# Patient Record
Sex: MALE | Race: WHITE | NOT HISPANIC OR LATINO | URBAN - METROPOLITAN AREA
[De-identification: names, ages, dates, MRNs, and addresses within clinical notes are randomized per-mention and may not be internally consistent; named-entity substitution may affect disease eponyms.]

---

## 2017-12-04 ENCOUNTER — OUTPATIENT (OUTPATIENT)
Dept: OUTPATIENT SERVICES | Facility: HOSPITAL | Age: 67
LOS: 1 days | Discharge: HOME | End: 2017-12-04

## 2017-12-06 ENCOUNTER — OUTPATIENT (OUTPATIENT)
Dept: OUTPATIENT SERVICES | Facility: HOSPITAL | Age: 67
LOS: 1 days | Discharge: HOME | End: 2017-12-06

## 2017-12-08 ENCOUNTER — OUTPATIENT (OUTPATIENT)
Dept: OUTPATIENT SERVICES | Facility: HOSPITAL | Age: 67
LOS: 1 days | Discharge: HOME | End: 2017-12-08

## 2017-12-11 ENCOUNTER — OUTPATIENT (OUTPATIENT)
Dept: OUTPATIENT SERVICES | Facility: HOSPITAL | Age: 67
LOS: 1 days | Discharge: HOME | End: 2017-12-11

## 2017-12-15 ENCOUNTER — OUTPATIENT (OUTPATIENT)
Dept: OUTPATIENT SERVICES | Facility: HOSPITAL | Age: 67
LOS: 1 days | Discharge: HOME | End: 2017-12-15

## 2017-12-18 ENCOUNTER — OUTPATIENT (OUTPATIENT)
Dept: OUTPATIENT SERVICES | Facility: HOSPITAL | Age: 67
LOS: 1 days | Discharge: HOME | End: 2017-12-18

## 2017-12-22 ENCOUNTER — OUTPATIENT (OUTPATIENT)
Dept: OUTPATIENT SERVICES | Facility: HOSPITAL | Age: 67
LOS: 1 days | Discharge: HOME | End: 2017-12-22

## 2022-09-14 ENCOUNTER — OFFICE VISIT (OUTPATIENT)
Dept: FAMILY MEDICINE CLINIC | Facility: CLINIC | Age: 72
End: 2022-09-14
Payer: COMMERCIAL

## 2022-09-14 ENCOUNTER — APPOINTMENT (OUTPATIENT)
Dept: LAB | Facility: CLINIC | Age: 72
End: 2022-09-14
Payer: COMMERCIAL

## 2022-09-14 VITALS
HEART RATE: 92 BPM | BODY MASS INDEX: 35.46 KG/M2 | HEIGHT: 68 IN | SYSTOLIC BLOOD PRESSURE: 110 MMHG | DIASTOLIC BLOOD PRESSURE: 72 MMHG | WEIGHT: 234 LBS | OXYGEN SATURATION: 99 %

## 2022-09-14 DIAGNOSIS — Z76.89 ENCOUNTER TO ESTABLISH CARE: Primary | ICD-10-CM

## 2022-09-14 DIAGNOSIS — Z11.59 NEED FOR HEPATITIS C SCREENING TEST: ICD-10-CM

## 2022-09-14 DIAGNOSIS — G89.29 CHRONIC LEFT SHOULDER PAIN: ICD-10-CM

## 2022-09-14 DIAGNOSIS — I10 ESSENTIAL HYPERTENSION: ICD-10-CM

## 2022-09-14 DIAGNOSIS — G47.33 OSA (OBSTRUCTIVE SLEEP APNEA): ICD-10-CM

## 2022-09-14 DIAGNOSIS — J45.40 MODERATE PERSISTENT ASTHMA, UNSPECIFIED WHETHER COMPLICATED: ICD-10-CM

## 2022-09-14 DIAGNOSIS — E78.2 MIXED HYPERLIPIDEMIA: ICD-10-CM

## 2022-09-14 DIAGNOSIS — M25.50 POLYARTHRALGIA: ICD-10-CM

## 2022-09-14 DIAGNOSIS — Z12.5 PROSTATE CANCER SCREENING: ICD-10-CM

## 2022-09-14 DIAGNOSIS — M25.512 CHRONIC LEFT SHOULDER PAIN: ICD-10-CM

## 2022-09-14 DIAGNOSIS — E03.9 HYPOTHYROIDISM, UNSPECIFIED TYPE: ICD-10-CM

## 2022-09-14 DIAGNOSIS — M19.90 ARTHRITIS: ICD-10-CM

## 2022-09-14 DIAGNOSIS — Z23 NEED FOR PNEUMOCOCCAL VACCINATION: ICD-10-CM

## 2022-09-14 DIAGNOSIS — Z12.11 SCREENING FOR COLON CANCER: ICD-10-CM

## 2022-09-14 LAB
ALBUMIN SERPL BCP-MCNC: 4 G/DL (ref 3.5–5)
ALP SERPL-CCNC: 91 U/L (ref 46–116)
ALT SERPL W P-5'-P-CCNC: 62 U/L (ref 12–78)
ANION GAP SERPL CALCULATED.3IONS-SCNC: 4 MMOL/L (ref 4–13)
AST SERPL W P-5'-P-CCNC: 35 U/L (ref 5–45)
BASOPHILS # BLD AUTO: 0.08 THOUSANDS/ΜL (ref 0–0.1)
BASOPHILS NFR BLD AUTO: 1 % (ref 0–1)
BILIRUB SERPL-MCNC: 0.33 MG/DL (ref 0.2–1)
BUN SERPL-MCNC: 26 MG/DL (ref 5–25)
CALCIUM SERPL-MCNC: 9.8 MG/DL (ref 8.3–10.1)
CHLORIDE SERPL-SCNC: 105 MMOL/L (ref 96–108)
CHOLEST SERPL-MCNC: 171 MG/DL
CO2 SERPL-SCNC: 28 MMOL/L (ref 21–32)
CREAT SERPL-MCNC: 1.36 MG/DL (ref 0.6–1.3)
CREAT UR-MCNC: 296 MG/DL
CRP SERPL QL: 3.3 MG/L
EOSINOPHIL # BLD AUTO: 0.15 THOUSAND/ΜL (ref 0–0.61)
EOSINOPHIL NFR BLD AUTO: 2 % (ref 0–6)
ERYTHROCYTE [DISTWIDTH] IN BLOOD BY AUTOMATED COUNT: 13.8 % (ref 11.6–15.1)
GFR SERPL CREATININE-BSD FRML MDRD: 52 ML/MIN/1.73SQ M
GLUCOSE P FAST SERPL-MCNC: 115 MG/DL (ref 65–99)
HCT VFR BLD AUTO: 46.5 % (ref 36.5–49.3)
HDLC SERPL-MCNC: 31 MG/DL
HGB BLD-MCNC: 14.8 G/DL (ref 12–17)
IMM GRANULOCYTES # BLD AUTO: 0.06 THOUSAND/UL (ref 0–0.2)
IMM GRANULOCYTES NFR BLD AUTO: 1 % (ref 0–2)
LDLC SERPL CALC-MCNC: 89 MG/DL (ref 0–100)
LYMPHOCYTES # BLD AUTO: 1.55 THOUSANDS/ΜL (ref 0.6–4.47)
LYMPHOCYTES NFR BLD AUTO: 19 % (ref 14–44)
MCH RBC QN AUTO: 30 PG (ref 26.8–34.3)
MCHC RBC AUTO-ENTMCNC: 31.8 G/DL (ref 31.4–37.4)
MCV RBC AUTO: 94 FL (ref 82–98)
MICROALBUMIN UR-MCNC: 22.9 MG/L (ref 0–20)
MICROALBUMIN/CREAT 24H UR: 8 MG/G CREATININE (ref 0–30)
MONOCYTES # BLD AUTO: 0.65 THOUSAND/ΜL (ref 0.17–1.22)
MONOCYTES NFR BLD AUTO: 8 % (ref 4–12)
NEUTROPHILS # BLD AUTO: 5.72 THOUSANDS/ΜL (ref 1.85–7.62)
NEUTS SEG NFR BLD AUTO: 69 % (ref 43–75)
NONHDLC SERPL-MCNC: 140 MG/DL
NRBC BLD AUTO-RTO: 0 /100 WBCS
PLATELET # BLD AUTO: 274 THOUSANDS/UL (ref 149–390)
PMV BLD AUTO: 9.6 FL (ref 8.9–12.7)
POTASSIUM SERPL-SCNC: 4.7 MMOL/L (ref 3.5–5.3)
PROT SERPL-MCNC: 7.5 G/DL (ref 6.4–8.4)
PSA SERPL-MCNC: 1.6 NG/ML (ref 0–4)
RBC # BLD AUTO: 4.93 MILLION/UL (ref 3.88–5.62)
SODIUM SERPL-SCNC: 137 MMOL/L (ref 135–147)
TRIGL SERPL-MCNC: 255 MG/DL
TSH SERPL DL<=0.05 MIU/L-ACNC: 0.92 UIU/ML (ref 0.45–4.5)
WBC # BLD AUTO: 8.21 THOUSAND/UL (ref 4.31–10.16)

## 2022-09-14 PROCEDURE — 90677 PCV20 VACCINE IM: CPT | Performed by: FAMILY MEDICINE

## 2022-09-14 PROCEDURE — 36415 COLL VENOUS BLD VENIPUNCTURE: CPT

## 2022-09-14 PROCEDURE — 86430 RHEUMATOID FACTOR TEST QUAL: CPT

## 2022-09-14 PROCEDURE — 86803 HEPATITIS C AB TEST: CPT

## 2022-09-14 PROCEDURE — 85025 COMPLETE CBC W/AUTO DIFF WBC: CPT

## 2022-09-14 PROCEDURE — 3288F FALL RISK ASSESSMENT DOCD: CPT | Performed by: FAMILY MEDICINE

## 2022-09-14 PROCEDURE — 82570 ASSAY OF URINE CREATININE: CPT | Performed by: FAMILY MEDICINE

## 2022-09-14 PROCEDURE — 3725F SCREEN DEPRESSION PERFORMED: CPT | Performed by: FAMILY MEDICINE

## 2022-09-14 PROCEDURE — 86140 C-REACTIVE PROTEIN: CPT

## 2022-09-14 PROCEDURE — 84443 ASSAY THYROID STIM HORMONE: CPT | Performed by: FAMILY MEDICINE

## 2022-09-14 PROCEDURE — 80053 COMPREHEN METABOLIC PANEL: CPT

## 2022-09-14 PROCEDURE — 86038 ANTINUCLEAR ANTIBODIES: CPT

## 2022-09-14 PROCEDURE — 80061 LIPID PANEL: CPT

## 2022-09-14 PROCEDURE — 90471 IMMUNIZATION ADMIN: CPT | Performed by: FAMILY MEDICINE

## 2022-09-14 PROCEDURE — 1101F PT FALLS ASSESS-DOCD LE1/YR: CPT | Performed by: FAMILY MEDICINE

## 2022-09-14 PROCEDURE — 82043 UR ALBUMIN QUANTITATIVE: CPT | Performed by: FAMILY MEDICINE

## 2022-09-14 PROCEDURE — 99204 OFFICE O/P NEW MOD 45 MIN: CPT | Performed by: FAMILY MEDICINE

## 2022-09-14 PROCEDURE — 86200 CCP ANTIBODY: CPT

## 2022-09-14 PROCEDURE — G0103 PSA SCREENING: HCPCS

## 2022-09-14 RX ORDER — TRAZODONE HYDROCHLORIDE 50 MG/1
TABLET ORAL
COMMUNITY
Start: 2022-08-25 | End: 2022-10-16

## 2022-09-14 RX ORDER — MODAFINIL 200 MG/1
TABLET ORAL
COMMUNITY
Start: 2022-06-10

## 2022-09-14 RX ORDER — TAMSULOSIN HYDROCHLORIDE 0.4 MG/1
CAPSULE ORAL
COMMUNITY
Start: 2022-08-11

## 2022-09-14 RX ORDER — ENALAPRIL MALEATE 10 MG/1
10 TABLET ORAL DAILY
COMMUNITY

## 2022-09-14 RX ORDER — ALBUTEROL SULFATE 90 UG/1
2 AEROSOL, METERED RESPIRATORY (INHALATION) EVERY 4 HOURS PRN
COMMUNITY

## 2022-09-14 RX ORDER — LEVOTHYROXINE SODIUM 0.1 MG/1
TABLET ORAL
COMMUNITY
Start: 2022-08-11

## 2022-09-14 RX ORDER — SIMVASTATIN 10 MG
10 TABLET ORAL
Status: ON HOLD | COMMUNITY
Start: 2022-08-12 | End: 2022-10-16 | Stop reason: SDUPTHER

## 2022-09-14 RX ORDER — PRAMIPEXOLE DIHYDROCHLORIDE 1 MG/1
2 TABLET ORAL
COMMUNITY
Start: 2022-08-15

## 2022-09-14 RX ORDER — MONTELUKAST SODIUM 10 MG/1
TABLET ORAL
COMMUNITY
Start: 2022-08-11

## 2022-09-14 RX ORDER — LAMOTRIGINE 200 MG/1
TABLET ORAL
COMMUNITY
Start: 2022-08-15

## 2022-09-14 NOTE — PROGRESS NOTES
Assessment/Plan:   Diagnoses and all orders for this visit:    Encounter to establish care  - reviewed PMHx, PSHx, meds, allergies, FHx, Soc Hx  - due for screening labs - script given   - UTD with COVID IMMs and Booster x1   - UTD with Tdap (within the past 10yrs)   - due for PCV20 and will get in the office today  - RTO in 10/2022 for annual Flu vaccine - pt aware and agreeable  - last Colon Ca screening was >10yrs ago - referred to GI for screening Cscope  - discussed prostate ca screening - pt interested - PSA ordered    BMI 35 0-35 9,adult  -     Ambulatory Referral to Nutrition Services; Future  -     Ambulatory Referral to Weight Management; Future  - BMI 35 6  - has been steadily gaining weight and interested in weight loss options  - discussed diet and encouraged exercise  - referral given to Nutritionist and to Weight Management     Chronic left shoulder pain  -     Ambulatory Referral to Orthopedic Surgery; Future  Arthritis  -     Ambulatory Referral to Orthopedic Surgery; Future  Polyarthralgia  -     DENIS Screen w/ Reflex to Titer/Pattern; Future  -     RF Screen w/ Reflex to Titer; Future  -     Ambulatory Referral to Rheumatology; Future  -     C-reactive protein; Future  -     Cyclic citrul peptide antibody, IgG; Future    Moderate persistent asthma, unspecified whether complicated  -     Ambulatory Referral to Pulmonology; Future  DONALD (obstructive sleep apnea)  -     Ambulatory Referral to Sleep Medicine; Future  -     CBC and differential; Future  Need for pneumococcal vaccination  -     Pneumococcal Conjugate Vaccine 20-valent (Pcv20)    Screening for colon cancer  -     Ambulatory Referral to Gastroenterology; Future    Prostate cancer screening  -     PSA, Total Screen; Future    Need for hepatitis C screening test  -     Hepatitis C antibody;  Future    Hypothyroidism, unspecified type  -     TSH, 3rd generation with Free T4 reflex    Essential hypertension  -     Comprehensive metabolic panel; Future  -     Microalbumin / creatinine urine ratio  -     Ambulatory Referral to Ophthalmology; Future  - BP stable in the office today   - cont ACEI 10mg QD  - due for screening labs - script given   - referred to Optho for annual hypertensive eye exam - advised to schedule     Mixed hyperlipidemia  -     Lipid panel; Future  - cont statin     Other orders  -     Trelegy Ellipta 200-62 5-25 MCG/INH AEPB inhaler; Inhale 1 puff daily  -     lamoTRIgine (LaMICtal) 200 MG tablet  -     levothyroxine 100 mcg tablet  -     modafinil (PROVIGIL) 200 MG tablet  -     montelukast (SINGULAIR) 10 mg tablet  -     tamsulosin (FLOMAX) 0 4 mg  -     pramipexole (MIRAPEX) 1 mg tablet; Take 2 mg by mouth daily at bedtime  -     simvastatin (ZOCOR) 10 mg tablet; 10 mg  -     traZODone (DESYREL) 50 mg tablet  -     enalapril (VASOTEC) 10 mg tablet; Take 10 mg by mouth daily  -     albuterol (PROVENTIL HFA,VENTOLIN HFA) 90 mcg/act inhaler; Inhale 2 puffs every 4 (four) hours as needed for wheezing          Subjective:    Patient ID: Zoila Mosqueda is a 81 y o  male    Zoila Mosqueda is a 81 y o  male who presents to the office with his wife to establish care   - prior PCP: [facility] - per pt, his last annual exam was within the past year   - Specialists: Ortho, Pulm, Psych   - PMHx: Depression, BPH, HL, HTN, asthma, DONALD on CPAP (does not recall last sleep study), seasonal allergies, hypothyroidism, BMI 35  - allergies: Spiriva - rash  - Meds: see med rec   - PSHx: umbilical hernia repair, b/l cataract surgery   - FHx: M (HTN, Depression), F (Arthritis, Depression), PU (MI at 29yo), denies FHx of colon/prostate ca   - Immunizations: UTD with COVID IMMs and Booster x1, Tdap within the past 10yrs, interested in PCV20 and will get in the office today   - Hm: last Cscope was 15yrs, interested in prostate cancer screening   - diet/exercise: not exercising, diet: unbalanced diet - drinks on ave 10cans of diet coke - has been gaining weight  - social: denies tob/illicits/EtOH  - last vision: last Optho was >1yr ago   - last dental: goes Q6months   - ROS: today in the office pt denies F/C/N/V/HA/visual changes/CP/palpitations/SOB/wheezing/abd pain/D/LE edema       The following portions of the patient's history were reviewed and updated as appropriate: allergies, current medications, past family history, past medical history, past social history, past surgical history and problem list     Review of Systems  as per HPI    Objective:  /72   Pulse 92   Ht 5' 8" (1 727 m)   Wt 106 kg (234 lb)   SpO2 99%   BMI 35 58 kg/m²    Physical Exam  Vitals reviewed  Constitutional:       General: He is not in acute distress  Appearance: Normal appearance  He is obese  He is not ill-appearing, toxic-appearing or diaphoretic  HENT:      Head: Normocephalic and atraumatic  Right Ear: External ear normal       Left Ear: External ear normal       Nose: Nose normal    Eyes:      General: No scleral icterus  Right eye: No discharge  Left eye: No discharge  Extraocular Movements: Extraocular movements intact  Conjunctiva/sclera: Conjunctivae normal    Cardiovascular:      Rate and Rhythm: Normal rate and regular rhythm  Pulmonary:      Effort: Pulmonary effort is normal  No respiratory distress  Breath sounds: No stridor  Examination of the right-lower field reveals decreased breath sounds  Decreased breath sounds present  No wheezing, rhonchi or rales  Abdominal:      Palpations: Abdomen is soft  Musculoskeletal:      Cervical back: Normal range of motion  Right lower leg: No edema  Left lower leg: No edema  Comments: B/l UE ROM limited 2/2 pain    Skin:     General: Skin is warm  Neurological:      General: No focal deficit present  Mental Status: He is alert and oriented to person, place, and time     Psychiatric:         Mood and Affect: Mood normal          Behavior: Behavior normal  BMI Counseling: Body mass index is 35 58 kg/m²  The BMI is above normal  Nutrition recommendations include 3-5 servings of fruits/vegetables daily  Exercise recommendations include exercising 3-5 times per week  Patient referred to nutritionist due to patient being obese  Patient referred to weight management due to patient being obese  Depression Screening Follow-up Plan: Patient's depression screening was positive with a PHQ-2 score of 0  Their PHQ-9 score was   Continue regular follow-up with their psychologist/therapist/psychiatrist who is managing their mental health condition(s)

## 2022-09-15 ENCOUNTER — TELEPHONE (OUTPATIENT)
Dept: FAMILY MEDICINE CLINIC | Facility: CLINIC | Age: 72
End: 2022-09-15

## 2022-09-15 LAB
HCV AB SER QL: NORMAL
RHEUMATOID FACT SER QL LA: NEGATIVE

## 2022-09-15 NOTE — TELEPHONE ENCOUNTER
Jade Raymond is calling for Estrella Bees   Dr Mardelle Nageotte referred Estrella Bees to pulmonology  Pulmonology is requesting a chest x-ray be done before he is seen    Asking if Dr Mardelle Nageotte will order one for Estrella Bees

## 2022-09-16 ENCOUNTER — TELEPHONE (OUTPATIENT)
Dept: FAMILY MEDICINE CLINIC | Facility: CLINIC | Age: 72
End: 2022-09-16

## 2022-09-16 DIAGNOSIS — R79.89 ELEVATED SERUM CREATININE: ICD-10-CM

## 2022-09-16 DIAGNOSIS — R80.9 URINE TEST POSITIVE FOR MICROALBUMINURIA: ICD-10-CM

## 2022-09-16 DIAGNOSIS — I10 ESSENTIAL HYPERTENSION: Primary | ICD-10-CM

## 2022-09-16 DIAGNOSIS — J45.40 MODERATE PERSISTENT ASTHMA, UNSPECIFIED WHETHER COMPLICATED: Primary | ICD-10-CM

## 2022-09-16 DIAGNOSIS — E78.1 HYPERTRIGLYCERIDEMIA: ICD-10-CM

## 2022-09-16 DIAGNOSIS — R73.01 ELEVATED FASTING BLOOD SUGAR: ICD-10-CM

## 2022-09-16 LAB
CCP AB SER IA-ACNC: 0.5
RYE IGE QN: NEGATIVE

## 2022-09-16 NOTE — TELEPHONE ENCOUNTER
Pt called regarding the labs he just got a few days ago  Pt would like to know was there anything in the labs that would be affiliated with rheumatoid arthritis  Please advise pt

## 2022-09-20 ENCOUNTER — TELEPHONE (OUTPATIENT)
Dept: FAMILY MEDICINE CLINIC | Facility: CLINIC | Age: 72
End: 2022-09-20

## 2022-09-20 NOTE — TELEPHONE ENCOUNTER
Pt called regarding his rheumatoid arthritis lab that he got done on 9/14/22  He said his joint pain is getting worse  Please advise pt when labs have been reviewed

## 2022-09-21 NOTE — TELEPHONE ENCOUNTER
Spoke with pt and advised OTC Tylenol for now for pain control (limit to total of 3g/day)   Caution with NSAIDs as noted to have elevated kidney function tests on CMP - no other labs with which to compare  Advised to schedule with Rheum (can be out of network if can get earlier appt) - pt aware and agreeable

## 2022-09-26 ENCOUNTER — APPOINTMENT (OUTPATIENT)
Dept: RADIOLOGY | Facility: CLINIC | Age: 72
End: 2022-09-26
Payer: COMMERCIAL

## 2022-09-26 VITALS — WEIGHT: 231 LBS | BODY MASS INDEX: 35.01 KG/M2 | HEIGHT: 68 IN

## 2022-09-26 DIAGNOSIS — G89.29 CHRONIC RIGHT SHOULDER PAIN: Primary | ICD-10-CM

## 2022-09-26 DIAGNOSIS — M25.511 CHRONIC RIGHT SHOULDER PAIN: Primary | ICD-10-CM

## 2022-09-26 DIAGNOSIS — M25.512 CHRONIC LEFT SHOULDER PAIN: ICD-10-CM

## 2022-09-26 DIAGNOSIS — M19.90 ARTHRITIS: ICD-10-CM

## 2022-09-26 DIAGNOSIS — M19.011 PRIMARY OSTEOARTHRITIS OF RIGHT SHOULDER: ICD-10-CM

## 2022-09-26 DIAGNOSIS — G89.29 CHRONIC LEFT SHOULDER PAIN: ICD-10-CM

## 2022-09-26 DIAGNOSIS — J45.40 MODERATE PERSISTENT ASTHMA, UNSPECIFIED WHETHER COMPLICATED: ICD-10-CM

## 2022-09-26 PROCEDURE — 1160F RVW MEDS BY RX/DR IN RCRD: CPT | Performed by: ORTHOPAEDIC SURGERY

## 2022-09-26 PROCEDURE — 71046 X-RAY EXAM CHEST 2 VIEWS: CPT

## 2022-09-26 PROCEDURE — 73030 X-RAY EXAM OF SHOULDER: CPT

## 2022-09-26 PROCEDURE — 20610 DRAIN/INJ JOINT/BURSA W/O US: CPT | Performed by: ORTHOPAEDIC SURGERY

## 2022-09-26 PROCEDURE — 99203 OFFICE O/P NEW LOW 30 MIN: CPT | Performed by: ORTHOPAEDIC SURGERY

## 2022-09-26 RX ORDER — TRIAMCINOLONE ACETONIDE 40 MG/ML
80 INJECTION, SUSPENSION INTRA-ARTICULAR; INTRAMUSCULAR
Status: COMPLETED | OUTPATIENT
Start: 2022-09-26 | End: 2022-09-26

## 2022-09-26 RX ORDER — BUPIVACAINE HYDROCHLORIDE 2.5 MG/ML
4 INJECTION, SOLUTION INFILTRATION; PERINEURAL
Status: COMPLETED | OUTPATIENT
Start: 2022-09-26 | End: 2022-09-26

## 2022-09-26 RX ADMIN — TRIAMCINOLONE ACETONIDE 80 MG: 40 INJECTION, SUSPENSION INTRA-ARTICULAR; INTRAMUSCULAR at 09:23

## 2022-09-26 RX ADMIN — BUPIVACAINE HYDROCHLORIDE 4 ML: 2.5 INJECTION, SOLUTION INFILTRATION; PERINEURAL at 09:23

## 2022-09-26 NOTE — PROGRESS NOTES
Assessment:  1  Chronic right shoulder pain  XR shoulder 2+ vw right    Ambulatory referral to Orthopedic Surgery   2  Chronic left shoulder pain  Ambulatory Referral to Orthopedic Surgery    XR shoulder 2+ vw left   3  Arthritis  Ambulatory Referral to Orthopedic Surgery   4  Primary osteoarthritis of right shoulder  Ambulatory referral to Orthopedic Surgery       Plan:  Right shoulder osteoarthritis  The patient has on-going right shoulder pain and stiffness  He displays limited ROM with crepitus on exam   Radiograph displays severe arthritic changes  He is provided with right glenohumeral steroid injection  He tolerated the procedure well  He understands this injection would require a 3 month wait until surgery can be considered  He is referred to [facility] for total shoulder arthroplasty consideration  To do next visit:  Return if symptoms worsen or fail to improve  The above stated was discussed in layman's terms and the patient expressed understanding  All questions were answered to the patient's satisfaction  The patient has advanced arthritis of his right shoulder  Ultimately, at some point, will need surgical intervention including shoulder replacement surgery  The patient requested an injection  This occurred via an intra-articular approach with Kenbautista and Luz Elena  The patient understands that he will not be able to have any shoulder surgery for 3 months due to the injection  If his condition changes, he will not hesitate to let us know  Physical exam shows diffuse tenderness throughout the shoulder with glenohumeral crepitation and limited range of motion  X-rays do show advanced arthritic changes      Scribe Attestation    I,:  Dara Casey am acting as a scribe while in the presence of the attending physician :       I,:  Valerie Mills, DO personally performed the services described in this documentation    as scribed in my presence :             Subjective:    Henry Omer Ralf Troy is a 81 y o  male who presents for initial evaluation of right shoulder  He has had symptoms for several years with out injury  Today he complains of severe right shoulder generalized pain  Most activity aggravates while rest alleviates  He has had several injections in past in [place]  Review of systems negative unless otherwise specified in HPI    History reviewed  No pertinent past medical history  History reviewed  No pertinent surgical history      Family History   Problem Relation Age of Onset    Hypertension Mother     Depression Mother     Depression Father     Arthritis Father     Heart attack Paternal Uncle 32    Colon cancer Neg Hx     Prostate cancer Neg Hx        Social History     Occupational History    Not on file   Tobacco Use    Smoking status: Never Smoker    Smokeless tobacco: Never Used   Vaping Use    Vaping Use: Never used   Substance and Sexual Activity    Alcohol use: Never     Comment: socially    Drug use: Never    Sexual activity: Not on file         Current Outpatient Medications:     albuterol (PROVENTIL HFA,VENTOLIN HFA) 90 mcg/act inhaler, Inhale 2 puffs every 4 (four) hours as needed for wheezing, Disp: , Rfl:     enalapril (VASOTEC) 10 mg tablet, Take 10 mg by mouth daily, Disp: , Rfl:     lamoTRIgine (LaMICtal) 200 MG tablet, , Disp: , Rfl:     levothyroxine 100 mcg tablet, , Disp: , Rfl:     modafinil (PROVIGIL) 200 MG tablet, , Disp: , Rfl:     montelukast (SINGULAIR) 10 mg tablet, , Disp: , Rfl:     pramipexole (MIRAPEX) 1 mg tablet, Take 2 mg by mouth daily at bedtime, Disp: , Rfl:     simvastatin (ZOCOR) 10 mg tablet, 10 mg, Disp: , Rfl:     tamsulosin (FLOMAX) 0 4 mg, , Disp: , Rfl:     traZODone (DESYREL) 50 mg tablet, , Disp: , Rfl:     Trelegy Ellipta 200-62 5-25 MCG/INH AEPB inhaler, Inhale 1 puff daily, Disp: , Rfl:     Allergies   Allergen Reactions    Spiriva Respimat [Tiotropium Bromide Monohydrate] Rash Vitals:       Objective:  Physical exam  · General: Awake, Alert, Oriented  · Eyes: Pupils equal, round and reactive to light  · Heart: regular rate and rhythm  · Lungs: No audible wheezing  · Abdomen: soft                    Ortho Exam  Right shoulder:  Limited ROM with pain  Crepitus with ROM of shoulder and elbow  Sensation intact       Diagnostics, reviewed and taken today if performed as documented: The attending physician has personally reviewed the pertinent films in PACS and interpretation is as follows:  Right shoulder x-ray:  Severe arthritic changes  Procedures, if performed today:    Large joint arthrocentesis: R glenohumeral  [place] Protocol:  Consent: Verbal consent obtained  Risks and benefits: risks, benefits and alternatives were discussed  Consent given by: patient  Time out: Immediately prior to procedure a "time out" was called to verify the correct patient, procedure, equipment, support staff and site/side marked as required  Timeout called at: 9/26/2022 9:20 AM   Patient understanding: patient states understanding of the procedure being performed  Site marked: the operative site was marked  Patient identity confirmed: verbally with patient    Supporting Documentation  Indications: pain   Procedure Details  Location: shoulder - R glenohumeral  Preparation: Patient was prepped and draped in the usual sterile fashion  Needle size: 22 G  Ultrasound guidance: no  Approach: anterolateral  Medications administered: 4 mL bupivacaine 0 25 %; 80 mg triamcinolone acetonide 40 mg/mL    Patient tolerance: patient tolerated the procedure well with no immediate complications  Dressing:  Sterile dressing applied             Portions of the record may have been created with voice recognition software  Occasional wrong word or "sound a like" substitutions may have occurred due to the inherent limitations of voice recognition software    Read the chart carefully and recognize, using context, where substitutions have occurred

## 2022-09-27 DIAGNOSIS — J45.20 MILD INTERMITTENT ASTHMA, UNSPECIFIED WHETHER COMPLICATED: Primary | ICD-10-CM

## 2022-10-04 ENCOUNTER — APPOINTMENT (OUTPATIENT)
Dept: RADIOLOGY | Facility: AMBULARY SURGERY CENTER | Age: 72
End: 2022-10-04
Attending: STUDENT IN AN ORGANIZED HEALTH CARE EDUCATION/TRAINING PROGRAM
Payer: COMMERCIAL

## 2022-10-04 ENCOUNTER — APPOINTMENT (OUTPATIENT)
Dept: LAB | Facility: AMBULARY SURGERY CENTER | Age: 72
End: 2022-10-04
Payer: COMMERCIAL

## 2022-10-04 VITALS
BODY MASS INDEX: 35.46 KG/M2 | WEIGHT: 234 LBS | SYSTOLIC BLOOD PRESSURE: 141 MMHG | DIASTOLIC BLOOD PRESSURE: 78 MMHG | HEART RATE: 71 BPM | HEIGHT: 68 IN

## 2022-10-04 DIAGNOSIS — M25.551 ACUTE HIP PAIN, BILATERAL: ICD-10-CM

## 2022-10-04 DIAGNOSIS — M25.551 PAIN IN RIGHT HIP: ICD-10-CM

## 2022-10-04 DIAGNOSIS — R79.89 ELEVATED SERUM CREATININE: ICD-10-CM

## 2022-10-04 DIAGNOSIS — R73.01 ELEVATED FASTING BLOOD SUGAR: ICD-10-CM

## 2022-10-04 DIAGNOSIS — M70.61 GREATER TROCHANTERIC BURSITIS OF BOTH HIPS: Primary | ICD-10-CM

## 2022-10-04 DIAGNOSIS — M25.552 ACUTE HIP PAIN, BILATERAL: ICD-10-CM

## 2022-10-04 DIAGNOSIS — M70.62 GREATER TROCHANTERIC BURSITIS OF BOTH HIPS: Primary | ICD-10-CM

## 2022-10-04 DIAGNOSIS — R80.9 URINE TEST POSITIVE FOR MICROALBUMINURIA: ICD-10-CM

## 2022-10-04 DIAGNOSIS — M25.552 PAIN IN LEFT HIP: ICD-10-CM

## 2022-10-04 LAB
ANION GAP SERPL CALCULATED.3IONS-SCNC: 6 MMOL/L (ref 4–13)
BUN SERPL-MCNC: 23 MG/DL (ref 5–25)
CALCIUM SERPL-MCNC: 9 MG/DL (ref 8.3–10.1)
CHLORIDE SERPL-SCNC: 101 MMOL/L (ref 96–108)
CO2 SERPL-SCNC: 27 MMOL/L (ref 21–32)
CREAT SERPL-MCNC: 1.18 MG/DL (ref 0.6–1.3)
CREAT UR-MCNC: 147 MG/DL
EST. AVERAGE GLUCOSE BLD GHB EST-MCNC: 137 MG/DL
GFR SERPL CREATININE-BSD FRML MDRD: 62 ML/MIN/1.73SQ M
GLUCOSE P FAST SERPL-MCNC: 114 MG/DL (ref 65–99)
HBA1C MFR BLD: 6.4 %
MICROALBUMIN UR-MCNC: 8.2 MG/L (ref 0–20)
MICROALBUMIN/CREAT 24H UR: 6 MG/G CREATININE (ref 0–30)
POTASSIUM SERPL-SCNC: 4.3 MMOL/L (ref 3.5–5.3)
SODIUM SERPL-SCNC: 134 MMOL/L (ref 135–147)

## 2022-10-04 PROCEDURE — 80048 BASIC METABOLIC PNL TOTAL CA: CPT

## 2022-10-04 PROCEDURE — 82570 ASSAY OF URINE CREATININE: CPT

## 2022-10-04 PROCEDURE — 99214 OFFICE O/P EST MOD 30 MIN: CPT | Performed by: STUDENT IN AN ORGANIZED HEALTH CARE EDUCATION/TRAINING PROGRAM

## 2022-10-04 PROCEDURE — 36415 COLL VENOUS BLD VENIPUNCTURE: CPT

## 2022-10-04 PROCEDURE — 20610 DRAIN/INJ JOINT/BURSA W/O US: CPT | Performed by: STUDENT IN AN ORGANIZED HEALTH CARE EDUCATION/TRAINING PROGRAM

## 2022-10-04 PROCEDURE — 82043 UR ALBUMIN QUANTITATIVE: CPT

## 2022-10-04 PROCEDURE — 83036 HEMOGLOBIN GLYCOSYLATED A1C: CPT

## 2022-10-04 PROCEDURE — 73522 X-RAY EXAM HIPS BI 3-4 VIEWS: CPT

## 2022-10-04 RX ORDER — BUPIVACAINE HYDROCHLORIDE 2.5 MG/ML
1 INJECTION, SOLUTION INFILTRATION; PERINEURAL
Status: COMPLETED | OUTPATIENT
Start: 2022-10-04 | End: 2022-10-04

## 2022-10-04 RX ORDER — METHYLPREDNISOLONE ACETATE 40 MG/ML
1 INJECTION, SUSPENSION INTRA-ARTICULAR; INTRALESIONAL; INTRAMUSCULAR; SOFT TISSUE
Status: COMPLETED | OUTPATIENT
Start: 2022-10-04 | End: 2022-10-04

## 2022-10-04 RX ORDER — MELOXICAM 15 MG/1
15 TABLET ORAL DAILY
Qty: 30 TABLET | Refills: 1 | Status: SHIPPED | OUTPATIENT
Start: 2022-10-04

## 2022-10-04 RX ADMIN — METHYLPREDNISOLONE ACETATE 1 ML: 40 INJECTION, SUSPENSION INTRA-ARTICULAR; INTRALESIONAL; INTRAMUSCULAR; SOFT TISSUE at 10:19

## 2022-10-04 RX ADMIN — BUPIVACAINE HYDROCHLORIDE 1 ML: 2.5 INJECTION, SOLUTION INFILTRATION; PERINEURAL at 10:19

## 2022-10-04 NOTE — PROGRESS NOTES
Orthopaedics Office Visit - New Patient Visit    ASSESSMENT/PLAN:    Assessment:   Bilateral greater trochanteric bursitis     Plan:   1  Patient to be weight-bearing as tolerated, activity as tolerated to bilateral lower extremities  2  Conservative management of trochanteric bursitis was discussed with the patient at length  Patient expressed understanding  Patient would like to proceed with corticosteroid injections in the bilateral trochanteric bursae:  3  Bilateral corticosteroid injection's give, procedure tolerated well  Post injection protocol advised  4  Patient is seeing   [facility] for bilateral shoulder arthritis  5  Meloxicam 15 mg once daily was prescribed to the patient  Risk of medications discussed  6  Patient can follow-up on an as-needed basis for trochanteric bursitis   To Do Next Visit:  None    _____________________________________________________  CHIEF COMPLAINT:  Chief Complaint   Patient presents with    Left Hip - Pain    Right Hip - Pain    Left Shoulder - Pain         SUBJECTIVE:  Elle Chavez is a 81 y o  male who presents today for initial evaluation of bilateral hip pain  Patient states his pain started about 3 months ago  He denies any mechanism of injury or trauma  He indicates the greater trochanter as the location of his pain bilaterally  He denies any groin pain  He describes his pain as a dull and constant ache  Prolonged weight-bearing aggravates while rest alleviates  He also notes 'unsteadiness' when he is standing  He has not tried any previous treatments  PAST MEDICAL HISTORY:  History reviewed  No pertinent past medical history  PAST SURGICAL HISTORY:  History reviewed  No pertinent surgical history      FAMILY HISTORY:  Family History   Problem Relation Age of Onset    Hypertension Mother     Depression Mother     Depression Father     Arthritis Father     Heart attack Paternal Uncle 32    Colon cancer Neg Hx     Prostate cancer Neg Hx SOCIAL HISTORY:  Social History     Tobacco Use    Smoking status: Never Smoker    Smokeless tobacco: Never Used   Vaping Use    Vaping Use: Never used   Substance Use Topics    Alcohol use: Never     Comment: socially    Drug use: Never       MEDICATIONS:    Current Outpatient Medications:     albuterol (PROVENTIL HFA,VENTOLIN HFA) 90 mcg/act inhaler, Inhale 2 puffs every 4 (four) hours as needed for wheezing, Disp: , Rfl:     enalapril (VASOTEC) 10 mg tablet, Take 10 mg by mouth daily, Disp: , Rfl:     lamoTRIgine (LaMICtal) 200 MG tablet, , Disp: , Rfl:     levothyroxine 100 mcg tablet, , Disp: , Rfl:     modafinil (PROVIGIL) 200 MG tablet, , Disp: , Rfl:     montelukast (SINGULAIR) 10 mg tablet, , Disp: , Rfl:     pramipexole (MIRAPEX) 1 mg tablet, Take 2 mg by mouth daily at bedtime, Disp: , Rfl:     simvastatin (ZOCOR) 10 mg tablet, 10 mg, Disp: , Rfl:     tamsulosin (FLOMAX) 0 4 mg, , Disp: , Rfl:     traZODone (DESYREL) 50 mg tablet, , Disp: , Rfl:     Trelegy Ellipta 200-62 5-25 MCG/INH AEPB inhaler, Inhale 1 puff daily, Disp: 60 blister, Rfl: 2    ALLERGIES:  Allergies   Allergen Reactions    Spiriva Respimat [Tiotropium Bromide Monohydrate] Rash       REVIEW OF SYSTEMS:  MSK: bilateral hip  Neuro: no paraesthesias, numbness or tingling  Pertinent items are otherwise noted in HPI  A comprehensive review of systems was otherwise negative      LABS:  HgA1c: No results found for: HGBA1C  BMP:   Lab Results   Component Value Date    CALCIUM 9 8 09/14/2022    K 4 7 09/14/2022    CO2 28 09/14/2022     09/14/2022    BUN 26 (H) 09/14/2022    CREATININE 1 36 (H) 09/14/2022     CBC: No components found for: CBC    _____________________________________________________  PHYSICAL EXAMINATION:  Vital signs: /78   Pulse 71   Ht 5' 8" (1 727 m)   Wt 106 kg (234 lb)   BMI 35 58 kg/m²   General: No acute distress, awake and alert  Psychiatric: Mood and affect appear appropriate  HEENT: Trachea Midline, No torticollis, no apparent facial trauma  Cardiovascular: No audible murmurs; Extremities appear perfused  Pulmonary: No audible wheezing or stridor  Skin: No open lesions; see further details (if any) below    MUSCULOSKELETAL EXAMINATION:  Extremities: The right hip was exposed inspected  No overlying skin lesions or ecchymosis  Pain with resisted hip abduction No masses  Patient's hip range of motion flexion extension was full  Patient has limited internal rotation with some pain with internal rotation to approximately 15-20 degrees  Patient neurovascularly intact distally  The left hip was exposed inspected  No overlying skin lesions or ecchymosis  Pain with resisted hip abduction No masses  Patient's hip range of motion flexion extension was full  Patient has limited internal rotation with some pain with internal rotation to approximately 20 degrees  Patient neurovascularly intact distally  _____________________________________________________  STUDIES REVIEWED:  I personally reviewed the images and interpretation is as follows:  X-rays of the pelvis and bilateral hips demonstrated mild-to-moderate osteoarthritic changes in the hip joints with mild joint space narrowing  No acute fractures dislocations  No osseous lesions  PROCEDURES PERFORMED:  Large joint arthrocentesis: bilateral greater trochanteric bursa  Universal Protocol:  Consent: Verbal consent obtained    Risks and benefits: risks, benefits and alternatives were discussed  Consent given by: patient  Timeout called at: 10/4/2022 10:18 AM   Patient understanding: patient states understanding of the procedure being performed  Patient identity confirmed: verbally with patient    Supporting Documentation  Indications: pain and diagnostic evaluation   Procedure Details  Location: hip - bilateral greater trochanteric bursa  Ultrasound guidance: no    Medications (Right): 1 mL bupivacaine 0 25 %; 1 mL methylPREDNISolone acetate 40 mg/mLMedications (Left): 1 mL bupivacaine 0 25 %; 1 mL methylPREDNISolone acetate 40 mg/mL   Patient tolerance: patient tolerated the procedure well with no immediate complications  Dressing:  Sterile dressing applied          Scribe Attestation    I,:  Shannan Huerta am acting as a scribe while in the presence of the attending physician :       I,:  Lydia Doan DO personally performed the services described in this documentation    as scribed in my presence :

## 2022-10-05 DIAGNOSIS — R73.03 PREDIABETES: Primary | ICD-10-CM

## 2022-10-06 ENCOUNTER — OFFICE VISIT (OUTPATIENT)
Dept: GASTROENTEROLOGY | Facility: CLINIC | Age: 72
End: 2022-10-06
Payer: COMMERCIAL

## 2022-10-06 ENCOUNTER — TELEPHONE (OUTPATIENT)
Dept: GASTROENTEROLOGY | Facility: CLINIC | Age: 72
End: 2022-10-06

## 2022-10-06 VITALS
BODY MASS INDEX: 35.46 KG/M2 | HEART RATE: 75 BPM | HEIGHT: 68 IN | WEIGHT: 234 LBS | DIASTOLIC BLOOD PRESSURE: 66 MMHG | SYSTOLIC BLOOD PRESSURE: 117 MMHG

## 2022-10-06 DIAGNOSIS — E66.9 OBESITY (BMI 35.0-39.9 WITHOUT COMORBIDITY): ICD-10-CM

## 2022-10-06 DIAGNOSIS — R13.19 OTHER DYSPHAGIA: Primary | ICD-10-CM

## 2022-10-06 DIAGNOSIS — Z12.11 SCREENING FOR COLON CANCER: ICD-10-CM

## 2022-10-06 DIAGNOSIS — Z86.010 HISTORY OF COLON POLYPS: ICD-10-CM

## 2022-10-06 DIAGNOSIS — J45.909 MODERATE ASTHMA WITHOUT COMPLICATION, UNSPECIFIED WHETHER PERSISTENT: ICD-10-CM

## 2022-10-06 PROCEDURE — 99244 OFF/OP CNSLTJ NEW/EST MOD 40: CPT | Performed by: INTERNAL MEDICINE

## 2022-10-06 NOTE — TELEPHONE ENCOUNTER
Pt seen in office today by Dr Jolie Shelton whom informed that pt needs Pulmonary clearance is needed prior to pt having colon/egd scheduled  He would like pt to be scheduled for December if pt is cleared  Need to send clearance to Dr Ezekiel Kuhn, Pulmonologist   Pt has an appt with him on 10/12/22    Will fax clearance request

## 2022-10-06 NOTE — PROGRESS NOTES
[facility] - Outpatient Consultation  Natanael Garrido 81 y o  male MRN: [id number]  Encounter: [id number]          ASSESSMENT AND PLAN:      1  Screening for colon cancer  Patient has presented for colonoscopy  Patient has history of polyp  His last colonoscopy was 15 years ago  He denies any recent change in bowel habits or abdominal pain  He denies any rectal bleed per rectum  His diet is fairly good with fiber intake  He denies any prior episode of colitis  A colonoscopy will be done for screening purposes with a history of colon polyp  This will be scheduled in about six weeks since patient has significant comorbid conditions with pulmonary disorder and is getting consultation done in a few weeks  - Ambulatory Referral to Gastroenterology  - Colonoscopy; Future    2  Other dysphagia  Patient complains of dysphagia mostly of pills and solids  He points towards his middle of the chest where the pills which are large once get stuck  He does not have any dysphagia in the throat area  He does not have any significant amount of solid food dysphagia  He denies any heartburn indigestion  There is no nausea or vomiting  He denies any bloating or gassiness  For further evaluation of dysphagia upper endoscopy will be scheduled  This will be done after he gets clearance from Pulmonary Service  - EGD; Future    3  BMI 35 0-35 9,adult      4  History of colon polyps  Patient has history of colon polyp  This was about 20 years ago  His last colonoscopy was 15 years ago  - Colonoscopy; Future    5  Moderate asthma without complication, unspecified whether persistent  Patient has asthma and currently has shortness of breath on exertion  He is on multiple medication for asthma  He has pulmonary consult pending  Clearance for EGD colonoscopy will be sought at that time      6  Obesity (BMI 35 0-39 9 without comorbidity)      ______________________________________________________________________    HPI:  Difficulty eating and swallowing  History of asthma  See above discussion  History of colon polyp  Denies any chest pain and cough  Has shortness of breath on exertion  Denies any dysuria hematuria  There is no history of stroke, CVA or seizures  He does not have any history of cardiac illness  There is no palpitation or orthopnea  REVIEW OF SYSTEMS:    CONSTITUTIONAL: Denies any fever, chills, rigors, and weight loss  HEENT: No earache or tinnitus  Denies hearing loss or visual disturbances  CARDIOVASCULAR: No chest pain or palpitations  RESPIRATORY: Denies any cough, hemoptysis, shortness of breath or dyspnea on exertion  GASTROINTESTINAL: As noted in the History of Present Illness  GENITOURINARY: No problems with urination  Denies any hematuria or dysuria  NEUROLOGIC: No dizziness or vertigo, denies headaches  MUSCULOSKELETAL: Denies any muscle or joint pain  SKIN: Denies skin rashes or itching  ENDOCRINE: Denies excessive thirst  Denies intolerance to heat or cold  PSYCHOSOCIAL: Denies depression or anxiety  Denies any recent memory loss  Historical Information   History reviewed  No pertinent past medical history    Past Surgical History:   Procedure Laterality Date    UMBILICAL HERNIA REPAIR       Social History   Social History     Substance and Sexual Activity   Alcohol Use Never    Comment: socially     Social History     Substance and Sexual Activity   Drug Use Never     Social History     Tobacco Use   Smoking Status Never Smoker   Smokeless Tobacco Never Used     Family History   Problem Relation Age of Onset    Hypertension Mother     Depression Mother     Depression Father     Arthritis Father     Heart attack Paternal Uncle 32    Colon cancer Neg Hx     Prostate cancer Neg Hx        Meds/Allergies       Current Outpatient Medications:     albuterol (PROVENTIL HFA,VENTOLIN HFA) 90 mcg/act inhaler    enalapril (VASOTEC) 10 mg tablet    lamoTRIgine (LaMICtal) 200 MG tablet    levothyroxine 100 mcg tablet    meloxicam (Mobic) 15 mg tablet    modafinil (PROVIGIL) 200 MG tablet    montelukast (SINGULAIR) 10 mg tablet    pramipexole (MIRAPEX) 1 mg tablet    simvastatin (ZOCOR) 10 mg tablet    tamsulosin (FLOMAX) 0 4 mg    traZODone (DESYREL) 50 mg tablet    Trelegy Ellipta 200-62 5-25 MCG/INH AEPB inhaler    Allergies   Allergen Reactions    Spiriva Respimat [Tiotropium Bromide Monohydrate] Rash           Objective     Blood pressure 117/66, pulse 75, height 5' 8" (1 727 m), weight 106 kg (234 lb)  Body mass index is 35 58 kg/m²  PHYSICAL EXAM:      General Appearance:   Alert, cooperative, no distress   HEENT:   Normocephalic, atraumatic, anicteric      Neck:  Supple, symmetrical, trachea midline   Lungs:   Clear to auscultation bilaterally; no rales, rhonchi or wheezing; respirations unlabored    Heart[de-identified]   Regular rate and rhythm; no murmur, rub, or gallop  Abdomen:   Soft, non-tender, non-distended; normal bowel sounds; no masses, no organomegaly    Genitalia:   Deferred    Rectal:   Deferred    Extremities:  No cyanosis, clubbing or edema    Pulses:  2+ and symmetric    Skin:  No jaundice, rashes, or lesions    Lymph nodes:  No palpable cervical lymphadenopathy        Lab Results:   No visits with results within 1 Day(s) from this visit  Latest known visit with results is:   Appointment on 10/04/2022   Component Date Value    Sodium 10/04/2022 134 (A)    Potassium 10/04/2022 4 3     Chloride 10/04/2022 101     CO2 10/04/2022 27     ANION GAP 10/04/2022 6     BUN 10/04/2022 23     Creatinine 10/04/2022 1 18     Glucose, Fasting 10/04/2022 114 (A)    Calcium 10/04/2022 9 0     eGFR 10/04/2022 62     Hemoglobin A1C 10/04/2022 6 4 (A)    EAG 10/04/2022 137     Creatinine, Ur 10/04/2022 147 0     Microalbum  ,U,Random 10/04/2022 8 2     Microalb Creat Ratio 10/04/2022 6          Radiology Results:   XR chest pa & lateral    Result Date: 9/30/2022  Narrative: CHEST INDICATION:   J45 40: Moderate persistent asthma, uncomplicated  COMPARISON:  None EXAM PERFORMED/VIEWS:  XR CHEST PA & LATERAL FINDINGS: Cardiomediastinal silhouette appears unremarkable  Irregular parenchymal markings in the right lung base region could be secondary to scarring/fibrosis  Cannot exclude superimposed airspace disease  Otherwise no lobar airspace consolidation noted  No pneumothorax or pleural effusion  Mild S-shaped focal lumbar scoliosis with multilevel degenerative changes noted  No acute osseous fracture or subluxation  No free air in the upper abdomen noted        Impression: Irregular parenchymal markings in the right lung base region could be secondary to scarring/fibrosis  Cannot exclude superimposed airspace disease  Otherwise no lobar airspace consolidation noted  Workstation performed: [id number]     XR shoulder 2+ vw left    Result Date: 9/30/2022  Narrative: LEFT SHOULDER INDICATION:   M25 512: Pain in left shoulder G89 29: Other chronic pain  COMPARISON:  None VIEWS:  XR SHOULDER 2+ VW LEFT Images: 3 FINDINGS: Moderate glenohumeral joint osteoarthrosis  No acute displaced fracture or dislocation seen  No radiographic evidence for calcific tendinosis  Impression: No acute osseous abnormality  Moderate glenohumeral joint osteoarthrosis  Workstation performed: [id number]     XR shoulder 2+ vw right    Result Date: 9/30/2022  Narrative: RIGHT SHOULDER INDICATION:   M25 511: Pain in right shoulder G89 29: Other chronic pain  COMPARISON:  None VIEWS:  XR SHOULDER 2+ VW RIGHT FINDINGS: There is no acute fracture or dislocation  Severe osteoarthrosis of the glenohumeral joint with bone-on-bone apposition and subchondral sclerosis  No lytic or blastic osseous lesion   Please refer to concurrent chest radiograph for discussion of the chest      Impression: Severe osteoarthrosis of the right glenohumeral joint  No acute osseous abnormality  Workstation performed: [id number]   Answers for HPI/ROS submitted by the patient on 9/29/2022  Chronicity: chronic  Onset: more than 1 year ago  Onset quality: gradual  Frequency: intermittently  Episode duration: 0 months  Progression since onset: gradually worsening  Pain location: epigastric region  Pain - numeric: 3/10  Pain quality: aching  Radiates to: does not radiate  anorexia: No  arthralgias: Yes  belching: No  constipation: No  diarrhea: No  dysuria: No  fever: No  flatus: No  frequency: No  headaches: No  hematochezia: No  hematuria: No  melena: No  myalgias: Yes  nausea:  No  weight loss: No  vomiting: No  Aggravated by: coughing, eating  Relieved by: certain positions

## 2022-10-10 NOTE — PROGRESS NOTES
Pulmonary Consultation   Juan Jose Vivar 81 y o  male MRN: [id number]  10/10/2022      Reason for Consultation:  Worsening shortness of breath    Requested by: Saray Cantu DO    Assessment:  1  Shortness of breath  · Patient with worsening SOB, mostly on exertion but occasionally at rest, for the past 3 months  · He states it does not feel like asthma and it does not improve with Albuterol  · He wakes up multiple times in the middle of the night with significant shortness of breath  · CXR unremarkable  · Etiology based on history and exam with significant concern for neuromuscular disorder leading to shortness of breath  · Plan:  · Patient will be [facility] to [facility] to expedite neurology evaluation due to possibly rapidly progressing neuromuscular disorder  · PFTs and TTE once discharged from hospital    · Call the office to schedule an appointment after hospital discharge  · Rest of plan as below    2  Moderate Asthma  · Controlled  · Continue Trelegy Ellipta    3  History of Eosinophilic pneumonia  · Diagnosed 12 years ago, treated with prednisone  · No recurrence for the past 5 years per patient  4  DONALD  · Cannot tolerate CPAP  · Has appointment with sleep medicine already scheduled  5  Tremor  · Evidence of resting tremor on physical exam which significantly intensifies with any intentional movement, especially during neuro exam  No history or evidence of seizure-like activity  · SOB started 3 months ago and now accompanied by tremor, dizziness and unsteady gait  Concern for rapidly progressive neuromuscular disorder  · Patient will be admitted at [facility]  Please, place neurology consult  · ADT21 placed  · Inpatient Neurology AP informed  · Further workup per neurology  6  Preoperative Clearance  · GI requesting pre-op clearance prior to routine colonoscopy  · Will await pending further neurologic workup        Plan:    Diagnoses and all orders for this visit:    Dyspnea on exertion  -     Cancel: POCT 6 minute walk  -     Echo complete w/ contrast if indicated; Future  -     Cancel: Complete PFT with post bronchodilator; Future  -     Complete PFT with post bronchodilator; Future  -     Transfer to other facility    Moderate persistent asthma, unspecified whether complicated  -     Ambulatory Referral to Pulmonology    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[facility] [address] )    Polyarthralgia  -     Sedimentation rate, automated; Future    Tremor  -     Ambulatory Referral to Neurology; Future  -     Transfer to other facility    DONALD (obstructive sleep apnea)        History of Present Illness   HPI:  Boom Echevarria is a 81 y o  male with a PMHx Moderate persistent asthma, eosinophilic pneumonia, DONALD, HTN, Hypothyroidism, HLD, who comes to the office after being referred by PCP for shortness of breath  Patient states his shortness of breath has been progressively worsening for the past 3 months, to the point where he is scared  He is on Trelegy Ellipta for asthma and albuterol as needed  He reports feeling no improvement with the albuterol inhaler lately  He also states "this does not feel like asthma and it does not feel like prior episodes of eosinophilic pneumonia"  SOB is mostly on exertion however he also feels it while resting in bed  He has been waking up in the middle of the night gasping for air  Nothing improves his symptoms  He has not found any trigger so far  Symptoms have been worsening rapidly especially over the past few days when he also started to develop a resting tremor, dizziness and feeling unsteady while ambulating  Patient also reports significant arthritis of multiple joints  Patient is a lifelong nonsmoker  Denies vaping  He was diagnosed with asthma since early childhood  Diagnosed with eosinophilic pneumonia 12 years ago, treated with steroids  Denies any recurrence for the past 5 years  He worked at a desk job at [employer]  Denies any occupational exposures  No pets  Denies exposure to wild animals  Additionally, GI is requesting pulmonary clearance prior to routine colonoscopy  Review of Systems   Constitutional: Positive for activity change and fatigue  Negative for chills and fever  Respiratory: Positive for shortness of breath  Negative for wheezing  Cardiovascular: Positive for leg swelling  Negative for chest pain and palpitations  Gastrointestinal: Negative for abdominal pain  Musculoskeletal: Positive for arthralgias and gait problem  Neurological: Positive for dizziness, tremors and weakness  Negative for seizures and headaches  Hematological: Negative  Psychiatric/Behavioral: Negative  Historical Information   No past medical history on file    Past Surgical History:   Procedure Laterality Date   • UMBILICAL HERNIA REPAIR       Family History   Problem Relation Age of Onset   • Hypertension Mother    • Depression Mother    • Depression Father    • Arthritis Father    • Heart attack Paternal Uncle 32   • Colon cancer Neg Hx    • Prostate cancer Neg Hx          Meds/Allergies     Current Outpatient Medications:   •  albuterol (PROVENTIL HFA,VENTOLIN HFA) 90 mcg/act inhaler, Inhale 2 puffs every 4 (four) hours as needed for wheezing, Disp: , Rfl:   •  enalapril (VASOTEC) 10 mg tablet, Take 10 mg by mouth daily, Disp: , Rfl:   •  lamoTRIgine (LaMICtal) 200 MG tablet, , Disp: , Rfl:   •  levothyroxine 100 mcg tablet, , Disp: , Rfl:   •  meloxicam (Mobic) 15 mg tablet, Take 1 tablet (15 mg total) by mouth daily, Disp: 30 tablet, Rfl: 1  •  modafinil (PROVIGIL) 200 MG tablet, , Disp: , Rfl:   •  montelukast (SINGULAIR) 10 mg tablet, , Disp: , Rfl:   •  pramipexole (MIRAPEX) 1 mg tablet, Take 2 mg by mouth daily at bedtime, Disp: , Rfl:   •  simvastatin (ZOCOR) 10 mg tablet, 10 mg, Disp: , Rfl:   •  tamsulosin (FLOMAX) 0 4 mg, , Disp: , Rfl:   •  traZODone (DESYREL) 50 mg tablet, , Disp: , Rfl:   •  Trelegy Ellipta 200-62 5-25 MCG/INH AEPB inhaler, Inhale 1 puff daily, Disp: 60 blister, Rfl: 2  Allergies   Allergen Reactions   • Spiriva Respimat [Tiotropium Bromide Monohydrate] Rash       Vitals: There were no vitals taken for this visit  , There is no height or weight on file to calculate BMI  Physical Exam  Physical Exam  Vitals reviewed  Constitutional:       General: He is not in acute distress  Appearance: He is not toxic-appearing  HENT:      Head: Normocephalic  Eyes:      Pupils: Pupils are equal, round, and reactive to light  Cardiovascular:      Rate and Rhythm: Normal rate and regular rhythm  Heart sounds: No murmur heard  Pulmonary:      Effort: Pulmonary effort is normal  No respiratory distress  Breath sounds: Normal breath sounds  No wheezing, rhonchi or rales  Abdominal:      General: Bowel sounds are normal  There is no distension  Palpations: Abdomen is soft  Tenderness: There is no abdominal tenderness  There is no guarding  Musculoskeletal:      Right lower leg: Edema present  Left lower leg: Edema present  Comments: Trace bilateral lower extremity edema   Skin:     General: Skin is warm  Capillary Refill: Capillary refill takes less than 2 seconds  Neurological:      Mental Status: He is alert and oriented to person, place, and time  Comments: Patient is awake, alert and oriented x3  Speech is normal  No evidence of aphasia or dysarthria  CN intact  Patient does exhibit a resting tremor which significantly intensifies with any intentional movement to involve most of his body  No evidence of seizure-like activity  Decrease strength in bilateral lower extremities  Unsteady, broad based gait  Psychiatric:         Mood and Affect: Mood normal              Labs: I have personally reviewed pertinent lab results    Lab Results   Component Value Date    WBC 8 21 09/14/2022    HGB 14 8 09/14/2022    HCT 46 5 09/14/2022    MCV 94 09/14/2022     09/14/2022     Lab Results Component Value Date    CALCIUM 9 0 10/04/2022    K 4 3 10/04/2022    CO2 27 10/04/2022     10/04/2022    BUN 23 10/04/2022    CREATININE 1 18 10/04/2022     No results found for: IGE  Lab Results   Component Value Date    ALT 62 09/14/2022    AST 35 09/14/2022    ALKPHOS 91 09/14/2022       Imaging and other studies: I have personally reviewed pertinent reports  · CXR 09/26/2022: Irregular parenchymal markings in RLL  · CT Chest 06/03/2022: Outside facility  Imaging not available  Per report, evidence of new mild patchy GGO at the LLL concerning for infection/inflammation  Right hemidiaphragm elevation with RLL subsegmental atelectasis  Pulmonary function testing: None available    EKG, Pathology, and Other Studies: I have personally reviewed pertinent reports  TTE  10/14/2020: LVEF 65%, G1DD  Trace TR  No evidence of pulmonary hypertension       Suraj Harrell MD  Pulmonary and Critical Care Fellowship - PGY4  [facility]

## 2022-10-11 ENCOUNTER — OFFICE VISIT (OUTPATIENT)
Dept: OBGYN CLINIC | Facility: OTHER | Age: 72
End: 2022-10-11
Payer: COMMERCIAL

## 2022-10-11 VITALS
HEIGHT: 68 IN | HEART RATE: 96 BPM | SYSTOLIC BLOOD PRESSURE: 123 MMHG | DIASTOLIC BLOOD PRESSURE: 73 MMHG | WEIGHT: 229 LBS | BODY MASS INDEX: 34.71 KG/M2

## 2022-10-11 DIAGNOSIS — M19.012 PRIMARY OSTEOARTHRITIS, LEFT SHOULDER: ICD-10-CM

## 2022-10-11 DIAGNOSIS — M19.011 PRIMARY OSTEOARTHRITIS OF RIGHT SHOULDER: Primary | ICD-10-CM

## 2022-10-11 PROCEDURE — 20610 DRAIN/INJ JOINT/BURSA W/O US: CPT | Performed by: ORTHOPAEDIC SURGERY

## 2022-10-11 PROCEDURE — 99214 OFFICE O/P EST MOD 30 MIN: CPT | Performed by: ORTHOPAEDIC SURGERY

## 2022-10-11 RX ORDER — BUPIVACAINE HYDROCHLORIDE 2.5 MG/ML
2 INJECTION, SOLUTION INFILTRATION; PERINEURAL
Status: COMPLETED | OUTPATIENT
Start: 2022-10-11 | End: 2022-10-11

## 2022-10-11 RX ORDER — BETAMETHASONE SODIUM PHOSPHATE AND BETAMETHASONE ACETATE 3; 3 MG/ML; MG/ML
6 INJECTION, SUSPENSION INTRA-ARTICULAR; INTRALESIONAL; INTRAMUSCULAR; SOFT TISSUE
Status: COMPLETED | OUTPATIENT
Start: 2022-10-11 | End: 2022-10-11

## 2022-10-11 RX ADMIN — BUPIVACAINE HYDROCHLORIDE 2 ML: 2.5 INJECTION, SOLUTION INFILTRATION; PERINEURAL at 13:44

## 2022-10-11 RX ADMIN — BETAMETHASONE SODIUM PHOSPHATE AND BETAMETHASONE ACETATE 6 MG: 3; 3 INJECTION, SUSPENSION INTRA-ARTICULAR; INTRALESIONAL; INTRAMUSCULAR; SOFT TISSUE at 13:44

## 2022-10-11 NOTE — PROGRESS NOTES
Jasen Sorenson MD personally examined the patient and reviewed the history provided  I agree with the note and the assessment and plan by Caro Posey PA-C  Assessment:    Bilateral glenohumeral osteoarthritis    Plan:    As detailed in the plan below the patient would benefit from a right likely reverse total shoulder arthroplasty given the severe glenoid deformity we will obtain the CT blueprint as a preoperative plan and review that with the patient  His surgery should be delayed for at least 3 months secondary to the recent glenohumeral corticosteroid injection this will give him time to medically optimize his current medical situation and allow us time to evaluate the blueprint determine whether not a patient matched implant is required to reconstruct  He was provided with a left glenohumeral injection as detailed for symptomatic relief and we can always discuss all arthroplasty options for that in the future  I did see the patient in coordination with Ms Saeid Magaña and did develop and implement the medical decision making and plan of care                Assessment  Diagnoses and all orders for this visit:    Primary osteoarthritis of right shoulder  -     Ambulatory referral to Orthopedic Surgery  -     CT shoulder right blue print; Future    Primary osteoarthritis, left shoulder  -     Ambulatory referral to Orthopedic Surgery        Discussion and Plan:    Librado Garcia has severe osteoarthritis of the right shoulder  Since he just had an injection, we cannot perform surgery for 3 months  We will obtain a CT Blue Print to plan his surgery  We will see him back in a month to review those results with him  At this visit, we will consider scheduling surgery  He will need optimized prior to surgery given his new developments  For the left shoulder, a steroid injection was provided for pain relief  He will follow up as needed for the left shoulder    This injection can be repeated every 4-6 months as long as he gets a good result  Of concern, Laurann Cabot has hypersensitivity of the shoulder  A slight touch produces severe pain  This is atypical of shoulder pathology  Consideration for pain management may need to be discussed in future  Tylenol and Ice PRN pain  Avoid activities that cause pain  Subjective:   Patient ID: Dominic Gonzalez is a 81 y o  male      Laurann Cabot presents to the office in consultation of his right shoulder  He is referred by Dr Selena Skinner for consideration of a right total shoulder arthroplasty  Right shoulder pain is constant and made worse with any movement  Pain interferes with ADLs and activities of enjoyment  Pain interferes with sleep  Admits to crepitation with motion  He has had steroid injections in the past without relief  Last injection was just 2 weeks ago  He uses Meloxicam without relief  He took Aleve without relief  He has not had any surgery on the right shoulder  Admits to pain that radiates from top of arm down to his hand  This started about 2 weeks ago  He notes progression of a tremor x 2 weeks as well  Over the last day or so, he has increased difficulty with ambulation  He has not told his PCP about this development or seen a neurologist for these complaints  Also complaining of left shoulder pain  Pain has been worsening over time  Admits to crepitation of the shoulder with motion  Pain is worse with motion  Pain improves with rest   Denies any prior treatment with PT or injections  The following portions of the patient's history were reviewed and updated as appropriate: allergies, current medications, past family history, past medical history, past social history, past surgical history and problem list     Review of Systems   Constitutional: Negative for chills and fever  HENT: Negative for hearing loss  Eyes: Negative for visual disturbance  Respiratory: Negative for shortness of breath  Cardiovascular: Negative for chest pain  Gastrointestinal: Negative for abdominal pain  Musculoskeletal: Positive for gait problem  As reviewed in the HPI   Skin: Negative for rash  Neurological: Positive for tremors  As reviewed in the HPI   Psychiatric/Behavioral: Negative for agitation  Objective:  /73 (BP Location: Left arm, Patient Position: Sitting, Cuff Size: Adult)   Pulse 96   Ht 5' 8" (1 727 m)   Wt 104 kg (229 lb)   BMI 34 82 kg/m²       Right Shoulder Exam     Range of Motion   External rotation: 30   Forward flexion: 130     Muscle Strength   External rotation: 4/5   Supraspinatus: 4/5     Other   Erythema: absent  Sensation: normal  Pulse: present      Left Shoulder Exam     Range of Motion   External rotation: 60   Forward flexion: 160     Other   Scars: absent  Left shoulder sensation: hypersensitive  Pulse: present             Physical Exam  Constitutional:       Appearance: He is well-developed  HENT:      Head: Normocephalic  Pulmonary:      Effort: No respiratory distress  Breath sounds: No wheezing  Musculoskeletal:      Cervical back: Normal range of motion  Skin:     General: Skin is warm and dry  Neurological:      Mental Status: He is alert and oriented to person, place, and time  Psychiatric:         Behavior: Behavior normal          Thought Content: Thought content normal          Judgment: Judgment normal            I have personally reviewed pertinent films in PACS and my interpretation is as follows  Xray right shoulder - severe glenohumeral OA with medial wear of glenoid    Xray left shoulder - moderate glenohumeral OA with inferior humeral head osteophyte    Large joint arthrocentesis: L glenohumeral  Universal Protocol:  Consent: Verbal consent obtained    Consent given by: patient    Supporting Documentation  Indications: pain   Procedure Details  Location: shoulder - L glenohumeral  Needle size: 22 G  Ultrasound guidance: no  Approach: posterior  Medications administered: 6 mg betamethasone acetate-betamethasone sodium phosphate 6 (3-3) mg/mL; 2 mL bupivacaine 0 25 %    Patient tolerance: patient tolerated the procedure well with no immediate complications  Dressing:  Sterile dressing applied

## 2022-10-11 NOTE — TELEPHONE ENCOUNTER
Faxed clearance request to Dr Jasen Dobbs [phone]  Will call their office in one week to obtain status of clearance if do not receive back from them as pt is scheduled tomorrow

## 2022-10-11 NOTE — PATIENT INSTRUCTIONS

## 2022-10-12 ENCOUNTER — CONSULT (OUTPATIENT)
Dept: PULMONOLOGY | Facility: CLINIC | Age: 72
End: 2022-10-12
Payer: COMMERCIAL

## 2022-10-12 VITALS
DIASTOLIC BLOOD PRESSURE: 72 MMHG | HEART RATE: 113 BPM | OXYGEN SATURATION: 96 % | TEMPERATURE: 97.9 F | SYSTOLIC BLOOD PRESSURE: 134 MMHG | BODY MASS INDEX: 36.22 KG/M2 | RESPIRATION RATE: 16 BRPM | WEIGHT: 239 LBS | HEIGHT: 68 IN

## 2022-10-12 DIAGNOSIS — J45.40 MODERATE PERSISTENT ASTHMA, UNSPECIFIED WHETHER COMPLICATED: ICD-10-CM

## 2022-10-12 DIAGNOSIS — M25.50 POLYARTHRALGIA: ICD-10-CM

## 2022-10-12 DIAGNOSIS — J82.81 EOSINOPHILIC PNEUMONIA (HCC): ICD-10-CM

## 2022-10-12 DIAGNOSIS — R25.1 TREMOR: ICD-10-CM

## 2022-10-12 DIAGNOSIS — R06.09 DYSPNEA ON EXERTION: Primary | ICD-10-CM

## 2022-10-12 DIAGNOSIS — G47.33 OSA (OBSTRUCTIVE SLEEP APNEA): ICD-10-CM

## 2022-10-12 PROBLEM — R06.02 SHORTNESS OF BREATH: Status: ACTIVE | Noted: 2022-10-12

## 2022-10-12 PROCEDURE — 99244 OFF/OP CNSLTJ NEW/EST MOD 40: CPT | Performed by: INTERNAL MEDICINE

## 2022-10-13 ENCOUNTER — HOSPITAL ENCOUNTER (INPATIENT)
Facility: HOSPITAL | Age: 72
LOS: 3 days | Discharge: HOME | DRG: 073 | End: 2022-10-16
Attending: INTERNAL MEDICINE | Admitting: INTERNAL MEDICINE
Payer: COMMERCIAL

## 2022-10-13 ENCOUNTER — APPOINTMENT (OUTPATIENT)
Dept: RADIOLOGY | Facility: HOSPITAL | Age: 72
DRG: 073 | End: 2022-10-13
Payer: COMMERCIAL

## 2022-10-13 DIAGNOSIS — G25.3 MYOCLONUS: ICD-10-CM

## 2022-10-13 DIAGNOSIS — R29.898 HIP WEAKNESS: ICD-10-CM

## 2022-10-13 DIAGNOSIS — E78.2 MIXED HYPERLIPIDEMIA: ICD-10-CM

## 2022-10-13 DIAGNOSIS — M25.50 POLYARTHRALGIA: ICD-10-CM

## 2022-10-13 DIAGNOSIS — R25.1 TREMOR: ICD-10-CM

## 2022-10-13 DIAGNOSIS — R25.1 TREMOR OF RIGHT HAND: ICD-10-CM

## 2022-10-13 DIAGNOSIS — R06.02 SHORTNESS OF BREATH: Primary | ICD-10-CM

## 2022-10-13 PROBLEM — R73.03 PRE-DIABETES: Status: ACTIVE | Noted: 2022-10-13

## 2022-10-13 PROBLEM — F31.9 BIPOLAR DISORDER (HCC): Status: ACTIVE | Noted: 2022-10-13

## 2022-10-13 PROBLEM — R05.8 RESPIRATORY TRACT CONGESTION WITH COUGH: Status: ACTIVE | Noted: 2022-10-13

## 2022-10-13 PROBLEM — R10.31 RLQ ABDOMINAL PAIN: Status: ACTIVE | Noted: 2022-10-13

## 2022-10-13 PROBLEM — G47.00 INSOMNIA: Status: ACTIVE | Noted: 2022-10-13

## 2022-10-13 PROBLEM — J45.909 ASTHMA: Status: ACTIVE | Noted: 2022-10-13

## 2022-10-13 PROBLEM — R13.10 DYSPHAGIA: Status: ACTIVE | Noted: 2022-10-13

## 2022-10-13 PROBLEM — R42 DIZZINESS: Status: ACTIVE | Noted: 2022-10-13

## 2022-10-13 PROBLEM — J96.01 ACUTE RESPIRATORY FAILURE WITH HYPOXIA (HCC): Status: ACTIVE | Noted: 2022-10-13

## 2022-10-13 PROBLEM — N40.0 BPH (BENIGN PROSTATIC HYPERPLASIA): Status: ACTIVE | Noted: 2022-10-13

## 2022-10-13 PROBLEM — H53.8 BLURRY VISION, BILATERAL: Status: ACTIVE | Noted: 2022-10-13

## 2022-10-13 LAB
APTT PPP: 28 SECONDS (ref 23–37)
CARDIAC TROPONIN I PNL SERPL HS: 3 NG/L
CK SERPL-CCNC: 188 U/L (ref 39–308)
CRP SERPL QL: 1.8 MG/L
D DIMER PPP FEU-MCNC: 0.44 UG/ML FEU
ERYTHROCYTE [SEDIMENTATION RATE] IN BLOOD: 15 MM/HOUR (ref 0–19)
FLUAV RNA RESP QL NAA+PROBE: NEGATIVE
FLUBV RNA RESP QL NAA+PROBE: NEGATIVE
INR PPP: 0.94 (ref 0.84–1.19)
PROCALCITONIN SERPL-MCNC: 0.07 NG/ML
PROTHROMBIN TIME: 12.8 SECONDS (ref 11.6–14.5)
RSV RNA RESP QL NAA+PROBE: NEGATIVE
SARS-COV-2 RNA RESP QL NAA+PROBE: NEGATIVE
TSH SERPL DL<=0.05 MIU/L-ACNC: 2.74 UIU/ML (ref 0.45–4.5)

## 2022-10-13 PROCEDURE — 90662 IIV NO PRSV INCREASED AG IM: CPT | Performed by: INTERNAL MEDICINE

## 2022-10-13 PROCEDURE — 99223 1ST HOSP IP/OBS HIGH 75: CPT | Performed by: INTERNAL MEDICINE

## 2022-10-13 PROCEDURE — 86140 C-REACTIVE PROTEIN: CPT

## 2022-10-13 PROCEDURE — 85379 FIBRIN DEGRADATION QUANT: CPT

## 2022-10-13 PROCEDURE — 82550 ASSAY OF CK (CPK): CPT

## 2022-10-13 PROCEDURE — 90471 IMMUNIZATION ADMIN: CPT | Performed by: INTERNAL MEDICINE

## 2022-10-13 PROCEDURE — 0241U HB NFCT DS VIR RESP RNA 4 TRGT: CPT

## 2022-10-13 PROCEDURE — 82553 CREATINE MB FRACTION: CPT

## 2022-10-13 PROCEDURE — 86038 ANTINUCLEAR ANTIBODIES: CPT

## 2022-10-13 PROCEDURE — 84443 ASSAY THYROID STIM HORMONE: CPT

## 2022-10-13 PROCEDURE — 82607 VITAMIN B-12: CPT

## 2022-10-13 PROCEDURE — 84145 PROCALCITONIN (PCT): CPT

## 2022-10-13 PROCEDURE — 84484 ASSAY OF TROPONIN QUANT: CPT

## 2022-10-13 PROCEDURE — 85730 THROMBOPLASTIN TIME PARTIAL: CPT

## 2022-10-13 PROCEDURE — 82746 ASSAY OF FOLIC ACID SERUM: CPT

## 2022-10-13 PROCEDURE — 71045 X-RAY EXAM CHEST 1 VIEW: CPT

## 2022-10-13 PROCEDURE — 85652 RBC SED RATE AUTOMATED: CPT

## 2022-10-13 PROCEDURE — 83880 ASSAY OF NATRIURETIC PEPTIDE: CPT

## 2022-10-13 PROCEDURE — 85610 PROTHROMBIN TIME: CPT

## 2022-10-13 RX ORDER — ACETAMINOPHEN 325 MG/1
650 TABLET ORAL EVERY 6 HOURS PRN
Status: DISCONTINUED | OUTPATIENT
Start: 2022-10-13 | End: 2022-10-16 | Stop reason: HOSPADM

## 2022-10-13 RX ORDER — MODAFINIL 100 MG/1
200 TABLET ORAL DAILY
Status: DISCONTINUED | OUTPATIENT
Start: 2022-10-14 | End: 2022-10-16 | Stop reason: HOSPADM

## 2022-10-13 RX ORDER — ALBUTEROL SULFATE 90 UG/1
2 AEROSOL, METERED RESPIRATORY (INHALATION) EVERY 4 HOURS PRN
Status: DISCONTINUED | OUTPATIENT
Start: 2022-10-13 | End: 2022-10-16 | Stop reason: HOSPADM

## 2022-10-13 RX ORDER — LANOLIN ALCOHOL/MO/W.PET/CERES
6 CREAM (GRAM) TOPICAL
Status: DISCONTINUED | OUTPATIENT
Start: 2022-10-13 | End: 2022-10-16 | Stop reason: HOSPADM

## 2022-10-13 RX ORDER — LAMOTRIGINE 100 MG/1
200 TABLET ORAL DAILY
Status: DISCONTINUED | OUTPATIENT
Start: 2022-10-14 | End: 2022-10-16 | Stop reason: HOSPADM

## 2022-10-13 RX ORDER — TRAZODONE HYDROCHLORIDE 100 MG/1
100 TABLET ORAL
Status: DISCONTINUED | OUTPATIENT
Start: 2022-10-13 | End: 2022-10-14

## 2022-10-13 RX ORDER — PRAVASTATIN SODIUM 20 MG
20 TABLET ORAL
Status: DISCONTINUED | OUTPATIENT
Start: 2022-10-14 | End: 2022-10-14

## 2022-10-13 RX ORDER — MELOXICAM 7.5 MG/1
15 TABLET ORAL DAILY
Status: DISCONTINUED | OUTPATIENT
Start: 2022-10-14 | End: 2022-10-16 | Stop reason: HOSPADM

## 2022-10-13 RX ORDER — LEVOTHYROXINE SODIUM 0.1 MG/1
100 TABLET ORAL
Status: DISCONTINUED | OUTPATIENT
Start: 2022-10-14 | End: 2022-10-16 | Stop reason: HOSPADM

## 2022-10-13 RX ORDER — ENOXAPARIN SODIUM 100 MG/ML
40 INJECTION SUBCUTANEOUS DAILY
Status: DISCONTINUED | OUTPATIENT
Start: 2022-10-14 | End: 2022-10-16 | Stop reason: HOSPADM

## 2022-10-13 RX ORDER — TAMSULOSIN HYDROCHLORIDE 0.4 MG/1
0.4 CAPSULE ORAL
Status: DISCONTINUED | OUTPATIENT
Start: 2022-10-14 | End: 2022-10-16 | Stop reason: HOSPADM

## 2022-10-13 RX ORDER — PRAMIPEXOLE DIHYDROCHLORIDE 0.5 MG/1
2 TABLET ORAL
Status: DISCONTINUED | OUTPATIENT
Start: 2022-10-13 | End: 2022-10-16 | Stop reason: HOSPADM

## 2022-10-13 RX ORDER — LISINOPRIL 10 MG/1
10 TABLET ORAL DAILY
Status: DISCONTINUED | OUTPATIENT
Start: 2022-10-14 | End: 2022-10-16 | Stop reason: HOSPADM

## 2022-10-13 RX ORDER — MONTELUKAST SODIUM 10 MG/1
10 TABLET ORAL DAILY
Status: DISCONTINUED | OUTPATIENT
Start: 2022-10-14 | End: 2022-10-16 | Stop reason: HOSPADM

## 2022-10-13 RX ADMIN — INFLUENZA A VIRUS A/VICTORIA/2570/2019 IVR-215 (H1N1) ANTIGEN (FORMALDEHYDE INACTIVATED), INFLUENZA A VIRUS A/DARWIN/9/2021 SAN-010 (H3N2) ANTIGEN (FORMALDEHYDE INACTIVATED), INFLUENZA B VIRUS B/PHUKET/3073/2013 ANTIGEN (FORMALDEHYDE INACTIVATED), AND INFLUENZA B VIRUS B/MICHIGAN/01/2021 ANTIGEN (FORMALDEHYDE INACTIVATED) 0.7 ML: 60; 60; 60; 60 INJECTION, SUSPENSION INTRAMUSCULAR at 19:21

## 2022-10-13 RX ADMIN — TRAZODONE HYDROCHLORIDE 100 MG: 100 TABLET ORAL at 21:24

## 2022-10-13 RX ADMIN — Medication 6 MG: at 21:46

## 2022-10-13 RX ADMIN — PRAMIPEXOLE DIHYDROCHLORIDE 2 MG: 0.5 TABLET ORAL at 21:24

## 2022-10-14 ENCOUNTER — APPOINTMENT (OUTPATIENT)
Dept: RADIOLOGY | Facility: HOSPITAL | Age: 72
DRG: 073 | End: 2022-10-14
Payer: COMMERCIAL

## 2022-10-14 ENCOUNTER — APPOINTMENT (OUTPATIENT)
Dept: MRI IMAGING | Facility: HOSPITAL | Age: 72
DRG: 073 | End: 2022-10-14
Payer: COMMERCIAL

## 2022-10-14 ENCOUNTER — APPOINTMENT (INPATIENT)
Dept: NON INVASIVE DIAGNOSTICS | Facility: HOSPITAL | Age: 72
DRG: 073 | End: 2022-10-14
Payer: COMMERCIAL

## 2022-10-14 LAB
2HR DELTA HS TROPONIN: 1 NG/L
4HR DELTA HS TROPONIN: 2 NG/L
ALBUMIN SERPL BCP-MCNC: 3.8 G/DL (ref 3.5–5)
ALP SERPL-CCNC: 62 U/L (ref 34–104)
ALT SERPL W P-5'-P-CCNC: 38 U/L (ref 7–52)
ANION GAP SERPL CALCULATED.3IONS-SCNC: 7 MMOL/L (ref 4–13)
AORTIC ROOT: 3.5 CM
APICAL FOUR CHAMBER EJECTION FRACTION: 62 %
ASCENDING AORTA: 3.3 CM
AST SERPL W P-5'-P-CCNC: 27 U/L (ref 13–39)
AV LVOT PEAK GRADIENT: 4 MMHG
AV PEAK GRADIENT: 9 MMHG
BASOPHILS # BLD AUTO: 0.04 THOUSANDS/ΜL (ref 0–0.1)
BASOPHILS NFR BLD AUTO: 0 % (ref 0–1)
BILIRUB SERPL-MCNC: 0.36 MG/DL (ref 0.2–1)
BUN SERPL-MCNC: 22 MG/DL (ref 5–25)
CALCIUM SERPL-MCNC: 8.6 MG/DL (ref 8.4–10.2)
CARDIAC TROPONIN I PNL SERPL HS: 4 NG/L
CARDIAC TROPONIN I PNL SERPL HS: 5 NG/L
CHLORIDE SERPL-SCNC: 102 MMOL/L (ref 96–108)
CK MB SERPL-MCNC: 2.7 % (ref 0–2.5)
CK MB SERPL-MCNC: 5 NG/ML (ref 0.6–6.3)
CO2 SERPL-SCNC: 25 MMOL/L (ref 21–32)
CORTIS AM PEAK SERPL-MCNC: 0.6 UG/DL (ref 4.2–22.4)
CREAT SERPL-MCNC: 1.15 MG/DL (ref 0.6–1.3)
E WAVE DECELERATION TIME: 222 MS
EOSINOPHIL # BLD AUTO: 0.07 THOUSAND/ΜL (ref 0–0.61)
EOSINOPHIL NFR BLD AUTO: 1 % (ref 0–6)
ERYTHROCYTE [DISTWIDTH] IN BLOOD BY AUTOMATED COUNT: 14.2 % (ref 11.6–15.1)
FOLATE SERPL-MCNC: >20 NG/ML (ref 3.1–17.5)
FRACTIONAL SHORTENING: 30 (ref 28–44)
GFR SERPL CREATININE-BSD FRML MDRD: 64 ML/MIN/1.73SQ M
GLUCOSE SERPL-MCNC: 102 MG/DL (ref 65–140)
GLUCOSE SERPL-MCNC: 119 MG/DL (ref 65–140)
GLUCOSE SERPL-MCNC: 165 MG/DL (ref 65–140)
GLUCOSE SERPL-MCNC: 99 MG/DL (ref 65–140)
HCT VFR BLD AUTO: 41.5 % (ref 36.5–49.3)
HGB BLD-MCNC: 13.2 G/DL (ref 12–17)
IMM GRANULOCYTES # BLD AUTO: 0.1 THOUSAND/UL (ref 0–0.2)
IMM GRANULOCYTES NFR BLD AUTO: 1 % (ref 0–2)
INTERVENTRICULAR SEPTUM IN DIASTOLE (PARASTERNAL SHORT AXIS VIEW): 1.1 CM
INTERVENTRICULAR SEPTUM: 1.1 CM (ref 0.6–1.1)
L PNEUMO1 AG UR QL IA.RAPID: NEGATIVE
LAAS-AP2: 19.6 CM2
LAAS-AP4: 14.1 CM2
LEFT ATRIUM SIZE: 3.1 CM
LEFT INTERNAL DIMENSION IN SYSTOLE: 3.1 CM (ref 2.1–4)
LEFT VENTRICULAR INTERNAL DIMENSION IN DIASTOLE: 4.4 CM (ref 3.5–6)
LEFT VENTRICULAR POSTERIOR WALL IN END DIASTOLE: 1.1 CM
LEFT VENTRICULAR STROKE VOLUME: 53 ML
LVSV (TEICH): 53 ML
LYMPHOCYTES # BLD AUTO: 1.98 THOUSANDS/ΜL (ref 0.6–4.47)
LYMPHOCYTES NFR BLD AUTO: 19 % (ref 14–44)
MCH RBC QN AUTO: 29.7 PG (ref 26.8–34.3)
MCHC RBC AUTO-ENTMCNC: 31.8 G/DL (ref 31.4–37.4)
MCV RBC AUTO: 93 FL (ref 82–98)
MITRAL REGURGITATION PEAK VELOCITY: 5.37 M/S
MITRAL VALVE MEAN INFLOW VELOCITY: 4.49 M/S
MITRAL VALVE REGURGITANT PEAK GRADIENT: 115 MMHG
MONOCYTES # BLD AUTO: 0.85 THOUSAND/ΜL (ref 0.17–1.22)
MONOCYTES NFR BLD AUTO: 8 % (ref 4–12)
MV E'TISSUE VEL-SEP: 7 CM/S
MV PEAK A VEL: 0.93 M/S
MV PEAK E VEL: 88 CM/S
MV STENOSIS PRESSURE HALF TIME: 64 MS
MV VALVE AREA P 1/2 METHOD: 3.44
NEUTROPHILS # BLD AUTO: 7.24 THOUSANDS/ΜL (ref 1.85–7.62)
NEUTS SEG NFR BLD AUTO: 71 % (ref 43–75)
NRBC BLD AUTO-RTO: 0 /100 WBCS
NT-PROBNP SERPL-MCNC: 42 PG/ML
PLATELET # BLD AUTO: 237 THOUSANDS/UL (ref 149–390)
PMV BLD AUTO: 9.9 FL (ref 8.9–12.7)
POTASSIUM SERPL-SCNC: 3.9 MMOL/L (ref 3.5–5.3)
PROT SERPL-MCNC: 6.2 G/DL (ref 6.4–8.4)
RA PRESSURE ESTIMATED: 3 MMHG
RBC # BLD AUTO: 4.45 MILLION/UL (ref 3.88–5.62)
RIGHT ATRIUM AREA SYSTOLE A4C: 12.5 CM2
RIGHT VENTRICLE ID DIMENSION: 3.8 CM
RV PSP: 25 MMHG
RYE IGE QN: NEGATIVE
S PNEUM AG UR QL: NEGATIVE
SL CV DOP CALC MV VTI RETROGRADE: 191.5 CM
SL CV LEFT ATRIUM LENGTH A2C: 5.5 CM
SL CV LV EF: 65
SL CV MV MEAN GRADIENT RETROGRADE: 87 MMHG
SL CV PED ECHO LEFT VENTRICLE DIASTOLIC VOLUME (MOD BIPLANE) 2D: 90 ML
SL CV PED ECHO LEFT VENTRICLE SYSTOLIC VOLUME (MOD BIPLANE) 2D: 37 ML
SODIUM SERPL-SCNC: 134 MMOL/L (ref 135–147)
TR MAX PG: 22 MMHG
TR PEAK VELOCITY: 2.3 M/S
TRICUSPID VALVE PEAK REGURGITATION VELOCITY: 2.34 M/S
VIT B12 SERPL-MCNC: 1006 PG/ML (ref 100–900)
WBC # BLD AUTO: 10.28 THOUSAND/UL (ref 4.31–10.16)

## 2022-10-14 PROCEDURE — 85025 COMPLETE CBC W/AUTO DIFF WBC: CPT | Performed by: INTERNAL MEDICINE

## 2022-10-14 PROCEDURE — 93306 TTE W/DOPPLER COMPLETE: CPT | Performed by: INTERNAL MEDICINE

## 2022-10-14 PROCEDURE — 80053 COMPREHEN METABOLIC PANEL: CPT | Performed by: INTERNAL MEDICINE

## 2022-10-14 PROCEDURE — 86235 NUCLEAR ANTIGEN ANTIBODY: CPT | Performed by: PSYCHIATRY & NEUROLOGY

## 2022-10-14 PROCEDURE — G1004 CDSM NDSC: HCPCS

## 2022-10-14 PROCEDURE — 97166 OT EVAL MOD COMPLEX 45 MIN: CPT

## 2022-10-14 PROCEDURE — 82948 REAGENT STRIP/BLOOD GLUCOSE: CPT

## 2022-10-14 PROCEDURE — 86255 FLUORESCENT ANTIBODY SCREEN: CPT | Performed by: PSYCHIATRY & NEUROLOGY

## 2022-10-14 PROCEDURE — 82533 TOTAL CORTISOL: CPT | Performed by: INTERNAL MEDICINE

## 2022-10-14 PROCEDURE — 74230 X-RAY XM SWLNG FUNCJ C+: CPT

## 2022-10-14 PROCEDURE — A9585 GADOBUTROL INJECTION: HCPCS | Performed by: PSYCHIATRY & NEUROLOGY

## 2022-10-14 PROCEDURE — 83519 RIA NONANTIBODY: CPT | Performed by: PSYCHIATRY & NEUROLOGY

## 2022-10-14 PROCEDURE — 99232 SBSQ HOSP IP/OBS MODERATE 35: CPT | Performed by: INTERNAL MEDICINE

## 2022-10-14 PROCEDURE — 87449 NOS EACH ORGANISM AG IA: CPT

## 2022-10-14 PROCEDURE — 92611 MOTION FLUOROSCOPY/SWALLOW: CPT

## 2022-10-14 PROCEDURE — 99255 IP/OBS CONSLTJ NEW/EST HI 80: CPT | Performed by: INTERNAL MEDICINE

## 2022-10-14 PROCEDURE — 93306 TTE W/DOPPLER COMPLETE: CPT

## 2022-10-14 PROCEDURE — 84484 ASSAY OF TROPONIN QUANT: CPT

## 2022-10-14 PROCEDURE — 99254 IP/OBS CNSLTJ NEW/EST MOD 60: CPT | Performed by: PSYCHIATRY & NEUROLOGY

## 2022-10-14 PROCEDURE — 72156 MRI NECK SPINE W/O & W/DYE: CPT

## 2022-10-14 RX ORDER — CLONAZEPAM 0.5 MG/1
0.5 TABLET ORAL
Status: DISCONTINUED | OUTPATIENT
Start: 2022-10-14 | End: 2022-10-16 | Stop reason: HOSPADM

## 2022-10-14 RX ORDER — OXYCODONE HYDROCHLORIDE 5 MG/1
2.5 TABLET ORAL EVERY 6 HOURS PRN
Status: DISCONTINUED | OUTPATIENT
Start: 2022-10-14 | End: 2022-10-16 | Stop reason: HOSPADM

## 2022-10-14 RX ADMIN — CLONAZEPAM 0.5 MG: 0.5 TABLET ORAL at 21:32

## 2022-10-14 RX ADMIN — LISINOPRIL 10 MG: 10 TABLET ORAL at 09:06

## 2022-10-14 RX ADMIN — Medication 6 MG: at 21:32

## 2022-10-14 RX ADMIN — FLUTICASONE FUROATE, UMECLIDINIUM BROMIDE AND VILANTEROL TRIFENATATE 1 PUFF: 200; 62.5; 25 POWDER RESPIRATORY (INHALATION) at 09:08

## 2022-10-14 RX ADMIN — LEVOTHYROXINE SODIUM 100 MCG: 100 TABLET ORAL at 05:07

## 2022-10-14 RX ADMIN — MELOXICAM 15 MG: 7.5 TABLET ORAL at 09:06

## 2022-10-14 RX ADMIN — GADOBUTROL 10 ML: 604.72 INJECTION INTRAVENOUS at 21:02

## 2022-10-14 RX ADMIN — MODAFINIL 200 MG: 100 TABLET ORAL at 09:12

## 2022-10-14 RX ADMIN — TAMSULOSIN HYDROCHLORIDE 0.4 MG: 0.4 CAPSULE ORAL at 17:17

## 2022-10-14 RX ADMIN — PRAMIPEXOLE DIHYDROCHLORIDE 2 MG: 0.5 TABLET ORAL at 21:32

## 2022-10-14 RX ADMIN — DICLOFENAC SODIUM TOPICAL GEL, 1%, 2 G: 10 GEL TOPICAL at 21:34

## 2022-10-14 RX ADMIN — MONTELUKAST 10 MG: 10 TABLET, FILM COATED ORAL at 09:06

## 2022-10-14 RX ADMIN — LAMOTRIGINE 200 MG: 100 TABLET ORAL at 09:12

## 2022-10-14 NOTE — ASSESSMENT & PLAN NOTE
Venice Redd is a 81 y o  male w/ PMH DONALD, recurrent eosinophilic PNA, asthma, HLD, bipolar disorder x 40 years who p/w multiple worsening symptoms over the past several weeks to months concerning for an evolving process  Patient most prominently exhibits SOB/MONTAÑO which has been worsening from his baseline  Though history of eosinophilic PNA in the past he feels this is categorically different in presentation  Also has diffuse arthralgias that started in the right shoulder and progressed to left should and b/l hips w/o inflammatory markers indicative of rheumatologic disease or elevated CK indicative of inflammatory myositis     • Workup reviewed:   o Labs:  o Imaging:  • VC/NIF:  o 10/14/22: 1 3/-54    Plan:  • MRI CS

## 2022-10-14 NOTE — ASSESSMENT & PLAN NOTE
Patient has significant history of asthma since childhood requiring prednisone for several years during childhood    Controlled now    Plan:  Trelegy  Albuterol  Montelukast  Respiratory protocol

## 2022-10-14 NOTE — ASSESSMENT & PLAN NOTE
Possible prediabetes  Most recent hemoglobin A1c 6 5%      Plan:  Carbohydrate diet  Check blood sugar in the a m  10/14

## 2022-10-14 NOTE — ASSESSMENT & PLAN NOTE
Patient reporting history of hypothyroidism    TSH within normal limits    Plan:  Levothyroxine 100 mcg, home medication

## 2022-10-14 NOTE — ASSESSMENT & PLAN NOTE
Patient presenting with shortness of breath with hypoxia, SpO2 90% on room air    Direct admission from pulmonology visit, concern for acute neuromuscular disorder    Plan:  Pulmonology consult  Neurology consult  Respiratory protocol  Vital capacity measurement  Flu vaccine  TTE  Ambulatory pulse ox  Troponins  D-dimer, reflex to CTA chest with bilateral lower extremity Dopplers if D-dimer is elevated  ProBNP  TSH  A m  CMP, CBC, PT/INR, PTT  Lovenox 40 mg daily for VTE prophylaxis

## 2022-10-14 NOTE — ASSESSMENT & PLAN NOTE
Patient complaining of aches chronic history of significant insomnia, getting only about 3 hours of sleep each night  Less than 1 hour sleep over the last 3 nights      Plan:  Trazodone 100 mg, home medication  Melatonin 6 mg

## 2022-10-14 NOTE — CONSULTS
NEUROLOGY RESIDENCY CONSULT NOTE     Name: Chuck Hayward   Age & Sex: 81 y o  male   MRN: [id number]  Unit/Bed#: S -01   Encounter: [id number]  Length of Stay: 1    ASSESSMENT & PLAN     *Undifferentiated systemic disorder w/ SOB/MONTAÑO, polyarthralgias, tremor and myoclonus  Assessment & Plan  Chuck Hayward is a 81 y o  male w/ PMH DONALD, recurrent eosinophilic PNA, dysphagia w/ known esophageal narrowing, asthma, HLD, bipolar disorder x 40 years who p/w multiple worsening symptoms over the past several weeks to months concerning for an as-yet undetermined evolving process  Patient most prominently exhibits SOB/MONTAÑO which has been worsening from his baseline  Though history of eosinophilic PNA in the past he feels this is categorically different in presentation  Also has diffuse arthralgias that started in the right shoulder and progressed to left should and b/l hips w/o inflammatory markers indicative of rheumatologic disease or elevated CK indicative of inflammatory myositis  Does have b/l ptosis, prominent med-high amplitude predominantly postural and kinetic tremor, stimulus-induced myoclonus, atypical rest movements that are not choreiform per se but could be akin to extrapyramidal sx seen in long term neuroleptic use  Unclear unifying diagnosis but could be neuromuscular junction, cervical spine or autoimmune/rheumatologic  less likely myositis given aforementioned workup  Could be multifactorial and compounded by sleep disruption, DONALD, and pain     • Workup reviewed:   o Labs thus far unrevealing: , B12 1006, folate > 20, TSH 2 7, COVID/FLU/RSV neg, DENIS neg, CRP 1 8, ESR 15, DD  o Imaging:   - Barium swallow pending read  - XR hips wnl  - XR shoulder L moderate glenohumeral joint osteoarthrosis, R severe osteoarthrosis glenohumeral joint  - CXR wnl  • VC/NIF:  o 10/14/22: 1 3/-54    Plan:  • MRI CS w/wo  • Myasthenia labs: AChR binding, blocking, modulating, MuSk, + striated muscle  • Complete rheumatologic workup: sjogren's pending  • PT/OT/ST  • F/u barium swallow  • Continue monitoring VC/NIF  • Pulmonology consulted, appreciate recs  • Will complete a full review of medications to see if any psychotropics could be contributory  • Given that trazodone not currently helping his sleep, d/c trazodone and trial clonazepam 0 5 mg qhs for sleep and tremor/myoclonus  • If no improvement can consider empiric trial of mestinon but would obtain at least the above imaging first    Recommendations for outpatient neurological follow up have yet to be determined  Pending for d/c: workup, clinical improvement    SUBJECTIVE     Reason for Consult / Principal Problem: SOB, weakness, dysphagia, tremor  Hx and PE limited by: N/A    HPI: French Fontanez is a 81 y o  male w/ PMH DONALD, recurrent eosinophilic PNA, dysphagia w/ known esophageal narrowing, asthma, HLD, bipolar disorder x 40 years who p/w multiple worsening symptoms over the past several weeks to months concerning for an as-yet undetermined evolving process  Patient most prominently exhibits SOB/MONTAÑO which has been worsening from his baseline  Though he does have a history of recurrent eosinophilic PNA, he feels this is categorically different in presentation at this time  Also has diffuse arthralgias that started in the right shoulder and progressed to left should and b/l hips w/o inflammatory markers indicative of rheumatologic disease or elevated CK indicative of inflammatory myositis  Complains of LLE/left foot numbness and tingling but today this sx much less prominent  Does have b/l ptosis, worsened with activity and exhibits a prominent med-high amplitude predominantly postural and kinetic tremor  Has fairly severe stimulus-induced myoclonus as well as atypical rest movements that are not choreiform per se but could be akin to extrapyramidal sx seen in long term neuroleptic use   Unclear unifying diagnosis but could be neuromuscular junction, cervical spine or autoimmune/rheumatologic  less likely myositis given aforementioned workup  Could be multifactorial and compounded by sleep disruption, DONALD, and pain  Otherwise patient oriented and "happy to be here" in the sense of appreciating coordinated workup at this time  Inpatient consult to Neurology  Consult performed by: Alexandro Khalil MD  Consult ordered by: Josseline Velasquez MD          Historical Information   Past Medical History:   Diagnosis Date   • Arthritis    • Asthma    • 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[facility] )    • 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[facility] )    • [facility] depression ([facility] )    • Sleep apnea      Past Surgical History:   Procedure Laterality Date   • UMBILICAL HERNIA REPAIR       Social History   Social History     Substance and Sexual Activity   Alcohol Use Never    Comment: socially     Social History     Substance and Sexual Activity   Drug Use Never     E-Cigarette/Vaping   • E-Cigarette Use Never User      E-Cigarette/Vaping Substances   • Nicotine No    • THC No    • CBD No    • Flavoring No    • Other No    • Unknown No      Social History     Tobacco Use   Smoking Status Never Smoker   Smokeless Tobacco Never Used     Family History:   Family History   Problem Relation Age of Onset   • Hypertension Mother    • Depression Mother    • Depression Father    • Arthritis Father    • Heart attack Paternal Uncle 32   • Colon cancer Neg Hx    • Prostate cancer Neg Hx      Meds/Allergies   all current active meds have been reviewed and PTA meds:   Prior to Admission Medications   Prescriptions Last Dose Informant Patient Reported? Taking?    Trelegy Ellipta 200-62 5-25 MCG/INH AEPB inhaler 10/13/2022 at Unknown time Self No Yes   Sig: Inhale 1 puff daily   albuterol (PROVENTIL HFA,VENTOLIN HFA) 90 mcg/act inhaler Past Week at Unknown time Self Yes Yes   Sig: Inhale 2 puffs every 4 (four) hours as needed for wheezing   enalapril (VASOTEC) 10 mg tablet 10/13/2022 at Unknown time Self Yes Yes   Sig: Take 10 mg by mouth daily lamoTRIgine (LaMICtal) 200 MG tablet 10/13/2022 at Unknown time Self Yes Yes   levothyroxine 100 mcg tablet 10/13/2022 at Unknown time Self Yes Yes   meloxicam (Mobic) 15 mg tablet 10/13/2022 at Unknown time Self No Yes   Sig: Take 1 tablet (15 mg total) by mouth daily   modafinil (PROVIGIL) 200 MG tablet 10/13/2022 at Unknown time Self Yes Yes   montelukast (SINGULAIR) 10 mg tablet 10/13/2022 at Unknown time Self Yes Yes   pramipexole (MIRAPEX) 1 mg tablet 10/13/2022 at Unknown time Self Yes Yes   Sig: Take 2 mg by mouth daily at bedtime   simvastatin (ZOCOR) 10 mg tablet 10/13/2022 at Unknown time Self Yes Yes   Sig: 10 mg   tamsulosin (FLOMAX) 0 4 mg 10/13/2022 at Unknown time Self Yes Yes   traZODone (DESYREL) 50 mg tablet 10/12/2022 at Unknown time Self Yes Yes      Facility-Administered Medications: None     Allergies   Allergen Reactions   • Spiriva Respimat [Tiotropium Bromide Monohydrate] Rash       Previous medical records under review    Review of Systems 13 point ROS negative unless otherwise specified    OBJECTIVE     Patient ID: Juanita Marrero is a 81 y o  male  Vitals:   Vitals:    10/14/22 1250 10/14/22 1255 10/14/22 1258 10/14/22 1516   BP: 119/65 126/69 114/67 116/63   BP Location: Left arm Left arm Left arm Left arm   Pulse:    72   Resp:    18   Temp:    97 9 °F (36 6 °C)   TempSrc:    Oral   SpO2:       Weight:       Height:          Body mass index is 36 34 kg/m²  No intake or output data in the 24 hours ending 10/14/22 1553    Temperature: Temp (24hrs), Av °F (36 7 °C), Min:97 6 °F (36 4 °C), Max:98 5 °F (36 9 °C)   Temperature: 97 9 °F (36 6 °C)    Invasive Devices: Invasive Devices  Report    Peripheral Intravenous Line  Duration           Peripheral IV 10/13/22 Distal;Right;Upper;Ventral (anterior) Arm <1 day              Physical Exam Vitals reviewed  Constitutional:    Not in acute distress  Normal appearance  Not ill-appearing, toxic-appearing or diaphoretic   Elevated BMI  HENT:   Normocephalic and atraumatic  External ear normal b/l  Nose normal  No congestion or rhinorrhea  Mucous membranes are moist   Oropharynx is clear  No oropharyngeal exudate or posterior oropharyngeal erythema  Eyes:    No scleral icterus  No discharge b/l  Conjunctivae normal    Pulmonary:  Pulmonary effort is normal  No respiratory distress  GI: abdomen not distended  Musculoskeletal: no gross deformities  No muscle wasting but severely sensitive to certain maneuvers w/ pain-limitations in exam  Skin:   Skin is not pale  Psychiatric:       Mood normal  Affect congruent    Neurologic Exam:   Mental Status   Alert and oriented to person, place, and time  Attention is normal  Speech is fluent w/o dysarthria     Cranial Nerves   CN II Visual fields full to confrontation  CN III, IV, VI PERRL, EOMI, no CN III palsy, no CN VI palsy  no nystagmus, b/l ptosis exacerbated by upward eye movements  CN V   Facial sensation symmetric  CN VII  Facial expression full, symmetric  CN VIII Hearing roughly symmetric  CN IX, X Palate symmetric  CN XI trapezius strength symmetric  CN XII no dysarthria, symmetric lingual protrusion  Motor Exam   Muscle bulk and tone grossly normal; Pronator drift absent b/l  Strength intact and symmetric BUE/BLE but severe pain-limited exam BUE    Sensory Exam   Light touch and Temperature intact and symmetric BUE/BLE (subtle decrease LLE)    Gait, Coordination, and Reflexes   FTN normal b/l; severe RUE >>> LUE postural > kinetic med-high amplitude, low-med frequency tremor; severe stimulus-induced myoclonic jerking of all limbs  Able to be suppressed when patient encouraged to relax  Reflexes: Brachioradialis: 2+ b/l    Biceps: 2+ b/l    Patellar: 1+ b/l     Plantar response downgoing b/l  Gait: casual gait with average stance, slightly shortened stride length, arm swing normal but some clear gait hesitancy and instability    LABORATORY DATA     Labs:  I have personally reviewed pertinent reports  Results from last 7 days   Lab Units 10/14/22  0226   WBC Thousand/uL 10 28*   HEMOGLOBIN g/dL 13 2   HEMATOCRIT % 41 5   PLATELETS Thousands/uL 237   NEUTROS PCT % 71   MONOS PCT % 8      Results from last 7 days   Lab Units 10/14/22  0226   POTASSIUM mmol/L 3 9   CHLORIDE mmol/L 102   CO2 mmol/L 25   BUN mg/dL 22   CREATININE mg/dL 1 15   CALCIUM mg/dL 8 6   ALK PHOS U/L 62   ALT U/L 38   AST U/L 27              Results from last 7 days   Lab Units 10/13/22  2152   INR  0 94   PTT seconds 28     Lab Results   Component Value Date    HGBA1C 6 4 (H) 10/04/2022    [address] 89 09/14/2022               IMAGING & DIAGNOSTIC TESTING     Radiology Results: I have personally reviewed pertinent reports  and I have personally reviewed pertinent films in PACS  FL barium swallow video w speech   Final Result by ANABEL DARLING (10/14 1148)      XR chest portable   Final Result by Dariela Amador MD (10/14 1021)      No acute cardiopulmonary disease  Workstation performed: [id number]         MRI inpatient order    (Results Pending)       Other Diagnostic Testing: I have personally reviewed pertinent reports        ACTIVE MEDICATIONS     Current Facility-Administered Medications   Medication Dose Route Frequency   • acetaminophen (TYLENOL) tablet 650 mg  650 mg Oral Q6H PRN   • albuterol (PROVENTIL HFA,VENTOLIN HFA) inhaler 2 puff  2 puff Inhalation Q4H PRN   • clonazePAM (KlonoPIN) tablet 0 5 mg  0 5 mg Oral HS   • enoxaparin (LOVENOX) subcutaneous injection 40 mg  40 mg Subcutaneous Daily   • fluticasone-umeclidinium-vilanterol (TRELEGY) 200-62 5-25 MCG/INH inhaler 1 puff  1 puff Inhalation Daily   • lamoTRIgine (LaMICtal) tablet 200 mg  200 mg Oral Daily   • levothyroxine tablet 100 mcg  100 mcg Oral Early Morning   • lisinopril (ZESTRIL) tablet 10 mg  10 mg Oral Daily   • melatonin tablet 6 mg  6 mg Oral HS   • meloxicam (MOBIC) tablet 15 mg  15 mg Oral Daily   • modafinil (PROVIGIL) tablet 200 mg  200 mg Oral Daily   • montelukast (SINGULAIR) tablet 10 mg  10 mg Oral Daily   • pramipexole (MIRAPEX) tablet 2 mg  2 mg Oral HS   • tamsulosin (FLOMAX) capsule 0 4 mg  0 4 mg Oral Daily With Dinner     CODE STATUS & ADVANCED DIRECTIVES     Code Status: Level 1 - Full Code  Advance Directive and Living Will:      Power of :    POLST:      VTE Pharmacologic Prophylaxis: per primary team  VTE Mechanical Prophylaxis: sequential compression device    ==  Star Drake MD  Resident, Neurology, [address] [place] [address]

## 2022-10-14 NOTE — OCCUPATIONAL THERAPY NOTE
Occupational Therapy Evaluation      Chuck Hayward    10/14/2022    Patient Active Problem List   Diagnosis    BMI 35 0-35 9,adult    Primary osteoarthritis of right shoulder    Arthritis    Moderate persistent asthma    Hypothyroidism    DONALD (obstructive sleep apnea)    Essential hypertension    Mixed hyperlipidemia    Polyarthralgia    Primary osteoarthritis, left shoulder    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HCC)    Tremor    Shortness of breath    Acute respiratory failure with hypoxia (HCC)    Hip weakness    Blurry vision, bilateral    Insomnia    Dysphagia    Bipolar disorder (HCC)    Dizziness    Respiratory tract congestion with cough    RLQ abdominal pain    Asthma    BPH (benign prostatic hyperplasia)    Pre-diabetes    Myoclonic jerking    Tremor of right hand       Past Medical History:   Diagnosis Date    Arthritis     Asthma     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[address] )     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[address] )     Manic depression ([address] )     Sleep apnea        Past Surgical History:   Procedure Laterality Date    UMBILICAL HERNIA REPAIR          10/14/22 0754   OT Last Visit   OT Visit Date 10/14/22   Note Type   Note type Evaluation   Additional Comments Prefers to go by "Bill"   Pain Assessment   Pain Assessment Tool 0-10   Pain Score 8  (with activity)   Pain Location/Orientation Orientation: Bilateral;Location: Shoulder   Pain Onset/Description Onset: Ongoing; Descriptor: Aching   Effect of Pain on Daily Activities limits activity tolerance and comfort   Patient's Stated Pain Goal No pain   Hospital Pain Intervention(s) Repositioned; Ambulation/increased activity; Emotional support   Restrictions/Precautions   Weight Bearing Precautions Per Order No   Other Precautions Pain; Fall Risk   Home Living   Type of Home Other (Comment)  (Condo)   Home Layout One level;Performs ADLs on one level; Able to live on main level with bedroom/bathroom; Elevator  (10th floor)   Bathroom Shower/Tub Walk-in shower   Bathroom Toilet Standard   Bathroom Equipment Shower chair;Grab bars in [facility] [address]   (no AD)   Additional Comments Family lives across the reis of [address] [facility] ([facility])   Prior Function   Level of [place] Independent with ADLs; Independent with functional mobility; Independent with IADLS   Lives With Spouse  (works full time at [employer])   [address] with driving; Independent with medication management; Independent with meal prep   Falls in the last 6 months 1 to 4  (2 falls per pt in shower/bathroom)   Vocational Retired  (in May)   Comments Pt reports (I) with ADLs/IADLs PTA, no AD for mobility  Had been limited by pain for past few weeks, unable to sleep more than 3 hours and increased difficulty with ADL completion however has been managing  Pt lives alone during week as wife stays at other house close to her work but is home during weekend   Lifestyle   Autonomy Pt (I) with ADLs/IADLs PTA living in a 10th floor condo with elevator access   Reciprocal Relationships Supportive spouse and family who lives [facility] in [address] [place] [address] to Others Retired Manager of client relations   General   Family/Caregiver Present No   Additional General Comments Patient reporting new onset jerking/flexion of his neck and left upper extremity muscles involuntarily  None observed during functional activity however observed when resting  Subjective   Subjective "I just have too many things going wrong at once"   ADL   Eating Assistance 6  Modified independent   Eating Deficit Setup; Beverage management   Grooming Assistance 6  Modified Independent   LB Dressing Assistance 6  Modified independent   LB Dressing Deficit Setup;Don/doff R sock; Don/doff L sock; Increased time to complete  (Sitting EOB)   Toileting Assistance  6  Modified independent   Toileting Deficit Setup; Clothing management up;Clothing management down  (urinate in stance in bathroom)   Bed Mobility Supine to Sit 6  Modified independent   Additional items HOB elevated   Sit to Supine Unable to assess   Additional Comments Pt received sitting cross legged in bed  Pt OOB to recliner at end of session   Transfers   Sit to Stand 5  Supervision   Additional items Assist x 1; Increased time required   Stand to Sit 5  Supervision   Additional items Assist x 1; Increased time required   Functional Mobility   Functional Mobility 5  Supervision   Additional Comments Short household distance to bathroom and back with no device with (S)  No LOB, denies SOB, denies lightheadedness   Additional items   (no AD)   Balance   Static Sitting Good   Dynamic Sitting Fair +   Static Standing Fair +   Dynamic Standing Fair   Activity Tolerance   Activity Tolerance Patient limited by pain   Medical Staff Made Aware Spoke to PT RJ   Nurse Made Aware yes, RN Sharon Vega ok to see pt   RUE Assessment   RUE Assessment WFL   LUE Assessment   LUE Assessment WFL   Hand Function   Gross Motor Coordination Functional   Fine Motor Coordination Functional  (tremor noted R hand)   Sensation   Light Touch No apparent deficits   Additional Comments Lack of sensation in L foot   Vision-Basic Assessment   Current Vision Does not wear glasses   Cognition   Overall Cognitive Status [facility]   Arousal/Participation Alert; Cooperative   Attention Within functional limits   Orientation Level Oriented X4   Memory Decreased recall of precautions   Following Commands Follows multistep commands with increased time or repetition   Comments Pt pleasant and agreeable to OT session  Frustrated with pain and lack of progress with ability to sleep  Good insight into deficits  Assessment   Limitation Decreased high-level ADLs   Prognosis Good   Assessment Pt is a 81 y o  male seen for OT evaluation s/p admit to [address] [place] [address] on 10/13/2022 w/Acute respiratory failure with hypoxia ([facility] [address] )  Orders received for OT evaluation and treatment   Pt identified during session via name and wristband  Comorbidities affecting patient at time of evaluation include: myoclonic jerking, Bipolar disorder, dysphagia, hypertension, hypothyroid and hyperlipidemia  Personal factors affecting patient at time of evaluation include: limited caregiver support and difficulty performing IADLs  PTA, patient was independent with functional mobility without assistive device, independent with ADLS, independent with IADLS and living with spouse in a 10th floor condo with 0 steps to enter  The evaluation identifies the following performance deficits: impaired balance, decreased IADLS, pain and decreased activity tolerance, that result in activity limitations (as is outlined above in flowsheet)  Based on the OT evaluation outcomes, functional performance deficits, and assessment findings, pt has been identified as moderate complexity, because the patient may present with comorbidities that affect occupational performance and required minimal or moderate modification of tasks or assistance with the consideration of several treatment options  The patient's raw score on the AM-PAC Daily Activity inpatient short form is 24, standardized score is 57 54, greater than 39 4  From an OT standpoint, patients at this level may benefit from d/c to No rehabilitation needs  At time of evaluation, pt demonstrated (I) with ADL's and functional mobility w/ no device  Pt with no concerns for safe DC home and reporting feeling at/close to functional baseline aside from increased pain  Pt with no acute OT needs at this time and will be DC from inpatient OT caseload  Please reconsult if functional status changes     Goals   Patient Goals to have less pain   Recommendation   OT Discharge Recommendation No rehabilitation needs   Additional Comments  Pt denied need for DME   AM-[facility] Daily Activity Inpatient   Lower Body Dressing 4   Bathing 4   Toileting 4   Upper Body Dressing 4   Grooming 4   Eating 4   Daily Activity Raw Score 24   Daily Activity Standardized Score (Calc for Raw Score >=11) 57 54   AM-PAC Applied Cognition Inpatient   Following a Speech/Presentation 4   Understanding Ordinary Conversation 4   Taking Medications 4   Remembering Where Things Are Placed or Put Away 4   Remembering List of 4-5 Errands 4   Taking Care of Complicated Tasks 4   Applied Cognition Raw Score 24   Applied Cognition Standardized Score 62 21     At time of evaluation, pt demonstrated (I) with ADL's and functional mobility w/ no device  Pt with no concerns for safe DC home and reporting feeling at/close to functional baseline aside from increased pain  Pt with no acute OT needs at this time and will be DC from inpatient OT caseload  Please reconsult if functional status changes      Bozena Kramer

## 2022-10-14 NOTE — ASSESSMENT & PLAN NOTE
Patient reports history of bipolar disorder with manic episodes that last from several days to up to a month      Plan:  Lamictal 200 mg, home medication

## 2022-10-14 NOTE — CONSULTS
Consultation - Pulmonary Medicine   Natanael Garrido 81 y o  male MRN: [id number]  Unit/Bed#: S -01 Encounter: [id number]      Assessment/Plan:  1  Shortness of breath  - progressively worsening in the past 3 months  - SpO2 at rest is 95% on room air and drops to 85% with ambulation  No home O2 use at baseline   - associated with neuro muscular symptoms in the past 3 months including dose fascia and muscle weakness  - not improving with use of albuterol inhaler   - CXR in the past showed atelectasis in bibasilar areas but recent CXR with this admission showed no acute cardiopulmonary disease  2  DONALD  - patient is intolerant of the CPAP and did not follow-up for a dental Peace evaluation with the dentist   - patient admits to being sleep-deprived due to the severe DONALD    3  Moderate persistent asthma  -  Spirometry 10/26/2021:-FEV1/FVC noted to be 80% predicted  FEV1 noted to be 1 99 L, FVC noted to be 2 49 L  No obstructive ventilatory defect noted  - patient uses albuterol inhaler p r n  and Trelegy 1 puff daily    4  History of Eosinophilic pneumonia  - had treatment for eosinophilic pneumonia times 12 years ago and was treated with steroids x3 months with complete resolution  5  Dysphagia  - patient has had dysphagia for about 5 years and is now associated with muscular weakness when taking deep breaths as well as jerking movements associated with neck pain  Plan:  Continue to monitor SpO2 while on room air  ABG in the morning  Continue Trelegy 1 puff daily  Albuterol inhaler q 4 H p r n  Barium swallow with video and speech pending  Neuro consult  Will consider  NIF  Will consider SNIFF Test  Echo pending    Plan discussed with [facility]  History of Present Illness   Physician Requesting Consult: Echo Díaz MD  Reason for Consult / Principal Problem:  Worsening shortness of Breath    Hx and PE limited by: N/A  Chief Complaint:  Worsening shortness of Breath  HPI: Natanael Garrido is a 81 y o   male with PMH of childhood moderate persistent asthma, DONALD intolerant of CPaP, Eosinophilic PNA x 12 yrs ago, HTN, HLD, dysphagia x 5 years,  Hypothyroidism, obesity, history of polyps bursitis, OA, bipolar disorder s/p ECT who presented to [facility] with complaints of worsening SOB x 3 mths that did not improve with albuterol inhaler  He admits to nocturnal dyspnea waking up several times a night  Patient admits that his SpO2 at home is around 95% at rest and will drop to 85% with ambulation  He was previously able to walk several blocks without dyspnea however now can barely walk to the kitchen and back in the last 3 months  He has associated minimal nasal congestion cough and minimal production of phlegm  In the last 3 months he states that he has left foot numbness and unsteadiness when he walks  He attributes his muscle jerking to poor sleep due to severe DONALD  Family history is significant for mother with severe scoliosis impacting left lung capacity, mother, father and sister with depression but no other neurological conditions  From pulmonary standpoint, patient follows up with [facility] for the SOB established care 2 days ago, but was seeing pulmonologist Dr Sebas Mcgee in [place] [facility] for 2 years for asthma  Patient has no smoking hx  Denies vaping  Patient is currently maintained on Trelegy and Proventil 2 puffs q 4h p r n  patient is allergic to Spiriva and gets a rash  Patient was on Asmanex and Breo prior to current regimen that started on 5/12/22  Spirometry 10/26/2021:-FEV1/FVC noted to be 80% predicted  FEV1 noted to be 1 99 L, FVC noted to be 2 49 L  No obstructive ventilatory defect noted  No occupational exposures as patient wors at a desk for [employer]  No pets  Allergic to dogs  No hx of  GERD, seasonal allergies, or postnasal drip  Patient denies any recent acute exposures to dust, mold, asbestos, or silica      Review of Systems   Constitutional: Positive for activity change (Due to dyspnea on exertion) and fatigue  HENT: Positive for congestion and trouble swallowing  Eyes: Negative for discharge  Respiratory: Positive for choking (With dysphagia) and shortness of breath  Cardiovascular: Negative for chest pain, palpitations and leg swelling  Gastrointestinal: Negative for abdominal distention, constipation, diarrhea, nausea and vomiting  Endocrine: Negative for cold intolerance  Genitourinary: Negative for difficulty urinating  Musculoskeletal: Positive for arthralgias, gait problem and neck pain  Skin: Negative for wound  Allergic/Immunologic: Negative for environmental allergies  Neurological: Positive for dizziness (Resolved) and numbness (left foot)  Negative for headaches  Psychiatric/Behavioral: Positive for sleep disturbance (Patient sleeps less than 2 hours per night)  Negative for agitation, behavioral problems, confusion, decreased concentration, self-injury and suicidal ideas  History of bipolar disorder and suicidal ideations  None currently  Historical Information   Past Medical History:   Diagnosis Date   • Arthritis    • Asthma    • 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[facility] [address] )    • 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    • Manic depression ([facility] )    • Sleep apnea      Past Surgical History:   Procedure Laterality Date   • UMBILICAL HERNIA REPAIR       Social History   Social History     Substance and Sexual Activity   Alcohol Use Never    Comment: socially     Social History     Substance and Sexual Activity   Drug Use Never     Social History     Tobacco Use   Smoking Status Never Smoker   Smokeless Tobacco Never Used     E-Cigarette/Vaping   • E-Cigarette Use Never User      E-Cigarette/Vaping Substances   • Nicotine No    • THC No    • CBD No    • Flavoring No    • Other No    • Unknown No      Occupational History:  Desk job at [employer]      Family History:   Family History   Problem Relation Age of Onset   • Hypertension Mother    • Depression Mother    • Depression Father    • Arthritis Father    • Heart attack Paternal Uncle 32   • Colon cancer Neg Hx    • Prostate cancer Neg Hx        Meds/Allergies   all current active meds have been reviewed, pertinent pulmonary meds have been reviewed, current meds:   Current Facility-Administered Medications   Medication Dose Route Frequency   • acetaminophen (TYLENOL) tablet 650 mg  650 mg Oral Q6H PRN   • albuterol (PROVENTIL HFA,VENTOLIN HFA) inhaler 2 puff  2 puff Inhalation Q4H PRN   • clonazePAM (KlonoPIN) tablet 0 5 mg  0 5 mg Oral HS   • enoxaparin (LOVENOX) subcutaneous injection 40 mg  40 mg Subcutaneous Daily   • fluticasone-umeclidinium-vilanterol (TRELEGY) 200-62 5-25 MCG/INH inhaler 1 puff  1 puff Inhalation Daily   • lamoTRIgine (LaMICtal) tablet 200 mg  200 mg Oral Daily   • levothyroxine tablet 100 mcg  100 mcg Oral Early Morning   • lisinopril (ZESTRIL) tablet 10 mg  10 mg Oral Daily   • melatonin tablet 6 mg  6 mg Oral HS   • meloxicam (MOBIC) tablet 15 mg  15 mg Oral Daily   • modafinil (PROVIGIL) tablet 200 mg  200 mg Oral Daily   • montelukast (SINGULAIR) tablet 10 mg  10 mg Oral Daily   • pramipexole (MIRAPEX) tablet 2 mg  2 mg Oral HS   • tamsulosin (FLOMAX) capsule 0 4 mg  0 4 mg Oral Daily With Dinner    and PTA meds:   Prior to Admission Medications   Prescriptions Last Dose Informant Patient Reported? Taking?    Trelegy Ellipta 200-62 5-25 MCG/INH AEPB inhaler 10/13/2022 at Unknown time Self No Yes   Sig: Inhale 1 puff daily   albuterol (PROVENTIL HFA,VENTOLIN HFA) 90 mcg/act inhaler Past Week at Unknown time Self Yes Yes   Sig: Inhale 2 puffs every 4 (four) hours as needed for wheezing   enalapril (VASOTEC) 10 mg tablet 10/13/2022 at Unknown time Self Yes Yes   Sig: Take 10 mg by mouth daily   lamoTRIgine (LaMICtal) 200 MG tablet 10/13/2022 at Unknown time Self Yes Yes   levothyroxine 100 mcg tablet 10/13/2022 at Unknown time Self Yes Yes   meloxicam (Mobic) 15 mg tablet 10/13/2022 at Unknown time Self No Yes   Sig: Take 1 tablet (15 mg total) by mouth daily   modafinil (PROVIGIL) 200 MG tablet 10/13/2022 at Unknown time Self Yes Yes   montelukast (SINGULAIR) 10 mg tablet 10/13/2022 at Unknown time Self Yes Yes   pramipexole (MIRAPEX) 1 mg tablet 10/13/2022 at Unknown time Self Yes Yes   Sig: Take 2 mg by mouth daily at bedtime   simvastatin (ZOCOR) 10 mg tablet 10/13/2022 at Unknown time Self Yes Yes   Sig: 10 mg   tamsulosin (FLOMAX) 0 4 mg 10/13/2022 at Unknown time Self Yes Yes   traZODone (DESYREL) 50 mg tablet 10/12/2022 at Unknown time Self Yes Yes      Facility-Administered Medications: None       Allergies   Allergen Reactions   • Spiriva Respimat [Tiotropium Bromide Monohydrate] Rash       Objective   Vitals: Blood pressure 118/70, pulse 64, temperature 97 6 °F (36 4 °C), temperature source Oral, resp  rate 18, SpO2 90 %  ,There is no height or weight on file to calculate BMI  No intake or output data in the 24 hours ending 10/14/22 1021  Invasive Devices  Report    Peripheral Intravenous Line  Duration           Peripheral IV 10/13/22 Distal;Right;Upper;Ventral (anterior) Arm <1 day                Physical Exam  Vitals and nursing note reviewed  Constitutional:       General: He is not in acute distress  Appearance: Normal appearance  He is not ill-appearing, toxic-appearing or diaphoretic  HENT:      Head: Normocephalic and atraumatic  Nose: Nose normal       Mouth/Throat:      Mouth: Mucous membranes are moist       Pharynx: No oropharyngeal exudate or posterior oropharyngeal erythema  Eyes:      General: No scleral icterus  Right eye: No discharge  Left eye: No discharge  Pupils: Pupils are equal, round, and reactive to light  Neck:      Vascular: No carotid bruit  Cardiovascular:      Rate and Rhythm: Normal rate and regular rhythm  Pulses: Normal pulses  Heart sounds: Normal heart sounds  No murmur heard  No friction rub   No gallop  Pulmonary:      Effort: Pulmonary effort is normal  No respiratory distress  Breath sounds: Normal breath sounds  No stridor  No wheezing, rhonchi or rales  Abdominal:      General: Bowel sounds are normal       Palpations: Abdomen is soft  Tenderness: There is no abdominal tenderness  There is no right CVA tenderness, left CVA tenderness, guarding or rebound  Musculoskeletal:         General: Deformity (Mild thoracic scoliosis) present  No swelling or tenderness  Cervical back: Normal range of motion and neck supple  No rigidity  No muscular tenderness  Right lower leg: No edema  Left lower leg: No edema  Lymphadenopathy:      Cervical: No cervical adenopathy  Skin:     General: Skin is warm and dry  Capillary Refill: Capillary refill takes less than 2 seconds  Coloration: Skin is not jaundiced  Findings: No lesion or rash  Neurological:      General: No focal deficit present  Mental Status: He is alert and oriented to person, place, and time  Mental status is at baseline  Motor: No weakness  Psychiatric:         Mood and Affect: Mood normal          Behavior: Behavior normal          Thought Content:  Thought content normal          Judgment: Judgment normal          Lab Results:   CBC:   Lab Results   Component Value Date    WBC 10 28 (H) 10/14/2022    HGB 13 2 10/14/2022    HCT 41 5 10/14/2022    MCV 93 10/14/2022     10/14/2022    MCH 29 7 10/14/2022    MCHC 31 8 10/14/2022    RDW 14 2 10/14/2022    MPV 9 9 10/14/2022    NRBC 0 10/14/2022   , CMP:   Lab Results   Component Value Date    SODIUM 134 (L) 10/14/2022    K 3 9 10/14/2022     10/14/2022    CO2 25 10/14/2022    BUN 22 10/14/2022    CREATININE 1 15 10/14/2022    CALCIUM 8 6 10/14/2022    AST 27 10/14/2022    ALT 38 10/14/2022    ALKPHOS 62 10/14/2022    EGFR 64 10/14/2022   , PT/INR:   Lab Results   Component Value Date    INR 0 94 10/13/2022        Recent Labs 10/13/22  2152   CRP 1 8   DDIMER 0 44     Lab Results   Component Value Date    QERVDZKQ71 1,006 (H) 10/13/2022      Lab Results   Component Value Date    NTBNP 42 10/13/2022      Recent Labs     10/14/22  0508   LEGIONELLAUR Negative   STPU Negative       Imaging Studies: I have personally reviewed pertinent reports  XR chest portable Result Date: 10/14/2022 Impression: No acute cardiopulmonary disease  XR chest portable Result Date: 10/14/2022 Impression:         EKG, Pathology, and Other Studies: I have personally reviewed pertinent reports  Pulmonary Results (PFTs, PSG): I have personally reviewed pertinent reports  Spirometry 10/26/2021:-FEV1/FVC noted to be 80% predicted  FEV1 noted to be 1 99 L, FVC noted to be 2 49 L  No obstructive ventilatory defect noted  VTE Prophylaxis: Enoxaparin (Lovenox)    Code Status: Level 1 - Full Code    None    Portions of the record may have been created with voice recognition software  Occasional wrong word or "sound a like" substitutions may have occurred due to the inherent limitations of voice recognition software  Read the chart carefully and recognize, using context, where substitutions have occurred

## 2022-10-14 NOTE — ASSESSMENT & PLAN NOTE
Possible prediabetes  Most recent hemoglobin A1c 6 5%  A m   Blood sugars within normal limits    Plan:  Carbohydrate diet

## 2022-10-14 NOTE — ASSESSMENT & PLAN NOTE
Patient complaining of aches chronic history of significant insomnia, getting only about 3 hours of sleep each night  Less than 1 hour sleep over the last 3 nights      Plan:  Discontinue trazodone  Trial clonazepam 0 5 mg at night  Melatonin 6 mg

## 2022-10-14 NOTE — ASSESSMENT & PLAN NOTE
Patient reporting 1 day history of nasal congestion and cough with productive phlegm      Plan:  COVID/RSV/flu  Chest x-ray  Procalcitonin  Urine Legionella  Urine strep pneumonia  Sputum culture

## 2022-10-14 NOTE — ASSESSMENT & PLAN NOTE
Patient complaining of ongoing right lower quadrant abdominal pain  Attributes this to a pulled muscle  Nontender to palpation on abdominal exam    Plan:  Tylenol p r n

## 2022-10-14 NOTE — ASSESSMENT & PLAN NOTE
Patient reporting a 5 year history of dysphagia, getting worse over the last 1 year  Dysphagia with pills and solid food      Plan:  Bedside speech pathology evaluation

## 2022-10-14 NOTE — ASSESSMENT & PLAN NOTE
· Patient presenting with shortness of breath with hypoxia, SpO2 90% on room air  Direct admission from pulmonology visit, concern for acute neuromuscular disorder  · D-dimer, proBNP, TSH, troponins, CMP, CBC, PT/INR, PTT all within normal limits  · X-ray of the hips within normal limits  · X-ray of the shoulder left-sided, moderate glenohumeral joint osteoarthritis, right-sided severe osteoarthritis glenohumeral joint  · Abnormal vital capacity  · Echo demonstrating EF of 65%, aortic sclerosis, mild mitral regurgitation, trace tricuspid regurgitation    Plan:  Pulmonology consult, considering NIF, considering SNIFF test  ABG in the morning  Trelegy 1 puff daily  Albuterol inhaler q 4h as needed  Neurology consult  Respiratory protocol  Vital capacity measurement  Continue to monitor SpO2 while on room air  Lovenox 40 mg daily for VTE prophylaxis  MRI cervical spine with and without contrast  Myasthenia gravis labs, follow-up with results  Sjogren's rheumatological workup, follow-up with results  Continue to monitor VC/NIF  Complete a full review of medications to see of any psychotropics could be contributory  Discontinue trazodone, trial clonazepam am 0 5 mg q h s   For sleep/tremor/myoclonus  If no improvement consider empiric trial of Mestinon

## 2022-10-14 NOTE — PLAN OF CARE
Problem: MOBILITY - ADULT  Goal: Maintain or return to baseline ADL function  Description: INTERVENTIONS:  -  Assess patient's ability to carry out ADLs; assess patient's baseline for ADL function and identify physical deficits which impact ability to perform ADLs (bathing, care of mouth/teeth, toileting, grooming, dressing, etc )  - Assess/evaluate cause of self-care deficits   - Assess range of motion  - Assess patient's mobility; develop plan if impaired  - Assess patient's need for assistive devices and provide as appropriate  - Encourage maximum independence but intervene and supervise when necessary  - Involve family in performance of ADLs  - Assess for home care needs following discharge   - Consider OT consult to assist with ADL evaluation and planning for discharge  - Provide patient education as appropriate  Outcome: Progressing  Goal: Maintains/Returns to pre admission functional level  Description: INTERVENTIONS:  - Perform BMAT or MOVE assessment daily    - Set and communicate daily mobility goal to care team and patient/family/caregiver     - Collaborate with rehabilitation services on mobility goals if consulted  - Perform Range of Motion   - Stand patient 3 times a day  - Ambulate patient 3 times a day  - Out of bed to chair 3 times a day   - Out of bed for toileting  - Record patient progress and toleration of activity level   Outcome: Progressing

## 2022-10-14 NOTE — H&P
[facility]  H&P- [facility] 1950, 81 y o  male MRN: [id number]  Unit/Bed#: S -01 Encounter: [id number]  Primary Care Provider: Mukul Linn DO   Date and time admitted to hospital: 10/13/2022  4:23 PM    * Acute respiratory failure with hypoxia [facility])  Assessment & Plan  Patient presenting with shortness of breath with hypoxia, SpO2 90% on room air  Direct admission from pulmonology visit, concern for acute neuromuscular disorder    Plan:  Pulmonology consult  Neurology consult  Respiratory protocol  Vital capacity measurement  Flu vaccine  TTE  Ambulatory pulse ox  Troponins  D-dimer, reflex to CTA chest with bilateral lower extremity Dopplers if D-dimer is elevated  ProBNP  TSH  A m  CMP, CBC, PT/INR, PTT  Lovenox 40 mg daily for VTE prophylaxis    Myoclonic jerking  Assessment & Plan  Patient reporting new onset jerking/flexion of his neck and left upper extremity muscles involuntarily  Disrupting his sleep and ability to complete his daily activities  Clary's? Plan:  Neurology consult  Continue to monitor    Respiratory tract congestion with cough  Assessment & Plan  Patient reporting 1 day history of nasal congestion and cough with productive phlegm  Plan:  COVID/RSV/flu  Chest x-ray  Procalcitonin  Urine Legionella  Urine strep pneumonia  Sputum culture    Dizziness  Assessment & Plan  Patient reporting recent history of dizziness and unsteadiness on feet  Reports 2 falls in the last 10 days  Reports complete numbness of left foot  Plan:  Neurology consult, appreciate recommendations  Orthostatics  B12  TSH  Folate it is  PT/OT evaluation    Dysphagia  Assessment & Plan  Patient reporting a 5 year history of dysphagia, getting worse over the last 1 year  Dysphagia with pills and solid food      Plan:  Bedside speech pathology evaluation    Hip weakness  Assessment & Plan  Patient complaining of inability to bring knees to chest   Hip flexor weakness on exam   Proximal muscle weakness  Polymyalgia rheumatica versus polymyositis    Plan:  CK  ESR   CRP  DENIS  A m  Cortisol  PT/OT evaluation    Polyarthralgia  Assessment & Plan  Patient complaining of chronic right shoulder pain, additionally 10 day history of bilateral shoulder pain, bilateral hip pain, left foot ankle pain    Plan:  CK  ESR   CRP  DENIS  Meloxicam, home medication    Pre-diabetes  Assessment & Plan  Possible prediabetes  Most recent hemoglobin A1c 6 5%  Plan:  Carbohydrate diet  Check blood sugar in the a m  10/14    Asthma  Assessment & Plan  Patient has significant history of asthma since childhood requiring prednisone for several years during childhood  Controlled now    Plan:  Trelegy  Albuterol  Montelukast  Respiratory protocol    Insomnia  Assessment & Plan  Patient complaining of aches chronic history of significant insomnia, getting only about 3 hours of sleep each night  Less than 1 hour sleep over the last 3 nights  Plan:  Trazodone 100 mg, home medication  Melatonin 6 mg     Blurry vision, bilateral  Assessment & Plan  Two day history of bilateral blurry vision  Patient reports previous 20/20 vision    Plan:  Continue to monitor with neurological exam    BPH (benign prostatic hyperplasia)  Assessment & Plan  History of BPH    Plan:  Tamsulosin 0 4 mg, home medication    RLQ abdominal pain  Assessment & Plan  Patient complaining of ongoing right lower quadrant abdominal pain  Attributes this to a pulled muscle  Nontender to palpation on abdominal exam    Plan:  Tylenol p r n  Bipolar disorder [facility])  Assessment & Plan  Patient reports history of bipolar disorder with manic episodes that last from several days to up to a month      Plan:  Lamictal 200 mg, home medication    Mixed hyperlipidemia  Assessment & Plan  History of hyperlipidemia    Plan:  Pravastatin 20 mg, alternative formulary for home medication    Essential hypertension  Assessment & Plan  History of hypertension  Blood pressure on admission 131/76    Plan:  Lisinopril 10 mg, alternate formulary of home medication    DONALD (obstructive sleep apnea)  Assessment & Plan  History of obstructive sleep apnea  Noncompliant with CPAP due to sleep disturbances    Plan:  Modafinil 200 mg    Hypothyroidism  Assessment & Plan  Patient reporting history of hypothyroidism  Plan:  Levothyroxine 100 mcg, home medication  TSH    VTE Pharmacologic Prophylaxis: VTE Score: 3 Moderate Risk (Score 3-4) - Pharmacological DVT Prophylaxis Ordered: enoxaparin (Lovenox)  Code Status: Level 1 - Full Code   Discussion with family: I did not attempt update family  Anticipated Length of Stay: Patient will be admitted on an inpatient basis with an anticipated length of stay of greater than 2 midnights secondary to Rule out neuromuscular weakness  Chief Complaint:  Shortness of breath    History of Present Illness:  Natanael Garrido is a 81 y o  male with a PMH of asthma, obesity, history of polyps, history of greater trochanter bursitis, osteoarthritis, obstructive sleep apnea, hypothyroidism, hypertension, hyperlipidemia, depression, BPH, bipolar disorder, dysphagia with solid foods who presents with 3 month history of worsening shortness of breath  Patient reports that he has had 3 months of worsening shortness of breath, monitors his own pulse ox levels, at rest O2 levels are 95% when walking O2 levels dropped down to 85%  Patient reports he feels exhausted and can even climb any stairs at his house  Reports that he has a 1 day history of congestion, cough, and productive phlegm  Patient also reports a severe history of asthma since his childhood requiring prednisone for several years  He stopped having significant respiratory problems at the age of 23, and has only had occasional breathing issues over    The last decade    Patient reports a history of manic depression bipolar disorder with manic episodes lasting from several days to months at a time  No depressive medications helped him and he has required electroshock therapy several times to help him with his depression  Patient also complains of chronic osteoarthritis in the right shoulder for 7 years duration, he sees Orthopedic surgery and is planning on getting his right shoulder replaced  Over the last 3 days his arthritis has gotten significantly worse where he has significant pain in bilateral shoulders, bilateral hips, thumbs, and the left foot  This pain is significantly disrupting his sleep  Patient reports a 30 year history of poor sleep getting only about 3 hours of sleep each night  Patient reports that his left foot is completely numb  Patient also reports that he is unsteady on his feet and has fallen twice over the last 10 days without head trauma  Patient reporting that his head and shoulder have been involuntary jerking and if this is getting worse over the last several days  Patient reports a chronic history of poor sleep, most recently poor sleep from significant jerking of his left arm and neck  Patient reports weakness in his bilateral hips, cannot raise his legs to put on his shoes cannot raise his knees to his chest   Patient reports tremors in his right arm for the last 2 days  Patient also reports severe itching in the 2nd digit of the left toe which required significant anti-itch topical medication, now resolved  Patient also complaining of several days of dry mouth  Patient reports that he has significantly thin skin on his arms and he bruises very easily and gets cuts easily  He reports that of use Band-Aids the Band-Aids rip off his skin  He denies any blisters  Patient reports bilateral blurry vision for the last 2 days, previously he reports that he had 20/20 vision  Patient reports that his systolic blood pressures have significantly varied over the last week ranging from 90 up to 170      Patient reports a 5 year history of dysphagia which has gotten progressively worse over the last 1 year  He reports that pills and food gets stuck at the bottom of his throat and it takes some time for him to fully swallow pills and food  Patient denies fever, chills, nausea, vomiting, constipation, diarrhea, dysuria, lower extremity swelling  Patient reports last bowel movement was at 2:00 p m  Today 10/13 with nonbloody and formed  Patient reports he is happy to be here and hopes we can help workup his recent deterioration  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 [employer]  Review of Systems:  Review of Systems   Constitutional: Positive for fatigue  Negative for chills and fever  HENT: Positive for congestion and trouble swallowing  Negative for ear pain and sore throat  Eyes: Positive for visual disturbance  Negative for pain  Respiratory: Positive for cough and shortness of breath  Negative for wheezing  Cardiovascular: Negative for chest pain, palpitations and leg swelling  Gastrointestinal: Positive for abdominal pain  Negative for blood in stool, constipation, diarrhea, nausea and vomiting  Genitourinary: Negative for dysuria and hematuria  Musculoskeletal: Positive for arthralgias and gait problem  Negative for back pain  Skin: Negative for color change and rash  Neurological: Positive for dizziness and tremors  Negative for seizures and syncope  Hematological: Bruises/bleeds easily  Psychiatric/Behavioral: Positive for decreased concentration and sleep disturbance  All other systems reviewed and are negative        Past Medical and Surgical History:   Past Medical History:   Diagnosis Date   • Arthritis    • Asthma    • 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 Authorizing Provider   albuterol (PROVENTIL HFA,VENTOLIN HFA) 90 mcg/act inhaler Inhale 2 puffs every 4 (four) hours as needed for wheezing   Yes Historical Provider, MD   enalapril (VASOTEC) 10 mg tablet Take 10 mg by mouth daily   Yes Historical Provider, MD   lamoTRIgine (LaMICtal) 200 MG tablet  8/15/22  Yes Historical Provider, MD   levothyroxine 100 mcg tablet  8/11/22  Yes Historical Provider, MD   meloxicam (Mobic) 15 mg tablet Take 1 tablet (15 mg total) by mouth daily 10/4/22  Yes Jose Yang DO   modafinil (PROVIGIL) 200 MG tablet  6/10/22  Yes Historical Provider, MD   montelukast (SINGULAIR) 10 mg tablet  8/11/22  Yes Historical Provider, MD   pramipexole (MIRAPEX) 1 mg tablet Take 2 mg by mouth daily at bedtime 8/15/22  Yes Historical Provider, MD   simvastatin (ZOCOR) 10 mg tablet 10 mg 8/12/22  Yes Historical Provider, MD   tamsulosin (FLOMAX) 0 4 mg  8/11/22  Yes Historical Provider, MD   traZODone (DESYREL) 50 mg tablet  8/25/22  Yes Historical Provider, MD Chris Gagnon Tovar 200-62 5-25 MCG/INH AEPB inhaler Inhale 1 puff daily 9/28/22  Yes Magdaline Holiday, DO     I have reviewed home medications with patient personally  Allergies:    Allergies   Allergen Reactions   • Spiriva Respimat [Tiotropium Bromide Monohydrate] Rash       Social History:  Marital Status: /Civil Union   Occupation:  Retired  Patient Pre-hospital Living Situation: Home  Patient Pre-hospital Level of Mobility: walks  Patient Pre-hospital Diet Restrictions:  None  Substance Use History:   Social History     Substance and Sexual Activity   Alcohol Use Never    Comment: socially     Social History     Tobacco Use   Smoking Status Never Smoker   Smokeless Tobacco Never Used     Social History     Substance and Sexual Activity   Drug Use Never       Family History:  Family History   Problem Relation Age of Onset   • Hypertension Mother    • Depression Mother    • Depression Father    • Arthritis Father    • Heart attack Paternal Uncle 32   • Colon cancer Neg Hx    • Prostate cancer Neg Hx        Physical Exam:     Vitals:   Blood Pressure: 131/76 (10/13/22 1642)  Pulse: 66 (10/13/22 1642)  Temperature: 98 5 °F (36 9 °C) (10/13/22 1642)  Temp Source: Oral (10/13/22 1642)  Respirations: 18 (10/13/22 1642)  SpO2: 90 % (10/13/22 1642)    Physical Exam  Vitals and nursing note reviewed  Constitutional:       Appearance: He is well-developed  HENT:      Head: Normocephalic and atraumatic  Eyes:      Conjunctiva/sclera: Conjunctivae normal       Pupils: Pupils are equal, round, and reactive to light  Cardiovascular:      Rate and Rhythm: Normal rate and regular rhythm  Heart sounds: No murmur heard  Pulmonary:      Effort: Pulmonary effort is normal  No respiratory distress  Breath sounds: Normal breath sounds  No wheezing  Comments: Lung sounds reduced on the right side compared to the left  Abdominal:      General: Bowel sounds are normal  There is distension  Palpations: Abdomen is soft  Tenderness: There is no abdominal tenderness  Musculoskeletal:      Cervical back: Neck supple  Right lower leg: No edema  Left lower leg: No edema  Skin:     General: Skin is warm and dry  Neurological:      Mental Status: He is alert  Sensory: Sensory deficit (Numbness of the left) present  Motor: Weakness (Weakness of the bilateral hip flexors) present  Additional Data:     Lab Results:                            Imaging: No pertinent imaging reviewed  XR chest portable    (Results Pending)       EKG and Other Studies Reviewed on Admission:   · EKG: No EKG obtained  ** Please Note: This note has been constructed using a voice recognition system   **

## 2022-10-14 NOTE — ASSESSMENT & PLAN NOTE
Patient complaining of inability to bring knees to chest   Hip flexor weakness on exam   Proximal muscle weakness  Polymyalgia rheumatica versus polymyositis  CK, ESR, CRP within normal limits  A m  Cortisol significantly low 10/14 of 0 6, however this lab was drawn at approximately 2:30 a m  In the morning will repeat this lab at the correct time of 9:00 a m  Plan:  DENIS, follow-up with results  A m   Cortisol follow-up with results  PT/OT evaluation

## 2022-10-14 NOTE — ASSESSMENT & PLAN NOTE
Patient reporting new onset jerking/flexion of his neck and left upper extremity muscles involuntarily  Disrupting his sleep and ability to complete his daily activities  Ware's?     Plan:  Neurology consult  Continue to monitor

## 2022-10-14 NOTE — PROGRESS NOTES
[facility]  Progress Note [facility] 1950, 81 y o  male MRN: [id number]  Unit/Bed#: S -01 Encounter: [id number]  Primary Care Provider: Severiano Needy, DO   Date and time admitted to hospital: 10/13/2022  4:23 PM    * Acute respiratory failure with hypoxia ([address] )  Assessment & Plan  · Patient presenting with shortness of breath with hypoxia, SpO2 90% on room air  Direct admission from pulmonology visit, concern for acute neuromuscular disorder  · D-dimer, proBNP, TSH, troponins, CMP, CBC, PT/INR, PTT all within normal limits  · X-ray of the hips within normal limits  · X-ray of the shoulder left-sided, moderate glenohumeral joint osteoarthritis, right-sided severe osteoarthritis glenohumeral joint  · Abnormal vital capacity  · Echo demonstrating EF of 65%, aortic sclerosis, mild mitral regurgitation, trace tricuspid regurgitation    Plan:  Pulmonology consult, considering NIF, considering SNIFF test  ABG in the morning  Trelegy 1 puff daily  Albuterol inhaler q 4h as needed  Neurology consult  Respiratory protocol  Vital capacity measurement  Continue to monitor SpO2 while on room air  Lovenox 40 mg daily for VTE prophylaxis  MRI cervical spine with and without contrast  Myasthenia gravis labs, follow-up with results  Sjogren's rheumatological workup, follow-up with results  Continue to monitor VC/NIF  Complete a full review of medications to see of any psychotropics could be contributory  Discontinue trazodone, trial clonazepam am 0 5 mg q h s  For sleep/tremor/myoclonus  If no improvement consider empiric trial of Mestinon    Myoclonus  Assessment & Plan  Patient reporting new onset jerking/flexion of his neck and left upper extremity muscles involuntarily  Disrupting his sleep and ability to complete his daily activities       Plan:  Neurology consult  Continue to monitor  See plan for acute respiratory failure with hypoxia    Respiratory tract congestion with cough  Assessment & Plan  Patient reporting 1 day history of nasal congestion and cough with productive phlegm  COVID/RSV/flu, pro calc, urine Legionella, urine strep pneumonia all within normal limits  Chest x-ray demonstrating no acute cardiopulmonary disease    Plan:  Sputum culture    Dizziness  Assessment & Plan  Patient reporting recent history of dizziness and unsteadiness on feet  Reports 2 falls in the last 10 days  Reports complete numbness of left foot  B12, TSH, folate all within normal limits  Orthostatics negative    Plan:  Neurology consult, appreciate recommendations  PT/OT evaluation    Dysphagia  Assessment & Plan  Patient reporting a 5 year history of dysphagia, getting worse over the last 1 year  Dysphagia with pills and solid food  Plan:  Bedside speech pathology evaluation  Barium swallow study with video    Hip weakness  Assessment & Plan  Patient complaining of inability to bring knees to chest   Hip flexor weakness on exam   Proximal muscle weakness  Polymyalgia rheumatica versus polymyositis  CK, ESR, CRP within normal limits  A m  Cortisol significantly low 10/14 of 0 6, however this lab was drawn at approximately 2:30 a m  In the morning will repeat this lab at the correct time of 9:00 a m  Plan:  DENIS, follow-up with results  A m  Cortisol follow-up with results  PT/OT evaluation    Polyarthralgia  Assessment & Plan  Patient complaining of chronic right shoulder pain, additionally 10 day history of bilateral shoulder pain, bilateral hip pain, left foot ankle pain  CK, ESR, CRP all within normal limits    Plan:  DENIS, follow-up with results  Meloxicam, home medication    Pre-diabetes  Assessment & Plan  Possible prediabetes  Most recent hemoglobin A1c 6 5%  A m  Blood sugars within normal limits    Plan:  Carbohydrate diet    Asthma  Assessment & Plan  Patient has significant history of asthma since childhood requiring prednisone for several years during childhood    Controlled now    Plan:  Trelegy  Albuterol  Montelukast  Respiratory protocol    Insomnia  Assessment & Plan  Patient complaining of aches chronic history of significant insomnia, getting only about 3 hours of sleep each night  Less than 1 hour sleep over the last 3 nights  Plan:  Discontinue trazodone  Trial clonazepam 0 5 mg at night  Melatonin 6 mg     Blurry vision, bilateral  Assessment & Plan  Two day history of bilateral blurry vision  Patient reports previous 20/20 vision    Plan:  Continue to monitor with neurological exam    Tremor of right hand  Assessment & Plan  Patient reporting new onset tremors of the right hand    Plan:  Neurology consult  Continue to monitor    BPH (benign prostatic hyperplasia)  Assessment & Plan  History of BPH    Plan:  Tamsulosin 0 4 mg, home medication    RLQ abdominal pain  Assessment & Plan  Patient complaining of ongoing right lower quadrant abdominal pain  Attributes this to a pulled muscle  Nontender to palpation on abdominal exam    Plan:  Tylenol p r n  Bipolar disorder [facility])  Assessment & Plan  Patient reports history of bipolar disorder with manic episodes that last from several days to up to a month  Plan:  Lamictal 200 mg, home medication    Mixed hyperlipidemia  Assessment & Plan  History of hyperlipidemia    Plan:  Pravastatin 20 mg, alternative formulary for home medication    Essential hypertension  Assessment & Plan  History of hypertension  Blood pressure on admission 131/76    Plan:  Lisinopril 10 mg, alternate formulary of home medication    DONALD (obstructive sleep apnea)  Assessment & Plan  History of obstructive sleep apnea  Noncompliant with CPAP due to sleep disturbances    Plan:  Modafinil 200 mg    Hypothyroidism  Assessment & Plan  Patient reporting history of hypothyroidism    TSH within normal limits    Plan:  Levothyroxine 100 mcg, home medication        VTE Pharmacologic Prophylaxis: VTE Score: 3 Moderate Risk (Score 3-4) - Pharmacological DVT Prophylaxis Ordered: enoxaparin (Lovenox)  Patient Centered Rounds: I performed bedside rounds with nursing staff today  Discussions with Specialists or Other Care Team Provider:  Neurology, pulmonology, attending physician, senior resident    Education and Discussions with Family / Patient: I do not update family  Current Length of Stay: 1 day(s)  Current Patient Status: Inpatient   Discharge Plan: TBD    Code Status: Level 1 - Full Code    Subjective: Mariusz White his 11year-old male with a past medical history asthma, bursitis of the greater trochanter, osteoarthritis, obstructive sleep apnea, obesity, history of polyps, hypothyroidism, hypertension, hyperlipidemia, depression, BPH, bipolar disorder, dysphagia who was a direct admission from pulmonology department for concern of a rapidly progressive neuromuscular disorder, with shortness of breath progressively worsening over the last 3 months  No overnight events  This morning patient reports that his shortness of breath is worse than yesterday in the days prior  He reports having extensive pain in his bilateral hips and is feeling discouraged by his progressive symptoms  He reports that IV in his right arm is painful when he bends his arm  He is hoping he can shower and walk as much as possible today  Patient reports also getting no sleep last night  Patient denies nausea, vomiting, chest pain, abdominal pain, dysuria, constipation, diarrhea  Objective:     Vitals:   Temp (24hrs), Av 8 °F (36 6 °C), Min:97 6 °F (36 4 °C), Max:97 9 °F (36 6 °C)    Temp:  [97 6 °F (36 4 °C)-97 9 °F (36 6 °C)] 97 9 °F (36 6 °C)  HR:  [64-72] 72  Resp:  [18] 18  BP: (114-126)/(63-70) 116/63  Body mass index is 36 34 kg/m²  Input and Output Summary (last 24 hours):   No intake or output data in the 24 hours ending 10/14/22 6395    Physical Exam:   Physical Exam  Vitals and nursing note reviewed     Constitutional:       Appearance: He is well-developed  HENT:      Head: Normocephalic and atraumatic  Eyes:      Conjunctiva/sclera: Conjunctivae normal    Cardiovascular:      Rate and Rhythm: Normal rate and regular rhythm  Heart sounds: No murmur heard  Pulmonary:      Effort: Pulmonary effort is normal  No respiratory distress  Breath sounds: Normal breath sounds  Abdominal:      General: Bowel sounds are normal  There is distension  Palpations: Abdomen is soft  Tenderness: There is no abdominal tenderness  Musculoskeletal:      Cervical back: Neck supple  Right lower leg: No edema  Left lower leg: No edema  Skin:     General: Skin is warm and dry  Neurological:      Mental Status: He is alert and oriented to person, place, and time  Sensory: Sensory deficit (Numbness and tingling of the left foot) present  Motor: Weakness (Weakness in the bilateral hip flexors, knee extension, knee flexion, upper extremity flexion and extension) present        Comments: Involuntary jerks/chorea of the neck/abdomen/left upper extremity          Additional Data:     Labs:  Results from last 7 days   Lab Units 10/14/22  0226   WBC Thousand/uL 10 28*   HEMOGLOBIN g/dL 13 2   HEMATOCRIT % 41 5   PLATELETS Thousands/uL 237   NEUTROS PCT % 71   LYMPHS PCT % 19   MONOS PCT % 8   EOS PCT % 1     Results from last 7 days   Lab Units 10/14/22  0226   SODIUM mmol/L 134*   POTASSIUM mmol/L 3 9   CHLORIDE mmol/L 102   CO2 mmol/L 25   BUN mg/dL 22   CREATININE mg/dL 1 15   ANION GAP mmol/L 7   CALCIUM mg/dL 8 6   ALBUMIN g/dL 3 8   TOTAL BILIRUBIN mg/dL 0 36   ALK PHOS U/L 62   ALT U/L 38   AST U/L 27   GLUCOSE RANDOM mg/dL 119     Results from last 7 days   Lab Units 10/13/22  2152   INR  0 94     Results from last 7 days   Lab Units 10/14/22  1610 10/14/22  0753   POC GLUCOSE mg/dl 165* 99         Results from last 7 days   Lab Units 10/13/22  2152   PROCALCITONIN ng/ml 0 07       Lines/Drains:  Invasive Devices  Report Peripheral Intravenous Line  Duration           Peripheral IV 10/13/22 Distal;Right;Upper;Ventral (anterior) Arm <1 day                      Imaging: Reviewed radiology reports from this admission including: chest xray    Recent Cultures (last 7 days):   Results from last 7 days   Lab Units 10/14/22  0508   LEGIONELLA URINARY ANTIGEN  Negative       Last 24 Hours Medication List:   Current Facility-Administered Medications   Medication Dose Route Frequency Provider Last Rate   • acetaminophen  650 mg Oral Q6H PRN Harvey Ellis MD     • albuterol  2 puff Inhalation Q4H PRN Harvey Ellis MD     • clonazePAM  0 5 mg Oral HS Rylan Casper MD     • Diclofenac Sodium  2 g Topical 4x Daily Harvey Ellis MD     • enoxaparin  40 mg Subcutaneous Daily Harvey Ellis MD     • fluticasone-umeclidinium-vilanterol  1 puff Inhalation Daily Harvey Ellis MD     • lamoTRIgine  200 mg Oral Daily Harvey Ellis MD     • levothyroxine  100 mcg Oral Early Morning Harvey Ellis MD     • lisinopril  10 mg Oral Daily Harvey Ellis MD     • melatonin  6 mg Oral HS Harvey Ellis MD     • meloxicam  15 mg Oral Daily Harvey Ellis MD     • modafinil  200 mg Oral Daily Harvey Ellis MD     • montelukast  10 mg Oral Daily Harvey Ellis MD     • oxyCODONE  2 5 mg Oral Q6H PRN Harvey Ellis MD     • pramipexole  2 mg Oral HS Harvey Ellis MD     • tamsulosin  0 4 mg Oral Daily With Skye Harris MD          Today, Patient Was Seen By: Fang Bahena    **Please Note: This note may have been constructed using a voice recognition system  **

## 2022-10-14 NOTE — ASSESSMENT & PLAN NOTE
Two day history of bilateral blurry vision    Patient reports previous 20/20 vision    Plan:  Continue to monitor with neurological exam

## 2022-10-14 NOTE — UTILIZATION REVIEW
Initial Clinical Review    Admission: Date/Time/Statement:   Admission Orders (From admission, onward)     Ordered        10/13/22 1836  Inpatient Admission  Once                      Orders Placed This Encounter   Procedures   • Inpatient Admission     Standing Status:   Standing     Number of Occurrences:   1     Order Specific Question:   Level of Care     Answer:   Med Surg [16]     Order Specific Question:   Estimated length of stay     Answer:   More than 2 Midnights     Order Specific Question:   Certification     Answer:   I certify that inpatient services are medically necessary for this patient for a duration of greater than two midnights  See H&P and MD Progress Notes for additional information about the patient's course of treatment  ED Arrival Information     Patient not seen in ED                     No chief complaint on file  Initial Presentation: 81 y o  male with a PMH of asthma, obesity, history of polyps, history of greater trochanter bursitis, osteoarthritis, obstructive sleep apnea, hypothyroidism, hypertension, hyperlipidemia, depression, BPH, bipolar disorder, dysphagia with solid foods who presents with 3 month history of worsening shortness of breath  Patient reports that he has had 3 months of worsening shortness of breath, monitors his own pulse ox levels, at rest O2 levels are 95% when walking O2 levels dropped down to 85%  Patient reports he feels exhausted and can even climb any stairs at his house  Reports that he has a 1 day history of congestion, cough, and productive phlegm  Plan: Inpatient admission for evaluation and treatment of acute resp failure with hypoxia, myoclonic jerking: Pulmonology and Neurology consults, TTE, ambulatory pulse ox, sputum culture, urine strep pneumonia and Legionella, orthostatic vitals , PT/OT       Date: 10/14   Day 2:     Pulmonology consult: continue to monitor SpO2, ABG in the am, continue Trglegy and albuterol, barium swallow pending, Neurology consult, Echo pending  Internal medicine: Patient symptoms appear to involve multiple systems  Neuro input appreciated  Agree with MRI of the cervical spine, rheumatological workup including SSA SSB for Sjogren's  ESR and CRP unremarkable  Follow-up on acetyl choline receptor antibody and musk antibody to rule out myasthenia  Patient had diminished vital capacity but normal NIF  Speech consulted for video barium swallow study  PT OT evaluation will be obtained    Neurology consult: MRI c spine, myasthenia labs, PT/OT/ST, follow up barium swallow, discontinue trazadone and trial clonazepam      ED Triage Vitals   Temperature Pulse Respirations Blood Pressure SpO2   10/13/22 1642 10/13/22 1642 10/13/22 1642 10/13/22 1642 10/13/22 1642   98 5 °F (36 9 °C) 66 18 131/76 90 %      Temp Source Heart Rate Source Patient Position - Orthostatic VS BP Location FiO2 (%)   10/13/22 1642 -- 10/13/22 1642 10/13/22 1642 --   Oral  Sitting Left arm       Pain Score       10/13/22 1650       6          Wt Readings from Last 1 Encounters:   10/14/22 108 kg (239 lb)     Additional Vital Signs:     Date/Time Temp Pulse Resp BP MAP (mmHg) SpO2 O2 Device Patient Position - Orthostatic VS   10/14/22 1258 -- -- -- 114/67 -- -- -- Standing for 3 minutes - Orthostatic VS   10/14/22 1255 -- -- -- 126/69 -- -- -- Standing - Orthostatic VS   10/14/22 1250 -- -- -- 119/65 -- -- -- Sitting - Orthostatic VS   10/14/22 1245 -- -- -- 124/65 -- -- -- Lying - Orthostatic VS   10/14/22 0905 -- 64 -- 118/70 -- -- -- --   10/14/22 0751 97 6 °F (36 4 °C) 64 18 118/70 92 -- -- Sitting     Pertinent Labs/Diagnostic Test Results:   FL barium swallow video w speech   Final Result by SYSTEMGENERATED, DOCUMENTATION (10/14 1148)      XR chest portable   Final Result by Nitish Rinaldi MD (10/14 1021)      No acute cardiopulmonary disease                    Workstation performed: [id number]         MRI inpatient order    (Results Pending) 10/14 Echo: Interpretation Summary       •  Left Ventricle: Left ventricular cavity size is normal  Wall thickness is normal  The left ventricular ejection fraction is 65%  Systolic function is normal  Wall motion is normal  Diastolic function is normal for age  •  Right Ventricle: Right ventricular cavity size is normal  Systolic function is normal   •  Aortic Valve: There is aortic sclerosis  •  Mitral Valve: There is mild regurgitation  •  Tricuspid Valve: There is trace regurgitation  The estimated right ventricular systolic pressure is 91 33 mmHg      Results from last 7 days   Lab Units 10/13/22  2149   SARS-COV-2  Negative     Results from last 7 days   Lab Units 10/14/22  0226   WBC Thousand/uL 10 28*   HEMOGLOBIN g/dL 13 2   HEMATOCRIT % 41 5   PLATELETS Thousands/uL 237   NEUTROS ABS Thousands/µL 7 24         Results from last 7 days   Lab Units 10/14/22  0226   SODIUM mmol/L 134*   POTASSIUM mmol/L 3 9   CHLORIDE mmol/L 102   CO2 mmol/L 25   ANION GAP mmol/L 7   BUN mg/dL 22   CREATININE mg/dL 1 15   EGFR ml/min/1 73sq m 64   CALCIUM mg/dL 8 6     Results from last 7 days   Lab Units 10/14/22  0226   AST U/L 27   ALT U/L 38   ALK PHOS U/L 62   TOTAL PROTEIN g/dL 6 2*   ALBUMIN g/dL 3 8   TOTAL BILIRUBIN mg/dL 0 36     Results from last 7 days   Lab Units 10/14/22  0753   POC GLUCOSE mg/dl 99     Results from last 7 days   Lab Units 10/14/22  0226   GLUCOSE RANDOM mg/dL 119           Results from last 7 days   Lab Units 10/13/22  2152   CK TOTAL U/L 188   CK MB INDEX % 2 7*   CK MB ng/mL 5 0     Results from last 7 days   Lab Units 10/14/22  0226 10/14/22  0033 10/13/22  2152   HS TNI 0HR ng/L  --   --  3   HS TNI 2HR ng/L  --  4  --    HSTNI D2 ng/L  --  1  --    HS TNI 4HR ng/L 5  --   --    HSTNI D4 ng/L 2  --   --      Results from last 7 days   Lab Units 10/13/22  2152   D-DIMER QUANTITATIVE ug/ml FEU 0 44     Results from last 7 days   Lab Units 10/13/22  2152   PROTIME seconds 12 8   INR 0 94   PTT seconds 28     Results from last 7 days   Lab Units 10/13/22  2152   TSH 3RD GENERATON uIU/mL 2 743     Results from last 7 days   Lab Units 10/13/22  2152   PROCALCITONIN ng/ml 0 07                 Results from last 7 days   Lab Units 10/13/22  2152   NT-PRO BNP pg/mL 42                 Results from last 7 days   Lab Units 10/13/22  2152   CRP mg/L 1 8   SED RATE mm/hour 15                 Results from last 7 days   Lab Units 10/14/22  0508 10/13/22  2149   STREP PNEUMONIAE ANTIGEN, URINE  Negative  --    LEGIONELLA URINARY ANTIGEN  Negative  --    INFLUENZA A PCR   --  Negative   INFLUENZA B PCR   --  Negative   RSV PCR   --  Negative         ED Treatment:   Medication Administration - No Administrations Displayed (No Start Event Found)     None        Past Medical History:   Diagnosis Date   • Arthritis    • Asthma    • 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HCC)    • 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HCC)    • Manic depression ([facility] [address] )    • Sleep apnea      Present on Admission:  • Polyarthralgia  • Essential hypertension  • DONALD (obstructive sleep apnea)  • Hypothyroidism  • Mixed hyperlipidemia      Admitting Diagnosis: Dyspnea on exertion [R06 09]  Age/Sex: 81 y o  male  Admission Orders:  Scheduled Medications:  clonazePAM, 0 5 mg, Oral, HS  enoxaparin, 40 mg, Subcutaneous, Daily  fluticasone-umeclidinium-vilanterol, 1 puff, Inhalation, Daily  lamoTRIgine, 200 mg, Oral, Daily  levothyroxine, 100 mcg, Oral, Early Morning  lisinopril, 10 mg, Oral, Daily  melatonin, 6 mg, Oral, HS  meloxicam, 15 mg, Oral, Daily  modafinil, 200 mg, Oral, Daily  montelukast, 10 mg, Oral, Daily  pramipexole, 2 mg, Oral, HS  tamsulosin, 0 4 mg, Oral, Daily With Dinner      Continuous IV Infusions:     PRN Meds:  acetaminophen, 650 mg, Oral, Q6H PRN  albuterol, 2 puff, Inhalation, Q4H PRN        IP CONSULT TO NEUROLOGY  IP CONSULT TO PULMONOLOGY    Network Utilization Review Department  ATTENTION: Please call with any questions or concerns to [phone] and carefully listen to the prompts so that you are directed to the right person  All voicemails are confidential   Candia [employer]ens all requests for admission clinical reviews, approved or denied determinations and any other requests to dedicated fax number below belonging to the campus where the patient is receiving treatment   List of dedicated fax numbers for the Facilities:  [address] DENIALS (Administrative/Medical Necessity) [phone]   [address] (Maternity/NICU/Pediatrics) [phone]   1 [facility] [phone]   [place] [address] [phone]   [address] [place] [address] Rosalba Wise [facility] 28 [phone]   [facility] Aliya [place] [facility] 134 815 [facility] [phone]

## 2022-10-14 NOTE — ASSESSMENT & PLAN NOTE
Patient reporting a 5 year history of dysphagia, getting worse over the last 1 year  Dysphagia with pills and solid food      Plan:  Bedside speech pathology evaluation  Barium swallow study with video

## 2022-10-14 NOTE — CASE MANAGEMENT
Case Management Assessment & Discharge Planning Note    Patient name Eri Reddy  Location S /S -70 MRN [id number]  : 1950 Date 10/14/2022       Current Admission Date: 10/13/2022  Current Admission Diagnosis:Acute respiratory failure with hypoxia [facility])   Patient Active Problem List    Diagnosis Date Noted   • Acute respiratory failure with hypoxia ([facility] [address] ) 10/13/2022   • Hip weakness 10/13/2022   • Blurry vision, bilateral 10/13/2022   • Insomnia 10/13/2022   • Dysphagia 10/13/2022   • Bipolar disorder ([address] ) 10/13/2022   • Dizziness 10/13/2022   • Respiratory tract congestion with cough 10/13/2022   • RLQ abdominal pain 10/13/2022   • Asthma 10/13/2022   • BPH (benign prostatic hyperplasia) 10/13/2022   • Pre-diabetes 10/13/2022   • Myoclonic jerking 10/13/2022   • Tremor of right hand    • 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[facility] [address] )    • Tremor 10/12/2022   • Shortness of breath 10/12/2022   • Primary osteoarthritis, left shoulder 10/11/2022   • BMI 35 0-35 9,adult 2022   • Primary osteoarthritis of right shoulder 2022   • Arthritis 2022   • Moderate persistent asthma 2022   • Hypothyroidism 2022   • DONALD (obstructive sleep apnea) 2022   • Essential hypertension 2022   • Mixed hyperlipidemia 2022   • Polyarthralgia 2022      LOS (days): 1  Geometric Mean LOS (GMLOS) (days): 2 80  Days to GMLOS:2     OBJECTIVE:    Risk of Unplanned Readmission Score: 5 13         Current admission status: Inpatient       Preferred Pharmacy:   [facility] [address] [place] [id number]  Phone: [phone] Fax: [phone]    [facility] ([facility] [address] [place] [address], [address] [place] [address]  [address]  Phone: [phone] Fax: [phone] [facility] [id number]  [place] [address] [address]  Phone: [phone] Fax: [phone]    Primary Care Provider: Ivana Castano DO    Primary Insurance: BLUE CROSS  Secondary Insurance:     ASSESSMENT:  Octavia Ambrosio Proxies    There are no active Health Care Proxies on file  Patient Information  Admitted from[de-identified] Home  Mental Status: Alert  During Assessment patient was accompanied by: Not accompanied during assessment  Assessment information provided by[de-identified] Patient  Primary Caregiver: Self  Support Systems: Spouse/significant other, Family members  [place] of Residence: [address] [place] [address] do you live in?: [facility] entry access options   Select all that apply : Stairs  Number of steps to enter home : 3  Type of Current Residence: 2 story home  Upon entering residence, is there a bedroom on the main floor (no further steps)?: No  A bedroom is located on the following floor levels of residence (select all that apply):: 2nd Floor  Upon entering residence, is there a bathroom on the main floor (no further steps)?: No  Indicate which floors of current residence have a bathroom (select all the apply):: 2nd Floor  Number of steps to 2nd floor from main floor:  (15 steps)  In the last 12 months, was there a time when you were not able to pay the mortgage or rent on time?: No  In the last 12 months, how many places have you lived?: 1  In the last 12 months, was there a time when you did not have a steady place to sleep or slept in a shelter (including now)?: No  Homeless/housing insecurity resource given?: N/A  Living Arrangements: Lives w/ Spouse/significant other  Is patient a ?: No    Activities of Daily Living Prior to Admission  Functional Status: Independent  Completes ADLs independently?: Yes  Ambulates independently?: Yes  Does patient use assisted devices?: No  Does patient currently own DME?: No  Does patient have a history of Outpatient Therapy (PT/OT)?: Yes  Does patient have a history of HHC?: No  Does patient currently have [address]?: No    Patient Information Continued  Does patient have prescription coverage?: Yes  Within the past 12 months, you worried that your food would run out before you got the money to buy more : Never true  Within the past 12 months, the food you bought just didn't last and you didn't have money to get more : Never true  Food insecurity resource given?: N/A  Does patient receive dialysis treatments?: No  Does patient have a history of substance abuse?: No  Does patient have a history of Mental Health Diagnosis?: No    Means of Transportation  Means of Transport to Appts[de-identified] Other (Comment) (Has license, gave his car to his grandson and takes Radha Monae to appointments)  In the past 12 months, has lack of transportation kept you from medical appointments or from getting medications?: No  In the past 12 months, has lack of transportation kept you from meetings, work, or from getting things needed for daily living?: No  Was application for public transport provided?: N/A    DISCHARGE DETAILS:    Contacts  Patient Contacts: Patient  Contact Method:  In Person  Reason/Outcome: Continuity of Care, Discharge Planning    Other Referral/Resources/Interventions Provided:  Interventions: None Indicated    Treatment Team Recommendation: Home  Discharge Destination Plan[de-identified] Home     Additional Comments: Patient confirms he lives in [place], but his wife also has a house in [place] as she now works with [employer], so he is staying with her after his hospital stay

## 2022-10-14 NOTE — ASSESSMENT & PLAN NOTE
Patient reporting new onset tremors of the right hand    Plan:  Neurology consult  Continue to monitor

## 2022-10-14 NOTE — ASSESSMENT & PLAN NOTE
Patient reporting new onset jerking/flexion of his neck and left upper extremity muscles involuntarily  Disrupting his sleep and ability to complete his daily activities       Plan:  Neurology consult  Continue to monitor  See plan for acute respiratory failure with hypoxia

## 2022-10-14 NOTE — ASSESSMENT & PLAN NOTE
Patient reporting recent history of dizziness and unsteadiness on feet  Reports 2 falls in the last 10 days  Reports complete numbness of left foot      Plan:  Neurology consult, appreciate recommendations  Orthostatics  B12  TSH  Folate it is  PT/OT evaluation

## 2022-10-14 NOTE — ASSESSMENT & PLAN NOTE
Patient reporting 1 day history of nasal congestion and cough with productive phlegm    COVID/RSV/flu, pro calc, urine Legionella, urine strep pneumonia all within normal limits  Chest x-ray demonstrating no acute cardiopulmonary disease    Plan:  Sputum culture

## 2022-10-14 NOTE — RESPIRATORY THERAPY NOTE
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[address] )    • Manic depression ([facility] )    • Sleep apnea      Social History     Socioeconomic History   • Marital status: /Civil Union     Spouse name: Not on file   • Number of children: Not on file   • Years of education: Not on file   • Highest education level: Not on file   Occupational History   • Not on file   Tobacco Use   • Smoking status: Never Smoker   • Smokeless tobacco: Never Used   Vaping Use   • Vaping Use: Never used   Substance and Sexual Activity   • Alcohol use: Never     Comment: socially   • Drug use: Never   • Sexual activity: Not on file   Other Topics Concern   • Not on file   Social History Narrative   • Not on file     Social Determinants of Health     Financial Resource Strain: Not on file   Food Insecurity: Not on file   Transportation Needs: Not on file   Physical Activity: Not on file   Stress: Not on file   Social Connections: Not on file   Intimate Partner Violence: Not on file   Housing Stability: Not on file       Subjective         Objective    Physical Exam:        Vitals:  Blood pressure 131/76, pulse 66, temperature 98 5 °F (36 9 °C), temperature source Oral, resp  rate 18, SpO2 90 %  Imaging and other studies: I have personally reviewed pertinent reports              Plan    Respiratory Plan: (P) Discontinue Protocol, Moderate/Severe Distress pathway        Resp Comments: (P) takes MDI daily, ALB prn but states it doesnt do much for him has tried cpap at home but doesnt wear it at home nor doesn't want to try ours

## 2022-10-14 NOTE — SPEECH THERAPY NOTE
Speech Pathology Videofluoroscopic Swallow Study (VFSS/VBSS/MBSS)      Patient Name: Cady Gurrola    RKHBE'P Date: 10/14/2022     Problem List  Principal Problem:    Acute respiratory failure with hypoxia ([facility] [address] )  Active Problems:    Hypothyroidism    DONALD (obstructive sleep apnea)    Essential hypertension    Mixed hyperlipidemia    Polyarthralgia    Hip weakness    Blurry vision, bilateral    Insomnia    Dysphagia    Bipolar disorder (HCC)    Dizziness    Respiratory tract congestion with cough    RLQ abdominal pain    Asthma    BPH (benign prostatic hyperplasia)    Pre-diabetes    Myoclonic jerking    Tremor of right hand      Past Medical History  Past Medical History:   Diagnosis Date   • Arthritis    • Asthma    • 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[facility] [address] )    • 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[address] )    • Manic depression ([facility] [address] )    • Sleep apnea        Past Surgical History  Past Surgical History:   Procedure Laterality Date   • UMBILICAL HERNIA REPAIR           General Information; Cady Gurrola is a 81 y o  male with a PMH of asthma, obesity, history of polyps, history of greater trochanter bursitis, osteoarthritis, obstructive sleep apnea, hypothyroidism, hypertension, hyperlipidemia, depression, BPH, bipolar disorder, dysphagia with solid foods who presents with 3 month history of worsening shortness of breath  Patient reports that he has had 3 months of worsening shortness of breath, monitors his own pulse ox levels, at rest O2 levels are 95% when walking O2 levels dropped down to 85%  Patient reports he feels exhausted and can even climb any stairs at his house  Reports that he has a 1 day history of congestion, cough, and productive phlegm  Patient also reports a severe history of asthma since his childhood requiring prednisone for several years  He stopped having significant respiratory problems at the age of 23, and has only had occasional breathing issues over the last decade    Patient reports a history of manic depression bipolar disorder with manic episodes lasting from several days to months at a time  No depressive medications helped him and he has required electroshock therapy several times to help him with his depression  Patient also reports a 5 year history of dysphagia which has gotten progressively worse over the last 1 year  He reports that pills and food gets stuck at the bottom of his throat and it takes some time for him to fully swallow pills and food  1* DX: Acute respiratory failure with hypoxia  Betsey Santoyo SLP Swallowing Evaluation ordered in am followed by order for VFSS  "Lit Owens" was viewed in lateral position and assessed with thin liquid (tsp, individual and successive cup sips)  nectar thick liquid (individual and successive cup sips), puree, soft moist food (fruit/fig bar), solid food (Nelly Doone cookie) and a13mm pill with thin liquid  Oral stage:  No significant oral stage dysphagia  Mastication was timely and effective with materials administered today  Bolus formation and transfer were functional   Oral control was good with cup sips (mild premature spillage over the base of tongue with tsp of thin liquid only)    Pharyngeal stage:  No s/s pharyngeal dysphagia  Velar elevation noted  Swallowing initiation was prompt  Laryngeal rise and anterior hyoid excursion appeared adequate  AIrway entrance closure appeared adequate  Tongue base retraction appeared to be of adequate strength  PES opening was adequate  Arthritic changes in lower cervical spine suspected  Management of food/liquid/barium tablet follows: All food, liquid and the barium tablet passed through the pharynx safely and efficiently (ie no laryngeal penetration, aspiration or significant pharyngeal residue noted today)  Esophageal stage:  Brief view of esophagus was completed after administration of the barium tablet (no stasis in upper  mid esophagus)      Assessment Summary:    Pt presents with no significant s/s oropharyngeal dysphagia today  He admits to episodes when "I eat too fast" and "when I used to take a handful of medications at one time)  We spoke of importance of regular bite size, thorough chewing and avoiding taking too many pills at one time  Recommendations:   Recommended Diet:  regular diet and thin liquids   Recommended Form of Medications: as desired and tolerated   SLP Dysphagia therapy recommended: None at this time    Results Reviewed with: patient and RN     Thank you for this referral   Please do not hesitate to contact me with any questions or concerns      Maribel Jaime [facility]   Speech Pathologist  [place] License [id number]  [place] License [id number]

## 2022-10-14 NOTE — ASSESSMENT & PLAN NOTE
Patient reporting recent history of dizziness and unsteadiness on feet  Reports 2 falls in the last 10 days  Reports complete numbness of left foot    B12, TSH, folate all within normal limits  Orthostatics negative    Plan:  Neurology consult, appreciate recommendations  PT/OT evaluation

## 2022-10-14 NOTE — ASSESSMENT & PLAN NOTE
History of obstructive sleep apnea    Noncompliant with CPAP due to sleep disturbances    Plan:  Modafinil 200 mg

## 2022-10-14 NOTE — ASSESSMENT & PLAN NOTE
Patient complaining of chronic right shoulder pain, additionally 10 day history of bilateral shoulder pain, bilateral hip pain, left foot ankle pain    Plan:  CK  ESR   CRP  DENIS  Meloxicam, home medication

## 2022-10-14 NOTE — ASSESSMENT & PLAN NOTE
Patient complaining of inability to bring knees to chest   Hip flexor weakness on exam   Proximal muscle weakness  Polymyalgia rheumatica versus polymyositis    Plan:  CK  ESR   CRP  DENIS  A m   Cortisol  PT/OT evaluation

## 2022-10-14 NOTE — ASSESSMENT & PLAN NOTE
History of hypertension    Blood pressure on admission 131/76    Plan:  Lisinopril 10 mg, alternate formulary of home medication

## 2022-10-14 NOTE — ASSESSMENT & PLAN NOTE
Patient complaining of chronic right shoulder pain, additionally 10 day history of bilateral shoulder pain, bilateral hip pain, left foot ankle pain  CK, ESR, CRP all within normal limits    Plan:  DENIS, follow-up with results  Meloxicam, home medication

## 2022-10-15 LAB
ANION GAP SERPL CALCULATED.3IONS-SCNC: 7 MMOL/L (ref 4–13)
BASOPHILS # BLD AUTO: 0.05 THOUSANDS/ΜL (ref 0–0.1)
BASOPHILS NFR BLD AUTO: 1 % (ref 0–1)
BUN SERPL-MCNC: 19 MG/DL (ref 5–25)
CALCIUM SERPL-MCNC: 9.1 MG/DL (ref 8.4–10.2)
CHLORIDE SERPL-SCNC: 102 MMOL/L (ref 96–108)
CO2 SERPL-SCNC: 27 MMOL/L (ref 21–32)
CORTIS AM PEAK SERPL-MCNC: 7.5 UG/DL (ref 4.2–22.4)
CREAT SERPL-MCNC: 1.14 MG/DL (ref 0.6–1.3)
ENA SS-A AB SER-ACNC: <0.2 AI (ref 0–0.9)
ENA SS-B AB SER-ACNC: <0.2 AI (ref 0–0.9)
EOSINOPHIL # BLD AUTO: 0.17 THOUSAND/ΜL (ref 0–0.61)
EOSINOPHIL NFR BLD AUTO: 3 % (ref 0–6)
ERYTHROCYTE [DISTWIDTH] IN BLOOD BY AUTOMATED COUNT: 14 % (ref 11.6–15.1)
GFR SERPL CREATININE-BSD FRML MDRD: 64 ML/MIN/1.73SQ M
GLUCOSE SERPL-MCNC: 107 MG/DL (ref 65–140)
HCT VFR BLD AUTO: 41.3 % (ref 36.5–49.3)
HGB BLD-MCNC: 13.9 G/DL (ref 12–17)
IMM GRANULOCYTES # BLD AUTO: 0.06 THOUSAND/UL (ref 0–0.2)
IMM GRANULOCYTES NFR BLD AUTO: 1 % (ref 0–2)
LYMPHOCYTES # BLD AUTO: 1.4 THOUSANDS/ΜL (ref 0.6–4.47)
LYMPHOCYTES NFR BLD AUTO: 21 % (ref 14–44)
MCH RBC QN AUTO: 30.7 PG (ref 26.8–34.3)
MCHC RBC AUTO-ENTMCNC: 33.7 G/DL (ref 31.4–37.4)
MCV RBC AUTO: 91 FL (ref 82–98)
MONOCYTES # BLD AUTO: 0.6 THOUSAND/ΜL (ref 0.17–1.22)
MONOCYTES NFR BLD AUTO: 9 % (ref 4–12)
NEUTROPHILS # BLD AUTO: 4.38 THOUSANDS/ΜL (ref 1.85–7.62)
NEUTS SEG NFR BLD AUTO: 65 % (ref 43–75)
NRBC BLD AUTO-RTO: 0 /100 WBCS
PLATELET # BLD AUTO: 207 THOUSANDS/UL (ref 149–390)
PMV BLD AUTO: 9.3 FL (ref 8.9–12.7)
POTASSIUM SERPL-SCNC: 4.2 MMOL/L (ref 3.5–5.3)
RBC # BLD AUTO: 4.53 MILLION/UL (ref 3.88–5.62)
SODIUM SERPL-SCNC: 136 MMOL/L (ref 135–147)
WBC # BLD AUTO: 6.66 THOUSAND/UL (ref 4.31–10.16)

## 2022-10-15 PROCEDURE — 93005 ELECTROCARDIOGRAM TRACING: CPT

## 2022-10-15 PROCEDURE — 85025 COMPLETE CBC W/AUTO DIFF WBC: CPT

## 2022-10-15 PROCEDURE — 82533 TOTAL CORTISOL: CPT

## 2022-10-15 PROCEDURE — 80048 BASIC METABOLIC PNL TOTAL CA: CPT

## 2022-10-15 PROCEDURE — 94150 VITAL CAPACITY TEST: CPT

## 2022-10-15 PROCEDURE — 99232 SBSQ HOSP IP/OBS MODERATE 35: CPT | Performed by: INTERNAL MEDICINE

## 2022-10-15 RX ADMIN — MELOXICAM 15 MG: 7.5 TABLET ORAL at 08:50

## 2022-10-15 RX ADMIN — LEVOTHYROXINE SODIUM 100 MCG: 100 TABLET ORAL at 06:00

## 2022-10-15 RX ADMIN — CLONAZEPAM 0.5 MG: 0.5 TABLET ORAL at 21:46

## 2022-10-15 RX ADMIN — Medication 6 MG: at 21:46

## 2022-10-15 RX ADMIN — DICLOFENAC SODIUM TOPICAL GEL, 1%, 2 G: 10 GEL TOPICAL at 08:50

## 2022-10-15 RX ADMIN — MODAFINIL 200 MG: 100 TABLET ORAL at 08:50

## 2022-10-15 RX ADMIN — TAMSULOSIN HYDROCHLORIDE 0.4 MG: 0.4 CAPSULE ORAL at 17:37

## 2022-10-15 RX ADMIN — DICLOFENAC SODIUM TOPICAL GEL, 1%, 2 G: 10 GEL TOPICAL at 17:37

## 2022-10-15 RX ADMIN — FLUTICASONE FUROATE, UMECLIDINIUM BROMIDE AND VILANTEROL TRIFENATATE 1 PUFF: 200; 62.5; 25 POWDER RESPIRATORY (INHALATION) at 08:50

## 2022-10-15 RX ADMIN — LISINOPRIL 10 MG: 10 TABLET ORAL at 08:50

## 2022-10-15 RX ADMIN — DICLOFENAC SODIUM TOPICAL GEL, 1%, 2 G: 10 GEL TOPICAL at 21:45

## 2022-10-15 RX ADMIN — LAMOTRIGINE 200 MG: 100 TABLET ORAL at 08:50

## 2022-10-15 RX ADMIN — PRAMIPEXOLE DIHYDROCHLORIDE 2 MG: 0.5 TABLET ORAL at 21:46

## 2022-10-15 RX ADMIN — DICLOFENAC SODIUM TOPICAL GEL, 1%, 2 G: 10 GEL TOPICAL at 11:14

## 2022-10-15 RX ADMIN — MONTELUKAST 10 MG: 10 TABLET, FILM COATED ORAL at 08:50

## 2022-10-15 NOTE — RESPIRATORY THERAPY NOTE
Pt ordered STAT ABG  Pt found to be in no distress and on room air  Pt reports no change is respiratory or mental status  RN unaware of reason for order   Message sent to MD

## 2022-10-15 NOTE — PROGRESS NOTES
[facility]  Progress Note Steven Sears 1950, 81 y o  male MRN: [id number]  Unit/Bed#: S -01 Encounter: [id number]  Primary Care Provider: Natan Arteaga DO   Date and time admitted to hospital: 10/13/2022  4:23 PM    * Acute respiratory failure with hypoxia ([address] )  Assessment & Plan  · Patient presenting with shortness of breath with hypoxia, SpO2 90% on room air  Direct admission from pulmonology visit, concern for acute neuromuscular disorder  · D-dimer, proBNP, TSH, troponins, CMP, CBC, PT/INR, PTT all within normal limits  · Chest x-ray demonstrating no acute cardiopulmonary disease  · X-ray of the hips within normal limits  · X-ray of the shoulder left-sided, moderate glenohumeral joint osteoarthritis, right-sided severe osteoarthritis glenohumeral joint  · Abnormal vital capacity  · Echo demonstrating EF of 65%, aortic sclerosis, mild mitral regurgitation, trace tricuspid regurgitation  · Repeat NIF/VC today demonstrating NIF greater than -60, VC 4 0 5 L, great effort  · MRI of the cervical spine with contrast demonstrating:  Reversal of mid to lower cervical lordosis centered at the fused C5-C6 level, grade 1 degenerative anterolisthesis at C3-4 and C4-5 level, multilevel degenerative disc disease with superimposed discs disc protrusion at the C2-C3 through the C4-C5 levels, the cervical cord appears normal in caliber and signal with no evidence of focal impingement  · Continuous O2 pulse monitoring demonstrating saturations between 90 and 92% on room air    Plan:  Pulmonology consult, considering SNIFF test  ABG in the morning, follow-up with results  A m   Cortisol, follow-up with results  Trelegy 1 puff daily  Albuterol inhaler q 4h as needed  Neurology consult  Respiratory protocol  Continue to monitor SpO2 while on room air  Lovenox 40 mg daily for VTE prophylaxis  Myasthenia gravis labs, follow-up with results  Complete ice pack study to see if ice improves ptosis of patient's eyelids  Sjogren's rheumatological workup, follow-up with results  Continue to monitor VC/NIF  Continuous pulse oxygen monitoring  Complete a full review of medications to see of any psychotropics could be contributory  If no improvement consider empiric trial of Mestinon/pyridostigmine  Consider high-resolution CT scan for workup of interstitial lung disease  Consider consultation to rheumatology following workup  Consider consultation to GI, was previously scheduled to get a colonoscopy/EGD and was sent to pulmonology for clearance which subsequently developed into inpatient hospital admission      Myoclonus  Assessment & Plan  Patient reporting new onset jerking/flexion of his neck and left upper extremity muscles involuntarily  Disrupting his sleep and ability to complete his daily activities  Possibly extrapyramidal symptoms    Plan:  Neurology consult  Continue to monitor  See plan for acute respiratory failure with hypoxia    Respiratory tract congestion with cough  Assessment & Plan  Patient reporting 1 day history of nasal congestion and cough with productive phlegm  COVID/RSV/flu, pro calc, urine Legionella, urine strep pneumonia all within normal limits  Chest x-ray demonstrating no acute cardiopulmonary disease    Plan:  Sputum culture    Dizziness  Assessment & Plan  Patient reporting recent history of dizziness and unsteadiness on feet  Reports 2 falls in the last 10 days  Reports complete numbness of left foot  B12, TSH, folate all within normal limits  Orthostatics negative    Plan:  Neurology consult, appreciate recommendations  PT/OT evaluation  Continuous pulse oxygen monitoring    Dysphagia  Assessment & Plan  Patient reporting a 5 year history of dysphagia, getting worse over the last 1 year  Dysphagia with pills and solid food      Plan:  Bedside speech pathology evaluation  Barium swallow study with video, demonstrating no significant oropharyngeal dysphagia    Hip weakness  Assessment & Plan  Patient complaining of inability to bring knees to chest   Hip flexor weakness on exam   Proximal muscle weakness  Polymyalgia rheumatica versus polymyositis  CK, ESR, CRP within normal limits  A m  Cortisol significantly low 10/14 of 0 6, however this lab was drawn at approximately 2:30 a m  In the morning will repeat this lab at the correct time of 9:00 a m  Plan:  DENIS, follow-up with results  A m  Cortisol follow-up with results  PT/OT evaluation    Polyarthralgia  Assessment & Plan  Patient complaining of chronic right shoulder pain, additionally 10 day history of bilateral shoulder pain, bilateral hip pain, left foot ankle pain  CK, ESR, CRP all within normal limits    Plan:  DENIS, follow-up with results  Meloxicam, home medication  Voltaren gel p r n  For joint pain  Consider consultation to rheumatology    Pre-diabetes  Assessment & Plan  Possible prediabetes  Most recent hemoglobin A1c 6 5%  A m  Blood sugars within normal limits    Plan:  Carbohydrate diet    Asthma  Assessment & Plan  Patient has significant history of asthma since childhood requiring prednisone for several years during childhood  Controlled now    Plan:  Trelegy  Albuterol  Montelukast  Respiratory protocol    Insomnia  Assessment & Plan  Patient complaining of aches chronic history of significant insomnia, getting only about 3 hours of sleep each night  Less than 1 hour sleep over the last 3 nights  Plan:  Discontinue trazodone, trial clonazepam am 0 5 mg q h s  For sleep/tremor/myoclonus, greatly improved sleep  Melatonin 6 mg     Blurry vision, bilateral  Assessment & Plan  Two day history of bilateral blurry vision    Patient reports previous 20/20 vision  Patient reporting drooping eyelids this morning 10/15, left greater than right    Plan:  Continue to monitor with neurological exam, neuro exams q 4  Complete ice test to see if ice application to eyelids improves ptosis    Tremor of right hand  Assessment & Plan  Patient reporting new onset tremors of the right hand  Possibly extrapyramidal symptoms    Plan:  Neurology consult  Continue to monitor    BPH (benign prostatic hyperplasia)  Assessment & Plan  History of BPH    Plan:  Tamsulosin 0 4 mg, home medication    RLQ abdominal pain  Assessment & Plan  Patient complaining of ongoing right lower quadrant abdominal pain  Attributes this to a pulled muscle  Nontender to palpation on abdominal exam    Plan:  Tylenol p r n  Bipolar disorder [facility])  Assessment & Plan  Patient reports history of bipolar disorder with manic episodes that last from several days to up to a month  Plan:  Lamictal 200 mg, home medication    Mixed hyperlipidemia  Assessment & Plan  History of hyperlipidemia    Plan:  Pravastatin 20 mg, alternative formulary for home medication    Essential hypertension  Assessment & Plan  History of hypertension  Blood pressure on admission 131/76    Plan:  Lisinopril 10 mg, alternate formulary of home medication    DONALD (obstructive sleep apnea)  Assessment & Plan  History of obstructive sleep apnea  Noncompliant with CPAP due to sleep disturbances    Plan:  Modafinil 200 mg    Hypothyroidism  Assessment & Plan  Patient reporting history of hypothyroidism  TSH within normal limits    Plan:  Levothyroxine 100 mcg, home medication        VTE Pharmacologic Prophylaxis: VTE Score: 3 Moderate Risk (Score 3-4) - Pharmacological DVT Prophylaxis Ordered: enoxaparin (Lovenox)  Patient Centered Rounds: I performed bedside rounds with nursing staff today  Discussions with Specialists or Other Care Team Provider:  Neurology, pulmonology, attending physician    Education and Discussions with Family / Patient: I did not update family today  Current Length of Stay: 2 day(s)  Current Patient Status: Inpatient   Discharge Plan: TBD    Code Status: Level 1 - Full Code    Subjective:    Jeffery Span his 11year-old male with a past medical history asthma, bursitis of the greater trochanter, osteoarthritis, obstructive sleep apnea, obesity, history of polyps, hypothyroidism, hypertension, hyperlipidemia, depression, BPH, bipolar disorder, dysphagia who was a direct admission from pulmonology department for concern of a rapidly progressive neuromuscular disorder, with shortness of breath progressively worsening over the last 3 months  No overnight events  This morning patient reports that he feels like his uncontrollable movements/jerking have gotten worse over the last 30-60 minutes, without any stimulus  He also reports he feels like a shaking and more unsteady the knee has been  Patient reports that he slept well last night any got 6 hours slight hand that was pre should have of the switched to clonazepam   This morning patient reports that his perception of his shortness of breath is worsened and he feels like he is not getting enough air  Patient reports that the Voltaren gel has really helped with his joint pain, however he still is having pretty excruciating bilateral hip pain  Regarding patient's dizziness, patient reports feeling dizzy lightheaded at rest without movement  Patient reports that his eyelids seem to be more droopy this morning left greater than right  Patient reports ongoing mild abdominal pain in the right lower quadrant, attributes this to and muscle pull  Patient denies fever, chills, chest pain, nausea, vomiting, diarrhea, constipation, dysuria  Objective:     Vitals:   Temp (24hrs), Av 9 °F (36 6 °C), Min:97 9 °F (36 6 °C), Max:97 9 °F (36 6 °C)    Temp:  [97 9 °F (36 6 °C)] 97 9 °F (36 6 °C)  HR:  [60-74] 60  Resp:  [16-18] 18  BP: (114-126)/(63-69) 125/64  SpO2:  [92 %-93 %] 92 %  Body mass index is 36 34 kg/m²  Input and Output Summary (last 24 hours):      Intake/Output Summary (Last 24 hours) at 10/15/2022 1229  Last data filed at 10/15/2022 0900  Gross per 24 hour   Intake 180 ml   Output --   Net 180 ml       Physical Exam:   Physical Exam  Vitals and nursing note reviewed  Constitutional:       Appearance: He is well-developed  HENT:      Head: Normocephalic and atraumatic  Eyes:      Conjunctiva/sclera: Conjunctivae normal    Cardiovascular:      Rate and Rhythm: Normal rate and regular rhythm  Heart sounds: No murmur heard  Pulmonary:      Effort: Pulmonary effort is normal  No respiratory distress  Breath sounds: Normal breath sounds  Comments: Reduced breath sounds  Abdominal:      General: Bowel sounds are normal       Palpations: Abdomen is soft  Tenderness: There is no abdominal tenderness  Musculoskeletal:      Cervical back: Neck supple  Right lower leg: Edema present  Left lower leg: Edema present  Skin:     General: Skin is warm and dry  Neurological:      Mental Status: He is alert and oriented to person, place, and time  Motor: Weakness (Bilateral weakness of the hip flexors, knee extension, knee flexion, elbow extension, and elbow flexion) present            Additional Data:     Labs:  Results from last 7 days   Lab Units 10/15/22  0523   WBC Thousand/uL 6 66   HEMOGLOBIN g/dL 13 9   HEMATOCRIT % 41 3   PLATELETS Thousands/uL 207   NEUTROS PCT % 65   LYMPHS PCT % 21   MONOS PCT % 9   EOS PCT % 3     Results from last 7 days   Lab Units 10/15/22  0523 10/14/22  0226   SODIUM mmol/L 136 134*   POTASSIUM mmol/L 4 2 3 9   CHLORIDE mmol/L 102 102   CO2 mmol/L 27 25   BUN mg/dL 19 22   CREATININE mg/dL 1 14 1 15   ANION GAP mmol/L 7 7   CALCIUM mg/dL 9 1 8 6   ALBUMIN g/dL  --  3 8   TOTAL BILIRUBIN mg/dL  --  0 36   ALK PHOS U/L  --  62   ALT U/L  --  38   AST U/L  --  27   GLUCOSE RANDOM mg/dL 107 119     Results from last 7 days   Lab Units 10/13/22  2152   INR  0 94     Results from last 7 days   Lab Units 10/14/22  2056 10/14/22  1610 10/14/22  0753   POC GLUCOSE mg/dl 102 165* 99         Results from last 7 days   Lab Units 10/13/22  2152 PROCALCITONIN ng/ml 0 07       Lines/Drains:  Invasive Devices  Report    Peripheral Intravenous Line  Duration           Peripheral IV 10/13/22 Distal;Right;Upper;Ventral (anterior) Arm 1 day                      Imaging: Reviewed radiology reports from this admission including: MRI spine    Recent Cultures (last 7 days):   Results from last 7 days   Lab Units 10/14/22  0508   LEGIONELLA URINARY ANTIGEN  Negative       Last 24 Hours Medication List:   Current Facility-Administered Medications   Medication Dose Route Frequency Provider Last Rate   • acetaminophen  650 mg Oral Q6H PRN Nica Bell MD     • albuterol  2 puff Inhalation Q4H PRN Nica Bell MD     • clonazePAM  0 5 mg Oral HS Jadon Barakat MD     • Diclofenac Sodium  2 g Topical 4x Daily Nica Bell MD     • enoxaparin  40 mg Subcutaneous Daily Nica Bell MD     • fluticasone-umeclidinium-vilanterol  1 puff Inhalation Daily Nica Bell MD     • lamoTRIgine  200 mg Oral Daily Nica Bell MD     • levothyroxine  100 mcg Oral Early Morning Nica Bell MD     • lisinopril  10 mg Oral Daily Nica Bell MD     • melatonin  6 mg Oral HS Nica Bell MD     • meloxicam  15 mg Oral Daily Nica Bell MD     • modafinil  200 mg Oral Daily Nica Bell MD     • montelukast  10 mg Oral Daily Nica Bell MD     • oxyCODONE  2 5 mg Oral Q6H PRN Nica Bell MD     • pramipexole  2 mg Oral HS Nica Bell MD     • tamsulosin  0 4 mg Oral Daily With Claribel Alves MD          Today, Patient Was Seen By: Razia Bahena    **Please Note: This note may have been constructed using a voice recognition system  **

## 2022-10-15 NOTE — ASSESSMENT & PLAN NOTE
Patient reporting new onset jerking/flexion of his neck and left upper extremity muscles involuntarily  Disrupting his sleep and ability to complete his daily activities     Possibly extrapyramidal symptoms    Plan:  Neurology consult  Continue to monitor  See plan for acute respiratory failure with hypoxia

## 2022-10-15 NOTE — ASSESSMENT & PLAN NOTE
Patient complaining of aches chronic history of significant insomnia, getting only about 3 hours of sleep each night  Less than 1 hour sleep over the last 3 nights  Plan:  Discontinue trazodone, trial clonazepam am 0 5 mg q h s   For sleep/tremor/myoclonus, greatly improved sleep  Melatonin 6 mg

## 2022-10-15 NOTE — ASSESSMENT & PLAN NOTE
Patient reporting new onset tremors of the right hand  Possibly extrapyramidal symptoms    Plan:  Neurology consult  Continue to monitor

## 2022-10-15 NOTE — ASSESSMENT & PLAN NOTE
Two day history of bilateral blurry vision    Patient reports previous 20/20 vision  Patient reporting drooping eyelids this morning 10/15, left greater than right    Plan:  Continue to monitor with neurological exam, neuro exams q 4  Complete ice test to see if ice application to eyelids improves ptosis

## 2022-10-15 NOTE — PROGRESS NOTES
Pt is now refusing continuous pulse ox  Educated the pt on the importance of having it on, but pt still refused  Notified SLIM  Will continue to monitor

## 2022-10-15 NOTE — ASSESSMENT & PLAN NOTE
Patient reporting recent history of dizziness and unsteadiness on feet  Reports 2 falls in the last 10 days  Reports complete numbness of left foot    B12, TSH, folate all within normal limits  Orthostatics negative    Plan:  Neurology consult, appreciate recommendations  PT/OT evaluation  Continuous pulse oxygen monitoring

## 2022-10-15 NOTE — ASSESSMENT & PLAN NOTE
· Patient presenting with shortness of breath with hypoxia, SpO2 90% on room air  Direct admission from pulmonology visit, concern for acute neuromuscular disorder  · D-dimer, proBNP, TSH, troponins, CMP, CBC, PT/INR, PTT all within normal limits  · Chest x-ray demonstrating no acute cardiopulmonary disease  · X-ray of the hips within normal limits  · X-ray of the shoulder left-sided, moderate glenohumeral joint osteoarthritis, right-sided severe osteoarthritis glenohumeral joint  · Abnormal vital capacity  · Echo demonstrating EF of 65%, aortic sclerosis, mild mitral regurgitation, trace tricuspid regurgitation  · Repeat NIF/VC today demonstrating NIF greater than -60, VC 4 0 5 L, great effort  · MRI of the cervical spine with contrast demonstrating:  Reversal of mid to lower cervical lordosis centered at the fused C5-C6 level, grade 1 degenerative anterolisthesis at C3-4 and C4-5 level, multilevel degenerative disc disease with superimposed discs disc protrusion at the C2-C3 through the C4-C5 levels, the cervical cord appears normal in caliber and signal with no evidence of focal impingement  · Continuous O2 pulse monitoring demonstrating saturations between 90 and 92% on room air    Plan:  Pulmonology consult, considering SNIFF test  ABG in the morning, follow-up with results  A m   Cortisol, follow-up with results  Trelegy 1 puff daily  Albuterol inhaler q 4h as needed  Neurology consult  Respiratory protocol  Continue to monitor SpO2 while on room air  Lovenox 40 mg daily for VTE prophylaxis  Myasthenia gravis labs, follow-up with results  Complete ice pack study to see if ice improves ptosis of patient's eyelids  Sjogren's rheumatological workup, follow-up with results  Continue to monitor VC/NIF  Continuous pulse oxygen monitoring  Complete a full review of medications to see of any psychotropics could be contributory  If no improvement consider empiric trial of Mestinon/pyridostigmine  Consider high-resolution CT scan for workup of interstitial lung disease  Consider consultation to rheumatology following workup  Consider consultation to GI, was previously scheduled to get a colonoscopy/EGD and was sent to pulmonology for clearance which subsequently developed into inpatient hospital admission

## 2022-10-15 NOTE — QUICK NOTE
Jose Ford is a 81 y o  male with DONALD, recurrent eosinophilic PNA, dysphagia w/ known esophageal narrowing, HLD, asthma, bipolar disorder who presented with multiple symptoms that have been worsening over the past several weeks to months including SOB/MONTAÑO (worse than baseline) and diffuse arthralgias  On exam patient noted to have bilateral ptosis, prominent medium-high amplitude predominantly postural and kinetic tremor, stimulus induced myoclonus, and atypical rest movements    - MRI C-spine completed and unremarkable for cord enhancement/ focal impingement  - Sjogren's antibody negative  - RT assessment 10/15/2022: VC 4 5, NIF -60  - Lab work pending:  Acetylcholine binding, blocking, and modulating receptors, musk antibody    Today patient reported feeling unsteady on his feet  He also continues to experience left eye drooping  Patient states he feels better with the medication last night (clonazepam)  Patient admits to noticing the jerking movements this morning, however movements were not noted on exam   Discussed MRI C-spine results with patient  Patient to follow-up with outpatient neuromuscular attending or advanced practitioner in 4-6 weeks  No further outpatient imaging/testing needed prior to follow-up

## 2022-10-15 NOTE — ASSESSMENT & PLAN NOTE
Patient reporting a 5 year history of dysphagia, getting worse over the last 1 year  Dysphagia with pills and solid food      Plan:  Bedside speech pathology evaluation  Barium swallow study with video, demonstrating no significant oropharyngeal dysphagia

## 2022-10-15 NOTE — ASSESSMENT & PLAN NOTE
Patient complaining of chronic right shoulder pain, additionally 10 day history of bilateral shoulder pain, bilateral hip pain, left foot ankle pain  CK, ESR, CRP all within normal limits    Plan:  DENIS, follow-up with results  Meloxicam, home medication  Voltaren gel p r n   For joint pain  Consider consultation to rheumatology

## 2022-10-16 LAB
ANION GAP SERPL CALCULATED.3IONS-SCNC: 7 MMOL/L (ref 4–13)
ARTERIAL PATENCY WRIST A: YES
BASE EXCESS BLDA CALC-SCNC: 1.2 MMOL/L
BASOPHILS # BLD AUTO: 0.05 THOUSANDS/ΜL (ref 0–0.1)
BASOPHILS NFR BLD AUTO: 1 % (ref 0–1)
BUN SERPL-MCNC: 23 MG/DL (ref 5–25)
CALCIUM SERPL-MCNC: 8.7 MG/DL (ref 8.4–10.2)
CHLORIDE SERPL-SCNC: 100 MMOL/L (ref 96–108)
CO2 SERPL-SCNC: 29 MMOL/L (ref 21–32)
CREAT SERPL-MCNC: 1.28 MG/DL (ref 0.6–1.3)
EOSINOPHIL # BLD AUTO: 0.22 THOUSAND/ΜL (ref 0–0.61)
EOSINOPHIL NFR BLD AUTO: 3 % (ref 0–6)
ERYTHROCYTE [DISTWIDTH] IN BLOOD BY AUTOMATED COUNT: 14 % (ref 11.6–15.1)
GFR SERPL CREATININE-BSD FRML MDRD: 56 ML/MIN/1.73SQ M
GLUCOSE SERPL-MCNC: 102 MG/DL (ref 65–140)
HCO3 BLDA-SCNC: 27.1 MMOL/L (ref 22–28)
HCT VFR BLD AUTO: 41.5 % (ref 36.5–49.3)
HGB BLD-MCNC: 13.6 G/DL (ref 12–17)
IMM GRANULOCYTES # BLD AUTO: 0.04 THOUSAND/UL (ref 0–0.2)
IMM GRANULOCYTES NFR BLD AUTO: 1 % (ref 0–2)
LYMPHOCYTES # BLD AUTO: 1.49 THOUSANDS/ΜL (ref 0.6–4.47)
LYMPHOCYTES NFR BLD AUTO: 22 % (ref 14–44)
MCH RBC QN AUTO: 30.3 PG (ref 26.8–34.3)
MCHC RBC AUTO-ENTMCNC: 32.8 G/DL (ref 31.4–37.4)
MCV RBC AUTO: 92 FL (ref 82–98)
MONOCYTES # BLD AUTO: 0.54 THOUSAND/ΜL (ref 0.17–1.22)
MONOCYTES NFR BLD AUTO: 8 % (ref 4–12)
NASAL CANNULA: 3
NEUTROPHILS # BLD AUTO: 4.39 THOUSANDS/ΜL (ref 1.85–7.62)
NEUTS SEG NFR BLD AUTO: 65 % (ref 43–75)
NRBC BLD AUTO-RTO: 0 /100 WBCS
O2 CT BLDA-SCNC: 19 ML/DL (ref 16–23)
OXYHGB MFR BLDA: 96.9 % (ref 94–97)
PCO2 BLDA: 48.2 MM HG (ref 36–44)
PH BLDA: 7.37 [PH] (ref 7.35–7.45)
PLATELET # BLD AUTO: 222 THOUSANDS/UL (ref 149–390)
PMV BLD AUTO: 9.6 FL (ref 8.9–12.7)
PO2 BLDA: 126.7 MM HG (ref 75–129)
POTASSIUM SERPL-SCNC: 4.3 MMOL/L (ref 3.5–5.3)
RBC # BLD AUTO: 4.49 MILLION/UL (ref 3.88–5.62)
SODIUM SERPL-SCNC: 136 MMOL/L (ref 135–147)
SPECIMEN SOURCE: ABNORMAL
WBC # BLD AUTO: 6.73 THOUSAND/UL (ref 4.31–10.16)

## 2022-10-16 PROCEDURE — 85025 COMPLETE CBC W/AUTO DIFF WBC: CPT

## 2022-10-16 PROCEDURE — 82805 BLOOD GASES W/O2 SATURATION: CPT | Performed by: PHYSICIAN ASSISTANT

## 2022-10-16 PROCEDURE — 99239 HOSP IP/OBS DSCHRG MGMT >30: CPT | Performed by: INTERNAL MEDICINE

## 2022-10-16 PROCEDURE — 80048 BASIC METABOLIC PNL TOTAL CA: CPT

## 2022-10-16 RX ORDER — SIMVASTATIN 10 MG
10 TABLET ORAL
Qty: 30 TABLET | Refills: 0 | Status: SHIPPED | OUTPATIENT
Start: 2022-10-23 | End: 2022-10-28

## 2022-10-16 RX ORDER — CLONAZEPAM 0.5 MG/1
0.5 TABLET ORAL
Qty: 30 TABLET | Refills: 0 | Status: SHIPPED | OUTPATIENT
Start: 2022-10-16 | End: 2022-10-26

## 2022-10-16 RX ADMIN — MONTELUKAST 10 MG: 10 TABLET, FILM COATED ORAL at 08:12

## 2022-10-16 RX ADMIN — OXYCODONE HYDROCHLORIDE 2.5 MG: 5 TABLET ORAL at 00:12

## 2022-10-16 RX ADMIN — FLUTICASONE FUROATE, UMECLIDINIUM BROMIDE AND VILANTEROL TRIFENATATE 1 PUFF: 200; 62.5; 25 POWDER RESPIRATORY (INHALATION) at 08:13

## 2022-10-16 RX ADMIN — DICLOFENAC SODIUM TOPICAL GEL, 1%, 2 G: 10 GEL TOPICAL at 11:10

## 2022-10-16 RX ADMIN — LISINOPRIL 10 MG: 10 TABLET ORAL at 08:12

## 2022-10-16 RX ADMIN — MODAFINIL 200 MG: 100 TABLET ORAL at 08:12

## 2022-10-16 RX ADMIN — MELOXICAM 15 MG: 7.5 TABLET ORAL at 08:12

## 2022-10-16 RX ADMIN — LAMOTRIGINE 200 MG: 100 TABLET ORAL at 08:12

## 2022-10-16 RX ADMIN — DICLOFENAC SODIUM TOPICAL GEL, 1%, 2 G: 10 GEL TOPICAL at 08:12

## 2022-10-16 RX ADMIN — LEVOTHYROXINE SODIUM 100 MCG: 100 TABLET ORAL at 05:17

## 2022-10-16 NOTE — ASSESSMENT & PLAN NOTE
Patient reporting 1 day history of nasal congestion and cough with productive phlegm    COVID/RSV/flu, pro calc, urine Legionella, urine strep pneumonia all within normal limits  Chest x-ray demonstrating no acute cardiopulmonary disease

## 2022-10-16 NOTE — ASSESSMENT & PLAN NOTE
Patient reporting new onset tremors of the right hand  Possibly extrapyramidal symptoms    Plan:  Neurology follow-up outpatient  For now continue dopaminergic agents

## 2022-10-16 NOTE — ASSESSMENT & PLAN NOTE
Patient reporting new onset jerking/flexion of his neck and left upper extremity muscles involuntarily  Disrupting his sleep and ability to complete his daily activities     Possibly extrapyramidal symptoms    Plan:  Neurology follow-up outpatient

## 2022-10-16 NOTE — ASSESSMENT & PLAN NOTE
· Patient presenting with shortness of breath with hypoxia, SpO2 90% on room air    Direct admission from pulmonology visit, concern for acute neuromuscular disorder  · Imaging and lab testing only significant for known osteoarthritis  · Abnormal vital capacity  · Echo demonstrating EF of 65%, aortic sclerosis, mild mitral regurgitation, trace tricuspid regurgitation  · Repeat NIF/VC demonstrating NIF greater than -60, VC 4 0 5 L, great effort  · MRI of the cervical spine with contrast demonstrating:  Multilevel degenerative disc disease, no impingement of the cervical cord     Plan:  Pulmonology follow-up outpatient  Continue home medications (Trelegy, albuterol inhaler)  Outpatient Rheumatology  Will let patient know once myasthenia labs return

## 2022-10-16 NOTE — PLAN OF CARE
Problem: MOBILITY - ADULT  Goal: Maintain or return to baseline ADL function  Description: INTERVENTIONS:  -  Assess patient's ability to carry out ADLs; assess patient's baseline for ADL function and identify physical deficits which impact ability to perform ADLs (bathing, care of mouth/teeth, toileting, grooming, dressing, etc )  - Assess/evaluate cause of self-care deficits   - Assess range of motion  - Assess patient's mobility; develop plan if impaired  - Assess patient's need for assistive devices and provide as appropriate  - Encourage maximum independence but intervene and supervise when necessary  - Involve family in performance of ADLs  - Assess for home care needs following discharge   - Consider OT consult to assist with ADL evaluation and planning for discharge  - Provide patient education as appropriate  10/16/2022 1258 by Madhu Le RN  Outcome: Adequate for Discharge  10/16/2022 1258 by Madhu Le RN  Outcome: Adequate for Discharge  10/16/2022 0945 by Madhu Le RN  Outcome: Progressing  Goal: Maintains/Returns to pre admission functional level  Description: INTERVENTIONS:  - Perform BMAT or MOVE assessment daily    - Set and communicate daily mobility goal to care team and patient/family/caregiver  - Collaborate with rehabilitation services on mobility goals if consulted  - Perform Range of Motion 3 times a day  - Reposition patient every 2 hours    - Dangle patient 3 times a day  - Stand patient 3 times a day  - Ambulate patient 3 times a day  - Out of bed to chair 3 times a day   - Out of bed for meals 3 times a day  - Out of bed for toileting  - Record patient progress and toleration of activity level   10/16/2022 1258 by Madhu Le RN  Outcome: Adequate for Discharge  10/16/2022 1258 by Madhu Le RN  Outcome: Adequate for Discharge  10/16/2022 0945 by Madhu Le RN  Outcome: Progressing     Problem: MOBILITY - ADULT  Goal: Maintains/Returns to pre admission functional level  Description: INTERVENTIONS:  - Perform BMAT or MOVE assessment daily    - Set and communicate daily mobility goal to care team and patient/family/caregiver  - Collaborate with rehabilitation services on mobility goals if consulted  - Perform Range of Motion 3 times a day  - Reposition patient every 2 hours    - Dangle patient 3 times a day  - Stand patient 3 times a day  - Ambulate patient 3 times a day  - Out of bed to chair 3 times a day   - Out of bed for meals 3 times a day  - Out of bed for toileting  - Record patient progress and toleration of activity level   10/16/2022 1258 by Maci Estrella, RN  Outcome: Adequate for Discharge  10/16/2022 1258 by Maci Estrella, RN  Outcome: Adequate for Discharge  10/16/2022 0945 by Maci Estrella, RN  Outcome: Progressing

## 2022-10-16 NOTE — ASSESSMENT & PLAN NOTE
History of obstructive sleep apnea    Noncompliant with CPAP due to sleep disturbances    Plan:  Modafinil 200 mg  Recommend sleep medicine follow-up

## 2022-10-16 NOTE — ASSESSMENT & PLAN NOTE
Patient complaining of chronic right shoulder pain, additionally 10 day history of bilateral shoulder pain, bilateral hip pain, left foot ankle pain  CK, ESR, CRP all within normal limits    Plan:  Meloxicam, home medication  Voltaren gel p r n   For joint pain  Outpatient rheumatology

## 2022-10-16 NOTE — DISCHARGE SUMMARY
[facility]  Discharge- [facility] 1950, 81 y o  male MRN: [id number]  Unit/Bed#: S -01 Encounter: [id number]  Primary Care Provider: Mukul Linn DO   Date and time admitted to hospital: 10/13/2022  4:23 PM    * Acute respiratory failure with hypoxia [facility])  Assessment & Plan  · Patient presenting with shortness of breath with hypoxia, SpO2 90% on room air  Direct admission from pulmonology visit, concern for acute neuromuscular disorder  · Imaging and lab testing only significant for known osteoarthritis  · Abnormal vital capacity  · Echo demonstrating EF of 65%, aortic sclerosis, mild mitral regurgitation, trace tricuspid regurgitation  · Repeat NIF/VC demonstrating NIF greater than -60, VC 4 0 5 L, great effort  · MRI of the cervical spine with contrast demonstrating:  Multilevel degenerative disc disease, no impingement of the cervical cord     Plan:  Pulmonology follow-up outpatient  Continue home medications (Trelegy, albuterol inhaler)  Outpatient Rheumatology  Will let patient know once myasthenia labs return      Tremor of right hand  Assessment & Plan  Patient reporting new onset tremors of the right hand  Possibly extrapyramidal symptoms    Plan:  Neurology follow-up outpatient  For now continue dopaminergic agents    Myoclonus  Assessment & Plan  Patient reporting new onset jerking/flexion of his neck and left upper extremity muscles involuntarily  Disrupting his sleep and ability to complete his daily activities  Possibly extrapyramidal symptoms    Plan:  Neurology follow-up outpatient    Pre-diabetes  Assessment & Plan  prediabetes  Most recent hemoglobin A1c 6 5%      Plan:  Carbohydrate diet  Follow-up with PCP    BPH (benign prostatic hyperplasia)  Assessment & Plan  History of BPH    Plan:  Tamsulosin 0 4 mg, home medication    Asthma  Assessment & Plan  Patient has significant history of asthma since childhood requiring prednisone for several years during childhood  Controlled now    Plan:  Trelegy  Albuterol  Montelukast    RLQ abdominal pain  Assessment & Plan  Patient complaining of ongoing right lower quadrant abdominal pain  Attributes this to a pulled muscle  Nontender to palpation on abdominal exam    Plan:  Tylenol p r n  Respiratory tract congestion with cough  Assessment & Plan  Patient reporting 1 day history of nasal congestion and cough with productive phlegm  COVID/RSV/flu, pro calc, urine Legionella, urine strep pneumonia all within normal limits  Chest x-ray demonstrating no acute cardiopulmonary disease    Dizziness  Assessment & Plan  Patient reporting recent history of dizziness and unsteadiness on feet  Reports 2 falls in the last 10 days  Reports complete numbness of left foot  B12, TSH, folate all within normal limits  Orthostatics negative  Visualized ambulating to nurse's station without assistance    Plan:  Neurology f/u outpatient  PT/OT outpatient    Bipolar disorder [facility])  Assessment & Plan  Patient reports history of bipolar disorder with manic episodes that last from several days to up to a month  Plan:  Lamictal 200 mg, home medication    Dysphagia  Assessment & Plan  Patient reporting a 5 year history of dysphagia, getting worse over the last 1 year  Dysphagia with pills and solid food  Plan:  Bedside speech pathology evaluation  Barium swallow study with video, demonstrating no significant oropharyngeal dysphagia    Insomnia  Assessment & Plan  Patient complaining of aches chronic history of significant insomnia, getting only about 3 hours of sleep each night  Less than 1 hour sleep over the last 3 nights  Plan:  Discontinue trazodone, continue clonazepam am 0 5 mg q h s  (started here) For sleep/tremor/myoclonus, greatly improved sleep    Blurry vision, bilateral  Assessment & Plan  Two day history of bilateral blurry vision    Patient reports previous 20/20 vision  Patient reporting drooping eyelids this morning 10/15, left greater than right    Plan:  Neuro and rheum follow up   Pending myasthenia labs    Hip weakness  Assessment & Plan  Patient complaining of inability to bring knees to chest   Hip flexor weakness on exam   Proximal muscle weakness  AM cortisol not drawn at correct time x2  Statin on hold     Plan:  Hold statin until next Sunday, if no change in weakness or pain can resume  Instructed to let PCP know if pains return  PT/OT outpatient referral    Polyarthralgia  Assessment & Plan  Patient complaining of chronic right shoulder pain, additionally 10 day history of bilateral shoulder pain, bilateral hip pain, left foot ankle pain  CK, ESR, CRP all within normal limits    Plan:  Meloxicam, home medication  Voltaren gel p r n  For joint pain  Outpatient rheumatology    Mixed hyperlipidemia  Assessment & Plan  History of hyperlipidemia    Plan:  Hold statin until next week, if no improvement in joint/muscle aches can resume  Instructed to notify PCP if pain returns or worsens on resumption    Essential hypertension  Assessment & Plan  History of hypertension  Blood pressure on admission 131/76    Plan:  Continue home enalapril    DONALD (obstructive sleep apnea)  Assessment & Plan  History of obstructive sleep apnea  Noncompliant with CPAP due to sleep disturbances    Plan:  Modafinil 200 mg  Recommend sleep medicine follow-up    Hypothyroidism  Assessment & Plan  Patient reporting history of hypothyroidism    TSH within normal limits    Plan:  Levothyroxine 100 mcg, home medication      Medical Problems             Resolved Problems  Date Reviewed: 10/16/2022   None               Discharging Resident: Wilver Boswell  Discharging Attending: Debbie Gonzalez MD  PCP: Henrry Gutiérrez DO  Admission Date:   Admission Orders (From admission, onward)     Ordered        10/13/22 1836  Inpatient Admission  Once                      Discharge Date: 10/16/22    [facility] Stay:  · Pulmonology  · Neurology    Procedures Performed:   · Vital Capacity: initially reduced, then normal     Significant Findings / Test Results:   · Chest x-ray:  Negative  · Video barium swallow:  Unremarkable  · MRI C-spine:  Degenerative changes, no cervical cord impingement  · Echo:  EF 51%, normal diastolic function, aortic sclerosis, mild mitral regurgitation, trace tricuspid regurgitation  RSVP is 25mmHg  · Sjogren's antibodies negative, DENIS negative  · ESR and CRP within normal limits    Incidental Findings:   · None     Test Results Pending at Discharge (will require follow up):  · Musk, AChR binding, blocking, and modulating antibodies     Outpatient Tests Requested:  · Rheumatology, neurology, pulmonology follow-up    Complications:  None    Reason for Admission:  Worsening dyspnea, choreiform movements    Hospital Course: Samm Plunkett is a 81 y o  male patient who originally presented to the hospital on 10/13/2022 due to worsening dyspnea times several months  Patient also was endorsing left foot numbness, choreiform movements, dry mouth, delicate skin, and blurry vision bilaterally  Patient underwent evaluation by Neurology and pulmonology and initially was found to have a low vital capacity however upon reassessment vital capacity was normal   Patient had several labs drawn for rheumatological conditions including Sjogren's, DENIS, inflammatory markers which were all negative  He also had a C-spine MRI which is shown above with no impingement of the cervical cord  Video barium swallow was also unremarkable  As above the only labs pending myasthenia labs and patient was noted to have improvement in his ambulation and vital capacity and was deemed stable for discharge  We will follow up the labs and report to him as a result  For now he will have follow-up with the rheumatologist, pulmonologist and neurologist   Outpatient OT and PT ordered      Please see above list of diagnoses and related plan for additional information  Condition at Discharge: poor    Discharge Day Visit / Exam:   Subjective:  Patient seen and examined sitting in the bedside chair  Patient is doing about the same as prior, however he states that he is getting a little bit more sleep and his strength is improving than what it was when he was at home  He is tearful and a bit discouraged that we have not found an answer for his complaints yet  However he understands that the workup is ongoing  He states he just feels tired  He was seen ambulating to the nurse's station without issue or without assistance  Vitals: Blood Pressure: 111/66 (10/16/22 0743)  Pulse: 63 (10/16/22 0743)  Temperature: 97 8 °F (36 6 °C) (10/16/22 0743)  Temp Source: Oral (10/16/22 0743)  Respirations: 18 (10/16/22 0743)  Height: 5' 8" (172 7 cm) (10/14/22 0905)  Weight - Scale: 108 kg (239 lb) (10/14/22 0905)  SpO2: 92 % (10/15/22 2312)  Exam:   Physical Exam  Vitals and nursing note reviewed  Constitutional:       General: He is not in acute distress  HENT:      Mouth/Throat:      Mouth: Mucous membranes are moist       Pharynx: Oropharynx is clear  Eyes:      Extraocular Movements: Extraocular movements intact  Conjunctiva/sclera: Conjunctivae normal       Pupils: Pupils are equal, round, and reactive to light  Cardiovascular:      Rate and Rhythm: Normal rate and regular rhythm  Pulses: Normal pulses  Heart sounds: Normal heart sounds  No murmur heard  Pulmonary:      Effort: Pulmonary effort is normal  No respiratory distress  Breath sounds: Normal breath sounds  Comments: Right lower lobe slightly diminished  Abdominal:      General: Abdomen is flat  Bowel sounds are normal  There is no distension  Palpations: Abdomen is soft  Tenderness: There is no abdominal tenderness  Musculoskeletal:         General: No swelling or tenderness  Cervical back: Neck supple        Right lower leg: Edema present  Left lower leg: Edema present  Comments: Trace   Skin:     General: Skin is warm and dry  Neurological:      Mental Status: He is alert and oriented to person, place, and time  Discussion with Family: Patient declined call to   Discharge instructions/Information to patient and family:   See after visit summary for information provided to patient and family  Provisions for Follow-Up Care:  See after visit summary for information related to follow-up care and any pertinent home health orders  Disposition:   Home    Planned Readmission:  No    Discharge Medications:  See after visit summary for reconciled discharge medications provided to patient and/or family        **Please Note: This note may have been constructed using a voice recognition system**

## 2022-10-16 NOTE — DISCHARGE INSTR - AVS FIRST PAGE
Dear Jose Ford,     It was our pleasure to care for you here at [facility]  It is our hope that we were always able to exceed the expected standards for your care during your stay  You were hospitalized due to shortness of breath and jerking movements  You were cared for on the third floor by Marina Hansen under the service of Sheryle Malone, MD with the [facility] who covers for your primary care physician (PCP), Fabiola Muniz DO, while you were hospitalized  If you have any questions or concerns related to this hospitalization, you may contact us at [phone]  For follow up as well as any medication refills, we recommend that you follow up with your primary care physician  A registered nurse will reach out to you by phone within a few days after your discharge to answer any additional questions that you may have after going home    However, at this time we provide for you here, the most important instructions / recommendations at discharge:     Notable Medication Adjustments -   Your trazodone has been discontinued  You have been started on clonazepam 0 5mg at night, please speak with your psychiatrist about increasing this further  Please resume your simvastatin next Sunday, and once resuming, let your doctor know if your joint/muscle aches worsen  Testing Required after Discharge -   None required, but your labs for myasthenia gravis are still pending  Important follow up information -   Please see neurology in 4-6 weeks  Please see rheumatology   We have ordered outpatient PT/OT for you   Other Instructions -   If you experience worsening shortness of breath, please do not hesitate to return to the hospital   Please review this entire after visit summary as additional general instructions including medication list, appointments, activity, diet, any pertinent wound care, and other additional recommendations from your care team that may be provided for you        Sincerely,     Arelis Linder, DO

## 2022-10-16 NOTE — ASSESSMENT & PLAN NOTE
Patient complaining of aches chronic history of significant insomnia, getting only about 3 hours of sleep each night  Less than 1 hour sleep over the last 3 nights      Plan:  Discontinue trazodone, continue clonazepam am 0 5 mg q h s  (started here) For sleep/tremor/myoclonus, greatly improved sleep

## 2022-10-16 NOTE — ASSESSMENT & PLAN NOTE
Patient has significant history of asthma since childhood requiring prednisone for several years during childhood    Controlled now    Plan:  Trelegy  Albuterol  Montelukast

## 2022-10-16 NOTE — ASSESSMENT & PLAN NOTE
History of hyperlipidemia    Plan:  Hold statin until next week, if no improvement in joint/muscle aches can resume     Instructed to notify PCP if pain returns or worsens on resumption

## 2022-10-16 NOTE — ASSESSMENT & PLAN NOTE
Two day history of bilateral blurry vision    Patient reports previous 20/20 vision  Patient reporting drooping eyelids this morning 10/15, left greater than right    Plan:  Neuro and rheum follow up   Pending myasthenia labs

## 2022-10-16 NOTE — ASSESSMENT & PLAN NOTE
Patient reporting recent history of dizziness and unsteadiness on feet  Reports 2 falls in the last 10 days  Reports complete numbness of left foot    B12, TSH, folate all within normal limits  Orthostatics negative  Visualized ambulating to nurse's station without assistance    Plan:  Neurology f/u outpatient  PT/OT outpatient

## 2022-10-16 NOTE — ASSESSMENT & PLAN NOTE
Patient complaining of inability to bring knees to chest   Hip flexor weakness on exam   Proximal muscle weakness  AM cortisol not drawn at correct time x2  Statin on hold     Plan:  Hold statin until next Sunday, if no change in weakness or pain can resume  Instructed to let PCP know if pains return    PT/OT outpatient referral

## 2022-10-17 VITALS
SYSTOLIC BLOOD PRESSURE: 111 MMHG | HEART RATE: 63 BPM | DIASTOLIC BLOOD PRESSURE: 66 MMHG | WEIGHT: 239 LBS | BODY MASS INDEX: 36.22 KG/M2 | RESPIRATION RATE: 18 BRPM | HEIGHT: 68 IN | TEMPERATURE: 97.6 F | OXYGEN SATURATION: 92 %

## 2022-10-17 LAB — ACHR BIND AB SER-SCNC: <0.03 NMOL/L (ref 0–0.24)

## 2022-10-17 NOTE — UTILIZATION REVIEW
Initial Clinical Review    Admission: Date/Time/Statement:   Admission Orders (From admission, onward)     Ordered        10/13/22 1836  Inpatient Admission  Once                      Orders Placed This Encounter   Procedures   • Inpatient Admission     Standing Status:   Standing     Number of Occurrences:   1     Order Specific Question:   Level of Care     Answer:   Med Surg [16]     Order Specific Question:   Estimated length of stay     Answer:   More than 2 Midnights     Order Specific Question:   Certification     Answer:   I certify that inpatient services are medically necessary for this patient for a duration of greater than two midnights  See H&P and MD Progress Notes for additional information about the patient's course of treatment  ED Arrival Information     Patient not seen in ED                     No chief complaint on file  Initial Presentation: 81 y o  male with a PMH of asthma, obesity, history of polyps, history of greater trochanter bursitis, osteoarthritis, obstructive sleep apnea, hypothyroidism, hypertension, hyperlipidemia, depression, BPH, bipolar disorder, dysphagia with solid foods who presents with 3 month history of worsening shortness of breath  Patient reports that he has had 3 months of worsening shortness of breath, monitors his own pulse ox levels, at rest O2 levels are 95% when walking O2 levels dropped down to 85%  Patient reports he feels exhausted and can even climb any stairs at his house  Reports that he has a 1 day history of congestion, cough, and productive phlegm  Plan: Inpatient admission for evaluation and treatment of acute resp failure with hypoxia, myoclonic jerking: Pulmonology and Neurology consults, TTE, ambulatory pulse ox, sputum culture, urine strep pneumonia and Legionella, orthostatic vitals , PT/OT       Date: 10/14   Day 2:     Pulmonology consult: continue to monitor SpO2, ABG in the am, continue Trglegy and albuterol, barium swallow pending, Neurology consult, Echo pending  Internal medicine: Patient symptoms appear to involve multiple systems  Neuro input appreciated  Agree with MRI of the cervical spine, rheumatological workup including SSA SSB for Sjogren's  ESR and CRP unremarkable  Follow-up on acetyl choline receptor antibody and musk antibody to rule out myasthenia  Patient had diminished vital capacity but normal NIF  Speech consulted for video barium swallow study  PT OT evaluation will be obtained    Neurology consult: MRI c spine, myasthenia labs, PT/OT/ST, follow up barium swallow, discontinue trazadone and trial clonazepam      ED Triage Vitals   Temperature Pulse Respirations Blood Pressure SpO2   10/13/22 1642 10/13/22 1642 10/13/22 1642 10/13/22 1642 10/13/22 1642   98 5 °F (36 9 °C) 66 18 131/76 90 %      Temp Source Heart Rate Source Patient Position - Orthostatic VS BP Location FiO2 (%)   10/13/22 1642 10/15/22 2312 10/13/22 1642 10/13/22 1642 --   Oral Monitor Sitting Left arm       Pain Score       10/13/22 1650       6          Wt Readings from Last 1 Encounters:   10/14/22 108 kg (239 lb)     Additional Vital Signs:     Date/Time Temp Pulse Resp BP MAP (mmHg) SpO2 O2 Device Patient Position - Orthostatic VS   10/14/22 1258 -- -- -- 114/67 -- -- -- Standing for 3 minutes - Orthostatic VS   10/14/22 1255 -- -- -- 126/69 -- -- -- Standing - Orthostatic VS   10/14/22 1250 -- -- -- 119/65 -- -- -- Sitting - Orthostatic VS   10/14/22 1245 -- -- -- 124/65 -- -- -- Lying - Orthostatic VS   10/14/22 0905 -- 64 -- 118/70 -- -- -- --   10/14/22 0751 97 6 °F (36 4 °C) 64 18 118/70 92 -- -- Sitting     Pertinent Labs/Diagnostic Test Results:   MRI cervical spine w wo contrast   Final Result by Hope Koyanagi, MD (10/15 0800)   1  Reversal of mid to lower cervical lordosis centered at the fused C5-6 level  2  Grade 1 degenerative anterolisthesis at C3-4 and C4-5    Multilevel degenerative disc disease with superimposed disc protrusions at the C2-3 through the C4-5 levels  and mild central canal narrowing  3  The cervical cord appears normal in caliber and signal with no evidence of focal impingement  Workstation performed: [id number]         FL barium swallow video w speech   Final Result by ANABEL DARLING (10/14 1148)      XR chest portable   Final Result by Yolette Noguera MD (10/14 1021)      No acute cardiopulmonary disease  Workstation performed: [id number]           10/14 Echo: Interpretation Summary       •  Left Ventricle: Left ventricular cavity size is normal  Wall thickness is normal  The left ventricular ejection fraction is 65%  Systolic function is normal  Wall motion is normal  Diastolic function is normal for age  •  Right Ventricle: Right ventricular cavity size is normal  Systolic function is normal   •  Aortic Valve: There is aortic sclerosis  •  Mitral Valve: There is mild regurgitation  •  Tricuspid Valve: There is trace regurgitation  The estimated right ventricular systolic pressure is 99 43 mmHg      Results from last 7 days   Lab Units 10/13/22  2149   SARS-COV-2  Negative     Results from last 7 days   Lab Units 10/16/22  0531 10/15/22  0523 10/14/22  0226   WBC Thousand/uL 6 73 6 66 10 28*   HEMOGLOBIN g/dL 13 6 13 9 13 2   HEMATOCRIT % 41 5 41 3 41 5   PLATELETS Thousands/uL 222 207 237   NEUTROS ABS Thousands/µL 4 39 4 38 7 24         Results from last 7 days   Lab Units 10/16/22  0531 10/15/22  0523 10/14/22  0226   SODIUM mmol/L 136 136 134*   POTASSIUM mmol/L 4 3 4 2 3 9   CHLORIDE mmol/L 100 102 102   CO2 mmol/L 29 27 25   ANION GAP mmol/L 7 7 7   BUN mg/dL 23 19 22   CREATININE mg/dL 1 28 1 14 1 15   EGFR ml/min/1 73sq m 56 64 64   CALCIUM mg/dL 8 7 9 1 8 6     Results from last 7 days   Lab Units 10/14/22  0226   AST U/L 27   ALT U/L 38   ALK PHOS U/L 62   TOTAL PROTEIN g/dL 6 2*   ALBUMIN g/dL 3 8   TOTAL BILIRUBIN mg/dL 0 36     Results from last 7 days   Lab Units 10/14/22  2056 10/14/22  1610 10/14/22  0753   POC GLUCOSE mg/dl 102 165* 99     Results from last 7 days   Lab Units 10/16/22  0531 10/15/22  0523 10/14/22  0226   GLUCOSE RANDOM mg/dL 102 107 119           Results from last 7 days   Lab Units 10/13/22  2152   CK TOTAL U/L 188   CK MB INDEX % 2 7*   CK MB ng/mL 5 0     Results from last 7 days   Lab Units 10/14/22  0226 10/14/22  0033 10/13/22  2152   HS TNI 0HR ng/L  --   --  3   HS TNI 2HR ng/L  --  4  --    HSTNI D2 ng/L  --  1  --    HS TNI 4HR ng/L 5  --   --    HSTNI D4 ng/L 2  --   --      Results from last 7 days   Lab Units 10/13/22  2152   D-DIMER QUANTITATIVE ug/ml FEU 0 44     Results from last 7 days   Lab Units 10/13/22  2152   PROTIME seconds 12 8   INR  0 94   PTT seconds 28     Results from last 7 days   Lab Units 10/13/22  2152   TSH 3RD GENERATON uIU/mL 2 743     Results from last 7 days   Lab Units 10/13/22  2152   PROCALCITONIN ng/ml 0 07                 Results from last 7 days   Lab Units 10/13/22  2152   NT-PRO BNP pg/mL 42                 Results from last 7 days   Lab Units 10/13/22  2152   CRP mg/L 1 8   SED RATE mm/hour 15                 Results from last 7 days   Lab Units 10/14/22  0508 10/13/22  2149   STREP PNEUMONIAE ANTIGEN, URINE  Negative  --    LEGIONELLA URINARY ANTIGEN  Negative  --    INFLUENZA A PCR   --  Negative   INFLUENZA B PCR   --  Negative   RSV PCR   --  Negative         ED Treatment:   Medication Administration - No Administrations Displayed (No Start Event Found)     None        Past Medical History:   Diagnosis Date   • Arthritis    • Asthma    • 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[facility] )    • 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[facility] )    • [facility] depression [facility] )    • Sleep apnea      Present on Admission:  • Polyarthralgia  • Essential hypertension  • DONALD (obstructive sleep apnea)  • Hypothyroidism  • Mixed hyperlipidemia      Admitting Diagnosis: Dyspnea on exertion [R06 09]  Age/Sex: 81 y o  male  Admission Orders:  Scheduled Medications:  No current facility-administered medications for this encounter  Continuous IV Infusions:  No current facility-administered medications for this encounter  PRN Meds:  No current facility-administered medications for this encounter  IP CONSULT TO NEUROLOGY  IP CONSULT TO PULMONOLOGY    Network Utilization Review Department  ATTENTION: Please call with any questions or concerns to [phone] and carefully listen to the prompts so that you are directed to the right person  All voicemails are confidential   [facility] all requests for admission clinical reviews, approved or denied determinations and any other requests to dedicated fax number below belonging to the campus where the patient is receiving treatment   List of dedicated fax numbers for the Facilities:  [address] DENIALS (Administrative/Medical Necessity) [phone]   [address] (Maternity/NICU/Pediatrics) [phone]   [address] [phone]   [address] [phone]   [address] [address] [facility] [phone]   [facility] [phone] [facility] [phone]

## 2022-10-18 ENCOUNTER — TRANSITIONAL CARE MANAGEMENT (OUTPATIENT)
Dept: FAMILY MEDICINE CLINIC | Facility: CLINIC | Age: 72
End: 2022-10-18

## 2022-10-18 LAB
ATRIAL RATE: 71 BPM
ATRIAL RATE: 72 BPM
P AXIS: 62 DEGREES
P AXIS: 64 DEGREES
PR INTERVAL: 158 MS
PR INTERVAL: 162 MS
QRS AXIS: 58 DEGREES
QRS AXIS: 61 DEGREES
QRSD INTERVAL: 92 MS
QRSD INTERVAL: 94 MS
QT INTERVAL: 396 MS
QT INTERVAL: 400 MS
QTC INTERVAL: 430 MS
QTC INTERVAL: 438 MS
T WAVE AXIS: 39 DEGREES
T WAVE AXIS: 40 DEGREES
VENTRICULAR RATE: 71 BPM
VENTRICULAR RATE: 72 BPM

## 2022-10-18 PROCEDURE — 93010 ELECTROCARDIOGRAM REPORT: CPT | Performed by: INTERNAL MEDICINE

## 2022-10-18 NOTE — TELEPHONE ENCOUNTER
Reviewed chart and appt note on 10/12/22 does state pt is looking for clearance for procedure, however, nothing mentioned if cleared  I will call in one week to see if pt is cleared at this time or if needs additional testing, or denied

## 2022-10-19 LAB
ACHR BLOCK AB/ACHR TOTAL SFR SER: 23 % (ref 0–25)
MISCELLANEOUS LAB TEST RESULT: NORMAL

## 2022-10-20 LAB — MUSK AB SER IA-ACNC: <1 U/ML

## 2022-10-26 ENCOUNTER — TELEPHONE (OUTPATIENT)
Dept: PULMONOLOGY | Facility: CLINIC | Age: 72
End: 2022-10-26

## 2022-10-26 NOTE — TELEPHONE ENCOUNTER
Established patient needing pre-op clearance  Surgery: EGD/Colonoscopy  Surgery date: TBD  Last seen by us: 10/12/22  On oxygen: No  Kind of Sedation:   Length of surgery:  Surgeon: Dr Russell Russian  Office contact: Kamaljit Barron  Fax(To send office note): [phone]    Would you like to clear patient via a phone call from last visit or would like us to schedule them with you or an AP?

## 2022-10-27 RX ORDER — SIMVASTATIN 10 MG
TABLET ORAL
COMMUNITY
Start: 2022-10-23 | End: 2022-10-28

## 2022-10-28 ENCOUNTER — OFFICE VISIT (OUTPATIENT)
Dept: FAMILY MEDICINE CLINIC | Facility: CLINIC | Age: 72
End: 2022-10-28

## 2022-10-28 VITALS
HEIGHT: 68 IN | HEART RATE: 92 BPM | DIASTOLIC BLOOD PRESSURE: 72 MMHG | BODY MASS INDEX: 34.86 KG/M2 | WEIGHT: 230 LBS | SYSTOLIC BLOOD PRESSURE: 120 MMHG | OXYGEN SATURATION: 94 %

## 2022-10-28 DIAGNOSIS — H53.8 BLURRY VISION, BILATERAL: Primary | ICD-10-CM

## 2022-10-28 DIAGNOSIS — Z76.89 ENCOUNTER FOR SUPPORT AND COORDINATION OF TRANSITION OF CARE: Primary | ICD-10-CM

## 2022-10-28 DIAGNOSIS — Z87.898 HISTORY OF EXTRAPYRAMIDAL SYMPTOMS: ICD-10-CM

## 2022-10-28 DIAGNOSIS — R06.02 SOB (SHORTNESS OF BREATH): ICD-10-CM

## 2022-10-28 DIAGNOSIS — H02.30: ICD-10-CM

## 2022-10-28 NOTE — PROGRESS NOTES
Assessment/Plan:   Diagnoses and all orders for this visit:    Encounter for support and coordination of transition of care  SOB (shortness of breath)  History of extrapyramidal symptoms  - reviewed hospitalization records  - per pt, his s/s markedly improved with cessation of statin which he had been taking for years - advised to cont to hold off on the statins for now   - has a CT scheduled for 11/2/2022 and appt with Ortho on 11/7/2022   - has an appt with Sleep Med on 11/4/2022  - has an appt with Pulm on 11/9/2022  - has an appt with Bariatrics on 11/14/2022   - unable to get an appt with Rheum in network till 5/2023 - referred to Dr Jessica Murphy in 1month for f/u - pt and wife aware and agreeable     Other orders  -     Discontinue: simvastatin (ZOCOR) 10 mg tablet          TCM Call     Date and time call was made  10/26/2022 11:49 AM    Hospital care reviewed  Records reviewed    Patient was hospitialized at  [address] [place] [address]    Date of Admission  10/13/22    Date of discharge  10/16/22    Diagnosis  Acute respiratory failure with hypoxia    Disposition  Home    Were the patients medications reviewed and updated  Yes    Current Symptoms  None      TCM Call     Post hospital issues  None    Should patient be enrolled in anticoag monitoring? No    Scheduled for follow up?   Yes    Patients specialists  Pulmonlolgist; Neurologist    Did you obtain your prescribed medications  Yes    Do you need help managing your prescriptions or medications  No    Is transportation to your appointment needed  No    I have advised the patient to call PCP with any new or worsening symptoms  VALENTE De La Torre MA    Living Arrangements  Spouse or Significiant other    Support System  Partner    The type of support provided  None    Do you have social support  Yes, as much as I need    Are you recieving any outpatient services  No    Are you recieving home care services  No    Are you using any community resources  No Current waiver services  No    Have you fallen in the last 12 months  No    Interperter language line needed  No    Counseling  Patient            Subjective:    Patient ID: Beth Dawkins is a 81 y o  male  HPI   69yo M presents to the office with his wife for TCM   - pt was a direct admit to [facility] from [facility] for eval and treatment of SOB    Hospital Course: Beth Dawkins is a 81 y o  male patient who originally presented to the hospital on 10/13/2022 due to worsening dyspnea times several months  Patient also was endorsing left foot numbness, choreiform movements, dry mouth, delicate skin, and blurry vision bilaterally  Patient underwent evaluation by Neurology and pulmonology and initially was found to have a low vital capacity however upon reassessment vital capacity was normal   Patient had several labs drawn for rheumatological conditions including Sjogren's, DENIS, inflammatory markers which were all negative  He also had a C-spine MRI which is shown above with no impingement of the cervical cord  Video barium swallow was also unremarkable  As above the only labs pending myasthenia labs and patient was noted to have improvement in his ambulation and vital capacity and was deemed stable for discharge  We will follow up the labs and report to him as a result  For now he will have follow-up with the rheumatologist, pulmonologist and neurologist   Outpatient OT and PT ordered      - Statin was held in the hospital   - per pt, the majority of his s/s have resolved with cessation of statin   - R-shoulder pain - following with Ortho - due to have joint replacement   - denies F/C/N/V/CP/palpitations/SOB/wheezing/abd pain/LE edema  - does not feel out of breath - thinking about restarting to exercise        The following portions of the patient's history were reviewed and updated as appropriate: allergies, current medications, past family history, past medical history, past social history, past surgical history and problem list     Review of Systems  as per HPI    Objective:  /72   Pulse 92   Ht 5' 8" (1 727 m)   Wt 104 kg (230 lb)   SpO2 94%   BMI 34 97 kg/m²    Physical Exam  Vitals reviewed  Constitutional:       General: He is not in acute distress  Appearance: Normal appearance  He is not ill-appearing, toxic-appearing or diaphoretic  HENT:      Head: Normocephalic and atraumatic  Right Ear: External ear normal       Left Ear: External ear normal       Nose: Nose normal    Eyes:      General: No scleral icterus  Right eye: No discharge  Left eye: No discharge  Extraocular Movements: Extraocular movements intact  Conjunctiva/sclera: Conjunctivae normal    Cardiovascular:      Rate and Rhythm: Normal rate and regular rhythm  Heart sounds: Normal heart sounds  Pulmonary:      Effort: Pulmonary effort is normal  No respiratory distress  Breath sounds: Normal breath sounds  No stridor  No wheezing, rhonchi or rales  Abdominal:      Palpations: Abdomen is soft  Musculoskeletal:         General: Normal range of motion  Cervical back: Normal range of motion  Right lower leg: No edema  Left lower leg: No edema  Skin:     General: Skin is warm  Neurological:      General: No focal deficit present  Mental Status: He is alert and oriented to person, place, and time  Psychiatric:         Mood and Affect: Mood normal          Behavior: Behavior normal        BMI Counseling: Body mass index is 34 97 kg/m²  The BMI is above normal  Nutrition recommendations include 3-5 servings of fruits/vegetables daily  Exercise recommendations include exercising 3-5 times per week

## 2022-11-01 ENCOUNTER — TELEPHONE (OUTPATIENT)
Dept: NEUROLOGY | Facility: CLINIC | Age: 72
End: 2022-11-01

## 2022-11-01 NOTE — TELEPHONE ENCOUNTER
SLAN/Undifferentiated systemic disorder w/ SOB/MONTAÑO, polyarthralgias, tremor and myoclonus        NOTES: Patient to follow-up with outpatient neuromuscular attending or advanced practitioner in 4-6 weeks  No further outpatient imaging/testing needed prior to follow-up  SCHEDULED: 1/5/23 @ 11:30AM W/[facility] IN [place]  ADDED TO THE WAIT LIST  WILL SEND PATIENT A PATIENT MESSAGE VIA [place]  LAST LOGIN 11/1/22  WILL INCLUDE ALL APPOINTMENT DETAILS/OFFICE ADDRESS/PHONE NUMBER  WILL ALSO MAIL APPOINTMENT CARD/DIRECTIONS

## 2022-11-02 ENCOUNTER — HOSPITAL ENCOUNTER (OUTPATIENT)
Dept: CT IMAGING | Facility: HOSPITAL | Age: 72
Discharge: HOME | End: 2022-11-02

## 2022-11-02 DIAGNOSIS — M19.011 PRIMARY OSTEOARTHRITIS OF RIGHT SHOULDER: ICD-10-CM

## 2022-11-04 ENCOUNTER — OFFICE VISIT (OUTPATIENT)
Dept: SLEEP CENTER | Facility: CLINIC | Age: 72
End: 2022-11-04

## 2022-11-04 VITALS
SYSTOLIC BLOOD PRESSURE: 132 MMHG | BODY MASS INDEX: 35.01 KG/M2 | DIASTOLIC BLOOD PRESSURE: 62 MMHG | HEIGHT: 68 IN | WEIGHT: 231 LBS | HEART RATE: 76 BPM

## 2022-11-04 DIAGNOSIS — G47.59 OTHER PARASOMNIA: ICD-10-CM

## 2022-11-04 DIAGNOSIS — I10 ESSENTIAL HYPERTENSION: ICD-10-CM

## 2022-11-04 DIAGNOSIS — F31.0 BIPOLAR AFFECTIVE DISORDER, CURRENT EPISODE HYPOMANIC (HCC): ICD-10-CM

## 2022-11-04 DIAGNOSIS — G47.34 SLEEP RELATED HYPOXIA: ICD-10-CM

## 2022-11-04 DIAGNOSIS — R40.0 DAYTIME SLEEPINESS: ICD-10-CM

## 2022-11-04 DIAGNOSIS — J45.40 MODERATE PERSISTENT ASTHMA, UNSPECIFIED WHETHER COMPLICATED: ICD-10-CM

## 2022-11-04 DIAGNOSIS — G47.33 OSA (OBSTRUCTIVE SLEEP APNEA): Primary | ICD-10-CM

## 2022-11-04 DIAGNOSIS — G47.09 OTHER INSOMNIA: ICD-10-CM

## 2022-11-04 NOTE — PATIENT INSTRUCTIONS
What you can do to improve your sleep: (Sleep Hygiene) Basic rules for a good night's sleep    Create a regular sleep schedule  This will help you form a sleep routine  Keep a record of your sleep patterns, and any sleeping problems you have  Bring the record to follow-up visits with healthcare providers  Avoid prolonged use of light-emitting screens before bedtime or watching TV in bed  Avoid forcing sleep  Do not take naps  Naps could make it hard for you to fall asleep at bedtime  Deal with your worries before bedtime  Keep your bedroom cool, quiet, and dark  Turn on white noise, such as a fan, to help you relax  Do not use your bed for any activity that will keep you awake  Do not read, exercise, eat, or watch TV in your bedroom  Get up if you do not fall asleep within 20 minutes  Move to another room and do something relaxing until you become sleepy  Limit caffeine, alcohol, nicotine and food to earlier in the day  Only drink caffeine in the morning  Do not drink alcohol within 6 hours of bedtime  Do not eat a heavy meal right before you go to bed  Avoid smoking, especially in the evening  Exercise regularly  Daily exercise will help you sleep better  Do not exercise within 4 hours of bedtime  Stimulus control therapy rules  1  Go to bed only when sleepy  2  Do not watch television, read, eat, or worry while in bed  Use bed only for sleep and sex  3  Get out of bed if unable to fall asleep within 20 minutes and go to another room  Return to bed only when sleepy  Repeat this step as many times as necessary throughout the night  4  Set an alarm clock to wake up at a fixed time each morning, including weekends  5  Do not take a nap during the day  Data from: [address] [place] [address], [address] Nonpharmacologic treatments of insomnia  J Clin Psychiatry 8632; 53:37  Go to AASM website for more information: Sleepeducation  org     Recommended Reading:  Book by authors Scott Martinez No More sleepless nights    What is DONALD? Obstructive sleep apnea is a common and serious sleep disorder that causes you to stop breathing during sleep  The airway repeatedly becomes blocked, limiting the amount of air that reaches your lungs  When this happens, you may snore loudly or making choking noises as you try to breathe  Your brain and body becomes oxygen deprived and you may wake up  This may happen a few times a night, or in more severe cases, several hundred times a night  Sleep apnea can make you wake up in the morning feeling tired or unrefreshed even though you have had a full night of sleep  During the day, you may feel fatigued, have difficulty concentrating or you may even unintentionally fall asleep  This is because your body is waking up numerous times throughout the night, even though you might not be conscious of each awakening  The lack of oxygen your body receives can have negative long-term consequences for your health  This includes:  High blood pressure  Heart disease  Irregular heart rhythms  Stroke  Pre-diabetes and diabetes  Depression    Testing  An objective evaluation of your sleep may be needed before your board certified sleep physician can make a diagnosis  Options include:   In-lab overnight sleep study  This type of sleep study requires you to stay overnight at a sleep center, in a bed that may resemble a hotel room  You will sleep with sensors hooked up to various parts of your body  These sensors record your brain waves, heartbeat, breathing and movement  An overnight sleep study also provides your doctor with the most complete information about your sleep  Learn more about an overnight sleep study       Home sleep apnea test  Some patients with high risk factors for obstructive sleep apnea and no other medical disorders may be candidates for a home sleep apnea test  The testing equipment differs in that it is less complicated than what is used in an overnight sleep study  As such, does not give all the data an in-lab will and if negative, may not mean you do not have the problem  Treatment for sleep apnea  includes using a continuous positive airway pressure (CPAP) machine to keep your airway open during sleep  A mask is placed over your nose and mouth, or just your nose  The mask is hooked to the CPAP machine that blows a gentle stream of air into the mask when you breathe  This helps keep your airway open so you can breathe more regularly  Extra oxygen may be given to you through the machine  You may be given a mouth device  It looks like a mouth guard or dental retainer and stops your tongue and mouth tissues from blocking your throat while you sleep  Surgery may be needed to remove extra tissues that block your mouth, throat, or nose  Manage sleep apnea:   Do not smoke  Nicotine and other chemicals in cigarettes and cigars can cause lung damage  Ask your healthcare provider for information if you currently smoke and need help to quit  E-cigarettes or smokeless tobacco still contain nicotine  Talk to your healthcare provider before you use these products  Do not drink alcohol or take sedative medicine before you go to sleep  Alcohol and sedatives can relax the muscles and tissues around your throat  This can block the airflow to your lungs  Maintain a healthy weight  Excess tissue around your throat may restrict your breathing  Ask your healthcare provider for information if you need to lose weight  Sleep on your side or use pillows designed to prevent sleep apnea  This prevents your tongue or other tissues from blocking your throat  You can also raise the head of your bed  Driving Safety  Refrain from driving when drowsy  Follow up with your healthcare provider as directed:  Write down your questions so you remember to ask them during your visits  Go to AASM website for more information: Sleepeducation  org     What is DONALD?    Obstructive sleep apnea is a common and serious sleep disorder that causes you to stop breathing during sleep  The airway repeatedly becomes blocked, limiting the amount of air that reaches your lungs  When this happens, you may snore loudly or making choking noises as you try to breathe  Your brain and body becomes oxygen deprived and you may wake up  This may happen a few times a night, or in more severe cases, several hundred times a night  Sleep apnea can make you wake up in the morning feeling tired or unrefreshed even though you have had a full night of sleep  During the day, you may feel fatigued, have difficulty concentrating or you may even unintentionally fall asleep  This is because your body is waking up numerous times throughout the night, even though you might not be conscious of each awakening  The lack of oxygen your body receives can have negative long-term consequences for your health  This includes:  High blood pressure  Heart disease  Irregular heart rhythms  Stroke  Pre-diabetes and diabetes  Depression    Testing  An objective evaluation of your sleep may be needed before your board certified sleep physician can make a diagnosis  Options include:   In-lab overnight sleep study  This type of sleep study requires you to stay overnight at a sleep center, in a bed that may resemble a hotel room  You will sleep with sensors hooked up to various parts of your body  These sensors record your brain waves, heartbeat, breathing and movement  An overnight sleep study also provides your doctor with the most complete information about your sleep  Learn more about an overnight sleep study  Home sleep apnea test  Some patients with high risk factors for obstructive sleep apnea and no other medical disorders may be candidates for a home sleep apnea test  The testing equipment differs in that it is less complicated than what is used in an overnight sleep study   As such, does not give all the data an in-lab will and if negative, may not mean you do not have the problem  Treatment for sleep apnea  includes using a continuous positive airway pressure (CPAP) machine to keep your airway open during sleep  A mask is placed over your nose and mouth, or just your nose  The mask is hooked to the CPAP machine that blows a gentle stream of air into the mask when you breathe  This helps keep your airway open so you can breathe more regularly  Extra oxygen may be given to you through the machine  You may be given a mouth device  It looks like a mouth guard or dental retainer and stops your tongue and mouth tissues from blocking your throat while you sleep  Surgery may be needed to remove extra tissues that block your mouth, throat, or nose  Manage sleep apnea:   Do not smoke  Nicotine and other chemicals in cigarettes and cigars can cause lung damage  Ask your healthcare provider for information if you currently smoke and need help to quit  E-cigarettes or smokeless tobacco still contain nicotine  Talk to your healthcare provider before you use these products  Do not drink alcohol or take sedative medicine before you go to sleep  Alcohol and sedatives can relax the muscles and tissues around your throat  This can block the airflow to your lungs  Maintain a healthy weight  Excess tissue around your throat may restrict your breathing  Ask your healthcare provider for information if you need to lose weight  Sleep on your side or use pillows designed to prevent sleep apnea  This prevents your tongue or other tissues from blocking your throat  You can also raise the head of your bed  Driving Safety  Refrain from driving when drowsy  Follow up with your healthcare provider as directed:  Write down your questions so you remember to ask them during your visits  Go to AAS website for more information: Sleepeducation  org             Nursing Support:  When: Monday through Friday 7A-5PM except holidays  Where: Our direct line is [phone]  If you are having a true emergency please call 911  In the event that the line is busy or it is after hours please leave a voice message and we will return your call  Please speak clearly, leaving your full name, birth date, best number to reach you and the reason for your call  Medication refills: We will need the name of the medication, the dosage, the ordering provider, whether you get a 30 or 90 day refill, and the pharmacy name and address  Medications will be ordered by the provider only  Nurses cannot call in prescriptions  Please allow 7 days for medication refills  Physician requested updates: If your provider requested that you call with an update after starting medication, please be ready to provide us the medication and dosage, what time you take your medication, the time you attempt to fall asleep, time you fall asleep, when you wake up, and what time you get out of bed  Sleep Study Results: We will contact you with sleep study results and/or next steps after the physician has reviewed your testing

## 2022-11-04 NOTE — PROGRESS NOTES
Consultation - Sleep Center   Jimmy Moore  81 y o  male  :1950  OHN:[id number]  DOS:2022    Physician Requesting Consult: Aron Swain DO             Reason for Consult : At your kind request I saw Jimmy Moore for initial sleep evaluation today  Was diagnosed with obstructive sleep apnea in the past and prescribed CPAP  He discontinued use because of intolerance  He had initial studies over 10 years ago and around 3 years ago had home sleep testing  Unfortunately, he has no re-collection of where the studies were done and no results of prior studies are available  PFSH, Problem List, Medications & Allergies were reviewed in EMR  Saint Landry Gaurav  has a past medical history of Arthritis, Asthma, 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[address] ), 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[facility] ), Manic depression ([facility] ), and Sleep apnea  He has a current medication list which includes the following prescription(s): albuterol, diclofenac sodium, enalapril, lamotrigine, levothyroxine, meloxicam, modafinil, montelukast, pramipexole, tamsulosin, trelegy ellipta, and clonazepam       HPI:  He presents with complaints of "Major sleep problems impacting EVERYTHING "  Bed partner reports continuous loud snoring ongoing for decades  On occasion he has a woken himself with gasping  He is not aware of modifying factors  Other Complaints: He is diagnosed with bipolar and feels it is controlled on current medication  He has difficulty maintaining sleep for which she was prescribed Klonopin recently  In addition, is using modafinil prescribed by his psychiatrist for daytime drowsiness  Restless Leg Syndrome: reports no suggestive symptoms  However, he is on Mirapex as an adjunct to Lamictal for bipolar  Parasomnia: reports sleeptalking; and reports sleep walking (in or past and last episode approximately a year ago that he suspects was related to medication use); Sleep Routine (on average):   Typical Bedtime:  9:00 p m   Gets OOB: Midnight “because I am not sleepy I after that" TIB:3 hrs  Sleep latency:< 15 minutes Sleep Interruptions:>5/nite, in spite of Klonopin that he feels has not helped  not always sure of the cause and   struggles to fall back asleep  Awakens: spontaneously  Upon awakening: usually feels refreshed   He estimates getting 3 hrs sleep, depending on “which end of the scale I am on" and is able to sleep longer “when I am depressed"  Phil Tejada denies Daytime Sleepiness (since he is on modafinil) and is not napping  He volunteers he does nap when he is depressed  Luciano Ledesma He rated himself at Total score: 2 /24 on the Holstein Sleepiness Scale  Habits:  reports that he has never smoked  He has never used smokeless tobacco  ;   E-Cigarette/Vaping   • E-Cigarette Use Never User     ;  reports no history of drug use ;  reports no history of alcohol use  ; Caffeine use:excessive" all day"; Exercise routine: none in the past 6 months since he had adverse reaction to statin medication  Family History: Negative for sleep disturbance  ROS - reviewed and as attached  Significant for approximately 30 lb weight gain in the past few months  He reported no nasal symptoms  He feels asthma is controlled  He reported no cardiac symptoms  Luciano Ledesma EXAM:  /62 (BP Location: Left arm, Patient Position: Sitting, Cuff Size: Large)   Pulse 76   Ht 5' 8" (1 727 m)   Wt 105 kg (231 lb)   BMI 35 12 kg/m²    General: Well groomed male, well appearing, in no apparent distress  Neurological: Alert and orientated;  cooperative; Cranial nerves intact;    Psychiatric: Speech:  Pressured; appears anxious,  Normal mood, affect & thought   Skin: warm and dry; Color& Hydration good; no facial rashes or lesions   HEENT:  Craniofacial anatomy: normal Sinuses: non- tender   TMJ: Normal    Eyes: EOM's intact;  conjunctiva/corneas clear   Ears: Externallyappear normal     Nasal Airway: assymetric nares and assymetric nose Septum:  Deviated; Mucosa: normal; Turbinates: normal; Rhinorrhea: None   Mouth: Lips: normal posture; Dentition: missing several  Mucosa:moist  ; Hard Palate:normal    Oropharryx: crowdedTongue: Mallampati:Class III, Mobile and ScallopedSoft Palate:  redundant  Tonsils: absent  Neck:is thick; Neck Circumference: 17 "; Supple; no abnormal masses; Thyroid:normal  Trachea:central     Lymph: No Cervical or Submandibular Lymhadenopathy  Heart: S1,S2 normal; RRR; no gallop; no murmurs   Lungs: Respiratory Effort:normal  Air entry good bilaterally  No wheezes  No rales  Abdomen: Obese, Soft & non-tender    Extremities: No pedal edema  No clubbing or cyanosis  Musculoskeletal:  Motor normal; Gait:normal        Last BMP demonstrated elevated CO2 at 29 millimoles per L but otherwise normal; CBC was normal    IMPRESSION: Primary/Secondary Sleep Diagnoses (to Medical or Psych conditions) & Comorbidities   1  DONALD (obstructive sleep apnea)  Ambulatory Referral to Sleep Medicine    Diagnostic Sleep Study   2  Sleep related hypoxia     3  Other insomnia  Diagnostic Sleep Study   4  Other parasomnia  Diagnostic Sleep Study   5  Daytime sleepiness     6  Bipolar affective disorder, current episode hypomanic ([facility] [address] )     7  Moderate persistent asthma, unspecified whether complicated     8  Essential hypertension     9  BMI 35 0-35 9,adult        PLAN:   With respect to above conditions, comprehensive counseling provided on pathophysiology; effects on symptoms and comorbidities, diagnostic strategies & limitations; treatment options; risks or no treament; risks & benefits of the various therapeutic options; costs and insurance aspects  I advised weight reduction, avoiding sleeping supine, using alcohol or sedating medications close to bed time and on safe driving practices  Nocturnal polysomnography is indicated and a diagnostic study will be scheduled   Home sleep testing is contraindicated because Severe insomnia, Parasomnia and High risk for sleep-related hypoventilation  Patient is willing to try Positive airway pressure therapy and will be scheduled for a titration study if indicated  Multi component Cognitive behavioral therapy for Insomnia undertaken - Sleep Restriction, Stimulus control, Relaxation techniques and Sleep hygiene were discussed  He will benefit from reviewing of his psychiatric medications  I advised against use of Klonopin as a sleep aid which she states has not been helping  This may also eliminate need for modafin   Follow-up to be scheduled after the studies to review results, further details of treatment options and to initiate/adjust therapy  Sincerely,      Authenticated electronically on 25/24/06   Board Certified Specialist     Portions of the record may have been created with voice recognition software  Occasional wrong word or "sound a like" substitutions may have occurred due to the inherent limitations of voice recognition software  There may also be notations and random deletions of words or characters from malfunctioning software  Read the chart carefully and recognize, using context, where substitutions/deletions have occurred

## 2022-11-04 NOTE — PROGRESS NOTES
Review of Systems      Genitourinary need to urinate more than twice a night   Cardiology ankle/leg swelling   Gastrointestinal none   Neurology none   Constitutional weight change   Integumentary none   Psychiatry depression   Musculoskeletal joint pain   Pulmonary none   ENT none   Endocrine none   Hematological none

## 2022-11-07 ENCOUNTER — OFFICE VISIT (OUTPATIENT)
Dept: OBGYN CLINIC | Facility: OTHER | Age: 72
End: 2022-11-07

## 2022-11-07 VITALS — HEIGHT: 68 IN | BODY MASS INDEX: 35.01 KG/M2 | WEIGHT: 231 LBS

## 2022-11-07 DIAGNOSIS — M19.011 PRIMARY OSTEOARTHRITIS OF RIGHT SHOULDER: Primary | ICD-10-CM

## 2022-11-07 DIAGNOSIS — E66.01 OBESITY, MORBID (HCC): ICD-10-CM

## 2022-11-07 RX ORDER — CHLORHEXIDINE GLUCONATE 0.12 MG/ML
15 RINSE ORAL ONCE
OUTPATIENT
Start: 2022-11-07 | End: 2022-11-07

## 2022-11-07 NOTE — PROGRESS NOTES
Assessment  Diagnoses and all orders for this visit:    Primary osteoarthritis of right shoulder    Obesity, morbid ([address] )            Discussion and Plan:    Findings today are consistent with bilateral glenohumeral osteoarthritis  Imaging and prognosis was reviewed with the patient today  Patient has not had succuss with conservative treatments for his right shoulder  Discussed with patient that he is a candidate for a surgical intervention- right reverse total shoulder arthroplasty vs right hemiarthroplasty  Given the severe glenoid deformity it is unclear to me whether not we will be able to implant a reverse base plate and if we are unable to successfully implant a reverse base plate then hemiarthroplasty would be the only reasonable option  Patient wishes to proceed with surgical intervention  Patient did have a cortisone steroid injection on 9/26/2022 and would need to wait 3 months prior to undergoing surgery  A thorough discussion was performed with the patient reviewing all operative and nonoperative options as well as the risks of the procedure  Risks discussed include but not limited to persistent pain, dislocation, loosening of the prosthesis, infection, need for further surgery including revision, any remaining rotator cuff rupture , neurovascular injury, as well as the risk of anesthesia  After this discussion all questions were answered and informed consent was obtained for Reverse Total Shoulder Arthroplasty versus Hemiarthroplasty right shoulder  Subjective:   Patient ID: Nella Patel is a 81 y o  male      HPI  Patient presents today for a follow up of bilateral shoulder pain- severe glenohumeral OA  Patient was last seen on 10/11/2022 and a CT Blue Print was ordered of the right shoulder- here to further discuss right reverse total shoulder  Patient received CSI to the right shoulder on 9/26/2022- would need to wait 3 months from then for surgery   Patient received a left shoulder CSI on 10/11/2022   Patient states that he got good relief from the injection is not experiencing any pain in his left shoulder at this time  Pain in the right shoulder is diffused through out the shoulder and at times radiates into the elbow  States that the pain is constant in timing and is worse with shoulder movements  The following portions of the patient's history were reviewed and updated as appropriate: allergies, current medications, past family history, past medical history, past social history, past surgical history and problem list     Review of Systems   Constitutional: Negative for chills and fever  HENT: Negative for ear pain and sore throat  Eyes: Negative for pain and visual disturbance  Respiratory: Negative for cough and shortness of breath  Cardiovascular: Negative for chest pain and palpitations  Gastrointestinal: Negative for abdominal pain and vomiting  Genitourinary: Negative for dysuria and hematuria  Musculoskeletal: Positive for arthralgias (bilateral shoulder)  Negative for back pain  Skin: Negative for color change and rash  Neurological: Negative for seizures and syncope  All other systems reviewed and are negative  Objective:  Ht 5' 8" (1 727 m)   Wt 105 kg (231 lb)   BMI 35 12 kg/m²       Right Shoulder Exam     Range of Motion   Active abduction: 100   Forward flexion: 120     Muscle Strength   Abduction: 4/5   Internal rotation: 4/5     Tests   Cross arm: positive  Drop arm: positive    Other   Erythema: absent  Scars: absent  Sensation: normal  Pulse: present            Physical Exam  Vitals and nursing note reviewed  Constitutional:       Appearance: He is well-developed  HENT:      Head: Normocephalic and atraumatic  Eyes:      Pupils: Pupils are equal, round, and reactive to light  Cardiovascular:      Rate and Rhythm: Normal rate and regular rhythm  Pulses: Normal pulses  Heart sounds: Normal heart sounds  Pulmonary:      Effort: Pulmonary effort is normal  No respiratory distress  Breath sounds: Normal breath sounds  Abdominal:      General: There is no distension  Palpations: Abdomen is soft  Musculoskeletal:      Cervical back: Normal range of motion and neck supple  Skin:     General: Skin is warm and dry  Neurological:      Mental Status: He is alert and oriented to person, place, and time  Psychiatric:         Behavior: Behavior normal          Thought Content: Thought content normal          Judgment: Judgment normal            I have personally reviewed pertinent films in PACS and my interpretation is as follows   CT Blue Print right shoulder: severe glenohumeral joint osteoarthritis with advanced glenoid deformity    Scribe Attestation    I,:  Rudolph Luna am acting as a scribe while in the presence of the attending physician :       I,:  Ginna Cortez MD personally performed the services described in this documentation    as scribed in my presence :

## 2022-11-07 NOTE — PATIENT INSTRUCTIONS
What to Expect Before and After Shoulder Replacement Surgery  You are being scheduled for a shoulder replacement by Dr Derek Zuniga to treat your shoulder condition  Here is some information which may help to answer questions that you may have  Please do not hesitate to reach out to our team to answer questions not addressed here  Before Surgery  You will be contacted the evening prior to your surgery to confirm the scheduled time of the procedure and when to arrive at the hospital    Do not eat or drink anything after midnight the night before your surgery so that the anesthesia can be performed safely  If you have been fitted for a sling in the office prior to the surgery please remember to bring it to the hospital   You will meet the anesthesiologist the morning of the surgery  The surgery is performed under a general anesthetic but they will also offer you a regional block (shot to numb the arm) to help control your post-operative pain as well as with a catheter that is left in place after the block  The catheter is connected to a small pump which will continue to provide numbing medicine and help prolong the pain control from the block  Unfortunately this catheter is not as effective as the initial block, but can still be very helpful in managing the pain  After Surgery and in the Hospital  The shoulder replacement surgery typically takes 60-90 minutes  When surgery is completed, your surgeon will update your family and friends on your condition and progress  You will remain in the recovery room for at least an hour or until the anesthesia has worn off and your blood pressure and pulse are stable  If you have pain, the nurses will give you medication  Once out of surgery, your surgeon will decide on how long you will be using a sling in order to protect and position your shoulder  However, this won't keep you from starting physical therapy    Exercises typically begin on the day after surgery with emphasis on moving the shoulder, wrist, and hand  The physical therapist will be provided with a detailed protocol but typically the first 6 weeks are used to regain range of motion and then strengthening is initiated  Starting strengthening exercises too early may lead to complications  When You Are Discharged from the [address] [place] [address] can expect to be released from the hospital the day after surgery (this may change if you have special needs or medical conditions)  Before you are released, the treatment team (Orthopaedic surgery residents, physician assistants and physical therapists) will talk with you about the importance of limiting any sudden or stressful movements to the arm for several weeks or longer  Activities that involve pushing, pulling, and lifting should not be done until you are given permission from your surgeon  Your First Day at [address] [place] [address] may need help with your daily activities, so it is a good idea to have family and friends prepared to help you  It is okay to remove the sling and let the arm hang at the side so that you can get cleaned and change your clothes  To put on a shirt, place the bad arm in first and then the good arm  Reverse to take it off  Don't forget to wear the sling every night for at least the first month after surgery, and never use your arm to push yourself up in bed or from a chair  The added weight on your shoulder may cause you to re-injure the joint  How to [place] in the First Week  You are encouraged to return to your normal eating and sleeping patterns as soon as possible  It is important for you to be active in order to control your weight and muscle tone  It is ok to increase your activity level and even perform light aerobic exercise (like walking or riding a stationary bike) within the first week or so if you are feeling up to it    If there is concern about these activates best to wait until the first post-operative visit and discuss this with the maritza    Maren Lanes might be able to return to work within several days if you can perform your job while wearing a sling  Consult with your doctor, as this differs from patient to patient  However, if your job requires heavy lifting or climbing, there may be a delay for several months  Until you are seen for your first follow-up visit, please try and keep wound dry  It is okay to shower but try your best to keep the incision out of direct contact with the water (or consider using a waterproof bandage)  If it does gets wet please dry as best as possible afterwards  If you notice any drainage or a foul odor from your incision or your temperature goes above 101 5 degrees, please contact the office  What You Can Expect in the First Month  Your first post-operative appointment will be with Dr Eva Nelson physician assistant (PA) around 2 weeks after the surgery  You skin staples will be removed and X-rays will be obtained at that visit  Please understand that it is quite common to still experience pain at that time but the pain should be steadily improving  Hopefully physical therapy has already begun but if not it will be initiated at this visit and will continue for the 8-12 weeks  At about 12 weeks after surgery you will start a progressive strengthening program  Physical therapy is a deliberate process of not only strengthening your shoulder but also altering how you use your arm  It may be many months before your desired results are achieved, so do not get discouraged  Your shoulder will generally continue to improve steadily up to 6-8 months after surgery  After that point further improvement is very slow; although it has been shown that even after a year or more, activity can increase as muscle strength continues to improve  After the First Month at Home   Because each person heals differently, there are different recovery timelines  An average recovery period typically lasts about between 3-6 months  Talk with your surgeon about which activities will be appropriate for you once you have recovered

## 2022-11-08 NOTE — PROGRESS NOTES
Pulmonary Follow Up Note   Sybil Farnsworth 81 y o  male MRN: [id number]  11/8/2022      Assessment:    1  Moderate Persistent Asthma  · Symptoms are controlled  · Continue Trelegy Ellipta daily and albuterol as needed    2  History of eosinophilic pneumonia  · Diagnosed 12 years ago, treated with prednisone  · No recurrence for the past 5 years per patient  3  DONALD  · Follows with sleep medicine  4  Preoperative clearance  · ARISCAT score of 3 consistent with overall low risk (1 6% risk) of in-hospital post-operative pulmonary complications  If risks acceptable to patient (or surrogate) and surgeon, may proceed to OR  History of Present Illness   HPI:  Sybil Farnsworth is a 81 y o  male with a PMHx Moderate persistent asthma, eosinophilic pneumonia, DONALD, HTN, Hypothyroidism, HLD, who comes to the office as a follow up visit and for preoperative clearance for routine colonoscopy  Patient was last seen in the office on 10/12/2022 for worsening shortness of breath accompanied by new onset of tremors and difficulty ambulating  Patient was sent to [address] [place] [address] for direct admission at that time for full neurologic workup due to concerns for neuromuscular disorder  MRI C-spine was unremarkable for cord enhancement/focal impingement  Sjogren's antibodies were negative  Acetylcholine binding, blocking, and modulating receptos and musk antibodies pending  He will follow up with neurology and rheumatology as outpatient  On discharge, he was advised to discontinue Simvastatin  He states his symptoms resolved after stopping this medication and he feels back at his baseline  He denies any worsening shortness of breath or recent asthma exacerbations  He is able to ambulate better  He reports feeling 100% better  Review of Systems   Constitutional: Negative for appetite change and fever  HENT: Negative for ear pain, postnasal drip, rhinorrhea, sneezing, sore throat and trouble swallowing  Respiratory: Negative for cough, shortness of breath and wheezing  Cardiovascular: Negative for chest pain and palpitations  Gastrointestinal: Negative for abdominal pain  Musculoskeletal: Negative for myalgias  Neurological: Negative for headaches         Historical Information   Past Medical History:   Diagnosis Date   • Arthritis    • Asthma    • 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[facility] )    • 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[facility] )    • Manic depression ([facility] )    • Sleep apnea      Past Surgical History:   Procedure Laterality Date   • UMBILICAL HERNIA REPAIR       Family History   Problem Relation Age of Onset   • Hypertension Mother    • Depression Mother    • Depression Father    • Arthritis Father    • Heart attack Paternal Uncle 32   • Colon cancer Neg Hx    • Prostate cancer Neg Hx          Meds/Allergies     Current Outpatient Medications:   •  albuterol (PROVENTIL HFA,VENTOLIN HFA) 90 mcg/act inhaler, Inhale 2 puffs every 4 (four) hours as needed for wheezing, Disp: , Rfl:   •  clonazePAM (KlonoPIN) 0 5 mg tablet, Take 1 tablet (0 5 mg total) by mouth daily at bedtime for 10 days, Disp: 30 tablet, Rfl: 0  •  Diclofenac Sodium (VOLTAREN) 1 %, Apply 2 g topically 4 (four) times a day, Disp: 2 g, Rfl: 0  •  enalapril (VASOTEC) 10 mg tablet, Take 10 mg by mouth daily, Disp: , Rfl:   •  lamoTRIgine (LaMICtal) 200 MG tablet, , Disp: , Rfl:   •  levothyroxine 100 mcg tablet, , Disp: , Rfl:   •  meloxicam (Mobic) 15 mg tablet, Take 1 tablet (15 mg total) by mouth daily, Disp: 30 tablet, Rfl: 1  •  modafinil (PROVIGIL) 200 MG tablet, , Disp: , Rfl:   •  montelukast (SINGULAIR) 10 mg tablet, , Disp: , Rfl:   •  pramipexole (MIRAPEX) 1 mg tablet, Take 2 mg by mouth daily at bedtime, Disp: , Rfl:   •  tamsulosin (FLOMAX) 0 4 mg, , Disp: , Rfl:   •  Trelegy Ellipta 200-62 5-25 MCG/INH AEPB inhaler, Inhale 1 puff daily, Disp: 60 blister, Rfl: 2  Allergies   Allergen Reactions   • Simvastatin Shortness Of Breath   • Spiriva Respimat [Tiotropium Bromide Monohydrate] Rash       Vitals: There were no vitals taken for this visit  There is no height or weight on file to calculate BMI  Physical Exam  Physical Exam  Vitals reviewed  Constitutional:       General: He is not in acute distress  Appearance: He is not ill-appearing or toxic-appearing  HENT:      Head: Normocephalic  Eyes:      General: No scleral icterus  Pupils: Pupils are equal, round, and reactive to light  Cardiovascular:      Rate and Rhythm: Normal rate and regular rhythm  Heart sounds: No murmur heard  Pulmonary:      Effort: Pulmonary effort is normal  No respiratory distress  Breath sounds: Normal breath sounds  No wheezing, rhonchi or rales  Abdominal:      General: There is no distension  Palpations: Abdomen is soft  Tenderness: There is no abdominal tenderness  There is no guarding  Musculoskeletal:      Right lower leg: No edema  Left lower leg: No edema  Skin:     General: Skin is warm  Capillary Refill: Capillary refill takes less than 2 seconds  Neurological:      General: No focal deficit present  Mental Status: He is alert and oriented to person, place, and time  Mental status is at baseline  Cranial Nerves: No cranial nerve deficit  Psychiatric:         Mood and Affect: Mood normal              Labs: I have personally reviewed pertinent lab results  Lab Results   Component Value Date    WBC 6 73 10/16/2022    HGB 13 6 10/16/2022    HCT 41 5 10/16/2022    MCV 92 10/16/2022     10/16/2022     Lab Results   Component Value Date    CALCIUM 8 7 10/16/2022    K 4 3 10/16/2022    CO2 29 10/16/2022     10/16/2022    BUN 23 10/16/2022    CREATININE 1 28 10/16/2022     No results found for: IGE  Lab Results   Component Value Date    ALT 38 10/14/2022    AST 27 10/14/2022    ALKPHOS 62 10/14/2022       Imaging and other studies: I have personally reviewed pertinent reports      CT shoulder right blue print    Result Date: 11/3/2022  Impression: CT of the right shoulder performed according to preoperative shoulder replacement planning protocol  There is severe glenohumeral joint osteoarthritis  Workstation performed: [id number]     CXR 10/13/2022: Normal cardiac silhouette  Lungs are clear  No evidence of pneumothorax, pulmonary edema, pleural effusion or consolidation  Pulmonary function testing: Pending    EKG, Pathology, and Other Studies: I have personally reviewed pertinent reports  Results for orders placed during the hospital encounter of 10/13/22    Echo complete w/ contrast if indicated    Interpretation Summary  •  Left Ventricle: Left ventricular cavity size is normal  Wall thickness is normal  The left ventricular ejection fraction is 65%  Systolic function is normal  Wall motion is normal  Diastolic function is normal for age  •  Right Ventricle: Right ventricular cavity size is normal  Systolic function is normal   •  Aortic Valve: There is aortic sclerosis  •  Mitral Valve: There is mild regurgitation  •  Tricuspid Valve: There is trace regurgitation  The estimated right ventricular systolic pressure is 87 73 mmHg        Mariama Stephens MD  Pulmonary and Critical Care Fellowship, PGY4  [facility]    Answers for HPI/ROS submitted by the patient on 11/2/2022  When did you first notice your symptoms?: more than 1 month ago  How often do your symptoms occur?: intermittently  Since you first noticed this problem, how has it changed?: resolved  Do you have shortness of breath that occurs with effort or exertion?: Yes  Do you have ear congestion?: No  Do you have heartburn?: No  Do you have fatigue?: No  Do you have nasal congestion?: No  Do you have shortness of breath when lying flat?: No  Do you have shortness of breath when you wake up?: No  Do you have sweats?: No  Have you experienced weight loss?: No  Which of the following makes your symptoms worse?: exercise, strenuous activity, URI  Which of the following makes your symptoms better?: oral steroids, rest, steroid inhaler

## 2022-11-09 ENCOUNTER — OFFICE VISIT (OUTPATIENT)
Dept: PULMONOLOGY | Facility: CLINIC | Age: 72
End: 2022-11-09

## 2022-11-09 VITALS
DIASTOLIC BLOOD PRESSURE: 62 MMHG | SYSTOLIC BLOOD PRESSURE: 104 MMHG | OXYGEN SATURATION: 97 % | BODY MASS INDEX: 35.01 KG/M2 | TEMPERATURE: 97 F | RESPIRATION RATE: 18 BRPM | WEIGHT: 231 LBS | HEIGHT: 68 IN | HEART RATE: 108 BPM

## 2022-11-09 DIAGNOSIS — J82.81 EOSINOPHILIC PNEUMONIA (HCC): ICD-10-CM

## 2022-11-09 DIAGNOSIS — Z01.818 PREOPERATIVE CLEARANCE: ICD-10-CM

## 2022-11-09 DIAGNOSIS — J45.40 MODERATE PERSISTENT ASTHMA, UNSPECIFIED WHETHER COMPLICATED: Primary | ICD-10-CM

## 2022-11-09 DIAGNOSIS — G47.33 OSA (OBSTRUCTIVE SLEEP APNEA): ICD-10-CM

## 2022-11-09 PROBLEM — R06.02 SHORTNESS OF BREATH: Status: RESOLVED | Noted: 2022-10-12 | Resolved: 2022-11-09

## 2022-11-11 ENCOUNTER — DIAGNOSTICS ONLY (OUTPATIENT)
Dept: URBAN - METROPOLITAN AREA CLINIC 6 | Facility: CLINIC | Age: 72
End: 2022-11-11

## 2022-11-11 DIAGNOSIS — H02.403: ICD-10-CM

## 2022-11-11 PROCEDURE — 92083 EXTENDED VISUAL FIELD XM: CPT

## 2022-11-14 ENCOUNTER — CONSULT (OUTPATIENT)
Dept: BARIATRICS | Facility: CLINIC | Age: 72
End: 2022-11-14

## 2022-11-14 VITALS
BODY MASS INDEX: 38.92 KG/M2 | SYSTOLIC BLOOD PRESSURE: 136 MMHG | DIASTOLIC BLOOD PRESSURE: 74 MMHG | HEIGHT: 65 IN | HEART RATE: 83 BPM | WEIGHT: 233.6 LBS

## 2022-11-14 DIAGNOSIS — I10 ESSENTIAL HYPERTENSION: ICD-10-CM

## 2022-11-14 DIAGNOSIS — G47.33 OSA (OBSTRUCTIVE SLEEP APNEA): ICD-10-CM

## 2022-11-14 DIAGNOSIS — E66.9 OBESITY, CLASS II, BMI 35-39.9: Primary | ICD-10-CM

## 2022-11-14 PROBLEM — R07.89 CHEST TIGHTNESS: Status: ACTIVE | Noted: 2020-10-13

## 2022-11-14 PROBLEM — I20.0 UNSTABLE ANGINA (HCC): Status: ACTIVE | Noted: 2020-10-13

## 2022-11-14 PROBLEM — M12.819 ROTATOR CUFF ARTHROPATHY: Status: ACTIVE | Noted: 2021-03-31

## 2022-11-14 PROBLEM — I25.10 CAD IN NATIVE ARTERY: Status: ACTIVE | Noted: 2020-10-13

## 2022-11-14 PROBLEM — M62.82 DISEASE CHARACTERIZED BY DESTRUCTION OF SKELETAL MUSCLE: Status: ACTIVE | Noted: 2022-01-01

## 2022-11-14 PROBLEM — R29.6 FALLS FREQUENTLY: Status: ACTIVE | Noted: 2021-03-16

## 2022-11-14 PROBLEM — M25.511 CHRONIC PAIN IN RIGHT SHOULDER: Status: ACTIVE | Noted: 2020-01-01

## 2022-11-14 PROBLEM — G89.29 CHRONIC PAIN IN RIGHT SHOULDER: Status: ACTIVE | Noted: 2020-01-01

## 2022-11-14 NOTE — PROGRESS NOTES
Assessment/Plan:  Snow Rendon was seen today for consult  Diagnoses and all orders for this visit:    Obesity, Class II, BMI 35-39 9  -     Ambulatory Referral to Weight Management    DONALD (obstructive sleep apnea)    Essential hypertension    Patient has decided to pursue surgical consultation  He will be scheduled with the bariatric surgeon         - Discussed options of HealthyCORE-Intensive Lifestyle Intervention Program, Very Low Calorie Diet-VLCD, Conservative Program, Kya-En-Y Gastric Bypass and Vertical Sleeve Gastrectomy and the role of weight loss medications  - Explained the importance of making lifestyle changes first before starting any anti-obesity medications  Patient should demonstrate lifestyle changes first before anti-obesity medication can be initiated  - Patient is interested in pursuing Kya-En-Y Gastric Bypass and Vertical Sleeve Gastrectomy  - Initial weight loss goal of 5-10% weight loss for improved health  - Weight loss can improve patient's co-morbid conditions and/or prevent weight-related complications  Goals:  Do not skip any meals! Food log (ie ) www myfitnesspal com,sparkpeople  com,loseit com,calorieking  com,etc  baritastic (use skinnytaste  com, dietdoctor  com or smartphone [employer] for recipes)  No sugary beverages  At least 64oz of water daily  Increase physical activity by 10 minutes daily  Gradually increase physical activity to a goal of 5 days per week for 30 minutes of MODERATE intensity PLUS 2 days per week of FULL BODY resistance training (use smartphone apps [employer], Home Workout, etc )      Total time spent: 30 min, with >50% face-to-face time spent counseling patient on nonsurgical interventions for the treatment of excess weight  Discussed in detail nonsurgical options including intensive lifestyle intervention program, very low-calorie diet program and conservative program   Discussed the role of weight loss medications    Counseled patient on diet behavior and exercise modification for weight loss  Follow up in approximately 2 weeks with Bariatric Surgeon  Subjective:   Chief Complaint   Patient presents with   • Consult     Pt is here today for MWM consult  Patient ID: Martina Jefferson  is a 81 y o  male with excess weight/obesity here to pursue weight management  Previous notes and records have been reviewed  Past Medical History:   Diagnosis Date   • Arthritis    • Asthma    • 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[facility] )    • 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[facility] )    • Manic depression ([facility] )    • Sleep apnea      Past Surgical History:   Procedure Laterality Date   • UMBILICAL HERNIA REPAIR         HPI:  Patient presents for weight management consultation  Wt Readings from Last 20 Encounters:   11/14/22 106 kg (233 lb 9 6 oz)   11/09/22 105 kg (231 lb)   11/07/22 105 kg (231 lb)   11/04/22 105 kg (231 lb)   10/28/22 104 kg (230 lb)   10/14/22 108 kg (239 lb)   10/12/22 108 kg (239 lb)   10/11/22 104 kg (229 lb)   10/06/22 106 kg (234 lb)   10/04/22 106 kg (234 lb)   09/26/22 105 kg (231 lb)   09/14/22 106 kg (234 lb)     Obesity/Excess Weight:  Severity: Moderate  Onset:  Over the years    Modifiers: Diet and Exercise  Contributing factors: Poor Food Choices and Insufficient Physical Activity  Associated symptoms: comorbid conditions    Alcohol: no  Smoking: no      The following portions of the patient's history were reviewed and updated as appropriate: allergies, current medications, past family history, past medical history, past social history, past surgical history, and problem list     Review of Systems   Constitutional: Negative for fever  Respiratory: Positive for shortness of breath (Has asthma)  Cardiovascular: Negative for chest pain  Gastrointestinal: Negative for abdominal pain and blood in stool  Genitourinary: Negative for dysuria and hematuria  Musculoskeletal: Positive for arthralgias and myalgias  Skin: Negative for rash  Neurological: Negative for headaches  Psychiatric/Behavioral: Positive for dysphoric mood  Negative for suicidal ideas  The patient is nervous/anxious  Objective:  /74 (BP Location: Left arm, Patient Position: Sitting, Cuff Size: Large)   Pulse 83   Ht 5' 5 25" (1 657 m)   Wt 106 kg (233 lb 9 6 oz)   BMI 38 58 kg/m²   Constitutional: Well-developed, well-nourished and Obese Body mass index is 38 58 kg/m²  Severo Merle HEENT: No conjunctival injection  Pulmonary: No increased work of breathing or signs of respiratory distress  CV: Well-perfused  GI: Obese  Non-distended  MSK: No edema   Neuro: Oriented to person, place and time  Normal Speech  Normal gait  Psych: Normal affect and mood  Labs and Imaging  Recent labs and imaging have been personally reviewed    Lab Results   Component Value Date    WBC 6 73 10/16/2022    HGB 13 6 10/16/2022    HCT 41 5 10/16/2022    MCV 92 10/16/2022     10/16/2022     Lab Results   Component Value Date    SODIUM 136 10/16/2022    K 4 3 10/16/2022     10/16/2022    CO2 29 10/16/2022    AGAP 7 10/16/2022    BUN 23 10/16/2022    CREATININE 1 28 10/16/2022    GLUC 102 10/16/2022    GLUF 114 (H) 10/04/2022    CALCIUM 8 7 10/16/2022    AST 27 10/14/2022    ALT 38 10/14/2022    ALKPHOS 62 10/14/2022    TP 6 2 (L) 10/14/2022    TBILI 0 36 10/14/2022    EGFR 56 10/16/2022     Lab Results   Component Value Date    HGBA1C 6 4 (H) 10/04/2022     Lab Results   Component Value Date    OYA1NPSQYXVZ 2 743 10/13/2022     Lab Results   Component Value Date    CHOLESTEROL 171 09/14/2022     Lab Results   Component Value Date    HDL 31 (L) 09/14/2022     Lab Results   Component Value Date    TRIG 255 (H) 09/14/2022     Lab Results   Component Value Date    LDLCALC 89 09/14/2022

## 2022-11-17 ENCOUNTER — PATIENT MESSAGE (OUTPATIENT)
Dept: FAMILY MEDICINE CLINIC | Facility: CLINIC | Age: 72
End: 2022-11-17

## 2022-11-17 DIAGNOSIS — I10 ESSENTIAL HYPERTENSION: Primary | ICD-10-CM

## 2022-11-17 DIAGNOSIS — M25.50 POLYARTHRALGIA: ICD-10-CM

## 2022-11-18 ENCOUNTER — OFFICE VISIT (OUTPATIENT)
Dept: BARIATRICS | Facility: CLINIC | Age: 72
End: 2022-11-18

## 2022-11-18 VITALS
SYSTOLIC BLOOD PRESSURE: 130 MMHG | WEIGHT: 229.4 LBS | DIASTOLIC BLOOD PRESSURE: 68 MMHG | HEIGHT: 65 IN | HEART RATE: 92 BPM | BODY MASS INDEX: 38.22 KG/M2

## 2022-11-18 DIAGNOSIS — E66.01 MORBID (SEVERE) OBESITY DUE TO EXCESS CALORIES (HCC): ICD-10-CM

## 2022-11-18 DIAGNOSIS — J45.909 ASTHMA: ICD-10-CM

## 2022-11-18 DIAGNOSIS — E66.9 OBESITY, CLASS II, BMI 35-39.9: ICD-10-CM

## 2022-11-18 DIAGNOSIS — F31.0 BIPOLAR AFFECTIVE DISORDER, CURRENT EPISODE HYPOMANIC (HCC): ICD-10-CM

## 2022-11-18 DIAGNOSIS — E88.81 METABOLIC SYNDROME: ICD-10-CM

## 2022-11-18 DIAGNOSIS — I25.10 CAD IN NATIVE ARTERY: ICD-10-CM

## 2022-11-18 DIAGNOSIS — E78.2 MIXED HYPERLIPIDEMIA: ICD-10-CM

## 2022-11-18 DIAGNOSIS — R73.03 PRE-DIABETES: ICD-10-CM

## 2022-11-18 DIAGNOSIS — E03.9 HYPOTHYROIDISM, UNSPECIFIED TYPE: ICD-10-CM

## 2022-11-18 DIAGNOSIS — I10 ESSENTIAL HYPERTENSION: ICD-10-CM

## 2022-11-18 DIAGNOSIS — Z01.818 ENCOUNTER FOR OTHER PREPROCEDURAL EXAMINATION: Primary | ICD-10-CM

## 2022-11-18 DIAGNOSIS — G47.33 OSA (OBSTRUCTIVE SLEEP APNEA): ICD-10-CM

## 2022-11-18 PROBLEM — E88.810 METABOLIC SYNDROME: Status: ACTIVE | Noted: 2022-11-18

## 2022-11-18 PROBLEM — E66.812 OBESITY, CLASS II, BMI 35-39.9: Status: ACTIVE | Noted: 2022-11-18

## 2022-11-18 NOTE — LETTER
November 18, 2022     Aditi Gomes DO  [address]  [place] [address]    Patient: Juanita Marrero   YOB: 1950   Date of Visit: 11/18/2022       Dear Dr Sue Loving:    Thank you for referring Juanita Marrero to me for evaluation for metabolic and bariatric surgery  Below are my notes for this consultation  If you have questions, please do not hesitate to call me  I look forward to following your patient along with you  Sincerely,        Felipa Valdovinos MD        CC: No Recipients  Felipa Valdovinos MD  11/18/2022  3:35 PM  Sign when Signing Visit      [address] [facility] 81 y o  male MRN: [id number]  Unit/Bed#:  Encounter: [id number]      HPI:  Juanita Marrero is a 81 y o  male who presents with a longstanding history of morbid obesity and inability to sustain a meaningful weight loss  Here today to discuss bariatric options  He is present with his wife  He worked at [employer] for 30 years and is partially retired from [employer]  He desires to pursue metabolic and bariatric surgery to improve his health  Denies tobacco  Denies DVT/PE  Denies GERD  Denies dysphagia  *Lap hiatal hernia repair - unsure if fundoplication*    Visit type: initial visit    Symptoms: weight increase, edema, knee pain, back pain and joint pain    Associated Symptoms: none    Associated Conditions: glucose intolerance, hyperlipidemia and sleep apnea  Disease Complications: hypertension, sleep apnea and osteoarthritis  Weight Loss Interest: high    Exercise Frequency:daily  Types of Exercise: walking    Review of Systems   Musculoskeletal: Positive for arthralgias  All other systems reviewed and are negative        Historical Information   Past Medical History:   Diagnosis Date   • Arthritis    • Asthma    • 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[facility] )    • 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[facility] )    • Manic depression ([facility] )    • Sleep apnea      Past Surgical History: Procedure Laterality Date   • UMBILICAL HERNIA REPAIR       Social History   Social History     Substance and Sexual Activity   Alcohol Use Never     Social History     Substance and Sexual Activity   Drug Use Never     Social History     Tobacco Use   Smoking Status Never   Smokeless Tobacco Never     Family History: non-contributory    Meds/Allergies    all medications and allergies reviewed  Allergies   Allergen Reactions   • Simvastatin Shortness Of Breath   • Spiriva Respimat [Tiotropium Bromide Monohydrate] Rash       Objective      Current Vitals:   /68 (BP Location: Left arm, Patient Position: Sitting, Cuff Size: Large)   Pulse 92   Ht 5' 5 2" (1 656 m)   Wt 104 kg (229 lb 6 4 oz)   BMI 37 94 kg/m²     Invasive Devices     None                 Physical Exam  Constitutional:       Appearance: Normal appearance  HENT:      Head: Atraumatic  Nose: No rhinorrhea  Eyes:      Extraocular Movements: Extraocular movements intact  Cardiovascular:      Rate and Rhythm: Normal rate  Pulmonary:      Effort: Pulmonary effort is normal  No respiratory distress  Abdominal:      General: Abdomen is flat  There is no distension  Skin:     General: Skin is warm and dry  Neurological:      General: No focal deficit present  Mental Status: He is alert and oriented to person, place, and time  Psychiatric:         Mood and Affect: Mood normal          Behavior: Behavior normal          Lab Results: I have personally reviewed pertinent lab results  Imaging: I have personally reviewed pertinent reports  EKG, Pathology, and Other Studies: I have personally reviewed pertinent reports  Assessment/PLAN:    81 y o  yo male with a long standing h/o of obesity and inability to sustain any meaningful weight loss on his own despite several attempts  He is interested in the Laparoscopic nancy-en-y gastric bypass vs sleeve gastrectomy      Patient has been counseled about the risk of developing gastroesophageal reflux disease (GERD), worsening of current GERD and/or silent reflux  Patient has also been counseled on the risk of developing Krueger's esophagus (18%)  As a result the patient may require treatment with medications, further interventions and possibly additional surgery  Patient will require routine endoscopic surveillance to monitor for these possible complications  As a part of his pre op evaluation, he will be referred to a cardiologist and for a sleep evaluation and consult after successfully completing an evaluation with our pre-certification/, registered dietician and licensed clinical   He needs an EGD to evaluate the anatomy of his GI tract  I have spent over 45 minutes with him face to face in the office today discussing his options and details of the surgery  We have seen an animation of the surgery on the computer that illustrates how the operation is done and how the anatomy will be altered with the procedure  Over 50% of this was coordinating care  I have discussed and educated the patient with regards to the components of our multidisciplinary program and the importance of compliance and follow up in the post operative period  He was given the opportunity to ask questions and I have answered all of them  The patient was also instructed with regards to the importance of behavior modification, nutritional counseling, support meeting attendance and lifestyle changes that are important to ensure success  Although there is a great statistical chance of improvement or even resolution of most of his associated comorbidities, the results vary from patient to patient and they largely depend on his commitment and compliance  Weight loss goal will be determined at the time of his evaluation        Richard Rendon MD  11/18/2022  3:32 PM

## 2022-11-18 NOTE — PROGRESS NOTES
BARIATRIC INITIAL CONSULT - BARIATRIC SURGERY    Beulah Guajardo 81 y o  male MRN: [id number]  Unit/Bed#:  Encounter: [id number]      HPI:  Beulah Guajardo is a 81 y o  male who presents with a longstanding history of morbid obesity and inability to sustain a meaningful weight loss  Here today to discuss bariatric options  He is present with his wife  He worked at [employer] for 30 years and is partially retired from [employer]  He desires to pursue metabolic and bariatric surgery to improve his health  Denies tobacco  Denies DVT/PE  Denies GERD  Denies dysphagia  *Lap hiatal hernia repair - unsure if fundoplication*    Visit type: initial visit    Symptoms: weight increase, edema, knee pain, back pain and joint pain    Associated Symptoms: none    Associated Conditions: glucose intolerance, hyperlipidemia and sleep apnea  Disease Complications: hypertension, sleep apnea and osteoarthritis  Weight Loss Interest: high    Exercise Frequency:daily  Types of Exercise: walking    Review of Systems   Musculoskeletal: Positive for arthralgias  All other systems reviewed and are negative        Historical Information   Past Medical History:   Diagnosis Date   • Arthritis    • Asthma    • 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[facility] )    • 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[facility] )    • Manic depression ([facility] [address] )    • Sleep apnea      Past Surgical History:   Procedure Laterality Date   • UMBILICAL HERNIA REPAIR       Social History   Social History     Substance and Sexual Activity   Alcohol Use Never     Social History     Substance and Sexual Activity   Drug Use Never     Social History     Tobacco Use   Smoking Status Never   Smokeless Tobacco Never     Family History: non-contributory    Meds/Allergies   all medications and allergies reviewed  Allergies   Allergen Reactions   • Simvastatin Shortness Of Breath   • Spiriva Respimat [Tiotropium Bromide Monohydrate] Rash       Objective     Current Vitals:   /68 (BP Location: Left arm, Patient Position: Sitting, Cuff Size: Large)   Pulse 92   Ht 5' 5 2" (1 656 m)   Wt 104 kg (229 lb 6 4 oz)   BMI 37 94 kg/m²     Invasive Devices     None                 Physical Exam  Constitutional:       Appearance: Normal appearance  HENT:      Head: Atraumatic  Nose: No rhinorrhea  Eyes:      Extraocular Movements: Extraocular movements intact  Cardiovascular:      Rate and Rhythm: Normal rate  Pulmonary:      Effort: Pulmonary effort is normal  No respiratory distress  Abdominal:      General: Abdomen is flat  There is no distension  Skin:     General: Skin is warm and dry  Neurological:      General: No focal deficit present  Mental Status: He is alert and oriented to person, place, and time  Psychiatric:         Mood and Affect: Mood normal          Behavior: Behavior normal          Lab Results: I have personally reviewed pertinent lab results  Imaging: I have personally reviewed pertinent reports  EKG, Pathology, and Other Studies: I have personally reviewed pertinent reports  Assessment/PLAN:    81 y o  yo male with a long standing h/o of obesity and inability to sustain any meaningful weight loss on his own despite several attempts  He is interested in the Laparoscopic nancy-en-y gastric bypass vs sleeve gastrectomy  Patient has been counseled about the risk of developing gastroesophageal reflux disease (GERD), worsening of current GERD and/or silent reflux  Patient has also been counseled on the risk of developing Krueger's esophagus (18%)  As a result the patient may require treatment with medications, further interventions and possibly additional surgery  Patient will require routine endoscopic surveillance to monitor for these possible complications       As a part of his pre op evaluation, he will be referred to a cardiologist and for a sleep evaluation and consult after successfully completing an evaluation with our pre-certification/insurance specialist, registered dietician and licensed clinical   He needs an EGD to evaluate the anatomy of his GI tract  I have spent over 45 minutes with him face to face in the office today discussing his options and details of the surgery  We have seen an animation of the surgery on the computer that illustrates how the operation is done and how the anatomy will be altered with the procedure  Over 50% of this was coordinating care  I have discussed and educated the patient with regards to the components of our multidisciplinary program and the importance of compliance and follow up in the post operative period  He was given the opportunity to ask questions and I have answered all of them  The patient was also instructed with regards to the importance of behavior modification, nutritional counseling, support meeting attendance and lifestyle changes that are important to ensure success  Although there is a great statistical chance of improvement or even resolution of most of his associated comorbidities, the results vary from patient to patient and they largely depend on his commitment and compliance  Weight loss goal will be determined at the time of his evaluation        Carlos Sanders MD  11/18/2022  3:32 PM

## 2022-11-21 ENCOUNTER — OFFICE VISIT (OUTPATIENT)
Dept: PLASTIC SURGERY | Facility: CLINIC | Age: 72
End: 2022-11-21

## 2022-11-21 ENCOUNTER — COSMETIC (OUTPATIENT)
Dept: PLASTIC SURGERY | Facility: CLINIC | Age: 72
End: 2022-11-21

## 2022-11-21 VITALS
HEIGHT: 65 IN | BODY MASS INDEX: 38.15 KG/M2 | SYSTOLIC BLOOD PRESSURE: 130 MMHG | DIASTOLIC BLOOD PRESSURE: 70 MMHG | WEIGHT: 229 LBS

## 2022-11-21 DIAGNOSIS — H02.30: Primary | ICD-10-CM

## 2022-11-21 DIAGNOSIS — Z41.1 ENCOUNTER FOR COSMETIC SURGERY: Primary | ICD-10-CM

## 2022-11-22 RX ORDER — ENALAPRIL MALEATE 10 MG/1
10 TABLET ORAL DAILY
Qty: 30 TABLET | Refills: 0 | Status: SHIPPED | OUTPATIENT
Start: 2022-11-22

## 2022-11-30 ENCOUNTER — ANESTHESIA EVENT (OUTPATIENT)
Dept: PERIOP | Facility: HOSPITAL | Age: 72
End: 2022-11-30

## 2022-12-01 ENCOUNTER — HOSPITAL ENCOUNTER (OUTPATIENT)
Dept: SLEEP CENTER | Facility: CLINIC | Age: 72
Discharge: HOME | End: 2022-12-01

## 2022-12-01 DIAGNOSIS — G47.09 OTHER INSOMNIA: ICD-10-CM

## 2022-12-01 DIAGNOSIS — G47.59 OTHER PARASOMNIA: ICD-10-CM

## 2022-12-01 DIAGNOSIS — G47.33 OSA (OBSTRUCTIVE SLEEP APNEA): ICD-10-CM

## 2022-12-01 RX ORDER — ALBUTEROL SULFATE 90 UG/1
2 AEROSOL, METERED RESPIRATORY (INHALATION) EVERY 4 HOURS PRN
Status: CANCELLED | OUTPATIENT
Start: 2022-12-01

## 2022-12-01 NOTE — TELEPHONE ENCOUNTER
----- Message from Hayden Reich sent at 12/1/2022  9:03 AM EST -----  Regarding: Prescription  Contact: [phone]    Good morning  I need a prescription that I can't do automatically through the system  It is for   Medication  Albuterol 90 mcg/act inhaler  Pharmacy  [place] [address] [address] [facility]  [address], [address] [place] [address] [phone]    Thanks very much      Chano Guillermo

## 2022-12-02 DIAGNOSIS — J45.20 MILD INTERMITTENT ASTHMA WITHOUT COMPLICATION: Primary | ICD-10-CM

## 2022-12-02 RX ORDER — ALBUTEROL SULFATE 90 UG/1
2 AEROSOL, METERED RESPIRATORY (INHALATION) EVERY 4 HOURS PRN
Qty: 6.7 G | Refills: 2 | Status: SHIPPED | OUTPATIENT
Start: 2022-12-02

## 2022-12-02 NOTE — PROGRESS NOTES
Sleep Study Documentation    Pre-Sleep Study       Sleep testing procedure explained to patient:YES    Patient napped prior to study:NO    Caffeine:Dayshift worker after 12PM   Caffeine use:NO    Alcohol:Dayshift workers after 5PM: Alcohol use:NO    Typical day for patient:YES       Study Documentation    Sleep Study Indications: DONALD, snoring, insomnia, parasomnia, excessive daytime sleepiness, HTN, mood disturbance, asthma,bipolar, dizziness, obesity, CAD,  CHEST TIGHTNESS    Sleep Study: Diagnostic   Snore:Severe  Supplemental O2: no    O2 flow rate (L/min) range   O2 flow rate (L/min) final   Minimum SaO2 82%  Baseline SaO2 95%        Mode of Therapy:    EKG abnormalities: no     EEG abnormalities: no    Sleep Study Recorded < 2 hours: N/A    Sleep Study Recorded > 2 hours but incomplete study: N/A    Sleep Study Recorded 6 hours but no sleep obtained: NO    Patient classification: retired       Post-Sleep Study    Medication used at bedtime or during sleep study:NO    Patient reports time it took to fall asleep:less than 20 minutes    Patient reports waking up during study:3 or more times  Patient reports returning to sleep in greater than 30 minutes  Patient reports sleeping 4 to 6 hours without dreaming  Patient reports sleep during study:typical    Patient rated sleepiness: Not sleepy or tired    PAP treatment:no

## 2022-12-05 ENCOUNTER — TELEPHONE (OUTPATIENT)
Dept: GASTROENTEROLOGY | Facility: CLINIC | Age: 72
End: 2022-12-05

## 2022-12-05 DIAGNOSIS — R13.19 OTHER DYSPHAGIA: ICD-10-CM

## 2022-12-05 DIAGNOSIS — Z86.010 HISTORY OF COLON POLYPS: Primary | ICD-10-CM

## 2022-12-05 NOTE — PROGRESS NOTES
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  Patient reports worsening difficulty with vision and his peripheral fields have been checked per his ophthalmologist   He denies recent eye surgery  He denies dry eyes  He does not use contact lenses  He is a non-nicotine user  Review of Systems:  A 12 point ROS was performed and negative except per HPI  Past Medical History:  Past Medical History:   Diagnosis Date   • Arthritis    • Asthma    • 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[address] )    • 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[address] )    • Manic depression ([facility] [address] )    • Sleep apnea        Past Surgical History:  Past Surgical History:   Procedure Laterality Date   • UMBILICAL HERNIA REPAIR         Social History:  Social History     Tobacco Use   • Smoking status: Never   • Smokeless tobacco: Never   Vaping Use   • Vaping Use: Never used   Substance Use Topics   • Alcohol use: Never   • Drug use: Never       Family History:  Family History   Problem Relation Age of Onset   • Hypertension Mother    • Depression Mother    • Depression Father    • Arthritis Father    • Heart attack Paternal Uncle 32   • Colon cancer Neg Hx    • Prostate cancer Neg Hx        Allergies:   Allergies   Allergen Reactions   • Simvastatin Shortness Of Breath   • Spiriva Respimat [Tiotropium Bromide Monohydrate] Rash       Medications:  Current Outpatient Medications on File Prior to Visit   Medication Sig Dispense Refill   • Diclofenac Sodium (VOLTAREN) 1 % Apply 2 g topically 4 (four) times a day 2 g 0   • enalapril (VASOTEC) 10 mg tablet Take 1 tablet (10 mg total) by mouth daily 30 tablet 0   • lamoTRIgine (LaMICtal) 200 MG tablet      • levothyroxine 100 mcg tablet      • meloxicam (Mobic) 15 mg tablet Take 1 tablet (15 mg total) by mouth daily 30 tablet 1   • modafinil (PROVIGIL) 200 MG tablet      • montelukast (SINGULAIR) 10 mg tablet      • pramipexole (MIRAPEX) 1 mg tablet Take 2 mg by mouth daily at bedtime     • tamsulosin (FLOMAX) 0 4 mg      • Trelegy Ellipta 200-62 5-25 MCG/INH AEPB inhaler Inhale 1 puff daily 60 blister 2     No current facility-administered medications on file prior to visit  Physical Examination:  There were no vitals taken for this visit  Estimated body mass index is 38 11 kg/m² as calculated from the following:    Height as of an earlier encounter on 11/21/22: 5' 5" (1 651 m)  Weight as of an earlier encounter on 11/21/22: 104 kg (229 lb)  General: NAD, well appearing, AAOx3  HEENT: NCAT, EOMI, MMM, supple  Resp: Nonlabored  Heart: RRR  Abdomen: Soft, ND, NT  Extremities/MSK: no LE edema, no obvious deficits in ROM  Neuro: grossly intact with no obvious deficits  Skin: no obvious lesions or rashes    Eyes: bilateral upper dermatochalasis with blepharoptosis of left greater than right; bilateral lower tear trough deformity with herniation of fat pad x 3 bilaterally, negative vector, very thin skin, static periorbital rhytids      Rachna Aguilar, DO  Plastic and Reconstructive Surgery

## 2022-12-05 NOTE — PROGRESS NOTES
Assessment and Plan:  Mr Marjorie Caicedo is a 81 y o  male presenting with lower eyelid tear trough deformity     We discussed lower transconjunctival blepharoplasty  Given his negative vector I do think he will require a tarsorrhaphy stitch at the time of surgery to be removed in the office  I also explained why I would try to avoid anything more than a pinch of skin excision but likely will not remove any skin  A quote will be provided  I will see him back prior to surgery      History of Present Illness:   Mr Marjorie Caicedo is a 81 y o  male presenting with upper eyelid dermatochalasis and left blepharoptosis, 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  Patient reports worsening difficulty with vision and his peripheral fields have been checked per his ophthalmologist   He denies recent eye surgery  He denies dry eyes  He does not use contact lenses  He is a non-nicotine user  Review of Systems:  A 12 point ROS was performed and negative except per HPI  Past Medical History:  Past Medical History:   Diagnosis Date   • Arthritis    • Asthma    • 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[facility] [address] )    • 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[facility] [address] )    • Manic depression ([facility] [address] )    • Sleep apnea        Past Surgical History:  Past Surgical History:   Procedure Laterality Date   • UMBILICAL HERNIA REPAIR         Social History:  Social History     Tobacco Use   • Smoking status: Never   • Smokeless tobacco: Never   Vaping Use   • Vaping Use: Never used   Substance Use Topics   • Alcohol use: Never   • Drug use: Never       Family History:  Family History   Problem Relation Age of Onset   • Hypertension Mother    • Depression Mother    • Depression Father    • Arthritis Father    • Heart attack Paternal Uncle 32   • Colon cancer Neg Hx    • Prostate cancer Neg Hx        Allergies:   Allergies   Allergen Reactions   • Simvastatin Shortness Of Breath   • Spiriva Respimat [Tiotropium Bromide Monohydrate] Rash       Medications:  Current Outpatient Medications on File Prior to Visit Medication Sig Dispense Refill   • Diclofenac Sodium (VOLTAREN) 1 % Apply 2 g topically 4 (four) times a day 2 g 0   • enalapril (VASOTEC) 10 mg tablet Take 1 tablet (10 mg total) by mouth daily 30 tablet 0   • lamoTRIgine (LaMICtal) 200 MG tablet      • levothyroxine 100 mcg tablet      • meloxicam (Mobic) 15 mg tablet Take 1 tablet (15 mg total) by mouth daily 30 tablet 1   • modafinil (PROVIGIL) 200 MG tablet      • montelukast (SINGULAIR) 10 mg tablet      • pramipexole (MIRAPEX) 1 mg tablet Take 2 mg by mouth daily at bedtime     • tamsulosin (FLOMAX) 0 4 mg      • Trelegy Ellipta 200-62 5-25 MCG/INH AEPB inhaler Inhale 1 puff daily 60 blister 2     No current facility-administered medications on file prior to visit  Physical Examination:  /70   Ht 5' 5" (1 651 m)   Wt 104 kg (229 lb)   BMI 38 11 kg/m²   Estimated body mass index is 38 11 kg/m² as calculated from the following:    Height as of this encounter: 5' 5" (1 651 m)  Weight as of this encounter: 104 kg (229 lb)  General: NAD, well appearing, AAOx3  HEENT: NCAT, EOMI, MMM, supple  Resp: Nonlabored  Heart: RRR  Abdomen: Soft, ND, NT  Extremities/MSK: no LE edema, no obvious deficits in ROM  Neuro: grossly intact with no obvious deficits  Skin: no obvious lesions or rashes    Eyes: bilateral upper dermatochalasis with blepharoptosis of left greater than right; bilateral lower tear trough deformity with herniation of fat pad x 3 bilaterally, negative vector, very thin skin, static periorbital rhytids      Rachna Aguilar, DO  Plastic and Reconstructive Surgery

## 2022-12-05 NOTE — TELEPHONE ENCOUNTER
Patients GI provider:  Dr Toribio Reyes    Number to return call: [phone]    Reason for call: Pt calling states he never received Prep or instructions   Sent Inst by email to pt and sent request in for [employer]    Scheduled procedure/appointment date if applicable: Apt/procedure 12/13/22

## 2022-12-07 ENCOUNTER — RA CDI HCC (OUTPATIENT)
Dept: OTHER | Facility: HOSPITAL | Age: 72
End: 2022-12-07

## 2022-12-07 NOTE — PROGRESS NOTES
[facility]  coding opportunities       Chart reviewed, no opportunity found: CHART REVIEWED, NO OPPORTUNITY FOUND        Patients Insurance        Commercial Insurance: [address] [place] [address]

## 2022-12-12 PROBLEM — R05.8 RESPIRATORY TRACT CONGESTION WITH COUGH: Status: RESOLVED | Noted: 2022-10-13 | Resolved: 2022-12-12

## 2022-12-13 ENCOUNTER — ANESTHESIA (OUTPATIENT)
Dept: GASTROENTEROLOGY | Facility: HOSPITAL | Age: 72
End: 2022-12-13

## 2022-12-13 ENCOUNTER — ANESTHESIA EVENT (OUTPATIENT)
Dept: GASTROENTEROLOGY | Facility: HOSPITAL | Age: 72
End: 2022-12-13

## 2022-12-13 ENCOUNTER — HOSPITAL ENCOUNTER (OUTPATIENT)
Dept: GASTROENTEROLOGY | Facility: HOSPITAL | Age: 72
Setting detail: OUTPATIENT SURGERY
Discharge: HOME | End: 2022-12-13
Attending: INTERNAL MEDICINE

## 2022-12-13 VITALS
DIASTOLIC BLOOD PRESSURE: 60 MMHG | HEIGHT: 65 IN | SYSTOLIC BLOOD PRESSURE: 116 MMHG | RESPIRATION RATE: 20 BRPM | OXYGEN SATURATION: 94 % | TEMPERATURE: 97 F | BODY MASS INDEX: 38.57 KG/M2 | WEIGHT: 231.5 LBS | HEART RATE: 75 BPM

## 2022-12-13 DIAGNOSIS — R13.19 OTHER DYSPHAGIA: ICD-10-CM

## 2022-12-13 DIAGNOSIS — Z86.010 HISTORY OF COLON POLYPS: ICD-10-CM

## 2022-12-13 DIAGNOSIS — Z12.11 SCREENING FOR COLON CANCER: ICD-10-CM

## 2022-12-13 DIAGNOSIS — B37.81 CANDIDA ESOPHAGITIS (HCC): Primary | ICD-10-CM

## 2022-12-13 PROBLEM — I20.0 UNSTABLE ANGINA (HCC): Status: RESOLVED | Noted: 2020-10-13 | Resolved: 2022-12-13

## 2022-12-13 RX ORDER — LIDOCAINE HYDROCHLORIDE 20 MG/ML
INJECTION, SOLUTION EPIDURAL; INFILTRATION; INTRACAUDAL; PERINEURAL AS NEEDED
Status: DISCONTINUED | OUTPATIENT
Start: 2022-12-13 | End: 2022-12-13

## 2022-12-13 RX ORDER — SODIUM CHLORIDE, SODIUM LACTATE, POTASSIUM CHLORIDE, CALCIUM CHLORIDE 600; 310; 30; 20 MG/100ML; MG/100ML; MG/100ML; MG/100ML
INJECTION, SOLUTION INTRAVENOUS CONTINUOUS PRN
Status: DISCONTINUED | OUTPATIENT
Start: 2022-12-13 | End: 2022-12-13

## 2022-12-13 RX ORDER — PROPOFOL 10 MG/ML
INJECTION, EMULSION INTRAVENOUS AS NEEDED
Status: DISCONTINUED | OUTPATIENT
Start: 2022-12-13 | End: 2022-12-13

## 2022-12-13 RX ADMIN — SODIUM CHLORIDE, SODIUM LACTATE, POTASSIUM CHLORIDE, AND CALCIUM CHLORIDE: .6; .31; .03; .02 INJECTION, SOLUTION INTRAVENOUS at 09:14

## 2022-12-13 RX ADMIN — PROPOFOL 50 MG: 10 INJECTION, EMULSION INTRAVENOUS at 10:10

## 2022-12-13 RX ADMIN — PROPOFOL 50 MG: 10 INJECTION, EMULSION INTRAVENOUS at 10:24

## 2022-12-13 RX ADMIN — LIDOCAINE HYDROCHLORIDE 100 MG: 20 INJECTION, SOLUTION EPIDURAL; INFILTRATION; INTRACAUDAL; PERINEURAL at 10:03

## 2022-12-13 RX ADMIN — PROPOFOL 50 MG: 10 INJECTION, EMULSION INTRAVENOUS at 10:35

## 2022-12-13 RX ADMIN — PROPOFOL 150 MG: 10 INJECTION, EMULSION INTRAVENOUS at 10:06

## 2022-12-13 RX ADMIN — SODIUM CHLORIDE, SODIUM LACTATE, POTASSIUM CHLORIDE, AND CALCIUM CHLORIDE: .6; .31; .03; .02 INJECTION, SOLUTION INTRAVENOUS at 10:35

## 2022-12-13 RX ADMIN — PROPOFOL 50 MG: 10 INJECTION, EMULSION INTRAVENOUS at 10:32

## 2022-12-13 RX ADMIN — PROPOFOL 50 MG: 10 INJECTION, EMULSION INTRAVENOUS at 10:15

## 2022-12-13 RX ADMIN — PROPOFOL 50 MG: 10 INJECTION, EMULSION INTRAVENOUS at 10:20

## 2022-12-13 RX ADMIN — PROPOFOL 50 MG: 10 INJECTION, EMULSION INTRAVENOUS at 10:28

## 2022-12-13 NOTE — ANESTHESIA POSTPROCEDURE EVALUATION
Post-Op Assessment Note    CV Status:  Stable  Pain Score: 0    Pain management: adequate     Mental Status:  Awake   Hydration Status:  Stable   PONV Controlled:  Controlled   Airway Patency:  Patent      Post Op Vitals Reviewed: Yes      Staff: CRNA         No notable events documented      BP   119/72   Temp      Pulse  82   Resp   13   SpO2   99

## 2022-12-13 NOTE — DISCHARGE SUMMARY
Discharge Summary - Juanita Marrero 81 y o  male MRN: [id number]    Unit/Bed#:  Encounter: [id number]    Admission Date: 12/13/2022    Admitting Diagnosis: Screening for colon cancer [Z12 11]  History of colon polyps [Z86 010]  Other dysphagia [R13 19]    HPI: History of polyp  Intermittent dysphagia    Procedures Performed: No orders of the defined types were placed in this encounter  Summary of Hospital Course: Tolerated procedure well    Significant Findings, Care, Treatment and Services Provided: Candida esophagitis noted  Colon polyps removed  Prep was not adequate  Diverticulosis noted  Complications: None    Discharge Diagnosis: See above    Medical Problems     Resolved Problems  Date Reviewed: 12/5/2022   None         Condition at Discharge: good         Discharge instructions/Information to patient and family:   See after visit summary for information provided to patient and family  Provisions for Follow-Up Care:  See after visit summary for information related to follow-up care and any pertinent home health orders        PCP: Aditi Gomes DO    Disposition: Home

## 2022-12-13 NOTE — H&P
History and Physical - [facility]  Frank Ellis 81 y o  male MRN: [id number]                  HPI: Frank Ellis is a 81y o  year old male who presents for colonoscopy and upper endoscopy  History of colon polyp  Last colonoscopy 15 to 20 years ago  Reflux and intermittent dysphagia  REVIEW OF SYSTEMS: Per the HPI, and otherwise unremarkable  Historical Information   Past Medical History:   Diagnosis Date   • Arthritis    • Asthma    • Colon polyp    • 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HCC)    • 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[facility] [address] )    • Manic depression ([facility] )    • Sleep apnea      Past Surgical History:   Procedure Laterality Date   • COLONOSCOPY     • UMBILICAL HERNIA REPAIR       Social History   Social History     Substance and Sexual Activity   Alcohol Use Never     Social History     Substance and Sexual Activity   Drug Use Never     Social History     Tobacco Use   Smoking Status Never   Smokeless Tobacco Never     Family History   Problem Relation Age of Onset   • Hypertension Mother    • Depression Mother    • Depression Father    • Arthritis Father    • Heart attack Paternal Uncle 32   • Colon cancer Neg Hx    • Prostate cancer Neg Hx        Meds/Allergies       Current Outpatient Medications:   •  Diclofenac Sodium (VOLTAREN) 1 %  •  enalapril (VASOTEC) 10 mg tablet  •  lamoTRIgine (LaMICtal) 200 MG tablet  •  levothyroxine 100 mcg tablet  •  meloxicam (Mobic) 15 mg tablet  •  modafinil (PROVIGIL) 200 MG tablet  •  montelukast (SINGULAIR) 10 mg tablet  •  pramipexole (MIRAPEX) 1 mg tablet  •  tamsulosin (FLOMAX) 0 4 mg  •  Trelegy Ellipta 200-62 5-25 MCG/INH AEPB inhaler  •  albuterol (PROVENTIL HFA,VENTOLIN HFA) 90 mcg/act inhaler  •  polyethylene glycol (GOLYTELY) 4000 mL solution  No current facility-administered medications for this encounter      Facility-Administered Medications Ordered in Other Encounters:   •  lactated ringers infusion, , Intravenous, Continuous PRN, New Bag at 12/13/22 0914  •  lidocaine (PF) (XYLOCAINE-MPF) 2 % injection, , Intravenous, PRN, 100 mg at 12/13/22 1003    Allergies   Allergen Reactions   • Simvastatin Shortness Of Breath   • Spiriva Respimat [Tiotropium Bromide Monohydrate] Rash       Objective     /78   Pulse 77   Temp 98 °F (36 7 °C) (Temporal)   Resp 22   Ht 5' 5" (1 651 m)   Wt 105 kg (231 lb 8 oz)   SpO2 95%   BMI 38 52 kg/m²       PHYSICAL EXAM    Gen: NAD  Head: NCAT  CV: RRR  CHEST: Clear  ABD: soft, NT/ND  EXT: no edema      ASSESSMENT/PLAN:  This is a 81y o  year old male here for EGD and colonoscopy  , and he is stable and optimized for his procedure

## 2022-12-13 NOTE — INTERVAL H&P NOTE
H&P reviewed  After examining the patient I find no changes in the patients condition since the H&P had been written      Vitals:    12/13/22 0909   BP: 153/78   Pulse: 77   Resp: 22   Temp: 98 °F (36 7 °C)   SpO2: 95%

## 2022-12-13 NOTE — ANESTHESIA PREPROCEDURE EVALUATION
Procedure:  COLONOSCOPY  EGD    Relevant Problems   ANESTHESIA (within normal limits)   (-) History of anesthesia complications      CARDIO   (+) CAD in native artery   (+) Essential hypertension   (+) Mixed hyperlipidemia   (-) Chest pain   (-) MONTAÑO (dyspnea on exertion)      ENDO   (+) Hypothyroidism      GI/HEPATIC   (+) Dysphagia      /RENAL   (+) BPH (benign prostatic hyperplasia)      PULMONARY   (+) Asthma (well controlled)   (+) Moderate persistent asthma   (+) DONALD (obstructive sleep apnea)   (-) Respiratory failure (HCC)   (-) Shortness of breath   (-) URI (upper respiratory infection)      Other   (+) BMI 35 0-35 9,adult   (+) Bipolar disorder ([address] )      TTE 10/2022:  •  Left Ventricle: Left ventricular cavity size is normal  Wall thickness is normal  The left ventricular ejection fraction is 65%  Systolic function is normal  Wall motion is normal  Diastolic function is normal for age  •  Right Ventricle: Right ventricular cavity size is normal  Systolic function is normal   •  Aortic Valve: There is aortic sclerosis  •  Mitral Valve: There is mild regurgitation  •  Tricuspid Valve: There is trace regurgitation  The estimated right ventricular systolic pressure is 22 38 mmHg  Physical Exam    Airway    Mallampati score: II  TM Distance: >3 FB  Neck ROM: full     Dental       Cardiovascular      Pulmonary      Other Findings        Anesthesia Plan  ASA Score- 3     Anesthesia Type- IV sedation with anesthesia with ASA Monitors  Additional Monitors:   Airway Plan:           Plan Factors-Exercise tolerance (METS): >4 METS  Chart reviewed  EKG reviewed  Existing labs reviewed  Patient summary reviewed  Induction- intravenous  Postoperative Plan-     Informed Consent- Anesthetic plan and risks discussed with patient  I personally reviewed this patient with the CRNA  Discussed and agreed on the Anesthesia Plan with the CRNA  Harpreet Mcmillan

## 2022-12-14 ENCOUNTER — NURSE TRIAGE (OUTPATIENT)
Dept: OTHER | Facility: OTHER | Age: 72
End: 2022-12-14

## 2022-12-14 ENCOUNTER — TELEPHONE (OUTPATIENT)
Dept: SLEEP CENTER | Facility: CLINIC | Age: 72
End: 2022-12-14

## 2022-12-14 ENCOUNTER — OFFICE VISIT (OUTPATIENT)
Dept: FAMILY MEDICINE CLINIC | Facility: CLINIC | Age: 72
End: 2022-12-14

## 2022-12-14 ENCOUNTER — CLINICAL SUPPORT (OUTPATIENT)
Dept: BARIATRICS | Facility: CLINIC | Age: 72
End: 2022-12-14

## 2022-12-14 VITALS
BODY MASS INDEX: 38.35 KG/M2 | HEIGHT: 65 IN | DIASTOLIC BLOOD PRESSURE: 84 MMHG | SYSTOLIC BLOOD PRESSURE: 146 MMHG | HEART RATE: 79 BPM | WEIGHT: 230.2 LBS

## 2022-12-14 VITALS
HEIGHT: 65 IN | WEIGHT: 232 LBS | SYSTOLIC BLOOD PRESSURE: 130 MMHG | OXYGEN SATURATION: 96 % | BODY MASS INDEX: 38.65 KG/M2 | HEART RATE: 70 BPM | RESPIRATION RATE: 16 BRPM | DIASTOLIC BLOOD PRESSURE: 70 MMHG

## 2022-12-14 DIAGNOSIS — B37.81 CANDIDA ESOPHAGITIS (HCC): Primary | ICD-10-CM

## 2022-12-14 DIAGNOSIS — Z98.84 BARIATRIC SURGERY STATUS: Primary | ICD-10-CM

## 2022-12-14 DIAGNOSIS — I10 ESSENTIAL HYPERTENSION: ICD-10-CM

## 2022-12-14 DIAGNOSIS — E66.9 OBESITY, CLASS II, BMI 35-39.9: Primary | ICD-10-CM

## 2022-12-14 DIAGNOSIS — G47.33 OSA (OBSTRUCTIVE SLEEP APNEA): Primary | ICD-10-CM

## 2022-12-14 NOTE — TELEPHONE ENCOUNTER
Patient's spouse called in to report sensation of something stuck in his throat that started this AM  Return home post colonoscopy/EGD on 12/13 and had some nausea but able to tolerate meal     This morning he is having swallowing difficulty  Drinks water but delay in swallowing until there is a pressure before it goes down  Denies respiratory distress  Reports finding of fungus in upper scope  Please follow up with patient  Reason for Disposition  • [1] Caller has URGENT question or concern AND [2] triager unable to answer question    Answer Assessment - Initial Assessment Questions  1  DATE/TIME: "When did you have your endoscopy?"       12/13/22  2  MAIN CONCERN: "What is your main concern right now?" "What questions do you have?"      Nausea on return to home and this morning  3  ADOMINAL PAIN: "Are you having any abdominal (belly or stomach) pain?" If Yes, ask: "How bad is it?" (e g , Scale 1-10; mild, moderate, severe)  - MILD (1-3): doesn't interfere with normal activities, abdomen soft and not tender to touch      - MODERATE (4-7): interferes with normal activities or awakens from sleep, tender to touch      - SEVERE (8-10): excruciating pain, doubled over, unable to do any normal activities        Denies  4  OTHER SYMPTOMS: "What other symptoms are you having?" (e g , breathing or swallowing problems, chest pain, throat pain, hoarseness, vomiting, stomach pain, bloating, fever, dizziness)      Sensation of something being stuck in his throat; feels like there is a blockage when drinking and then there is pressure before fluids go down; finding of fungus in his throat  5  ONSET: "When did your symptoms start?"      12/14  6   PATTERN: "Is the symptom(s) constant or does it come and go?" "Is your symptom(s) getting worse, better, or staying the same?"      Not asked    Protocols used: ENDOSCOPY (UPPER GI) SYMPTOMS AND QUESTIONS-ADULT-

## 2022-12-14 NOTE — TELEPHONE ENCOUNTER
Pt with flip procedure yesterday  Candida noted  Nystatin ordered  Pt did not receive the Nystatin yet  He reports having difficulty swallowing  He is able to get some fluid down with hard swallows  He would like a call back to discuss further

## 2022-12-14 NOTE — TELEPHONE ENCOUNTER
Sleep study shows severe DONALD  AHI 45 5 and Dr Wili Gillespie ordered CPAP study     Spoke to the patient he asked that I send a [employer] message with the information   My chart message sent

## 2022-12-14 NOTE — PROGRESS NOTES
Bariatric Behavioral Health Evaluation    Presenting Problem:  Pepper Palacios  is a 81 y o    male   :  1950   Patient wants to have the surgery to lose weight and manage chronic health conditions  He doesn't feel he has the quality of life he would like, shortness of breathe with movement which he wants to remedy  Patient reported that he was able to lose weight on his own prior to pandemic but with having a more sedentary life with the lock down, not being in the office, he gained a significant amount  Patient has tried to lose weight with [employer] and keto, he would lose some weight but not able to maintain that loss  Is the patient seeking Bariatric Surgery Eval?  Yes   If yes, how long has patient been considering, researching and/or making changes for surgery? Patient has been considering the surgery for the past two years, he has friends and coworkers who have had the surgery and heard about their experiences  He has a good understanding of the impact surgery has on lifestyle, of the changes that need to be made to have long term success  Realizes Post-Op Requirements? Yes      Pre-morbid level of function and history of present illness: Patient has hypothyroidism, DONALD, hypertension, CAD, and hyperlipidemia      Living situation: Patient lives in an apartment in [place], his grandchildren and great grandchildren live in the same apartment complex and he enjoys spending time with and taking care of the kids  His wife works in [place] with housing that is provided for her there so they are currently living separately but   They are in the process of finding a home in [place] and are hoping to have that settled by March or 2023  Work: Patient works part time as a consultant, he enjoys his work, he finds that works livens him up and he feels very rewarded  Some stressful parts of the job but able to manage it well      Physical Activity: Patient tries to go for walks, he uses the exercises room in the apartment complex he lives in, uses the treadmill  He takes frequent walks in a day rather than long walks because of breathing difficulties  Family History (medical, traditions, culture, rules/routines around food):   Obesity/Overweight: sister, grandmother, aunt  Mental Health Diagnosis: grandfather, mom and dad  Substance Use Disorder/Alcoholism: none  Tobacco use: father  Family Cultural/Traditions: When he was a kid it was expected that they sit down together as a family to eat home cooked, health consctious meals prepared by his mother  He was expected to eat what was given to him and finish his plate  Patient celebrates traditional holidays with large gatherings including lots of food  Mental Health, Trauma and Substance use Assessment    Psychiatric/Psychological Treatment Diagnosis, History of Eating Disorders: Patient has a diagnosis of Bipolar Disorder, he is currently prescribed Lamictal and Pramipexole  He has a significant history of mental health stress from when he was in his 35s and 45s, he was hospitalized in the past and has even done ECT, last treatment 6 years ago  Since that time and finding his current medication regiment he feels his symptoms have been very well managed over the past 30 years  He has a great mental health treatment provider and feels secure in his symptom management  Outpatient Counselor No      Psychiatrist: Yes, sees Dr Juju Pelayo every three months    Have you had any Mental Health or Substance Use Inpatient Treatment? Yes, patient's last hospital stay was over 30 years ago  Drug and/or Alcohol use and treatment history: Patient denies any substance use disorder, he doesn't drink alcohol  Tobacco/Vaping History: Non-smoker  Domestic Violence: No      Abuse or Trauma History: No abuse history, he did lost his son 20 years ago which is still very sad to him but doesn't interfere with daily functioning        Risk Assessment    Stressors and Supports: Patient struggles with weight and the stress it puts on their health, no other life stressors  Patient has told his whole family that he is pursuing surgery, his wife had some concerns with patient pursuing surgery but after talking with surgeon she understood things more and is more supportive  Risk of harm to self or others: Patient denies any SI/HI  Presence of Audio/Visual Hallucinations:  No audio or visual hallucinations reported  Access to weapons: none     Observation: this interview    Based on the previous information, the client presents the following risk of harm to self or others: low      Physical/Mental Health Status:     Appearance: appropriate  Sociability: friendly  Affect: appropriate  Mood: calm  Thought Process: coherent  Speech: normal  Content: no impairment  Orientation: person  Yes , place  Yes , time  Yes , normal attention span  Yes , normal memory  Yes  , decreased in concentration ability  No and normal judgement  Yes   Insight: emotional  good       BARIATRIC SURGERY EDUCATION CHECKLIST    Patient has received the following education related to the bariatric surgery process and understands:    Patients may be required to complete a psychiatric evaluation and receive clearance for surgery from mental health provider  Patients who undergo weight loss surgery are at higher risk of increased mental health concerns and suicide attempts  Patients may be required to complete a full substance abuse evaluation and then complete all treatment recommendations prior to surgery  If diagnosis of abuse/dependence results, patient may be required to remain sober for one (1) year before having bariatric surgery  Patients on psychiatric medications should check with their provider to discuss psychiatric medications and the changes in absorption    Patient should discuss all time release medications with provider and take all medications as prescribed  The recommendation is that there is no use of any tobacco products, Hookah or vapes for the bariatric post-operation patient  Bariatric surgery patients should not consume alcohol as a post-operative patient as it may increase risk of numerous health conditions including but not limited to alcohol abuse and ulcers  There is a possibility of weight regain if patient does not follow all program guidelines and recommendations  Bariatric surgery patients should exercise thirty (30) to sixty (60) minutes per day to maintain post-surgical weight loss  Research indicates that bariatric patients are more successful when they see a therapist for up to two (2) years post-op  Patients will follow all medical and dietary recommendations provided  Patient will keep all scheduled appointments and follow up with their physician for a minimum of five (5) years  Patient will take all vitamins as recommended  Post-operative vitamins are life-long  There is a goal month set  All requirements should be met by this time  Don't wait to get started! There is a deadline month set  All requirements must be finished by this time and if not, the patient will be halted in the surgery process  The patient can be referred to the medical weight management program or can come back to the surgical program once the unfinished tasks from the previous program are completed  Female patients of childbearing years are informed that pregnancy is not recommended until 12 months post-op  Recommendations: Recommended for surgery  yes and Patient meets the criteria to be a member of [facility] Bariatric surgery program       Note:  Patient has a diagnosis of Bipolar Disorder that he feels is very well managed with current medication and treatment regiment    He has worked extensively on his mental health over his life, having ECT about six years ago and finding a medication regiment that has helped his symptoms management  Patient denies any substance use disorder, he doesn't drink alcohol and is non-smoker  Risk of alcohol and tobacco use post op were discussed  He is going to work on having regular meals, having less take out and being mindful of food choices  Patient is appropriate for surgery and recommended to meet with surgeon    Ilsa Davis LCSW

## 2022-12-14 NOTE — PROGRESS NOTES
Bariatric Nutrition Assessment Note    Type of surgery    Preop (4 wt checks)  Surgery Date: TBD--leaning toward RYGB   Surgeon: Dr Meghan Brito (consult on 11/18)    Nutrition Assessment   Juanita Marrero  81 y o   male     Wt with BMI of 25: 152#  Pre-Op Excess Wt: 147#  /84 (BP Location: Left arm, Patient Position: Sitting, Cuff Size: Large)   Pulse 79   Ht 5' 5 25" (1 657 m)   Wt 104 kg (230 lb 3 2 oz)   BMI 38 01 kg/m²      Start weight on 11/18/22:  229 4#  Can go to BMI of 35:  212#     [place] [facility]  [employer] Equation:    Estimated calories for weight loss 8040-2260 (1-2# per wk wt loss - sedentary )  Estimated protein needs 69-104gm (1 0-1 5 gms/kg IBW )   Estimated fluid needs 2072-2415ml (30-35 ml/kg IBW )      Weight History   Onset of Obesity: Adult, out of college was 150lbs and within the first 10 years maybe gained 10lbs to 160, then gradually increased to 170lb where stabilized for a while  Was 190# going into COVID in 2020 and since gained a lot of weight      Family history of obesity: Yes-sister  Wt Loss Attempts: Commercial Programs ([employer], [employer], etc )  Exercise  High Protein/Low CHO diets (Atkins, Union, etc )  Self Created Diets (Portion Control, Healthy Food Choices, etc )  Keto    Patient has tried the above for 6 months or more with insufficient weight loss or weight regain, which is why patient has requested to be evaluated for weight loss surgery today  Maximum Wt Lost: 10-15lbs      Review of History and Medications   Past Medical History:   Diagnosis Date   • Arthritis    • Asthma    • Colon polyp    • 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[address] )    • 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[address] )    • Manic depression ([address] )    • Sleep apnea      Past Surgical History:   Procedure Laterality Date   • COLONOSCOPY  03/61/4872   • UMBILICAL HERNIA REPAIR       Social History     Socioeconomic History   • Marital status: /Civil Union     Spouse name: None   • Number of children: None   • Years of education: None   • Highest education level: None   Occupational History   • None   Tobacco Use   • Smoking status: Never   • Smokeless tobacco: Never   Vaping Use   • Vaping Use: Never used   Substance and Sexual Activity   • Alcohol use: Never   • Drug use: Never   • Sexual activity: None   Other Topics Concern   • None   Social History Narrative   • None     Social Determinants of Health     Financial Resource Strain: Not on file   Food Insecurity: No Food Insecurity   • Worried About Running Out of Food in the Last Year: Never true   • Ran Out of Food in the Last Year: Never true   Transportation Needs: No Transportation Needs   • Lack of Transportation (Medical): No   • Lack of Transportation (Non-Medical):  No   Physical Activity: Not on file   Stress: Not on file   Social Connections: Not on file   Intimate Partner Violence: Not on file   Housing Stability: Low Risk    • Unable to Pay for Housing in the Last Year: No   • Number of Places Lived in the Last Year: 1   • Unstable Housing in the Last Year: No       Current Outpatient Medications:   •  albuterol (PROVENTIL HFA,VENTOLIN HFA) 90 mcg/act inhaler, Inhale 2 puffs every 4 (four) hours as needed for wheezing, Disp: 6 7 g, Rfl: 2  •  Diclofenac Sodium (VOLTAREN) 1 %, Apply 2 g topically 4 (four) times a day, Disp: 2 g, Rfl: 0  •  enalapril (VASOTEC) 10 mg tablet, Take 1 tablet (10 mg total) by mouth daily, Disp: 30 tablet, Rfl: 0  •  lamoTRIgine (LaMICtal) 200 MG tablet, , Disp: , Rfl:   •  levothyroxine 100 mcg tablet, , Disp: , Rfl:   •  meloxicam (Mobic) 15 mg tablet, Take 1 tablet (15 mg total) by mouth daily, Disp: 30 tablet, Rfl: 1  •  modafinil (PROVIGIL) 200 MG tablet, , Disp: , Rfl:   •  montelukast (SINGULAIR) 10 mg tablet, , Disp: , Rfl:   •  nystatin (MYCOSTATIN) 500,000 units/5 mL suspension, Apply 5 mL (500,000 Units total) to the mouth or throat 4 (four) times a day for 10 days, Disp: 473 mL, Rfl: 0  •  pramipexole (MIRAPEX) 1 mg tablet, Take 2 mg by mouth daily at bedtime, Disp: , Rfl:   •  tamsulosin (FLOMAX) 0 4 mg, , Disp: , Rfl:   •  Trelegy Ellipta 200-62 5-25 MCG/INH AEPB inhaler, Inhale 1 puff daily, Disp: 60 blister, Rfl: 2  •  polyethylene glycol (GOLYTELY) 4000 mL solution, Take 4,000 mL by mouth once for 1 dose, Disp: 4000 mL, Rfl: 0  No current facility-administered medications for this visit  Food Intake and Lifestyle Assessment   Food Intake Assessment completed via usual diet recall  Poor sleep quality due to severe DONALD and trouble using CPAP  Only gets 2-3 hrs per day  Does some 15 min cat naps through the day ie:  Has been up since 11pm last night  Has tried many meds and melatonin w/o success  Breakfast: most often will skip  If has something may be a yogurt or eggs and helms  Snack: -   Lunch: yesterday had sushi at 2pm which was the only meal of the day  Snack: -  Dinner: Does a lot of [employer]--pizza (1/2 pie) OR [place] foods  Snack: -  Beverage intake: sugar free beverages and diet soda  Protein supplement: none  Estimated protein intake per day: <60gm  Estimated fluid intake per day: water/crystal light about 64-80oz per day  Meals eaten away from home: Most of the meals right now are door dash  Typical meal pattern: 1-2 meals per day and 0-1 snacks per day  Eating Behaviors: Consumption of high calorie/ high fat foods, Large portion sizes, Mindless eating and Poor meal schedule    Food allergies or intolerances: Allergies   Allergen Reactions   • Simvastatin Shortness Of Breath   • Spiriva Respimat [Tiotropium Bromide Monohydrate] Rash     Cultural or Voodoo considerations: -    Physical Assessment  Physical Activity  Types of exercise: None--moderate walking  Current physical limitations: some SOB    Psychosocial Assessment   Support systems: spouse  Socioeconomic factors: Right now he lives in [place] ([address]) and his wife lives in [place] (5 years) due to her job   He will be moving to [place] with his wife fairly soon  Nutrition Diagnosis  Diagnosis: Overweight / Obesity (NC-3 3)  Related to: Physical inactivity and Excessive energy intake  As Evidenced by: BMI >25     Nutrition Prescription: Recommend the following diet  Regular    Interventions and Teaching   Discussed pre-op and post-op nutrition guidelines  Patient educated and handouts provided  Surgical changes to stomach / GI  Capacity of post-surgery stomach  Diet progression  Adequate hydration  Sugar and fat restriction to decrease "dumping syndrome"  Fat restriction to decrease steatorrhea  Expected weight loss  Weight loss plateaus/ possibility of weight regain  Exercise  Suggestions for pre-op diet  Nutrition considerations after surgery  Protein supplements  Meal planning and preparation  Appropriate carbohydrate, protein, and fat intake, and food/fluid choices to maximize safe weight loss, nutrient intake, and tolerance   Dietary and lifestyle changes  Possible problems with poor eating habits  Intuitive eating  Techniques for self monitoring and keeping daily food journal  Potential for food intolerance after surgery, and ways to deal with them including: lactose intolerance, nausea, reflux, vomiting, diarrhea, food intolerance, appetite changes, gas  Vitamin / Mineral supplementation of Multivitamin with minerals and Vitamin D    Patient is not currently pregnant and doesn't desire to become pregnant a minimum of one year post-op    Education provided to: patient    Barriers to learning: No barriers identified    Readiness to change:  preparation    Prior research on procedure: books, internet, discussed with provider and friends or family    Comprehension: verbalizes understanding     Expected Compliance: good    Recommendations  Pt is an appropriate candidate for surgery   Yes    Evaluation / Monitoring  Dietitian to Monitor: Eating pattern as discussed Tolerance of nutrition prescription Body weight Lab values Physical activity    Pre-op wt loss:  Do not gain  Lose ~5% by DOS:  -11lbs (218#)  Can not go below BMI 35:  212#    Goals  · Food journal via baritastic  · Exercise 30 minutes 5 times per week  · Complete lesson plans 1-6  · Eat 3 meals per day with protein at each meal  · Use protein shake as a meal replacement for one meal  · Decrease portions--fill 1/2 plate with veggies  · Eliminate mindless snacking  · lipidL checked in Sept (good until March), TSH checked in Oct (good until April)   Getting CBC, CBMP and A1c checked soon for upcoming shoulder sx       Time Spent:   1 Hour

## 2022-12-14 NOTE — TELEPHONE ENCOUNTER
Regarding: post EGD/Colonoscopy - feels like something is stuck in throat  ----- Message from Patrick Manual sent at 12/14/2022 12:09 PM EST -----  " My  had a Colonoscopy and EGD yesterday   Today he feels like there is something stuck in his throat "
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
## 2022-12-14 NOTE — TELEPHONE ENCOUNTER
Called patient and no answer  Spoke to wife Bismark Anna who reports yesterday pt had pizza, sushi, fruit snacks  Today, he felt like there was something stuck in his throat  He's been tolerating his secretions  She had him drink warm water which went down, but pt said it was going down slowly  He is currently going to his PCP office  Hasn't been able to start Nystatin yet but plans to pick it up today  Discussed warning signs of food impaction such as not being able to tolerate secretions, regurgitation  If pt not able to tolerate PO would need to present to the ED for further evaluation  Recommend he start with Nystatin treatment to treat underlying candida/inflammation otherwise

## 2022-12-16 NOTE — TELEPHONE ENCOUNTER
Called and spoke to pt whom informed he is no longer having any issues  He said there may have been miscommunication somewhere but he is fine now that started medication and does not feel need for ov  Thank you!

## 2022-12-20 ENCOUNTER — PREP FOR PROCEDURE (OUTPATIENT)
Dept: GASTROENTEROLOGY | Facility: CLINIC | Age: 72
End: 2022-12-20

## 2022-12-20 ENCOUNTER — OFFICE VISIT (OUTPATIENT)
Dept: BARIATRICS | Facility: CLINIC | Age: 72
End: 2022-12-20

## 2022-12-20 ENCOUNTER — TELEPHONE (OUTPATIENT)
Dept: GASTROENTEROLOGY | Facility: CLINIC | Age: 72
End: 2022-12-20

## 2022-12-20 ENCOUNTER — APPOINTMENT (OUTPATIENT)
Dept: LAB | Facility: CLINIC | Age: 72
End: 2022-12-20

## 2022-12-20 VITALS — BODY MASS INDEX: 38.05 KG/M2 | WEIGHT: 230.4 LBS

## 2022-12-20 DIAGNOSIS — M70.61 GREATER TROCHANTERIC BURSITIS OF BOTH HIPS: ICD-10-CM

## 2022-12-20 DIAGNOSIS — M70.62 GREATER TROCHANTERIC BURSITIS OF BOTH HIPS: ICD-10-CM

## 2022-12-20 DIAGNOSIS — M19.011 PRIMARY OSTEOARTHRITIS OF RIGHT SHOULDER: ICD-10-CM

## 2022-12-20 DIAGNOSIS — M25.551 ACUTE HIP PAIN, BILATERAL: ICD-10-CM

## 2022-12-20 DIAGNOSIS — E66.9 OBESITY, CLASS II, BMI 35-39.9: Primary | ICD-10-CM

## 2022-12-20 DIAGNOSIS — Z86.010 HISTORY OF COLON POLYPS: Primary | ICD-10-CM

## 2022-12-20 DIAGNOSIS — M25.552 ACUTE HIP PAIN, BILATERAL: ICD-10-CM

## 2022-12-20 DIAGNOSIS — I10 ESSENTIAL HYPERTENSION: ICD-10-CM

## 2022-12-20 PROBLEM — E66.812 OBESITY, CLASS II, BMI 35-39.9: Status: RESOLVED | Noted: 2022-11-18 | Resolved: 2022-12-20

## 2022-12-20 LAB
ALBUMIN SERPL BCP-MCNC: 4.4 G/DL (ref 3.5–5)
ALP SERPL-CCNC: 104 U/L (ref 46–116)
ALT SERPL W P-5'-P-CCNC: 60 U/L (ref 12–78)
ANION GAP SERPL CALCULATED.3IONS-SCNC: 8 MMOL/L (ref 4–13)
AST SERPL W P-5'-P-CCNC: 42 U/L (ref 5–45)
BASOPHILS # BLD AUTO: 0.09 THOUSANDS/ÂΜL (ref 0–0.1)
BASOPHILS NFR BLD AUTO: 1 % (ref 0–1)
BILIRUB SERPL-MCNC: 0.45 MG/DL (ref 0.2–1)
BUN SERPL-MCNC: 17 MG/DL (ref 5–25)
CALCIUM SERPL-MCNC: 10 MG/DL (ref 8.3–10.1)
CHLORIDE SERPL-SCNC: 101 MMOL/L (ref 96–108)
CO2 SERPL-SCNC: 25 MMOL/L (ref 21–32)
CREAT SERPL-MCNC: 1.18 MG/DL (ref 0.6–1.3)
EOSINOPHIL # BLD AUTO: 0.12 THOUSAND/ÂΜL (ref 0–0.61)
EOSINOPHIL NFR BLD AUTO: 2 % (ref 0–6)
ERYTHROCYTE [DISTWIDTH] IN BLOOD BY AUTOMATED COUNT: 14.3 % (ref 11.6–15.1)
EST. AVERAGE GLUCOSE BLD GHB EST-MCNC: 126 MG/DL
GFR SERPL CREATININE-BSD FRML MDRD: 62 ML/MIN/1.73SQ M
GLUCOSE P FAST SERPL-MCNC: 120 MG/DL (ref 65–99)
HBA1C MFR BLD: 6 %
HCT VFR BLD AUTO: 42.5 % (ref 36.5–49.3)
HGB BLD-MCNC: 13.3 G/DL (ref 12–17)
IMM GRANULOCYTES # BLD AUTO: 0.07 THOUSAND/UL (ref 0–0.2)
IMM GRANULOCYTES NFR BLD AUTO: 1 % (ref 0–2)
LYMPHOCYTES # BLD AUTO: 1.56 THOUSANDS/ÂΜL (ref 0.6–4.47)
LYMPHOCYTES NFR BLD AUTO: 22 % (ref 14–44)
MCH RBC QN AUTO: 29.4 PG (ref 26.8–34.3)
MCHC RBC AUTO-ENTMCNC: 31.3 G/DL (ref 31.4–37.4)
MCV RBC AUTO: 94 FL (ref 82–98)
MONOCYTES # BLD AUTO: 0.62 THOUSAND/ÂΜL (ref 0.17–1.22)
MONOCYTES NFR BLD AUTO: 9 % (ref 4–12)
NEUTROPHILS # BLD AUTO: 4.74 THOUSANDS/ÂΜL (ref 1.85–7.62)
NEUTS SEG NFR BLD AUTO: 65 % (ref 43–75)
NRBC BLD AUTO-RTO: 0 /100 WBCS
PLATELET # BLD AUTO: 271 THOUSANDS/UL (ref 149–390)
PMV BLD AUTO: 10.4 FL (ref 8.9–12.7)
POTASSIUM SERPL-SCNC: 4.6 MMOL/L (ref 3.5–5.3)
PROT SERPL-MCNC: 7.5 G/DL (ref 6.4–8.4)
RBC # BLD AUTO: 4.53 MILLION/UL (ref 3.88–5.62)
SODIUM SERPL-SCNC: 134 MMOL/L (ref 135–147)
WBC # BLD AUTO: 7.2 THOUSAND/UL (ref 4.31–10.16)

## 2022-12-20 RX ORDER — MELOXICAM 15 MG/1
15 TABLET ORAL DAILY
Qty: 30 TABLET | Refills: 0 | Status: SHIPPED | OUTPATIENT
Start: 2022-12-20

## 2022-12-20 RX ORDER — ENALAPRIL MALEATE 10 MG/1
10 TABLET ORAL DAILY
Qty: 30 TABLET | Refills: 0 | Status: SHIPPED | OUTPATIENT
Start: 2022-12-20

## 2022-12-20 NOTE — PROGRESS NOTES
Patient presents for 2 of 4 weight check, current weight 230 4lbs  Eating behaviors/food choices: Patient has spent some time reading bariatric manual, learning about post-op diet, 30/60 rule, portions and logging meals  He admits that sipping his water and not drinking while eating will be tricky for him but is going to make that a learning objective during this pre-op time  He downloaded baritastic and has been using it the past two days, getting about 1200 calories each day  Reports he's getting in plenty of protein, he wants to work on getting in more vegetables, doesn't like many of them but willing to try  Activity/Exercise:  Patient has been going for three 10 min walks for the past few days and plans to continue this six days a week  He will be having shoulder surgery in January so walking may decrease but he will then start PT which will be some type of activity to continue  Sleep/Rest:  Patient diagnosed with DONALD, going for CPAP test in June but anticipates that appointment being moved up soon  Aware that he needs to be using CPAP machine at least 30 days prior to surgery  Mental Health/Wellness:  Patient denies any issues with mood, mental health symptoms well managed  He did talk with psychiatrist about medication changes post-op, doctor doesn't think much will have to change except for pramipexole which may need to be increased  Doctor is going to keep an eye on this with patient's feedback for adjustment  Overall, symptoms well controlled for many years  Patient admits to having some cravings for night time eating but has become more mindful, having lower portions and choosing lower calorie options  Suggested tuning into triggers of eating, stop, think, act to define if it's emotional eating and using non food coping skills to manage    Practicing self compassion when cravings to eat come on and not depriving self of enjoy able foods but making room for them in life while meeting nutrition needs  Suggested he use notes section of sai to log  Patient and wife are going to be getting a house together after five years of living apart  Patient aware of the changes that are coming with that arrangement and having surgery, doesn't believe it will be a problem since reports he and wife get along well  Highlighted that the body can interpret even good things as stress so to be aware of how he is managing this  Workflow review:    Labs and PCP: labs needed, PCP letter done  Psych and EGD: no eval needed, EGD done  Nicotine: NA  Support Group: needs to be completed   Cardiology: scheduled 1/23  Sleep: severe DONALD, CPAP titration study schedule June, waiting for it to be moved up  Weight Checks: 4 weight checks    Goals:    - continue three meals a day focused on proteins with vegetables included, log meals  - continue efforts to be active  - be mindful of impact moods and boredom have on eating habits, tune into possible stress      Next Appointment:  3 of 4 weight check with RD on 1/16

## 2022-12-20 NOTE — TELEPHONE ENCOUNTER
Scheduled date of colonoscopy (as of today): 6/14/2023    Physician performing colonoscopy: Dr Guy Diaz    Location of colonoscopy: [facility]    Clearances: N/A

## 2022-12-20 NOTE — H&P (VIEW-ONLY)
Internal Medicine Pre-Operative Evaluation:     Reason for Visit: Pre-operative Evaluation for Risk Stratification and Optimization    Patient ID: Sandra Hanson is a 81 y o  male  Surgery: Arthroplasty of right Shoulder  Referring Provider: Dr Isidoro Nissen      Recommendations to Proceed withSurgery    Patient is considered to be Low risk for Medium risk procedure  After evaluation and discussion with patient with emphasis that all surgery has some degree of inherent risk it is determined this procedure is of acceptable risk  medically  Patient may proceed with planned procedure      Assessment      Primary osteoarthritis right Shoulder  • Failed conservative measures  • Electing to undergo total joint arthroplasty    Pre-operative Medical Evaluation for planned surgery  • Patient meets preoperative quality goals as noted below  • Recommendations as listed in PLAN section below  • Contact surgical nurse  navigator with any questions regarding preoperative plan or schedule      HTN  • Stable  • Cont current medications as directed  • Monitor post operative BP   • Hold enalapril the day prior to surgery and the morning of surgery    Asthma  · No recent flares  · Continue inhalers as prescribed    Hypothyroid  • Cont levothyroxine as prescribed    BPH  · Cont flomax  · Monitor for post operative retention    Mood disorder  · Continue medications as prescribed    Impaired fasting glucose  • Hgb A1c 6 0  • Recommend following DM diet  • Monitor FBS              Patient Active Problem List   Diagnosis   • Primary osteoarthritis of right shoulder   • Arthritis   • Moderate persistent asthma   • Hypothyroidism   • DONALD (obstructive sleep apnea)   • Essential hypertension   • Mixed hyperlipidemia   • Polyarthralgia   • Primary osteoarthritis, left shoulder   • 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HCC)   • Tremor   • Hip weakness   • Blurry vision, bilateral   • Insomnia   • Dysphagia   • Bipolar disorder (HCC)   • Dizziness • RLQ abdominal pain   • Asthma   • BPH (benign prostatic hyperplasia)   • Pre-diabetes   • Myoclonus   • Tremor of right hand   • Obesity, morbid (HCC)   • Preoperative clearance   • CAD in native artery   • Chest tightness   • Chronic pain in right shoulder   • Disease characterized by destruction of skeletal muscle   • Falls frequently   • Rotator cuff arthropathy   • Morbid (severe) obesity due to excess calories (HCC)   • Metabolic syndrome        Plan:     1  Further preoperative workup as follows:   - none no further testing required may proceed with surgery    2  Medication Management/Recommendations:   - continue all current medicines through morning of surgery with sip of water except the following:  - hold ACE / ARB specifically  24 hours prior to surgery  - hold aspirin 7 days prior to surgery  - avoid use of NSAID such as motrin, advil, aleve for 7 days prior to surgery  - hold all OTC herbal or nutritional supplements 7 days before surgery    3  Patient requires further consultation with:   No Consults Required    4  Discharge Planning / Barriers to Discharge  none identified - patients has post discharge therapy plan in place, transportation arranged for discharge day, adequate family support at home to assist with discharge to home  Subjective:           History of Present Illness:     Chuck Hayward is a 81 y o  male who presents to the office today for a preoperative consultation at the request of surgeon  The patient understands this is an elective procedure and not emergent  They are electing to undergo planned procedure with an understanding that all surgery has inherent risk  They have worked with their surgeon and failed conservative treatment measures  Today they present for preoperative risk assessment and recommendations for optimization in preparation for surgery  Pt seen in surgical optimization center for upcoming proposed surgery   They have failed previous conservative measures and have elected surgical intervention  Pt meets presurgical lab and BMI optimization goals  Upon interview questioning patient is able to perform greater than 4 METs workload in daily life without any reported cardio-pulmonary symptoms  ROS: No TIA's or unusual headaches, no dysphagia  No prolonged cough  No dyspnea or chest pain on exertion  No abdominal pain, change in bowel habits, black or bloody stools  No urinary tract or BPH symptoms  Positive reported pain in arthritic joint  Positive difficulty with gait  No skin rashes or issues              Objective:      /60   Pulse 80   Ht 5' 6 75" (1 695 m)   Wt 105 kg (230 lb 12 8 oz)   SpO2 97%   BMI 36 42 kg/m²       General Appearance: no distress, conversive  HEENT: PERRLA, conjuctiva normal; oropharynx clear; mucous membranes moist;   Neck:  Supple, no lymphadenopathy or thyromegaly  Lungs: breath sounds normal, normal respiratory effort, no retractions, expiratory effort normal  CV: normal heart sounds S1/S2, PMI normal   ABD: soft non tender, no masses , no hepatic or splenomegaly  EXT: DP pulses intact, no lymphadenopathy, no edema  Skin: normal turgor, normal texture, no rash  Psych: affect normal, mood normal  Neuro: AAOx3        The following portions of the patient's history were reviewed and updated as appropriate: allergies, current medications, past family history, past medical history, past social history, past surgical history and problem list      Past History:       Past Medical History:   Diagnosis Date   • Arthritis    • Asthma    • Colon polyp    • 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[facility] )    • 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[facility] )    • Manic depression ([facility] )    • Sleep apnea     Past Surgical History:   Procedure Laterality Date   • COLONOSCOPY  17/07/2194   • UMBILICAL HERNIA REPAIR            Social History     Tobacco Use   • Smoking status: Never   • Smokeless tobacco: Never   Vaping Use   • Vaping Use: Never used   Substance Use Topics   • Alcohol use: Never   • Drug use: Never     Family History   Problem Relation Age of Onset   • Hypertension Mother    • Depression Mother    • Depression Father    • Arthritis Father    • Heart attack Paternal Uncle 32   • Colon cancer Neg Hx    • Prostate cancer Neg Hx           Allergies: Allergies   Allergen Reactions   • Simvastatin Shortness Of Breath   • Spiriva Respimat [Tiotropium Bromide Monohydrate] Rash        Current Medications:     Current Outpatient Medications   Medication Instructions   • albuterol (PROVENTIL HFA,VENTOLIN HFA) 90 mcg/act inhaler 2 puffs, Inhalation, Every 4 hours PRN   • Diclofenac Sodium (VOLTAREN) 2 g, Topical, 4 times daily   • enalapril (VASOTEC) 10 mg, Oral, Daily   • lamoTRIgine (LaMICtal) 200 MG tablet No dose, route, or frequency recorded  • levothyroxine 100 mcg, Oral, Daily   • meloxicam (MOBIC) 15 mg, Oral, Daily   • modafinil (PROVIGIL) 200 MG tablet No dose, route, or frequency recorded  • montelukast (SINGULAIR) 10 mg tablet No dose, route, or frequency recorded  • ondansetron (ZOFRAN) 4 mg, Oral, Every 8 hours PRN   • pantoprazole (PROTONIX) 40 mg, Oral, Daily, Take 1 pill half hour before breakfast    • polyethylene glycol (GOLYTELY) 4000 mL solution 4,000 mL, Oral, Once   • pramipexole (MIRAPEX) 2 mg, Oral, Daily at bedtime   • tamsulosin (FLOMAX) 0 4 mg No dose, route, or frequency recorded  • Trelegy Ellipta 200-62 5-25 MCG/INH AEPB inhaler 1 puff, Inhalation, Daily             PRE-OP WORKSHEET DATA    Assessment of Pre-Operative Risks     MLJ Quality Hard Stops:  BMI (<40) : Estimated body mass index is 36 42 kg/m² as calculated from the following:    Height as of this encounter: 5' 6 75" (1 695 m)  Weight as of this encounter: 105 kg (230 lb 12 8 oz)  Hgb ( >11):    Lab Results   Component Value Date    HGB 13 3 12/20/2022     HbA1c (<7 0) :   Lab Results   Component Value Date    HGBA1C 6 0 (H) 12/20/2022     GFR (>60) (Less then 39 = Nephrology consult):  CrCl cannot be calculated (Patient's most recent lab result is older than the maximum 7 days allowed  )  Active Decompensated Chronic Conditions which would delay surgery  No acutely decompensated medical issues such as recent CVA, MI, new onset arrhythmia, severe aortic stenosis, CHF, uncontrolled COPD       Exercise Capacity: (if less the 4 mets consider functional assessment via cardiac stress testing or consultation)    · Able to walk 2 blocks without symptoms?: Yes  · Able to walk 1 flights without symptoms?: Yes    Assessment of intra and post operative respiratory, hemodynamic and thrombotic risks     Prior Anesthesia Reactions: No     Personal history of venous thromboembolic disease? No    History of steroid use > 5 mg for >2 weeks within last year? No    Cardiac Risk Estimation: per the Revised Cardiac Risk Index (Circ  100:1043, 1999),     The patient's risk factors for cardiac complications include :  none    Luciano Parra has a in hospital cardiac risk of RCI RISK CLASS I (0 risk factors, risk of major cardiac compl  appr  0 5%) based on RCRI calculator          Pre-Op Data Reviewed:       Laboratory Results: I have personally reviewed the pertinent laboratory results/reports     EKG:I have personally reviewed pertinent reports    I personally reviewed and interpreted available tracings in the electronic medical record    Normal sinus rhythm  Incomplete right bundle branch block  Borderline ECG  When compared with ECG of 15-OCT-2022 23:21, (unconfirmed)  Premature ventricular complexes are no longer Present  Confirmed by Grayson Marie ([id number]) on 10/18/2022 9:59:58 PM    OLD RECORDS: reviewed old records in the chart review section if EHR on day of visit      Previous cardiopulmonary studies within the past year:  · Echocardiogram: no  · Cardiac Catheterization: no  · Stress Test: no      Time of visit including pre-visit chart review, visit and post-visit coordination of plan and care , review of pre-surgical lab work, preparation and time spent documenting note in electronic medical record, time spent face-to-face in physical examination answering patient questions by care team 60 minutes             [address] [place] [address]

## 2022-12-20 NOTE — PATIENT INSTRUCTIONS
Contact surgical nurse  navigator with any questions regarding preoperative plan or schedule    Stop all over the counter supplements, herbal, naturopathic  medications for 1 week prior to surgery UNLESS prescribed by your surgeon  Hold NSAIDS (i e  advil, alleve, motrin, ibuprofen, celebrex, mobic) minimum 7 days prior to surgery  Hold Asprin minimum 7 days prior to surgery  Recommend using Tylenol ( acetaminophen ) 500mg every eight hours during the first week post discharge in conjunction with any additional pain medicine prescribed by your surgeon  Use bowel medicines prescribed by your surgeon ( colace) daily post op during the first 1-2 weeks to avoid post operative constipation issues  Call [phone] with any post discharge concerns or medical issues  Hold enalapril the day prior to surgery and the morning of surgery  The morning of surgery take only the following medication with small sip of water: lamictal

## 2022-12-20 NOTE — PROGRESS NOTES
Internal Medicine Pre-Operative Evaluation:     Reason for Visit: Pre-operative Evaluation for Risk Stratification and Optimization    Patient ID: Cindy More is a 81 y o  male  Surgery: Arthroplasty of right Shoulder  Referring Provider: Dr Leilani Cintron      Recommendations to Proceed withSurgery    Patient is considered to be Low risk for Medium risk procedure  After evaluation and discussion with patient with emphasis that all surgery has some degree of inherent risk it is determined this procedure is of acceptable risk  medically  Patient may proceed with planned procedure      Assessment      Primary osteoarthritis right Shoulder  • Failed conservative measures  • Electing to undergo total joint arthroplasty    Pre-operative Medical Evaluation for planned surgery  • Patient meets preoperative quality goals as noted below  • Recommendations as listed in PLAN section below  • Contact surgical nurse  navigator with any questions regarding preoperative plan or schedule      HTN  • Stable  • Cont current medications as directed  • Monitor post operative BP   • Hold enalapril the day prior to surgery and the morning of surgery    Asthma  · No recent flares  · Continue inhalers as prescribed    Hypothyroid  • Cont levothyroxine as prescribed    BPH  · Cont flomax  · Monitor for post operative retention    Mood disorder  · Continue medications as prescribed    Impaired fasting glucose  • Hgb A1c 6 0  • Recommend following DM diet  • Monitor FBS              Patient Active Problem List   Diagnosis   • Primary osteoarthritis of right shoulder   • Arthritis   • Moderate persistent asthma   • Hypothyroidism   • DONALD (obstructive sleep apnea)   • Essential hypertension   • Mixed hyperlipidemia   • Polyarthralgia   • Primary osteoarthritis, left shoulder   • 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HCC)   • Tremor   • Hip weakness   • Blurry vision, bilateral   • Insomnia   • Dysphagia   • Bipolar disorder (HCC)   • Dizziness • RLQ abdominal pain   • Asthma   • BPH (benign prostatic hyperplasia)   • Pre-diabetes   • Myoclonus   • Tremor of right hand   • Obesity, morbid (HCC)   • Preoperative clearance   • CAD in native artery   • Chest tightness   • Chronic pain in right shoulder   • Disease characterized by destruction of skeletal muscle   • Falls frequently   • Rotator cuff arthropathy   • Morbid (severe) obesity due to excess calories (HCC)   • Metabolic syndrome        Plan:     1  Further preoperative workup as follows:   - none no further testing required may proceed with surgery    2  Medication Management/Recommendations:   - continue all current medicines through morning of surgery with sip of water except the following:  - hold ACE / ARB specifically  24 hours prior to surgery  - hold aspirin 7 days prior to surgery  - avoid use of NSAID such as motrin, advil, aleve for 7 days prior to surgery  - hold all OTC herbal or nutritional supplements 7 days before surgery    3  Patient requires further consultation with:   No Consults Required    4  Discharge Planning / Barriers to Discharge  none identified - patients has post discharge therapy plan in place, transportation arranged for discharge day, adequate family support at home to assist with discharge to home  Subjective:           History of Present Illness:     Carlton Mills is a 81 y o  male who presents to the office today for a preoperative consultation at the request of surgeon  The patient understands this is an elective procedure and not emergent  They are electing to undergo planned procedure with an understanding that all surgery has inherent risk  They have worked with their surgeon and failed conservative treatment measures  Today they present for preoperative risk assessment and recommendations for optimization in preparation for surgery  Pt seen in surgical optimization center for upcoming proposed surgery   They have failed previous conservative measures and have elected surgical intervention  Pt meets presurgical lab and BMI optimization goals  Upon interview questioning patient is able to perform greater than 4 METs workload in daily life without any reported cardio-pulmonary symptoms  ROS: No TIA's or unusual headaches, no dysphagia  No prolonged cough  No dyspnea or chest pain on exertion  No abdominal pain, change in bowel habits, black or bloody stools  No urinary tract or BPH symptoms  Positive reported pain in arthritic joint  Positive difficulty with gait  No skin rashes or issues              Objective:      /60   Pulse 80   Ht 5' 6 75" (1 695 m)   Wt 105 kg (230 lb 12 8 oz)   SpO2 97%   BMI 36 42 kg/m²       General Appearance: no distress, conversive  HEENT: PERRLA, conjuctiva normal; oropharynx clear; mucous membranes moist;   Neck:  Supple, no lymphadenopathy or thyromegaly  Lungs: breath sounds normal, normal respiratory effort, no retractions, expiratory effort normal  CV: normal heart sounds S1/S2, PMI normal   ABD: soft non tender, no masses , no hepatic or splenomegaly  EXT: DP pulses intact, no lymphadenopathy, no edema  Skin: normal turgor, normal texture, no rash  Psych: affect normal, mood normal  Neuro: AAOx3        The following portions of the patient's history were reviewed and updated as appropriate: allergies, current medications, past family history, past medical history, past social history, past surgical history and problem list      Past History:       Past Medical History:   Diagnosis Date   • Arthritis    • Asthma    • Colon polyp    • 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[facility] [address] )    • 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[facility] [address] )    • [facility]    • Sleep apnea     Past Surgical History:   Procedure Laterality Date   • COLONOSCOPY  95/67/0658   • UMBILICAL HERNIA REPAIR            Social History     Tobacco Use   • Smoking status: Never   • Smokeless tobacco: Never   Vaping Use   • Vaping Use: Never used   Substance Use Topics   • Alcohol use: Never   • Drug use: Never     Family History   Problem Relation Age of Onset   • Hypertension Mother    • Depression Mother    • Depression Father    • Arthritis Father    • Heart attack Paternal Uncle 32   • Colon cancer Neg Hx    • Prostate cancer Neg Hx           Allergies: Allergies   Allergen Reactions   • Simvastatin Shortness Of Breath   • Spiriva Respimat [Tiotropium Bromide Monohydrate] Rash        Current Medications:     Current Outpatient Medications   Medication Instructions   • albuterol (PROVENTIL HFA,VENTOLIN HFA) 90 mcg/act inhaler 2 puffs, Inhalation, Every 4 hours PRN   • Diclofenac Sodium (VOLTAREN) 2 g, Topical, 4 times daily   • enalapril (VASOTEC) 10 mg, Oral, Daily   • lamoTRIgine (LaMICtal) 200 MG tablet No dose, route, or frequency recorded  • levothyroxine 100 mcg, Oral, Daily   • meloxicam (MOBIC) 15 mg, Oral, Daily   • modafinil (PROVIGIL) 200 MG tablet No dose, route, or frequency recorded  • montelukast (SINGULAIR) 10 mg tablet No dose, route, or frequency recorded  • ondansetron (ZOFRAN) 4 mg, Oral, Every 8 hours PRN   • pantoprazole (PROTONIX) 40 mg, Oral, Daily, Take 1 pill half hour before breakfast    • polyethylene glycol (GOLYTELY) 4000 mL solution 4,000 mL, Oral, Once   • pramipexole (MIRAPEX) 2 mg, Oral, Daily at bedtime   • tamsulosin (FLOMAX) 0 4 mg No dose, route, or frequency recorded  • Trelegy Ellipta 200-62 5-25 MCG/INH AEPB inhaler 1 puff, Inhalation, Daily             PRE-OP WORKSHEET DATA    Assessment of Pre-Operative Risks     MLJ Quality Hard Stops:  BMI (<40) : Estimated body mass index is 36 42 kg/m² as calculated from the following:    Height as of this encounter: 5' 6 75" (1 695 m)  Weight as of this encounter: 105 kg (230 lb 12 8 oz)  Hgb ( >11):    Lab Results   Component Value Date    HGB 13 3 12/20/2022     HbA1c (<7 0) :   Lab Results   Component Value Date    HGBA1C 6 0 (H) 12/20/2022     GFR (>60) (Less then 39 = Nephrology consult):  CrCl cannot be calculated (Patient's most recent lab result is older than the maximum 7 days allowed  )  Active Decompensated Chronic Conditions which would delay surgery  No acutely decompensated medical issues such as recent CVA, MI, new onset arrhythmia, severe aortic stenosis, CHF, uncontrolled COPD       Exercise Capacity: (if less the 4 mets consider functional assessment via cardiac stress testing or consultation)    · Able to walk 2 blocks without symptoms?: Yes  · Able to walk 1 flights without symptoms?: Yes    Assessment of intra and post operative respiratory, hemodynamic and thrombotic risks     Prior Anesthesia Reactions: No     Personal history of venous thromboembolic disease? No    History of steroid use > 5 mg for >2 weeks within last year? No    Cardiac Risk Estimation: per the Revised Cardiac Risk Index (Circ  100:1043, 1999),     The patient's risk factors for cardiac complications include :  none    Danny Hurt has a in hospital cardiac risk of RCI RISK CLASS I (0 risk factors, risk of major cardiac compl  appr  0 5%) based on RCRI calculator          Pre-Op Data Reviewed:       Laboratory Results: I have personally reviewed the pertinent laboratory results/reports     EKG:I have personally reviewed pertinent reports    I personally reviewed and interpreted available tracings in the electronic medical record    Normal sinus rhythm  Incomplete right bundle branch block  Borderline ECG  When compared with ECG of 15-OCT-2022 23:21, (unconfirmed)  Premature ventricular complexes are no longer Present  Confirmed by Nhan Glover ([id number]) on 10/18/2022 9:59:58 PM    OLD RECORDS: reviewed old records in the chart review section if EHR on day of visit      Previous cardiopulmonary studies within the past year:  · Echocardiogram: no  · Cardiac Catheterization: no  · Stress Test: no      Time of visit including pre-visit chart review, visit and post-visit coordination of plan and care , review of pre-surgical lab work, preparation and time spent documenting note in electronic medical record, time spent face-to-face in physical examination answering patient questions by care team 60 minutes             [address] [place] [address]

## 2022-12-21 ENCOUNTER — LAB REQUISITION (OUTPATIENT)
Dept: LAB | Facility: HOSPITAL | Age: 72
End: 2022-12-21

## 2022-12-21 DIAGNOSIS — M19.011 PRIMARY OSTEOARTHRITIS, RIGHT SHOULDER: ICD-10-CM

## 2022-12-21 DIAGNOSIS — K21.00 GASTROESOPHAGEAL REFLUX DISEASE WITH ESOPHAGITIS WITHOUT HEMORRHAGE: Primary | ICD-10-CM

## 2022-12-21 LAB
ABO GROUP BLD: NORMAL
BLD GP AB SCN SERPL QL: NEGATIVE
RH BLD: NEGATIVE
SPECIMEN EXPIRATION DATE: NORMAL

## 2022-12-21 RX ORDER — PANTOPRAZOLE SODIUM 40 MG/1
40 TABLET, DELAYED RELEASE ORAL DAILY
Qty: 90 TABLET | Refills: 2 | Status: SHIPPED | OUTPATIENT
Start: 2022-12-21

## 2022-12-27 ENCOUNTER — TELEPHONE (OUTPATIENT)
Dept: OTHER | Facility: OTHER | Age: 72
End: 2022-12-27

## 2022-12-27 NOTE — TELEPHONE ENCOUNTER
Pt called, to address that he is feeling discomfort and dizziness after he eats a meal  After hours triaging services was offered to pt, but pt preferred a message to be sent to office   Please assist

## 2022-12-28 ENCOUNTER — EVALUATION (OUTPATIENT)
Dept: PHYSICAL THERAPY | Facility: REHABILITATION | Age: 72
End: 2022-12-28

## 2022-12-28 ENCOUNTER — NURSE TRIAGE (OUTPATIENT)
Dept: OTHER | Facility: OTHER | Age: 72
End: 2022-12-28

## 2022-12-28 DIAGNOSIS — M19.011 PRIMARY OSTEOARTHRITIS OF RIGHT SHOULDER: Primary | ICD-10-CM

## 2022-12-28 DIAGNOSIS — R11.0 NAUSEA: Primary | ICD-10-CM

## 2022-12-28 RX ORDER — ONDANSETRON 4 MG/1
4 TABLET, FILM COATED ORAL EVERY 8 HOURS PRN
Qty: 30 TABLET | Refills: 1 | Status: SHIPPED | OUTPATIENT
Start: 2022-12-28 | End: 2023-01-23 | Stop reason: HOSPADM

## 2022-12-28 NOTE — PROGRESS NOTES
PT Evaluation     Today's date: 2022  Patient name: Jorge Alberto Cedeño  : 1950   MRN: [id number]  Referring provider: Elva Canas  Dx:   Encounter Diagnosis     ICD-10-CM    1  Primary osteoarthritis of right shoulder  M19 [id number] Ambulatory referral to Physical Therapy          Start Time: 706  Stop Time: 3780  Total time in clinic (min): 49 minutes    Assessment  Assessment details: Jorge Alberto Cedeño is a 81y o  year old male who presents with chronic right shoulder pain who is scheduled for a R TSA on 1/10/23 with Dr Yonny Ambriz  Current deficits include limited and painful AROM and impaired RUE strength secondary to pain which impaired his ability to perform all ADLs, household activities, and social/recreational activities  Recommend skilled PT services to address previously stated deficits to promote progress towards PLOF  Impairments: abnormal muscle tone, abnormal or restricted ROM, activity intolerance, impaired physical strength, lacks appropriate home exercise program, pain with function, scapular dyskinesis, poor posture  and poor body mechanics  Understanding of Dx/Px/POC: good   Prognosis: good    Goals  STG (pre-op):  1  Demonstrate understanding of initial HEP  LTGs to be established post-operatively  Plan  Plan details: Thank you for referring Jorge Alberto Cedeño to Physical Therapy at [place] [address] and for the opportunity to coordinate care      Patient would benefit from: skilled physical therapy  Planned modality interventions: cryotherapy, electrical stimulation/Russian stimulation, TENS, thermotherapy: hydrocollator packs and unattended electrical stimulation  Planned therapy interventions: abdominal trunk stabilization, activity modification, ADL retraining, balance, balance/weight bearing training, behavior modification, body mechanics training, breathing training, fine motor coordination training, flexibility, functional ROM exercises, gait training, graded activity, graded exercise, graded motor, home exercise program, work reintegration, transfer training, therapeutic training, therapeutic exercise, therapeutic activities, stretching, strengthening, self care, postural training, patient education, neuromuscular re-education, muscle pump exercises, motor coordination training, Hernandez taping, manual therapy, joint mobilization and IADL retraining  Frequency: 2x week  Duration in weeks: 12  Plan of Care beginning date: 2022  Treatment plan discussed with: patient        Subjective Evaluation    History of Present Illness  Mechanism of injury: 22: Kael Smith is a 81 y o  male presenting with chronic R shoulder pain and pre TSA to be performed by Dr Red Walton on 1/10/2023  Currently, Brant Cooper is having difficulty with typing on the computer for more than 30 minutes, sleeping/laying on the R side, lifting the R arm above 90*, reaching behind back, donning a jacket, combing his hair      Pain  Current pain ratin  At best pain ratin  At worst pain ratin  Location: R shoulder  Quality: dull ache (intense)  Relieving factors: medications    Social Support  Stairs in house: yes (at home in [place])   Lives in: [place] and multiple-level home (condo in [place], home in [place])    Employment status: working (Off until 2023)  Hand dominance: right      Diagnostic Tests  CT scan: abnormal  Treatments  Previous treatment: injection treatment and medication  Current treatment: physical therapy  Patient Goals  Patient goals for therapy: decreased pain, increased strength, return to sport/leisure activities, independence with ADLs/IADLs and increased motion          Objective     Postural Observations  Seated posture: fair        Active Range of Motion   Left Shoulder   Flexion: 145 degrees   Abduction: 160 degrees   External rotation BTH: T4   Internal rotation BTB: T3     Right Shoulder   Flexion: 95 degrees with pain  Abduction: 65 degrees with pain  External rotation BTH: C7 with pain  Internal rotation BTB: sacrum with pain    Strength/Myotome Testing     Left Shoulder     Planes of Motion   Flexion: 5   Abduction: 5   External rotation at 0°: 5   Internal rotation at 0°: 5     Right Shoulder     Planes of Motion   Flexion: 4 (pain)   Abduction: 3+ (pain)   External rotation at 0°: 3+ (pain)   Internal rotation at 0°: 3+ (pain)     Tests     Additional Tests Details  Special testing deferred secondary to pre-op status             Precautions: sleep apnea, arthritis, asthma, manic depression, Ray's syndrome       * indicates given as part of HEP  HEP code: [id number]     Daily Treatment Diary    Manuals 12/28          R shoulder PROM nv          Scapular mobility                                 Neuro Re-Ed           Scapular retraction           Scapular retraction with ER           Chin tuck           Prone I's           Prone T's           Prone Y's           Resisted W's           Sustained YWT's           Serratus push up           Plank shoulder taps           Plank to down dog                      Ther Ex           Upper trap stretch* HEP          Levator scap stretch* HEP          Gripping * HEP          Wrist flex/ext AROM* HEP          Elbow flex/ext AROM* HEP          Table slides           Supine shoulder flexion with cane           Sleeper stretch           Shoulder ER with cane           Shoulder isometrics against wall           Shoulder flexion isometric w/ cane           Scapular retraction w/ shoulder ext w/ cane           Foam roller up wall           TB rows           TB lat pull down           Band pull aparts           IR/ER isometric walkouts           Active IR/ER           Band wall walks horizontal           Band wall walks vertical           Sidelying shoulder ER           Ball wall circles                      Patient education Done                      Ther Activity           1 arm row           1 arm press                      Suitcase carry           90/90 carry           OH carry                                            Modalities

## 2022-12-28 NOTE — TELEPHONE ENCOUNTER
Talked with patient  He started with nausea 4 days before his EGD/colonscopy, completed nystatin therapy after procedure with improvement in swallowing, started PPI 1 week ago  Nausea still present, sometimes before meals, sometimes after, no overt pain, no vomiting  No acid reflux  No new meds prior to nausea starting   Will check RUQ US, provided number for central scheduling, continue PPI for a bit longer (had gastritis and esophagitis on EGD), call back if worsening

## 2022-12-28 NOTE — TELEPHONE ENCOUNTER
Regarding: nausea  ----- Message from [facility] sent at 12/28/2022  9:33 AM EST -----  "I have been experiencing nausea "

## 2022-12-28 NOTE — TELEPHONE ENCOUNTER
Patient called in to report symptom of moderate nausea that occurs prior to meal or after meal, and has dizziness occasionally; symptoms started several days before colonoscopy/EGD on 12/13/22  Patient completed antifungal therapy for thrush post EGD  Prescribed pantoprazole with no relief  Denies vomiting  Please follow up with patient for ongoing symptoms of nausea and dizziness  Reason for Disposition  • Nausea lasts > 1 week    Answer Assessment - Initial Assessment Questions  1  NAUSEA SEVERITY: "How bad is the nausea?" (e g , mild, moderate, severe; dehydration, weight loss)    - MILD: loss of appetite without change in eating habits    - MODERATE: decreased oral intake without significant weight loss, dehydration, or malnutrition    - SEVERE: inadequate caloric or fluid intake, significant weight loss, symptoms of dehydration     Moderate  2  ONSET: "When did the nausea begin?"      12/12/22 before or after patient ate with mild dizziness  3  VOMITING: "Any vomiting?" If Yes, ask: "How many times today?"      Denies  4  RECURRENT SYMPTOM: "Have you had nausea before?" If Yes, ask: "When was the last time?" "What happened that time?"      Symptoms started days prior to colonoscopy/EGD on 12/13/22  5  CAUSE: "What do you think is causing the nausea?"      Unsure   Took antifungal medication for thrush with resolution; no relief with pantoprazole; preparing for gastric bypass    Protocols used: NAUSEA-ADULT-OH

## 2022-12-29 ENCOUNTER — OFFICE VISIT (OUTPATIENT)
Dept: INTERNAL MEDICINE CLINIC | Facility: CLINIC | Age: 72
End: 2022-12-29

## 2022-12-29 VITALS
HEIGHT: 67 IN | WEIGHT: 230.8 LBS | SYSTOLIC BLOOD PRESSURE: 130 MMHG | OXYGEN SATURATION: 97 % | HEART RATE: 80 BPM | DIASTOLIC BLOOD PRESSURE: 60 MMHG | BODY MASS INDEX: 36.22 KG/M2

## 2022-12-29 DIAGNOSIS — E03.9 HYPOTHYROIDISM, UNSPECIFIED TYPE: Primary | ICD-10-CM

## 2022-12-29 DIAGNOSIS — G47.33 OSA (OBSTRUCTIVE SLEEP APNEA): ICD-10-CM

## 2022-12-29 DIAGNOSIS — M19.011 PRIMARY OSTEOARTHRITIS OF RIGHT SHOULDER: Primary | ICD-10-CM

## 2022-12-29 DIAGNOSIS — Z01.818 PRE-OPERATIVE CLEARANCE: ICD-10-CM

## 2022-12-29 DIAGNOSIS — E78.2 MIXED HYPERLIPIDEMIA: ICD-10-CM

## 2022-12-29 DIAGNOSIS — E03.9 HYPOTHYROIDISM, UNSPECIFIED TYPE: ICD-10-CM

## 2022-12-29 DIAGNOSIS — N40.0 BPH (BENIGN PROSTATIC HYPERPLASIA): ICD-10-CM

## 2022-12-29 DIAGNOSIS — I10 ESSENTIAL HYPERTENSION: ICD-10-CM

## 2022-12-29 DIAGNOSIS — E66.01 OBESITY, MORBID (HCC): ICD-10-CM

## 2022-12-29 RX ORDER — LEVOTHYROXINE SODIUM 0.1 MG/1
100 TABLET ORAL DAILY
Qty: 90 TABLET | Refills: 0 | Status: SHIPPED | OUTPATIENT
Start: 2022-12-29

## 2022-12-30 NOTE — PRE-PROCEDURE INSTRUCTIONS
Pre-Surgery Instructions:   Medication Instructions   • albuterol (PROVENTIL HFA,VENTOLIN HFA) 90 mcg/act inhaler Uses PRN- OK to take day of surgery, bring dos   • Diclofenac Sodium (VOLTAREN) 1 % Stop taking 7 days prior to surgery  • enalapril (VASOTEC) 10 mg tablet Instructions provided by MD, last dose 1/8/22   • lamoTRIgine (LaMICtal) 200 MG tablet Take day of surgery  • levothyroxine 100 mcg tablet Take day of surgery  • meloxicam (Mobic) 15 mg tablet Stop taking 7 days prior to surgery  • modafinil (PROVIGIL) 200 MG tablet Hold day of surgery  • montelukast (SINGULAIR) 10 mg tablet Take day of surgery  • ondansetron (ZOFRAN) 4 mg tablet Uses PRN- OK to take day of surgery   • pantoprazole (PROTONIX) 40 mg tablet Take day of surgery  • pramipexole (MIRAPEX) 1 mg tablet Take night before surgery   • tamsulosin (FLOMAX) 0 4 mg Take day of surgery  • Trelegy Ellipta 200-62 5-25 MCG/INH AEPB inhaler Take day of surgery  bring dos    See Geriatric Assessment below       • Cognitive Assessment: recall 3  • CAM:   • TUG <15 sec: yes  • Falls (last 6 months): no  • Hand  score:  -Attempt 1:  -Attempt 2:  -Attempt 3:  • Sai Total Score: 20  • PHQ- 9 Depression Scale: 1 0  • Nutrition Assessment Score: 14 0  • METS: 8 97  • Health goals:  -What are your overall health goals? #1 pain relief    -What brings you strength? Great-grandchildren    -What activities are important to you? Loves music    You will receive a phone call from hospital for arrival time  Please call surgeons office if any changes in your condition  Wear easy on/off clothing; consider type of surgery;  Valuables, jewelry, piercing's please keep at home  **COVID-19  education/surgical guidelines  Updated covid    Visitation policy  Please: No contact lenses or eye make up, artificial eyelashes    Please bring special ordered sling or braces if needed for  Your particular surgery      Please secure transportation Follow pre surgery showering or cleaning instructions as  Reviewed by nurse or surgeons office      Questions answered and concerns addressed

## 2023-01-05 ENCOUNTER — TELEPHONE (OUTPATIENT)
Dept: FAMILY MEDICINE CLINIC | Facility: CLINIC | Age: 73
End: 2023-01-05

## 2023-01-05 ENCOUNTER — TELEPHONE (OUTPATIENT)
Dept: OBGYN CLINIC | Facility: HOSPITAL | Age: 73
End: 2023-01-05

## 2023-01-05 NOTE — TELEPHONE ENCOUNTER
Caller: Patient    Doctor: Jorge Munson    Reason for call: patient is calling because he has shoulder replacement surgery scheduled on 1/10 and he's been short of breath and nauseous  He did go to the ER at [facility] (records in chart) and all his tests were negative  He just wanted to let the doctor know        Call back#: [phone]

## 2023-01-05 NOTE — ADDENDUM NOTE
Addended by: Flori Estes on: 1/5/2023 09:14 AM     Modules accepted: Level of Service [facility]  2022 at 9:41 AM

## 2023-01-05 NOTE — TELEPHONE ENCOUNTER
Pt called and stated he went to the ER (1/04/23) for difficulty of breathing and dizziness but has a procedure scheduled on 1/10/23  His surgeon would like for him to discuss with Dr Jose Antonio Garza if he should still get it or postpone it  Please advise pt

## 2023-01-06 ENCOUNTER — TELEPHONE (OUTPATIENT)
Dept: OBGYN CLINIC | Facility: CLINIC | Age: 73
End: 2023-01-06

## 2023-01-06 NOTE — TELEPHONE ENCOUNTER
Caller: Bailee vences/ Dr Vernon     Doctor/Office: Dr Patrick // Ya Johnson     Call regarding :  Pre op clearance  needed      Call was transferred to: Surg scheduling Ya Johnson

## 2023-01-10 ENCOUNTER — HOSPITAL ENCOUNTER (OUTPATIENT)
Facility: HOSPITAL | Age: 73
Setting detail: OUTPATIENT SURGERY
Discharge: HOME | End: 2023-01-11
Attending: ORTHOPAEDIC SURGERY | Admitting: ORTHOPAEDIC SURGERY

## 2023-01-10 ENCOUNTER — APPOINTMENT (OUTPATIENT)
Dept: RADIOLOGY | Facility: HOSPITAL | Age: 73
End: 2023-01-10

## 2023-01-10 ENCOUNTER — ANESTHESIA (OUTPATIENT)
Dept: PERIOP | Facility: HOSPITAL | Age: 73
End: 2023-01-10

## 2023-01-10 DIAGNOSIS — M19.012 PRIMARY OSTEOARTHRITIS, LEFT SHOULDER: ICD-10-CM

## 2023-01-10 DIAGNOSIS — Z96.611 S/P REVERSE TOTAL SHOULDER ARTHROPLASTY, RIGHT: Primary | ICD-10-CM

## 2023-01-10 DIAGNOSIS — Z96.611 STATUS POST TOTAL SHOULDER REPLACEMENT, RIGHT: ICD-10-CM

## 2023-01-10 LAB
ABO GROUP BLD: NORMAL
GLUCOSE SERPL-MCNC: 135 MG/DL (ref 65–140)
RH BLD: NEGATIVE

## 2023-01-10 DEVICE — SCREW PERIPHERAL 5 X 26MM: Type: IMPLANTABLE DEVICE | Site: SHOULDER | Status: FUNCTIONAL

## 2023-01-10 DEVICE — SCREW PERIPHERAL 5 X 18MM: Type: IMPLANTABLE DEVICE | Site: SHOULDER | Status: FUNCTIONAL

## 2023-01-10 DEVICE — SCREW 7MM SM F/PERFRORM RVRS AEQUALIS: Type: IMPLANTABLE DEVICE | Site: SHOULDER | Status: FUNCTIONAL

## 2023-01-10 DEVICE — SCREW PERIPHERAL 5 X 22MM: Type: IMPLANTABLE DEVICE | Site: SHOULDER | Status: FUNCTIONAL

## 2023-01-10 DEVICE — IMPLANTABLE DEVICE
Type: IMPLANTABLE DEVICE | Site: SHOULDER | Status: FUNCTIONAL
Brand: AEQUALIS™ ASCEND™ FLEX

## 2023-01-10 DEVICE — AUGMENT BASEPLATE 15DEG 25MM FULL WEDGE: Type: IMPLANTABLE DEVICE | Site: SHOULDER | Status: FUNCTIONAL

## 2023-01-10 DEVICE — IMPLANTABLE DEVICE
Type: IMPLANTABLE DEVICE | Site: SHOULDER | Status: FUNCTIONAL
Brand: TORNIER FLEX SHOULDER SYSTEM

## 2023-01-10 DEVICE — GLENOSPHERE STD 39MM: Type: IMPLANTABLE DEVICE | Site: SHOULDER | Status: FUNCTIONAL

## 2023-01-10 DEVICE — IMPLANTABLE DEVICE
Type: IMPLANTABLE DEVICE | Site: SHOULDER | Status: FUNCTIONAL
Brand: FLEX SHOULDER SYSTEM

## 2023-01-10 RX ORDER — ONDANSETRON 2 MG/ML
INJECTION INTRAMUSCULAR; INTRAVENOUS AS NEEDED
Status: DISCONTINUED | OUTPATIENT
Start: 2023-01-10 | End: 2023-01-10

## 2023-01-10 RX ORDER — BUPIVACAINE HYDROCHLORIDE 5 MG/ML
INJECTION, SOLUTION EPIDURAL; INTRACAUDAL AS NEEDED
Status: DISCONTINUED | OUTPATIENT
Start: 2023-01-10 | End: 2023-01-10

## 2023-01-10 RX ORDER — FENTANYL CITRATE 50 UG/ML
INJECTION, SOLUTION INTRAMUSCULAR; INTRAVENOUS AS NEEDED
Status: DISCONTINUED | OUTPATIENT
Start: 2023-01-10 | End: 2023-01-10

## 2023-01-10 RX ORDER — SODIUM CHLORIDE, SODIUM LACTATE, POTASSIUM CHLORIDE, CALCIUM CHLORIDE 600; 310; 30; 20 MG/100ML; MG/100ML; MG/100ML; MG/100ML
125 INJECTION, SOLUTION INTRAVENOUS CONTINUOUS
Status: DISCONTINUED | OUTPATIENT
Start: 2023-01-10 | End: 2023-01-11 | Stop reason: HOSPADM

## 2023-01-10 RX ORDER — LEVOTHYROXINE SODIUM 0.1 MG/1
100 TABLET ORAL
Status: DISCONTINUED | OUTPATIENT
Start: 2023-01-11 | End: 2023-01-11 | Stop reason: HOSPADM

## 2023-01-10 RX ORDER — HYDROMORPHONE HCL/PF 1 MG/ML
0.5 SYRINGE (ML) INJECTION
Status: DISCONTINUED | OUTPATIENT
Start: 2023-01-10 | End: 2023-01-10 | Stop reason: HOSPADM

## 2023-01-10 RX ORDER — HYDRALAZINE HYDROCHLORIDE 25 MG/1
25 TABLET, FILM COATED ORAL EVERY 8 HOURS PRN
Status: DISCONTINUED | OUTPATIENT
Start: 2023-01-10 | End: 2023-01-11 | Stop reason: HOSPADM

## 2023-01-10 RX ORDER — OXYCODONE HYDROCHLORIDE 5 MG/1
TABLET ORAL
Qty: 13 TABLET | Refills: 0 | Status: SHIPPED | OUTPATIENT
Start: 2023-01-10 | End: 2023-01-11 | Stop reason: SDUPTHER

## 2023-01-10 RX ORDER — ONDANSETRON 2 MG/ML
4 INJECTION INTRAMUSCULAR; INTRAVENOUS EVERY 6 HOURS PRN
Status: DISCONTINUED | OUTPATIENT
Start: 2023-01-10 | End: 2023-01-11 | Stop reason: HOSPADM

## 2023-01-10 RX ORDER — ALBUTEROL SULFATE 90 UG/1
AEROSOL, METERED RESPIRATORY (INHALATION) AS NEEDED
Status: DISCONTINUED | OUTPATIENT
Start: 2023-01-10 | End: 2023-01-10

## 2023-01-10 RX ORDER — MAGNESIUM HYDROXIDE 1200 MG/15ML
LIQUID ORAL AS NEEDED
Status: DISCONTINUED | OUTPATIENT
Start: 2023-01-10 | End: 2023-01-10 | Stop reason: HOSPADM

## 2023-01-10 RX ORDER — SODIUM CHLORIDE, SODIUM LACTATE, POTASSIUM CHLORIDE, CALCIUM CHLORIDE 600; 310; 30; 20 MG/100ML; MG/100ML; MG/100ML; MG/100ML
100 INJECTION, SOLUTION INTRAVENOUS CONTINUOUS
Status: DISCONTINUED | OUTPATIENT
Start: 2023-01-10 | End: 2023-01-11 | Stop reason: HOSPADM

## 2023-01-10 RX ORDER — CALCIUM CARBONATE 200(500)MG
1000 TABLET,CHEWABLE ORAL DAILY PRN
Status: DISCONTINUED | OUTPATIENT
Start: 2023-01-10 | End: 2023-01-11 | Stop reason: HOSPADM

## 2023-01-10 RX ORDER — ROCURONIUM BROMIDE 10 MG/ML
INJECTION, SOLUTION INTRAVENOUS AS NEEDED
Status: DISCONTINUED | OUTPATIENT
Start: 2023-01-10 | End: 2023-01-10

## 2023-01-10 RX ORDER — PROPOFOL 10 MG/ML
INJECTION, EMULSION INTRAVENOUS CONTINUOUS PRN
Status: DISCONTINUED | OUTPATIENT
Start: 2023-01-10 | End: 2023-01-10

## 2023-01-10 RX ORDER — HYDROMORPHONE HCL/PF 1 MG/ML
0.5 SYRINGE (ML) INJECTION EVERY 2 HOUR PRN
Status: DISCONTINUED | OUTPATIENT
Start: 2023-01-10 | End: 2023-01-11 | Stop reason: HOSPADM

## 2023-01-10 RX ORDER — LISINOPRIL 20 MG/1
20 TABLET ORAL DAILY
Status: DISCONTINUED | OUTPATIENT
Start: 2023-01-11 | End: 2023-01-10

## 2023-01-10 RX ORDER — MONTELUKAST SODIUM 10 MG/1
10 TABLET ORAL DAILY
Status: DISCONTINUED | OUTPATIENT
Start: 2023-01-11 | End: 2023-01-11 | Stop reason: HOSPADM

## 2023-01-10 RX ORDER — OXYCODONE HYDROCHLORIDE 5 MG/1
5 TABLET ORAL EVERY 4 HOURS PRN
Status: DISCONTINUED | OUTPATIENT
Start: 2023-01-10 | End: 2023-01-11 | Stop reason: HOSPADM

## 2023-01-10 RX ORDER — GUAIFENESIN 600 MG/1
600 TABLET, EXTENDED RELEASE ORAL EVERY 12 HOURS SCHEDULED
Status: DISCONTINUED | OUTPATIENT
Start: 2023-01-10 | End: 2023-01-11 | Stop reason: HOSPADM

## 2023-01-10 RX ORDER — MODAFINIL 100 MG/1
200 TABLET ORAL DAILY
Status: DISCONTINUED | OUTPATIENT
Start: 2023-01-11 | End: 2023-01-11 | Stop reason: HOSPADM

## 2023-01-10 RX ORDER — ONDANSETRON 2 MG/ML
4 INJECTION INTRAMUSCULAR; INTRAVENOUS ONCE AS NEEDED
Status: DISCONTINUED | OUTPATIENT
Start: 2023-01-10 | End: 2023-01-10 | Stop reason: HOSPADM

## 2023-01-10 RX ORDER — FLUTICASONE PROPIONATE AND SALMETEROL 250; 50 UG/1; UG/1
1 POWDER RESPIRATORY (INHALATION) EVERY 12 HOURS SCHEDULED
Status: DISCONTINUED | OUTPATIENT
Start: 2023-01-10 | End: 2023-01-10

## 2023-01-10 RX ORDER — TRAZODONE HYDROCHLORIDE 50 MG/1
50 TABLET ORAL
Status: DISCONTINUED | OUTPATIENT
Start: 2023-01-10 | End: 2023-01-11 | Stop reason: HOSPADM

## 2023-01-10 RX ORDER — PRAMIPEXOLE DIHYDROCHLORIDE 1 MG/1
1 TABLET ORAL
Status: DISCONTINUED | OUTPATIENT
Start: 2023-01-10 | End: 2023-01-11 | Stop reason: HOSPADM

## 2023-01-10 RX ORDER — CHLORHEXIDINE GLUCONATE 0.12 MG/ML
15 RINSE ORAL ONCE
Status: COMPLETED | OUTPATIENT
Start: 2023-01-10 | End: 2023-01-10

## 2023-01-10 RX ORDER — PANTOPRAZOLE SODIUM 40 MG/1
40 TABLET, DELAYED RELEASE ORAL
Status: DISCONTINUED | OUTPATIENT
Start: 2023-01-11 | End: 2023-01-11 | Stop reason: HOSPADM

## 2023-01-10 RX ORDER — SODIUM CHLORIDE, SODIUM LACTATE, POTASSIUM CHLORIDE, CALCIUM CHLORIDE 600; 310; 30; 20 MG/100ML; MG/100ML; MG/100ML; MG/100ML
50 INJECTION, SOLUTION INTRAVENOUS CONTINUOUS
Status: DISCONTINUED | OUTPATIENT
Start: 2023-01-10 | End: 2023-01-11 | Stop reason: HOSPADM

## 2023-01-10 RX ORDER — CEFAZOLIN SODIUM 2 G/50ML
2000 SOLUTION INTRAVENOUS EVERY 8 HOURS
Status: COMPLETED | OUTPATIENT
Start: 2023-01-10 | End: 2023-01-11

## 2023-01-10 RX ORDER — LAMOTRIGINE 100 MG/1
200 TABLET ORAL DAILY
Status: DISCONTINUED | OUTPATIENT
Start: 2023-01-11 | End: 2023-01-11 | Stop reason: HOSPADM

## 2023-01-10 RX ORDER — CEFAZOLIN SODIUM 2 G/50ML
2000 SOLUTION INTRAVENOUS ONCE
Status: COMPLETED | OUTPATIENT
Start: 2023-01-10 | End: 2023-01-10

## 2023-01-10 RX ORDER — LIDOCAINE HYDROCHLORIDE 10 MG/ML
INJECTION, SOLUTION EPIDURAL; INFILTRATION; INTRACAUDAL; PERINEURAL AS NEEDED
Status: DISCONTINUED | OUTPATIENT
Start: 2023-01-10 | End: 2023-01-10

## 2023-01-10 RX ORDER — ACETAMINOPHEN 325 MG/1
650 TABLET ORAL EVERY 6 HOURS PRN
Status: DISCONTINUED | OUTPATIENT
Start: 2023-01-10 | End: 2023-01-11 | Stop reason: HOSPADM

## 2023-01-10 RX ORDER — FENTANYL CITRATE/PF 50 MCG/ML
50 SYRINGE (ML) INJECTION
Status: DISCONTINUED | OUTPATIENT
Start: 2023-01-10 | End: 2023-01-10 | Stop reason: HOSPADM

## 2023-01-10 RX ORDER — DOCUSATE SODIUM 100 MG/1
100 CAPSULE, LIQUID FILLED ORAL 2 TIMES DAILY
Status: DISCONTINUED | OUTPATIENT
Start: 2023-01-10 | End: 2023-01-11 | Stop reason: HOSPADM

## 2023-01-10 RX ORDER — OXYCODONE HYDROCHLORIDE 10 MG/1
10 TABLET ORAL EVERY 4 HOURS PRN
Status: DISCONTINUED | OUTPATIENT
Start: 2023-01-10 | End: 2023-01-11 | Stop reason: HOSPADM

## 2023-01-10 RX ORDER — FLUTICASONE FUROATE AND VILANTEROL 200; 25 UG/1; UG/1
1 POWDER RESPIRATORY (INHALATION) DAILY
Status: DISCONTINUED | OUTPATIENT
Start: 2023-01-11 | End: 2023-01-11 | Stop reason: HOSPADM

## 2023-01-10 RX ORDER — ALBUTEROL SULFATE 90 UG/1
2 AEROSOL, METERED RESPIRATORY (INHALATION) EVERY 4 HOURS PRN
Status: DISCONTINUED | OUTPATIENT
Start: 2023-01-10 | End: 2023-01-11 | Stop reason: HOSPADM

## 2023-01-10 RX ORDER — PROPOFOL 10 MG/ML
INJECTION, EMULSION INTRAVENOUS AS NEEDED
Status: DISCONTINUED | OUTPATIENT
Start: 2023-01-10 | End: 2023-01-10

## 2023-01-10 RX ORDER — DEXAMETHASONE SODIUM PHOSPHATE 10 MG/ML
INJECTION, SOLUTION INTRAMUSCULAR; INTRAVENOUS AS NEEDED
Status: DISCONTINUED | OUTPATIENT
Start: 2023-01-10 | End: 2023-01-10

## 2023-01-10 RX ORDER — MIDAZOLAM HYDROCHLORIDE 2 MG/2ML
INJECTION, SOLUTION INTRAMUSCULAR; INTRAVENOUS AS NEEDED
Status: DISCONTINUED | OUTPATIENT
Start: 2023-01-10 | End: 2023-01-10

## 2023-01-10 RX ORDER — TAMSULOSIN HYDROCHLORIDE 0.4 MG/1
0.4 CAPSULE ORAL
Status: DISCONTINUED | OUTPATIENT
Start: 2023-01-10 | End: 2023-01-11 | Stop reason: HOSPADM

## 2023-01-10 RX ADMIN — MIDAZOLAM 2 MG: 1 INJECTION INTRAMUSCULAR; INTRAVENOUS at 08:58

## 2023-01-10 RX ADMIN — ONDANSETRON 4 MG: 2 INJECTION INTRAMUSCULAR; INTRAVENOUS at 09:52

## 2023-01-10 RX ADMIN — CEFAZOLIN SODIUM 2000 MG: 2 SOLUTION INTRAVENOUS at 17:11

## 2023-01-10 RX ADMIN — TAMSULOSIN HYDROCHLORIDE 0.4 MG: 0.4 CAPSULE ORAL at 17:12

## 2023-01-10 RX ADMIN — FENTANYL CITRATE 50 MCG: 50 INJECTION INTRAMUSCULAR; INTRAVENOUS at 08:58

## 2023-01-10 RX ADMIN — CHLORHEXIDINE GLUCONATE 0.12% ORAL RINSE 15 ML: 1.2 LIQUID ORAL at 07:50

## 2023-01-10 RX ADMIN — PRAMIPEXOLE DIHYDROCHLORIDE 1 MG: 1 TABLET ORAL at 21:05

## 2023-01-10 RX ADMIN — GUAIFENESIN 600 MG: 600 TABLET, EXTENDED RELEASE ORAL at 21:05

## 2023-01-10 RX ADMIN — ROCURONIUM BROMIDE 50 MG: 50 INJECTION INTRAVENOUS at 09:32

## 2023-01-10 RX ADMIN — OXYCODONE HYDROCHLORIDE 10 MG: 10 TABLET ORAL at 16:03

## 2023-01-10 RX ADMIN — TRAZODONE HYDROCHLORIDE 50 MG: 50 TABLET ORAL at 21:05

## 2023-01-10 RX ADMIN — ALBUTEROL SULFATE 2 PUFF: 90 AEROSOL, METERED RESPIRATORY (INHALATION) at 11:20

## 2023-01-10 RX ADMIN — DOCUSATE SODIUM 100 MG: 100 CAPSULE, LIQUID FILLED ORAL at 17:12

## 2023-01-10 RX ADMIN — SODIUM CHLORIDE, SODIUM LACTATE, POTASSIUM CHLORIDE, AND CALCIUM CHLORIDE 50 ML/HR: .6; .31; .03; .02 INJECTION, SOLUTION INTRAVENOUS at 08:06

## 2023-01-10 RX ADMIN — PROPOFOL 200 MG: 10 INJECTION, EMULSION INTRAVENOUS at 09:31

## 2023-01-10 RX ADMIN — PHENYLEPHRINE HYDROCHLORIDE 20 MCG/MIN: 10 INJECTION INTRAVENOUS at 09:39

## 2023-01-10 RX ADMIN — BUPIVACAINE 20 ML: 13.3 INJECTION, SUSPENSION, LIPOSOMAL INFILTRATION at 09:00

## 2023-01-10 RX ADMIN — ROCURONIUM BROMIDE 10 MG: 50 INJECTION INTRAVENOUS at 10:32

## 2023-01-10 RX ADMIN — DEXAMETHASONE SODIUM PHOSPHATE 10 MG: 10 INJECTION, SOLUTION INTRAMUSCULAR; INTRAVENOUS at 09:52

## 2023-01-10 RX ADMIN — SUGAMMADEX 200 MG: 100 INJECTION, SOLUTION INTRAVENOUS at 11:11

## 2023-01-10 RX ADMIN — GUAIFENESIN 600 MG: 600 TABLET, EXTENDED RELEASE ORAL at 13:25

## 2023-01-10 RX ADMIN — BUPIVACAINE HYDROCHLORIDE 7.5 ML: 5 INJECTION, SOLUTION EPIDURAL; INTRACAUDAL; PERINEURAL at 09:00

## 2023-01-10 RX ADMIN — Medication 50 MCG: at 11:47

## 2023-01-10 RX ADMIN — PROPOFOL 100 MCG/KG/MIN: 10 INJECTION, EMULSION INTRAVENOUS at 09:36

## 2023-01-10 RX ADMIN — LIDOCAINE HYDROCHLORIDE 50 MG: 10 INJECTION, SOLUTION EPIDURAL; INFILTRATION; INTRACAUDAL; PERINEURAL at 09:30

## 2023-01-10 RX ADMIN — CEFAZOLIN SODIUM 2000 MG: 2 SOLUTION INTRAVENOUS at 09:30

## 2023-01-10 RX ADMIN — FENTANYL CITRATE 50 MCG: 50 INJECTION INTRAMUSCULAR; INTRAVENOUS at 09:30

## 2023-01-10 NOTE — ANESTHESIA PREPROCEDURE EVALUATION
Procedure:  ARTHROPLASTY SHOULDER REVERSE VS HEMIARTHROPLASTY (Right: Shoulder)    Relevant Problems   CARDIO   (+) CAD in native artery   (+) Essential hypertension   (+) Mixed hyperlipidemia      ENDO   (+) Hypothyroidism      GI/HEPATIC   (+) Dysphagia      /RENAL   (+) BPH (benign prostatic hyperplasia)      MUSCULOSKELETAL   (+) Arthritis   (+) Disease characterized by destruction of skeletal muscle   (+) Primary osteoarthritis of right shoulder   (+) Primary osteoarthritis, left shoulder      NEURO/PSYCH   (+) Chronic pain in right shoulder      PULMONARY   (+) Asthma   (+) Moderate persistent asthma   (+) DONALD (obstructive sleep apnea)    •  Left Ventricle: Left ventricular cavity size is normal  Wall thickness    is normal  The left ventricular ejection fraction is 65%  Systolic    function is normal  Wall motion is normal  Diastolic function is normal    for age  •  Right Ventricle: Right ventricular cavity size is normal  Systolic    function is normal    •  Aortic Valve: There is aortic sclerosis  •  Mitral Valve: There is mild regurgitation  •  Tricuspid Valve: There is trace regurgitation  The estimated right   ventricular systolic pressure is 74 58 mmHg  Ray's syndrome   Physical Exam    Airway    Mallampati score: II  TM Distance: >3 FB  Neck ROM: full     Dental       Cardiovascular      Pulmonary      Other Findings        Anesthesia Plan  ASA Score- 2     Anesthesia Type- general with ASA Monitors  Additional Monitors:   Airway Plan: ETT  Comment: IS Block for post op pain per surgeon request          Plan Factors-    Induction-     Postoperative Plan-     Informed Consent- Anesthetic plan and risks discussed with patient  I personally reviewed this patient with the CRNA  Discussed and agreed on the Anesthesia Plan with the CRNA  Max Prieto

## 2023-01-10 NOTE — ANESTHESIA POSTPROCEDURE EVALUATION
Post-Op Assessment Note    CV Status:  Stable  Pain Score: 0    Pain management: adequate     Mental Status:  Alert and awake   Hydration Status:  Euvolemic   PONV Controlled:  Controlled   Airway Patency:  Patent      Post Op Vitals Reviewed: Yes      Staff: CRNA, Anesthesiologist         No notable events documented      BP   146/74   Temp (!) 96 8 °F (36 °C) (01/10/23 1129)    Pulse 87 (01/10/23 1129)   Resp   14   SpO2   94

## 2023-01-10 NOTE — OP NOTE
OPERATIVE REPORT  PATIENT NAME: Boom Echevarria    :  1950  MRN: [id number]  Pt Location:  OR ROOM 15    SURGERY DATE: 1/10/2023     SURGEON: Pelon Rouse MD     ASSISTANT: Kerri Nicole PA-C     NOTE: Kerri Nicole PA-C was present throughout the entire procedure and performed essential assistance with patient prepping, draping, positioning, trial implantation/range of motion, final implant insertion, careful retraction, wound closure, sterile dressing application and sling application, all under my direct supervision  NOTE: No qualified resident physician was available for assistance    SECOND ASSITANT: Emma Craig ATC    PREOPERATIVE DIAGNOSIS: Right Shoulder Glenohumeral Osteoarthritis with Severe Medial and Posterior Glenoid Wear    POSTOPERATIVE DIAGNOSIS: Same    PROCEDURES: Right Shoulder with Reverse Total Shoulder Arthroplasty with Long Head Biceps Tenodesis    ANESTHESIA STAFF: Preston Lin MD     ANESTHESIA TYPE: General with endotracheal tube with ultrasound guided interscalene block (Exparel)  The interscalene block was provided by the anesthesia staff per my request for postoperative pain control and to decrease the use of postoperative narcotic medication for pain control  COMPLICATIONS: None    FINDINGS: Glenohumeral Osteoarthritis    SPECIMEN(S): None    ESTIMATED BLOOD LOSS: Minimal    INDICATIONS FOR PROCEDURE:  The patient is a 81 y o  male presenting with pain and lack of function secondary to glenohumeral osteoarthritis of the right shoulder with severe posterior and medial glenoid wear  After a thorough discussion of the risks and benefits of operative and nonoperative care the patient elected for right reverse total shoulder arthroplasty, having the severe wear if a reverse baseplate was not able to be implanted then hemiarthroplasty would have to be considered  Informed consent was obtained in the office      OPERATIVE TECHNIQUE:  The day of surgery I identified the patient's right shoulder and marked it with my initials  The patient was taken back to the operating room where anesthesia was induced by the anesthesia staff without complication  The patient was placed in the beachchair position with all bony prominences padded  The right shoulder was prepped and draped in routine sterile fashion and after a time-out for safety and confirming 2 grams of IV Cefazolin were given, a standard deltopectoral approach was performed  The dissection was carried down to the subscapularis which was intact  It was released and tagged for repair to the anterior humerus later in the case    The long head of biceps had severe tenosynovitis and degeneration, so it was released and tagged for later tenodesis to the anterior humerus at the end of the case  The humeral head was exposed and found to have severe degenerative change with an intact rotator cuff  The humerus was prepared keeping with the surgical technique for a Tornier Flex reverse prosthesis  After preparing the humerus the glenoid was exposed and prepared for a 25 mm Tornier Perform Plus full wedge augemnted baseplate  The full wedge augment was required to correct the severe wear, there was very little medial bone to fixate into so a post rather than a central screw was required and this was distant with a preoperative templating implant  The baseplate was placed and then secured with a central 7 mm post   Additional fixation was achieved with a 18 mm non-locking screw followed by a superior 26 mm, a 26 mm anterior and an inferior 22 mm locking screws  These achieved excellent fixation of the baseplate to the glenoid  A 39 mm glenosphere was then fixated to the baseplate   The humerus was finished and a size 3A Tornier Flex Stem with a high offset tray and a +9 mm C angle polyethylene (135 degree construct) was trialed and found to have excellent stability with full range of motion and appropriate soft tissue tension  Final implants were placed and the area was irrigated with pulse lavage  The subscapularis was repaired to the anterior humerus and the long head biceps was tenodesed to the anterior humerus with Orthocord sutures  The deltopectoral interval was loosely closed with 0 Vicryl and the subdermal layer with 2-0 Vicryl with staples for skin  Sterile dressings and a sling with abduction pillow was placed and the patient was awoken  The patient was transported to the recovery room in good condition and will be admitted for post-operative care and physical therapy will be initiated following the standard reverse total shoulder arthroplasty protocol      PATIENT DISPOSITION:  Stable to PACU      SIGNATURE: Susie Duncan MD  DATE: January 10, 2023  TIME: 11:11 AM

## 2023-01-10 NOTE — ANESTHESIA PROCEDURE NOTES
Peripheral Block    Patient location during procedure: holding area  Start time: 1/10/2023 9:00 AM  Reason for block: at surgeon's request and post-op pain management  Staffing  Performed: Anesthesiologist   Anesthesiologist: Cheri Espinosa MD  Preanesthetic Checklist  Completed: patient identified, IV checked, site marked, risks and benefits discussed, surgical consent, monitors and equipment checked, pre-op evaluation and timeout performed  Peripheral Block  Patient position: supine  Prep: ChloraPrep  Patient monitoring: continuous pulse ox and frequent blood pressure checks  Block type: interscalene  Laterality: right  Injection technique: single-shot  Procedures: ultrasound guided, Ultrasound guidance required for the procedure to increase accuracy and safety of medication placement and decrease risk of complications    Ultrasound permanent image saved  Needle  Needle type: Stimuplex   Needle gauge: 18 G  Needle length: 5 cm  Needle localization: ultrasound guidance  Test dose: negative  Assessment  Injection assessment: incremental injection and local visualized surrounding nerve on ultrasound  Paresthesia pain: none  Heart rate change: no  Slow fractionated injection: yes  Post-procedure:  site cleaned  patient tolerated the procedure well with no immediate complications

## 2023-01-10 NOTE — DISCHARGE INSTR - AVS FIRST PAGE
What to Expect Before and After Shoulder Replacement Surgery  You are being scheduled for a shoulder replacement by Dr Fely Mahmood to treat your shoulder condition  Here is some information which may help to answer questions that you may have  Please do not hesitate to reach out to our team to answer questions not addressed here  Before Surgery  You will be contacted the evening prior to your surgery to confirm the scheduled time of the procedure and when to arrive at the hospital    Do not eat or drink anything after midnight the night before your surgery so that the anesthesia can be performed safely  If you have been fitted for a sling in the office prior to the surgery please remember to bring it to the hospital   You will meet the anesthesiologist the morning of the surgery  The surgery is performed under a general anesthetic but they will also offer you a regional block (shot to numb the arm) to help control your post-operative pain as well as with a catheter that is left in place after the block  The catheter is connected to a small pump which will continue to provide numbing medicine and help prolong the pain control from the block  Unfortunately this catheter is not as effective as the initial block, but can still be very helpful in managing the pain  After Surgery and in the Hospital  The shoulder replacement surgery typically takes 60-90 minutes  When surgery is completed, your surgeon will update your family and friends on your condition and progress  You will remain in the recovery room for at least an hour or until the anesthesia has worn off and your blood pressure and pulse are stable  If you have pain, the nurses will give you medication  Once out of surgery, your surgeon will decide on how long you will be using a sling in order to protect and position your shoulder  However, this won't keep you from starting physical therapy    Exercises typically begin on the day after surgery with emphasis on moving the shoulder, wrist, and hand  The physical therapist will be provided with a detailed protocol but typically the first 6 weeks are used to regain range of motion and then strengthening is initiated  Starting strengthening exercises too early may lead to complications  When You Are Discharged from the [address] [place] [address] can expect to be released from the hospital the day after surgery (this may change if you have special needs or medical conditions)  Before you are released, the treatment team (Orthopaedic surgery residents, physician assistants and physical therapists) will talk with you about the importance of limiting any sudden or stressful movements to the arm for several weeks or longer  Activities that involve pushing, pulling, and lifting should not be done until you are given permission from your surgeon  Your First Day at [address] [place] [address] may need help with your daily activities, so it is a good idea to have family and friends prepared to help you  It is okay to remove the sling and let the arm hang at the side so that you can get cleaned and change your clothes  To put on a shirt, place the bad arm in first and then the good arm  Reverse to take it off  Don't forget to wear the sling every night for at least the first month after surgery, and never use your arm to push yourself up in bed or from a chair  The added weight on your shoulder may cause you to re-injure the joint  How to [place] in the First Week  You are encouraged to return to your normal eating and sleeping patterns as soon as possible  It is important for you to be active in order to control your weight and muscle tone  It is ok to increase your activity level and even perform light aerobic exercise (like walking or riding a stationary bike) within the first week or so if you are feeling up to it    If there is concern about these activates best to wait until the first post-operative visit and discuss this with the team    Hanna Velázquez might be able to return to work within several days if you can perform your job while wearing a sling  Consult with your doctor, as this differs from patient to patient  However, if your job requires heavy lifting or climbing, there may be a delay for several months  Until you are seen for your first follow-up visit, please try and keep wound dry  It is okay to shower but try your best to keep the incision out of direct contact with the water (or consider using a waterproof bandage)  If it does gets wet please dry as best as possible afterwards  If you notice any drainage or a foul odor from your incision or your temperature goes above 101 5 degrees, please contact the office  What You Can Expect in the First Month  Your first post-operative appointment will be with Dr Alberto Navarro physician assistant (PA) around 2 weeks after the surgery  You skin staples will be removed and X-rays will be obtained at that visit  Please understand that it is quite common to still experience pain at that time but the pain should be steadily improving  Hopefully physical therapy has already begun but if not it will be initiated at this visit and will continue for the 8-12 weeks  At about 12 weeks after surgery you will start a progressive strengthening program  Physical therapy is a deliberate process of not only strengthening your shoulder but also altering how you use your arm  It may be many months before your desired results are achieved, so do not get discouraged  Your shoulder will generally continue to improve steadily up to 6-8 months after surgery  After that point further improvement is very slow; although it has been shown that even after a year or more, activity can increase as muscle strength continues to improve  After the First Month at Home   Because each person heals differently, there are different recovery timelines  An average recovery period typically lasts about between 3-6 months  Talk with your surgeon about which activities will be appropriate for you once you have recovered

## 2023-01-10 NOTE — CONSULTS
Internal Medicine  Consultation Note    Patient: Juan Jose Vivar  Age/sex: 81 y o  male  Medical Record #: [id number]    Assessment/Plan    Status Post right Total Shoulder Arthroplasty  • Continue post op pain control measures as prescribed  • Follow bowel regimen to help decrease narcotic induced constipation  •  Follow post operative hemoglobin with serial CBC and treat accordingly  • Monitor WBC and fever curve post op while encouraging use of incentive spirometer  • DVT prophylaxis in place and reviewed  HTN  • Hold enalapril in the post operative setting to avoid hypotension/ARIANE  • OK to resume on dc  • Add hydralazine as needed for SBP >160  • Monitor trend    Impaired fasting glucose  • Hgb A1c 6 0  • Monitor FBS    Hypothyroid  • Cont levothyroxine as prescribed    Mood disorder  · Cont medications as prescribed    BPH  · Monitor for post op retention    Cough  · Start mucinex  · Encourage fluids      PRE-OP HGB LEVEL: 13 3    Subjective/ HPI: Juan Jose Vivar was seen and examined  Hx of shoulder pain failed out patient conservative measures  Elected to undergo total shoulder arthroplasty We are asked to see patient for post op management of underlying medical co-morbidities as outlined above  Pt did well intra and post operatively with good hemodynamics  Pt currently comfortable and without any reported post op nausea  ROS:   A 10 point ROS was performed; negative except as noted above       Social History:    Substance Use History:   Social History     Substance and Sexual Activity   Alcohol Use Never     Social History     Tobacco Use   Smoking Status Never   Smokeless Tobacco Never     Social History     Substance and Sexual Activity   Drug Use Never       Family History:    Family History   Problem Relation Age of Onset   • Hypertension Mother    • Depression Mother    • Depression Father    • Arthritis Father    • Heart attack Paternal Uncle 32   • Colon cancer Neg Hx    • Prostate cancer Neg Hx          Review of Scheduled Meds:  Current Facility-Administered Medications   Medication Dose Route Frequency Provider Last Rate   • acetaminophen  650 mg Oral Q6H PRN Vishalbette VICTOR MANUEL Prabhakar     • albuterol  2 puff Inhalation Q4H PRN Vishalbette VICTOR MANUEL Prabhakar     • calcium carbonate  1,000 mg Oral Daily PRN Bobbette VICTOR MANUEL Prabhakar     • cefazolin  2,000 mg Intravenous Q8H Vishalbette VICTOR MANUEL Prabhakar     • docusate sodium  100 mg Oral BID Bobbettchente Prabhakar PA-C     • [START ON 1/11/2023] fluticasone-vilanterol  1 puff Inhalation Daily Sis Shearer MD      And   • [START ON 1/11/2023] umeclidinium  1 puff Inhalation Daily Sis Shearer MD     • guaiFENesin  600 mg Oral Q12H [address] Dewy Rose Delay, CRNP     • hydrALAZINE  25 mg Oral Q8H PRN Dewy Rose Delay, CRNP     • HYDROmorphone  0 5 mg Intravenous Q2H PRN Bobbette VICTOR MANUEL Prabhakar     • lactated ringers  1,000 mL Intravenous Once PRN Bobbette VICTOR MANUEL Prabhakar      And   • lactated ringers  1,000 mL Intravenous Once PRN Bobbette VICTO RMANUEL Prabhakar     • lactated ringers  50 mL/hr Intravenous Continuous Angie Gaffney MD 50 mL/hr (01/10/23 0926)   • lactated ringers  125 mL/hr Intravenous Continuous Sis Shearer MD     • lactated ringers  50 mL/hr Intravenous Continuous Ethelyn Serene, CRNA Stopped (01/10/23 1242)   • lactated ringers  100 mL/hr Intravenous Continuous Bobbette VICTOR MANUEL Prabhakar     • [START ON 1/11/2023] lamoTRIgine  200 mg Oral Daily Vishalbecara Prabhakar PA-C     • [START ON 1/11/2023] levothyroxine  100 mcg Oral Early Morning Vishalbettchente Prabhakar PA-C     • [START ON 1/11/2023] modafinil  200 mg Oral Daily Monserrat Prabhakar PA-C     • [START ON 1/11/2023] montelukast  10 mg Oral Daily Monserrat Prabhakar PA-C     • ondansetron  4 mg Intravenous Q6H PRN Monserrat Prabhakar PA-C     • oxyCODONE  10 mg Oral Q4H PRN Monserrat Prabhakar PA-C     • oxyCODONE  5 mg Oral Q4H PRN Janay Kilpatrick VICTOR MANUEL Rooney     • [START ON 2023] pantoprazole  40 mg Oral Early Morning Jd Mancilla PA-C     • pramipexole  1 mg Oral HS Jd Mancilla PA-C     • tamsulosin  0 4 mg Oral After Jose David Francis PA-C         Labs: Invalid input(s): LABGLOM, CMP               Results from last 7 days   Lab Units 01/10/23  1140   POC GLUCOSE mg/dl 135       No results found for: Bozena Coronado, WOUNDCULT, SPUTUMCULTUR    Input and Output Summary (last 24 hours): Intake/Output Summary (Last 24 hours) at 1/10/2023 1256  Last data filed at 1/10/2023 1104  Gross per 24 hour   Intake 500 ml   Output --   Net 500 ml       Imaging:     XR shoulder right 1 view    (Results Pending)       *Labs /Radiology studiesLabs reviewed  *Medications reviewed and reconciled as needed  *Please refer to order section for additional ordered labs studies  *Case discussed with primary attending during morning huddle case rounds    Vitals:   Temp (24hrs), Av 1 °F (36 2 °C), Min:96 5 °F (35 8 °C), Max:98 °F (36 7 °C)    Temp:  [96 5 °F (35 8 °C)-98 °F (36 7 °C)] 98 °F (36 7 °C)  HR:  [74-87] 77  Resp:  [18-22] 21  BP: (123-157)/(74-91) 157/84  SpO2:  [93 %-97 %] 93 %  Body mass index is 36 29 kg/m²  Physical Exam:   General Appearance: no distress, conversive  HEENT: PERRLA, conjuctiva normal; oropharynx clear; mucous membranes moist;   Neck:  Supple, no lymphadenopathy or thyromegaly  Lungs: CTA, normal respiratory effort, no retractions, expiratory effort normal  CV: regular rate and rhythm , PMI normal   ABD: soft non tender, no masses , no hepatic or splenomegaly  EXT: DP pulses intact, no lymphadenopathy, no edema ;  right shoulder dressing in place  Skin: normal turgor, normal texture, no rash  Psych: affect normal, mood normal  Neuro: AAOx3          Invasive Devices     Peripheral Intravenous Line  Duration           Peripheral IV 01/10/23 Dorsal (posterior); Left Hand <1 day Code Status: Level 1 - Full Code  Current Length of Stay: 0 day(s)    Total floor / unit time spent today 1 hour  Coordination of patient's care was performed in conjunction with primary service  Time invested included review of patient's labs, vitals, and management of their comorbidities with continued monitoring, examination of patient as well as answering patient questions, documenting her findings and creating progress note in electronic medical record,  ordering appropriate diagnostic testing  Medical decision making for the day was made by supervising physician unless otherwise noted in their attestation statement  ** Please Note: Fluency Direct voice to text software may have been used in the creation of this document   Audio transcription errors may occur**

## 2023-01-11 ENCOUNTER — APPOINTMENT (EMERGENCY)
Dept: RADIOLOGY | Facility: HOSPITAL | Age: 73
End: 2023-01-11

## 2023-01-11 ENCOUNTER — HOSPITAL ENCOUNTER (INPATIENT)
Facility: HOSPITAL | Age: 73
LOS: 2 days | Discharge: HOME | End: 2023-01-14
Attending: EMERGENCY MEDICINE | Admitting: INTERNAL MEDICINE

## 2023-01-11 VITALS
WEIGHT: 230 LBS | RESPIRATION RATE: 18 BRPM | OXYGEN SATURATION: 91 % | HEART RATE: 83 BPM | SYSTOLIC BLOOD PRESSURE: 131 MMHG | TEMPERATURE: 99 F | DIASTOLIC BLOOD PRESSURE: 78 MMHG | HEIGHT: 67 IN | BODY MASS INDEX: 36.1 KG/M2

## 2023-01-11 DIAGNOSIS — R06.02 SHORTNESS OF BREATH: ICD-10-CM

## 2023-01-11 DIAGNOSIS — R06.00 DYSPNEA: Primary | ICD-10-CM

## 2023-01-11 DIAGNOSIS — J18.9 RLL PNEUMONIA: ICD-10-CM

## 2023-01-11 DIAGNOSIS — F41.9 ANXIETY: ICD-10-CM

## 2023-01-11 DIAGNOSIS — R09.02 HYPOXIA: ICD-10-CM

## 2023-01-11 LAB
ANION GAP SERPL CALCULATED.3IONS-SCNC: 6 MMOL/L (ref 4–13)
BUN SERPL-MCNC: 16 MG/DL (ref 5–25)
CALCIUM SERPL-MCNC: 8.8 MG/DL (ref 8.3–10.1)
CHLORIDE SERPL-SCNC: 100 MMOL/L (ref 96–108)
CO2 SERPL-SCNC: 29 MMOL/L (ref 21–32)
CREAT SERPL-MCNC: 1.2 MG/DL (ref 0.6–1.3)
ERYTHROCYTE [DISTWIDTH] IN BLOOD BY AUTOMATED COUNT: 14.2 % (ref 11.6–15.1)
GFR SERPL CREATININE-BSD FRML MDRD: 60 ML/MIN/1.73SQ M
GLUCOSE SERPL-MCNC: 124 MG/DL (ref 65–140)
HCT VFR BLD AUTO: 38.9 % (ref 36.5–49.3)
HGB BLD-MCNC: 12.5 G/DL (ref 12–17)
MCH RBC QN AUTO: 29.9 PG (ref 26.8–34.3)
MCHC RBC AUTO-ENTMCNC: 32.1 G/DL (ref 31.4–37.4)
MCV RBC AUTO: 93 FL (ref 82–98)
PLATELET # BLD AUTO: 253 THOUSANDS/UL (ref 149–390)
PMV BLD AUTO: 9.9 FL (ref 8.9–12.7)
POTASSIUM SERPL-SCNC: 3.8 MMOL/L (ref 3.5–5.3)
RBC # BLD AUTO: 4.18 MILLION/UL (ref 3.88–5.62)
SODIUM SERPL-SCNC: 135 MMOL/L (ref 135–147)
WBC # BLD AUTO: 13.57 THOUSAND/UL (ref 4.31–10.16)

## 2023-01-11 RX ORDER — ALBUTEROL SULFATE 2.5 MG/3ML
2.5 SOLUTION RESPIRATORY (INHALATION) ONCE
Status: COMPLETED | OUTPATIENT
Start: 2023-01-11 | End: 2023-01-11

## 2023-01-11 RX ORDER — OXYCODONE HYDROCHLORIDE 5 MG/1
TABLET ORAL
Qty: 12 TABLET | Refills: 0 | Status: SHIPPED | OUTPATIENT
Start: 2023-01-11 | End: 2023-01-23 | Stop reason: HOSPADM

## 2023-01-11 RX ADMIN — HYDROMORPHONE HYDROCHLORIDE 0.5 MG: 1 INJECTION, SOLUTION INTRAMUSCULAR; INTRAVENOUS; SUBCUTANEOUS at 04:57

## 2023-01-11 RX ADMIN — PANTOPRAZOLE SODIUM 40 MG: 40 TABLET, DELAYED RELEASE ORAL at 05:43

## 2023-01-11 RX ADMIN — GUAIFENESIN 600 MG: 600 TABLET, EXTENDED RELEASE ORAL at 09:35

## 2023-01-11 RX ADMIN — OXYCODONE HYDROCHLORIDE 5 MG: 5 TABLET ORAL at 03:03

## 2023-01-11 RX ADMIN — CEFAZOLIN SODIUM 2000 MG: 2 SOLUTION INTRAVENOUS at 00:22

## 2023-01-11 RX ADMIN — LEVOTHYROXINE SODIUM 100 MCG: 100 TABLET ORAL at 05:43

## 2023-01-11 RX ADMIN — MONTELUKAST 10 MG: 10 TABLET, FILM COATED ORAL at 09:35

## 2023-01-11 RX ADMIN — LAMOTRIGINE 200 MG: 100 TABLET ORAL at 09:36

## 2023-01-11 RX ADMIN — ALBUTEROL SULFATE 2.5 MG: 2.5 SOLUTION RESPIRATORY (INHALATION) at 23:53

## 2023-01-11 NOTE — PROGRESS NOTES
Progress Note - Orthopedics   French Fontanez 81 y o  male MRN: [id number]  Unit/Bed#: -01      Subjective:    72 y o male POD 1 R rTSA No acute events, no new complaints  Patient doing well  Pain well controlled  Denies fevers, chills, CP, N/V, numbness or tingling  Patient reports no issues with urination or bowel movements  Patient states he is in mild pain with some SOB overnight but improving      Labs:  0   Lab Value Date/Time    HCT 42 5 12/20/2022 1406    HCT 41 5 10/16/2022 0531    HCT 41 3 10/15/2022 0523    HGB 13 3 12/20/2022 1406    HGB 13 6 10/16/2022 0531    HGB 13 9 10/15/2022 0523    INR 0 94 10/13/2022 2152    WBC 7 20 12/20/2022 1406    WBC 6 73 10/16/2022 0531    WBC 6 66 10/15/2022 0523    ESR 15 10/13/2022 2152    CRP 1 8 10/13/2022 2152       Meds:    Current Facility-Administered Medications:   •  acetaminophen (TYLENOL) tablet 650 mg, 650 mg, Oral, Q6H PRN, [place] Greet, PA-C  •  albuterol (PROVENTIL HFA,VENTOLIN HFA) inhaler 2 puff, 2 puff, Inhalation, Q4H PRN, [place] Greet, PA-C  •  calcium carbonate (TUMS) chewable tablet 1,000 mg, 1,000 mg, Oral, Daily PRN, [place] Greet, PA-C  •  docusate sodium (COLACE) capsule 100 mg, 100 mg, Oral, BID, [place] Greet, PA-C, 100 mg at 01/10/23 1712  •  fluticasone-vilanterol 200-25 mcg/actuation 1 puff, 1 puff, Inhalation, Daily **AND** umeclidinium 62 5 mcg/actuation inhaler AEPB 1 puff, 1 puff, Inhalation, Daily, Rola Daily MD  •  guaiFENesin (MUCINEX) 12 hr tablet 600 mg, 600 mg, Oral, Q12H [address], Brian Elizabeth, CRNP, 600 mg at 01/10/23 2105  •  hydrALAZINE (APRESOLINE) tablet 25 mg, 25 mg, Oral, Q8H PRN, LOLLY Howell  •  HYDROmorphone (DILAUDID) injection 0 5 mg, 0 5 mg, Intravenous, Q2H PRN, Javid Diaz PA-C, 0 5 mg at 01/11/23 0678  •  lactated ringers bolus 1,000 mL, 1,000 mL, Intravenous, Once PRN **AND** lactated ringers bolus 1,000 mL, 1,000 mL, Intravenous, Once PRN, Tirso Paniagua VICTOR MANUEL Rooney  •  lactated ringers infusion, 50 mL/hr, Intravenous, Continuous, Bam Lora MD, Last Rate: 50 mL/hr at 01/10/23 0926, Restarted at 01/10/23 1104  •  lactated ringers infusion, 125 mL/hr, Intravenous, Continuous, Maria Ines Hebert MD  •  lactated ringers infusion, 50 mL/hr, Intravenous, Continuous, Shorty Falk CRNA, Stopped at 01/10/23 1242  •  lactated ringers infusion, 100 mL/hr, Intravenous, Continuous, Madhu Monte PA-C, Last Rate: 100 mL/hr at 01/10/23 1307, 100 mL/hr at 01/10/23 1307  •  lamoTRIgine (LaMICtal) tablet 200 mg, 200 mg, Oral, Daily, Madhu Monte PA-C  •  levothyroxine tablet 100 mcg, 100 mcg, Oral, Early Morning, Madhu Monte PA-C  •  modafinil (PROVIGIL) tablet 200 mg, 200 mg, Oral, Daily, Madhu Monte PA-C  •  montelukast (SINGULAIR) tablet 10 mg, 10 mg, Oral, Daily, Madhu Monte PA-C  •  ondansetron [facility]) injection 4 mg, 4 mg, Intravenous, Q6H PRN, Madhu Monte PA-C  •  oxyCODONE (ROXICODONE) immediate release tablet 10 mg, 10 mg, Oral, Q4H PRN, Madhu Monte PA-C, 10 mg at 01/10/23 1603  •  oxyCODONE (ROXICODONE) IR tablet 5 mg, 5 mg, Oral, Q4H PRN, Madhu Monte PA-C, 5 mg at 01/11/23 0303  •  pantoprazole (PROTONIX) EC tablet 40 mg, 40 mg, Oral, Early Morning, Madhu Monte PA-C  •  pramipexole (MIRAPEX) tablet 1 mg, 1 mg, Oral, HS, Madhu Monte PA-C, 1 mg at 01/10/23 2105  •  tamsulosin (FLOMAX) capsule 0 4 mg, 0 4 mg, Oral, After Nitin Haskell, PA-C, 0 4 mg at 01/10/23 1712  •  traZODone (DESYREL) tablet 50 mg, 50 mg, Oral, HS HEATHN, Sammie Head, DO, 50 mg at 01/10/23 2105    Blood Culture:   No results found for: BLOODCX    Wound Culture:   No results found for: WOUNDCULT    Ins and Outs:  I/O last 24 hours: In: 950 [P O :450;  I V :500]  Out: -           Physical:  Vitals:    01/11/23 0308   BP: 134/82   Pulse: 80   Resp: 16   Temp: 98 7 °F (37 1 °C) SpO2: 93%     Musculoskeletal: right Upper Extremity  · Skin warm, dry   No erythema or ecchymosis  · Dressing c/d/i, abduction sling in place  · TTP alis-incisionally  · Sensation intact to median/radial/ulnar nerve distribution   · Motor intact anterior interosseous nerve/posterior interosseous nerve/median/radial/ulnar nerve distributions  · 2+ radial pulse, symmetric bilaterally  · Digits warm and well perfused  · Capillary refill < 2 seconds    Assessment:    72 y o male POD 1 R rTSA   Patient doing well  Will benefit from PT      Plan:  · NWB RUE   · PT/OT  · Pain control  · No DVT ppx needed  · Dispo: Ortho will follow    Jacquelyn Telles MD

## 2023-01-11 NOTE — PHYSICAL THERAPY NOTE
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[address] )    • Manic depression ([facility] [address] )    • Sleep apnea         Past Surgical History  Past Surgical History:   Procedure Laterality Date   • COLONOSCOPY  12/13/2022   • WA ARTHROPLASTY GLENOHUMERAL JOINT TOTAL SHOULDER Right 1/10/2023    Procedure: ARTHROPLASTY SHOULDER REVERSE WITH BICEPS TENODESIS;  Surgeon: Kari Martin MD;  Location: [facility];  Service: Orthopedics   • UMBILICAL HERNIA REPAIR          PT orders received and chart reviewed  Per OT pt is Mod I/S with all mobility and has supportive family who are able to assist as needed  OT reviewed all precautions/ restrictions with pt and pt plans to attend OPPT upon d/c from hospital  No acute care PT needs at this time  Will D/C PT orders      Norm Johan PT,DPT

## 2023-01-11 NOTE — DISCHARGE SUMMARY
ORTHOPEDICS DISCHARGE SUMMARY  Sharon Ramirez 81 y o  male MRN: [id number]  Unit/Bed#: -01    Attending Physician: Alyssa Olsen    Admitting diagnosis: Primary osteoarthritis of right shoulder [M19 011]    Discharge diagnosis: Primary osteoarthritis of right shoulder [M19 011]    Date of admission: 1/10/2023    Date of discharge: 01/11/23         Procedure:  R rTSA    HPI:  This is a 81y o  year old male that presented to the office with signs and symptoms of right shoulder osteoarthritis and/or other pathology  They tried and failed conservative treatment measures and wished to proceed with surgical intervention  The risks, benefits, and complications of the procedure were discussed with the patient and informed consent was obtained  Hospital Course: The patient was admitted to the hospital on 1/10/2023 and underwent an uncomplicated right reverse total shoulder arthroplasty  They were transferred to the floor after a brief stay in the post-anesthesia care unit  Their pain was well managed with IV and oral pain medications  On discharge date pt was cleared by PT and the medicine team and determined to be safe for discharge    0   Lab Value Date/Time    HGB 12 5 01/11/2023 0644    HGB 13 3 12/20/2022 1406    HGB 13 6 10/16/2022 0531    HGB 13 9 10/15/2022 0523    HGB 13 2 10/14/2022 0226    HGB 14 8 09/14/2022 1401        Body mass index is 36 29 kg/m²  moderately obese  Recommend behavior modifications, nutrition and physical activity  Discharge Instructions: The patient was discharged nonweight bearing to the right upper extremity  Refrain from PT/OT until cleared by surgeon  Take pain medications as instructed  Discharge Medications: For the complete list of discharge medications, please refer to the patient's medication reconciliation

## 2023-01-11 NOTE — PROGRESS NOTES
Pastoral Care Progress Note    2023  Patient: Low Menard : 1950  Admission Date & Time: 1/10/2023 0719  MRN: [id number] CSN: [id number]      Pt in good spirits and ready for discharge   encouraged by listening to Pt's thoughts, affirmed his optimism, prayed for his healing                 Chaplaincy Interventions Utilized:   Empowerment: Clarified, confirmed, or reviewed information from treatment team     Exploration: Explored emotional needs & resources and Explored relational needs & resources    Collaboration: Advocated for patient/family    Relationship Building: Cultivated a relationship of care and support    Ritual: Provided prayer

## 2023-01-11 NOTE — OCCUPATIONAL THERAPY NOTE
Occupational Therapy Evaluation + Treatment     Patient Name: Jayson Albright  QXOHR'D Date: 1/11/2023  Problem List  Principal Problem:    Primary osteoarthritis of right shoulder  Active Problems:    S/P reverse total shoulder arthroplasty, right    Past Medical History  Past Medical History:   Diagnosis Date    Arthritis     Asthma     Colon polyp     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HCC)     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[facility] [address] )     Manic depression ([facility] [address] )     Sleep apnea      Past Surgical History  Past Surgical History:   Procedure Laterality Date    COLONOSCOPY  12/13/2022    NH ARTHROPLASTY GLENOHUMERAL JOINT TOTAL SHOULDER Right 1/10/2023    Procedure: ARTHROPLASTY SHOULDER REVERSE WITH BICEPS TENODESIS;  Surgeon: Pallavi Moulton MD;  Location: [facility];  Service: Orthopedics    UMBILICAL HERNIA REPAIR             01/11/23 0850   OT Last Visit   OT Visit Date 01/11/23   Note Type   Note type Evaluation  (+ treatment)   Pain Assessment   Pain Assessment Tool 0-10   Pain Score No Pain   Restrictions/Precautions   Weight Bearing Precautions Per Order Yes   RUE Weight Bearing Per Order (S)  NWB   Braces or Orthoses Sling  (shoulder abd sling)   Other Precautions WBS; Multiple lines; Fall Risk   Home Living   Type of Home   (condo)   Home Layout One level;Elevator  (2-3SSTE complex)   Bathroom Shower/Tub Walk-in shower  (also has tub/shower)   Bathroom Toilet Standard   Bathroom Equipment Grab bars in shower; Shower chair   Home Equipment Electric scooter  (did not use PTA)   Prior Function   Level of [place] Independent with ADLs; Independent with IADLS   Lives With Alone   Receives Help From Family   IADLs Independent with driving; Independent with meal prep; Independent with medication management   Falls in the last 6 months 0   Vocational Retired   Comments Pt reports his wife is living in an apartment in [address] [place] [address] as she is a professor at [employer]   Pt reports he will be moving here soon with her but for now resides in his condo   Lifestyle   Autonomy PTA, pt reports being I with ADLs, IADLs, fnxl mobility, (+)    Reciprocal Relationships Spouse, granddtr/grandson/great granddtr live across the reis from pt in [address] [facility] to Others Semi-retired, consulting work   Intrinsic Gratification Moderately exercising, going on [employer], movies, music   ADL   Where [phone]  Independent   Grooming Assistance 5  Supervision/Setup   UB [address] 4  Minimal Assistance   LB [address] 5  Supervision/Setup    [address]  [address]  [address]  5  Supervision/Setup   Bed Mobility   Supine to Sit Unable to assess   Sit to Supine Unable to assess   Additional Comments Pt seated OOB in chair upon arrival   Transfers   Sit to Stand 6  Modified independent   Additional items Armrests   Stand to Sit 7  Independent   Additional Comments transfers w/o AD   Functional Mobility   Functional Mobility 5  Supervision   Additional Comments initially S, progressed to independent   Balance   Static Sitting Good   Dynamic Sitting Good   Static Standing Fair +   Dynamic Standing Fair +   Ambulatory Fair   Activity Tolerance   Activity Tolerance Patient tolerated treatment well   Medical Staff Made Aware updated CRNP   Nurse Made Aware RN clearance for session   RUE Assessment   RUE Assessment X  (NWB, finger ROM WFL)   LUE Assessment   LUE Assessment WFL   Vision - Complex Assessment   Ocular Range of Motion Intact   Tracking Intact   Saccades Intact   Psychosocial   Psychosocial (WDL) WDL   Cognition   Overall Cognitive Status WFL   Arousal/Participation Alert; Responsive; Cooperative   Attention Within functional limits   Orientation Level Oriented X4   Memory Within functional limits   Following Commands Follows one step commands without difficulty   Comments Pt pleasant and cooperative t/o session   Assessment   Limitation Decreased ADL status; Decreased endurance;Decreased self-care trans;Decreased high-level ADLs   Prognosis Good   Assessment Pt is a 81 y o  male admitted to \[facility]\"" on 1/10/2023 w/ Primary osteoarthritis of right shoulder  Pt now s/p R TSA   has a past medical history of Arthritis, Asthma, Colon polyp, 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[facility] [address] 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[facility] [address] ), Manic depression ([facility] [address] ), and Sleep apnea  Pt with active OT orders and OOB orders  As per pt report, pta, resides alone in a 1STH, 2-3STE  Pt was I w/  ADLS and IADLS, (+) drove  Upon evaluation, pt currently requires independent for transfers and mobility  Pt currently requires I eating, S grooming, MIN A UB ADLs, S LB ADLs, and S toileting  Pt to benefit from additional OT tx session to address HEP, dressing techniques, + formal education  The patient's raw score on the AM-PAC Daily Activity inpatient short form is 19, standardized score is 40 22, greater than 39 4  Patients at this level are likely to benefit from discharge to home  Please refer to the recommendation of the Occupational Therapist for safe discharge planning  Goals   STG Time Frame 1-3   Plan   Treatment Interventions ADL retraining;Functional transfer training;UE strengthening/ROM; Endurance training;Patient/family training;Equipment evaluation/education; Compensatory technique education;Continued evaluation; Energy conservation; Activityengagement   Goal Expiration Date 01/11/23   OT Treatment Day 1   OT Frequency Eval only  (+ follow-up tx)   Recommendation   OT Discharge Recommendation Home with outpatient rehabilitation  (OPPT as scheduled)   AM-PAC Daily Activity Inpatient   Lower Body Dressing 3   Bathing 3   Toileting 3   Upper Body Dressing 3   Grooming 3   Eating 4   Daily Activity Raw Score 19   Daily Activity Standardized Score (Calc for Raw Score >=11) 40 22   AM-PAC Applied Cognition Inpatient   Following a Speech/Presentation 4   Understanding Ordinary Conversation 4   Taking Medications 4   Remembering Where Things Are Placed or Put Away 4   Remembering List of 4-5 Errands 3   Taking Care of Complicated Tasks 3   Applied Cognition Raw Score 22   Applied Cognition Standardized Score 47 83   Additional Treatment Session   Start Time 1447   End Time 0850   Treatment Assessment Pt seen for additional OT tx session  Pt educated on one-handed dressing techniques, WBS, sling management, and pendulum exercises  Pt verbalized c/o of all education  Pt provided with handout to c/o education at home  PT ABLE TO COMPLETE FUNCTIONAL MOBILITY FOR GREATER THAN 100+ FEET AS WELL AS 2+ STEPS FOR STAIR MANAGEMENT AT AN OVERALL SUPERVISION LEVEL IN ORDER TO SAFELY ENTER/EXIT HOME ENVIRONMENT AND COMPLETE COMMUNITY MOBILITY  Pt with supportive family who can assist as needed  Pt did desat to ~ 84-85% with increased activity but improved quickly s/p deep breathing techniques  CRNP aware  From OT standpoint, recommendation would be home with increased social support  No further acute OT needs  D/C OT  Please re-consult if needed  Thank you  Pt was left after session with all current needs met  Additional Treatment Day 1     GOALS      - Pt will complete UB dressing and donning/ doffing abduction sling w/ min A using compensatory techniques as needed  - Pt will complete LB dressing/self care w/ S using DME as needed  - Pt will demonstrate G carryover of pt/caregiver education and training as appropriate, including weight bearing status  - Pt will demonstrate G carryover of pendulum exercises      Danni Quesada MS, OTR/L

## 2023-01-11 NOTE — PLAN OF CARE
Problem: OCCUPATIONAL THERAPY ADULT  Goal: Performs self-care activities at highest level of function for planned discharge setting  See evaluation for individualized goals  Description: Treatment Interventions: ADL retraining, Functional transfer training, UE strengthening/ROM, Endurance training, Patient/family training, Equipment evaluation/education, Compensatory technique education, Continued evaluation, Energy conservation, Activityengagement          See flowsheet documentation for full assessment, interventions and recommendations  Outcome: Completed  Note: Limitation: Decreased ADL status, Decreased endurance, Decreased self-care trans, Decreased high-level ADLs  Prognosis: Good  Assessment: Pt is a 81 y o  male admitted to [facility] on 1/10/2023 w/ Primary osteoarthritis of right shoulder  Pt now s/p R TSA   has a past medical history of Arthritis, Asthma, Colon polyp, 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[facility] [address] ), 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[facility] [address] ), Manic depression ([facility] [address] ), and Sleep apnea  Pt with active OT orders and OOB orders  As per pt report, pta, resides alone in a 1STH, 2-3STE  Pt was I w/  ADLS and IADLS, (+) drove  Upon evaluation, pt currently requires independent for transfers and mobility  Pt currently requires I eating, S grooming, MIN A UB ADLs, S LB ADLs, and S toileting  Pt to benefit from additional OT tx session to address HEP, dressing techniques, + formal education  The patient's raw score on the AM-PAC Daily Activity inpatient short form is 19, standardized score is 40 22, greater than 39 4  Patients at this level are likely to benefit from discharge to home  Please refer to the recommendation of the Occupational Therapist for safe discharge planning       OT Discharge Recommendation: Home with outpatient rehabilitation (OPPT as scheduled)

## 2023-01-11 NOTE — PROGRESS NOTES
Internal Medicine Progress Note  Patient: Allen Tejada  Age/sex: 81 y o  male  Medical Record #: [id number]      ASSESSMENT/PLAN: (Interval History)  Allen Tejada is seen and examined and management for following issues:    Status Post right Total SHOULDER ARTHROPLASTY  • Pain controlled  • Continue encourage incentive spirometry; monitor fever curve  • DVT prophylaxis in place and reviewed  Results from last 7 days   Lab Units 01/11/23  0644   WBC Thousand/uL 13 57*   HEMOGLOBIN g/dL 12 5   HEMATOCRIT % 38 9   PLATELETS Thousands/uL 253       HTN  • Cont to hold enalapril in the post operative setting to avoid hypotension/ARIANE  • OK to resume on dc 1/12  • Add hydralazine as needed for SBP >160  • Monitor trend  • stable     Impaired fasting glucose  • Hgb A1c 6 0  • Monitor FBS     Hypothyroid  • Cont levothyroxine as prescribed     Mood disorder  • Cont medications as prescribed     BPH  • Monitor for post op retention     Cough  • Cont mucinex  • Encourage fluids        PRE-OP HGB LEVEL: 13 3    The above assessment and plan was reviewed and updated as determined by my evaluation of the patient on 1/11/2023      Labs:   Results from last 7 days   Lab Units 01/11/23  0644   WBC Thousand/uL 13 57*   HEMOGLOBIN g/dL 12 5   HEMATOCRIT % 38 9   PLATELETS Thousands/uL 253     Results from last 7 days   Lab Units 01/11/23  0533   SODIUM mmol/L 135   POTASSIUM mmol/L 3 8   CHLORIDE mmol/L 100   CO2 mmol/L 29   BUN mg/dL 16   CREATININE mg/dL 1 20   CALCIUM mg/dL 8 8             Results from last 7 days   Lab Units 01/10/23  1140   POC GLUCOSE mg/dl 135       Review of Scheduled Meds:  Current Facility-Administered Medications   Medication Dose Route Frequency Provider Last Rate   • acetaminophen  650 mg Oral Q6H PRN JUAN DanielsC     • albuterol  2 puff Inhalation Q4H PRN Jack Carreon PAHenriC     • calcium carbonate  1,000 mg Oral Daily PRN Jack Carreon PA-C     • docusate sodium  100 mg Oral BID JUAN McgowanC     • fluticasone-vilanterol  1 puff Inhalation Daily Jo Wisdom MD      And   • umeclidinium  1 puff Inhalation Daily Jo Wisdom MD     • guaiFENesin  600 mg Oral Q12H [address] LOLLY Echols     • hydrALAZINE  25 mg Oral Q8H PRN LOLLY Echols     • HYDROmorphone  0 5 mg Intravenous Q2H PRN MoonJUAN AlfaroC     • lactated ringers  1,000 mL Intravenous Once PRN Moon Thorne PA-C      And   • lactated ringers  1,000 mL Intravenous Once PRN MoonJUAN AlfaroC     • lactated ringers  50 mL/hr Intravenous Continuous Yamil Douglas MD 50 mL/hr (01/10/23 0926)   • lactated ringers  125 mL/hr Intravenous Continuous Jo Wisdom MD     • lactated ringers  50 mL/hr Intravenous Continuous Tressa Marnie, CRNA Stopped (01/10/23 1242)   • lactated ringers  100 mL/hr Intravenous Continuous Moon Thorne PA-C 100 mL/hr (01/10/23 1307)   • lamoTRIgine  200 mg Oral Daily Moon Thorne PA-C     • levothyroxine  100 mcg Oral Early Morning Moon Thorne PA-C     • modafinil  200 mg Oral Daily Moonkeyla Thorne PA-C     • montelukast  10 mg Oral Daily MoonJUAN AlfaroC     • ondansetron  4 mg Intravenous Q6H PRN Moonkeyla Thorne PA-C     • oxyCODONE  10 mg Oral Q4H PRN Moonkeyla Thorne PA-C     • oxyCODONE  5 mg Oral Q4H PRN MoonJUAN AlfaroC     • pantoprazole  40 mg Oral Early Morning Moon Thorne PA-C     • pramipexole  1 mg Oral HS Moonkeyla Thorne PA-C     • tamsulosin  0 4 mg Oral After Ann Dubin, PA-C     • traZODone  50 mg Oral HS PRN Laurita Riggs, [place]         Subjective/ HPI: Jose Ford seen and examined  Patient's overnight issues or events were reviewed with nursing or staff during rounds or morning huddle session  New or overnight issues include the following:     Pt with some mild SOB and hypoxia overnight   Pt states it is better this am, likely secondary to the block  Will try off oxygen and monitor saturations  His lungs are clear      ROS:   A 10 point ROS was performed; negative except as noted above  Imaging:     XR shoulder right 1 view    (Results Pending)       *Labs /Radiology studies Reviewed  *Medications  reviewed and reconciled as needed  *Please refer to order section for additional ordered labs studies  *Case discussed with primary attending during morning huddle case rounds    Physical Examination:  Vitals:   Vitals:    01/10/23 2327 01/11/23 0200 01/11/23 0308 01/11/23 0744   BP: 143/84  134/82 131/78   Pulse: 90  80 83   Resp:   16 18   Temp: 98 1 °F (36 7 °C)  98 7 °F (37 1 °C) 99 °F (37 2 °C)   TempSrc:       SpO2: 95% 94% 93% 94%   Weight:       Height:           General Appearance: no distress, conversive  HEENT: PERRLA, conjuctiva normal; oropharynx clear; mucous membranes moist;   Neck:  Supple, no lymphadenopathy or thyromegaly  Lungs: CTA, normal respiratory effort, no retractions, expiratory effort normal  CV: regular rate and rhythm , PMI normal   ABD: obese, soft non tender, no masses , no hepatic or splenomegaly  EXT: DP pulses intact, no lymphadenopathy, no edema; right shoulder dressing in place  Skin: normal turgor, normal texture, no rash  Psych: affect normal, mood normal  Neuro: AAOx3      The above physical exam was reviewed and updated as determined by my evaluation of the patient on 1/11/2023  Invasive Devices     Peripheral Intravenous Line  Duration           Peripheral IV 01/10/23 Dorsal (posterior); Left Hand 1 day                   VTE Pharmacologic Prophylaxis: Enoxaparin  Code Status: Level 1 - Full Code  Current Length of Stay: 0 day(s)      Total floor / unit time spent today 30 minutes  Coordination of patient's care was performed in conjunction with primary service   Time invested included review of patient's labs, vitals, and management of their comorbidities with continued monitoring, examination of patient as well as answering patient questions, documenting her findings and creating progress note in electronic medical record,  ordering appropriate diagnostic testing  Medical decision making for the day was made by supervising physician unless otherwise noted in their attestation statement  ** Please Note:  voice to text software may have been used in the creation of this document   Although proof errors in transcription or interpretation are a potential of such software**

## 2023-01-11 NOTE — PROGRESS NOTES
Peripheral Nerve Block Follow-up Note - Acute Pain Service    Dominic Gonzalez 81 y o  male MRN: [id number]  Unit/Bed#: -01 Encounter: [id number]      Assessment:   Principal Problem:    Primary osteoarthritis of right shoulder  Active Problems:    S/P reverse total shoulder arthroplasty, right    Dominic Gonzalez is a 81y o  year old male who is postop day 1 from a right reverse total shoulder arthroplasty  Plan:   - Right interscalene block with Exparel was done preoperatively for postop pain control  Overnight patient was requiring oxygen via nasal cannula hypoxia and reporting shortness of breath which has now improved  SPO2 is 94% on room air, shortness of breath is present but overall improved and is reported with ambulation/exertion  He is able to take a deep breath and appears in no acute distress  Overnight hypoxia and shortness of breath is likely secondary to interscalene block which can be expected from this type of block  Given that the patient's shortness of breath and hypoxia have improved, we do not recommend any additional imaging at this time symptoms will continue to improve/resolve as block continues to wear off  Patient instruction included returning to the emergency department/calling surgical team if shortness of breath, dizziness, or lightheadedness would persist posthospitalization  Multimodal analgesia with:  · Changed Tylenol to 975 mg every 8 hours scheduled  · Discontinue Tylenol as needed  · Discontinue IV Dilaudid  · Discontinue oxycodone 10 mg dose  · Change oxycodone to 2 5 mg every 4 hours as needed for moderate pain  · Change oxycodone to 5 mg every 4 hours as needed for severe pain    Bowel management  · Colace 100 mg twice a day  · Add Senokot daily    Recommendations and plan of care discussed with primary care service and anesthesiologist   Roger Ruby to discharge from a pain standpoint as pain is controlled and respiratory symptoms continue to improve      APS will continue to follow  Please contact Acute Pain Service - [facility] via [employer] from [phone] with additional questions or concerns  See [employer] or Bee for additional contacts and after hours information  Pain History  Current pain location(s): right axilla/anterior chest wall   Pain Scale:   0-8  24 hour history: Patient sitting in chair, no acute distress  Reporting pain in his right anterior chest wall/axillary region which improves with as needed oxycodone  Rating current analgesic regimen, no reported adverse effects  Able to wiggle his fingers in his right hand, denied paresthesias in that right hand is able to detect sensation  Decreased sensation noted on right shoulder which is consistent with peripheral block  At rest patient denies shortness of breath and is able to take a deep breath  Noted shortness of breath that was markedly increased with any ambulation overnight and early this morning which is now improving  Patient is no longer requiring oxygen, SPO2 is 94% on room air       Opioid requirement previous 24 hours: Oxycodone 15 mg, IV Dilaudid 0 5 mg    Meds/Allergies   all current active meds have been reviewed, current meds:   Current Facility-Administered Medications   Medication Dose Route Frequency   • acetaminophen (TYLENOL) tablet 650 mg  650 mg Oral Q6H PRN   • albuterol (PROVENTIL HFA,VENTOLIN HFA) inhaler 2 puff  2 puff Inhalation Q4H PRN   • calcium carbonate (TUMS) chewable tablet 1,000 mg  1,000 mg Oral Daily PRN   • docusate sodium (COLACE) capsule 100 mg  100 mg Oral BID   • fluticasone-vilanterol 200-25 mcg/actuation 1 puff  1 puff Inhalation Daily    And   • umeclidinium 62 5 mcg/actuation inhaler AEPB 1 puff  1 puff Inhalation Daily   • guaiFENesin (MUCINEX) 12 hr tablet 600 mg  600 mg Oral Q12H WICHO   • hydrALAZINE (APRESOLINE) tablet 25 mg  25 mg Oral Q8H PRN   • HYDROmorphone (DILAUDID) injection 0 5 mg  0 5 mg Intravenous Q2H PRN   • lactated ringers bolus 1,000 mL  1,000 mL Intravenous Once PRN    And   • lactated ringers bolus 1,000 mL  1,000 mL Intravenous Once PRN   • lactated ringers infusion  50 mL/hr Intravenous Continuous   • lactated ringers infusion  125 mL/hr Intravenous Continuous   • lactated ringers infusion  50 mL/hr Intravenous Continuous   • lactated ringers infusion  100 mL/hr Intravenous Continuous   • lamoTRIgine (LaMICtal) tablet 200 mg  200 mg Oral Daily   • levothyroxine tablet 100 mcg  100 mcg Oral Early Morning   • modafinil (PROVIGIL) tablet 200 mg  200 mg Oral Daily   • montelukast (SINGULAIR) tablet 10 mg  10 mg Oral Daily   • ondansetron (ZOFRAN) injection 4 mg  4 mg Intravenous Q6H PRN   • oxyCODONE (ROXICODONE) immediate release tablet 10 mg  10 mg Oral Q4H PRN   • oxyCODONE (ROXICODONE) IR tablet 5 mg  5 mg Oral Q4H PRN   • pantoprazole (PROTONIX) EC tablet 40 mg  40 mg Oral Early Morning   • pramipexole (MIRAPEX) tablet 1 mg  1 mg Oral HS   • tamsulosin (FLOMAX) capsule 0 4 mg  0 4 mg Oral After Dinner   • traZODone (DESYREL) tablet 50 mg  50 mg Oral HS PRN    and PTA meds:   Prior to Admission Medications   Prescriptions Last Dose Informant Patient Reported? Taking?    Diclofenac Sodium (VOLTAREN) 1 %   No Yes   Sig: Apply 2 g topically 4 (four) times a day   Trelegy Ellipta 200-62 5-25 MCG/INH AEPB inhaler 1/9/2023  No Yes   Sig: Inhale 1 puff daily   albuterol (PROVENTIL HFA,VENTOLIN HFA) 90 mcg/act inhaler   No Yes   Sig: Inhale 2 puffs every 4 (four) hours as needed for wheezing   enalapril (VASOTEC) 10 mg tablet 1/8/2023  No Yes   Sig: Take 1 tablet (10 mg total) by mouth daily   lamoTRIgine (LaMICtal) 200 MG tablet 1/10/2023  Yes Yes   Sig: Take 200 mg by mouth daily   levothyroxine 100 mcg tablet 1/9/2023  No Yes   Sig: Take 1 tablet (100 mcg total) by mouth daily   meloxicam (Mobic) 15 mg tablet 1/3/2023  No Yes   Sig: Take 1 tablet (15 mg total) by mouth daily   modafinil (PROVIGIL) 200 MG tablet More than a month  Yes No   Sig: Take 200 mg by mouth daily   montelukast (SINGULAIR) 10 mg tablet 2023  Yes Yes   Sig: Take 10 mg by mouth in the morning   ondansetron (ZOFRAN) 4 mg tablet 2023  No Yes   Sig: Take 1 tablet (4 mg total) by mouth every 8 (eight) hours as needed for nausea or vomiting   pantoprazole (PROTONIX) 40 mg tablet 2023  No Yes   Sig: Take 1 tablet (40 mg total) by mouth daily Take 1 pill half hour before breakfast    polyethylene glycol (GOLYTELY) 4000 mL solution   No No   Sig: Take 4,000 mL by mouth once for 1 dose   pramipexole (MIRAPEX) 1 mg tablet 2023  Yes Yes   Sig: Take 2 mg by mouth daily at bedtime   tamsulosin (FLOMAX) 0 4 mg 2023  Yes Yes   Si 4 mg in the morning      Facility-Administered Medications: None       Allergies   Allergen Reactions   • Simvastatin Shortness Of Breath   • Spiriva Respimat [Tiotropium Bromide Monohydrate] Rash       Objective     Temp:  [96 8 °F (36 °C)-99 8 °F (37 7 °C)] 99 °F (37 2 °C)  HR:  [] 83  Resp:  [16-34] 18  BP: (123-157)/(73-84) 131/78    Physical Exam  Vitals reviewed  Constitutional:       General: He is awake  He is not in acute distress  Appearance: Normal appearance  He is not ill-appearing, toxic-appearing or diaphoretic  HENT:      Head: Normocephalic and atraumatic  Nose: Nose normal  No congestion or rhinorrhea  Mouth/Throat:      Mouth: Mucous membranes are moist    Eyes:      Extraocular Movements: Extraocular movements intact  Cardiovascular:      Rate and Rhythm: Normal rate  Pulmonary:      Effort: Pulmonary effort is normal  No tachypnea, bradypnea or respiratory distress  Breath sounds: Examination of the right-middle field reveals decreased breath sounds  Examination of the right-lower field reveals decreased breath sounds  Decreased breath sounds present  No wheezing, rhonchi or rales  Musculoskeletal:         General: Tenderness (right anterior chest wall/axilla ) present     Skin:     General: Skin is warm and dry       Coloration: Skin is not pale  Findings: No rash  Comments: Right should dressing intact   Neurological:      Mental Status: He is alert and oriented to person, place, and time  Mental status is at baseline  Sensory: Sensory deficit (right anterior shoulder ) present  Motor: No tremor  Comments: Able to detect sensation to right fingers and able to hand grasp without difficulty    Psychiatric:         Attention and Perception: Attention normal          Mood and Affect: Mood normal  Mood is not anxious  Speech: Speech normal          Behavior: Behavior normal  Behavior is cooperative  Cognition and Memory: Cognition and memory normal            Lab Results:   Results from last 7 days   Lab Units 01/11/23  0644   WBC Thousand/uL 13 57*   HEMOGLOBIN g/dL 12 5   HEMATOCRIT % 38 9   PLATELETS Thousands/uL 253      Results from last 7 days   Lab Units 01/11/23  0533   POTASSIUM mmol/L 3 8   CHLORIDE mmol/L 100   CO2 mmol/L 29   BUN mg/dL 16   CREATININE mg/dL 1 20   CALCIUM mg/dL 8 8           Please note that the APS provides consultative services regarding pain management only  With the exception of ketamine, peripheral nerve catheters, and epidural infusions (and except when indicated), final decisions regarding starting or changing doses of analgesic medications are at the discretion of the consulting service  Off hours consultation and/or medication management is generally not available      LOLLY Davison  Acute Pain Service

## 2023-01-12 ENCOUNTER — APPOINTMENT (EMERGENCY)
Dept: CT IMAGING | Facility: HOSPITAL | Age: 73
End: 2023-01-12

## 2023-01-12 PROBLEM — J18.9 RIGHT LOWER LOBE PNEUMONIA: Status: ACTIVE | Noted: 2023-01-12

## 2023-01-12 PROBLEM — E87.1 HYPONATREMIA: Status: ACTIVE | Noted: 2023-01-12

## 2023-01-12 PROBLEM — J96.01 ACUTE RESPIRATORY FAILURE WITH HYPOXIA (HCC): Status: ACTIVE | Noted: 2023-01-12

## 2023-01-12 LAB
2HR DELTA HS TROPONIN: -1 NG/L
ALBUMIN SERPL BCP-MCNC: 3.9 G/DL (ref 3.5–5)
ALP SERPL-CCNC: 78 U/L (ref 34–104)
ALT SERPL W P-5'-P-CCNC: 49 U/L (ref 7–52)
ANION GAP SERPL CALCULATED.3IONS-SCNC: 8 MMOL/L (ref 4–13)
AST SERPL W P-5'-P-CCNC: 57 U/L (ref 13–39)
ATRIAL RATE: 78 BPM
BASOPHILS # BLD AUTO: 0.04 THOUSANDS/ÂΜL (ref 0–0.1)
BASOPHILS NFR BLD AUTO: 0 % (ref 0–1)
BILIRUB SERPL-MCNC: 0.38 MG/DL (ref 0.2–1)
BUN SERPL-MCNC: 19 MG/DL (ref 5–25)
CALCIUM SERPL-MCNC: 8.7 MG/DL (ref 8.4–10.2)
CARDIAC TROPONIN I PNL SERPL HS: 8 NG/L
CARDIAC TROPONIN I PNL SERPL HS: 9 NG/L
CHLORIDE SERPL-SCNC: 99 MMOL/L (ref 96–108)
CO2 SERPL-SCNC: 27 MMOL/L (ref 21–32)
CREAT SERPL-MCNC: 1.07 MG/DL (ref 0.6–1.3)
EOSINOPHIL # BLD AUTO: 0.04 THOUSAND/ÂΜL (ref 0–0.61)
EOSINOPHIL NFR BLD AUTO: 0 % (ref 0–6)
ERYTHROCYTE [DISTWIDTH] IN BLOOD BY AUTOMATED COUNT: 14.4 % (ref 11.6–15.1)
FLUAV RNA RESP QL NAA+PROBE: NEGATIVE
FLUBV RNA RESP QL NAA+PROBE: NEGATIVE
GFR SERPL CREATININE-BSD FRML MDRD: 69 ML/MIN/1.73SQ M
GLUCOSE SERPL-MCNC: 126 MG/DL (ref 65–140)
HCT VFR BLD AUTO: 37.8 % (ref 36.5–49.3)
HGB BLD-MCNC: 12 G/DL (ref 12–17)
IMM GRANULOCYTES # BLD AUTO: 0.08 THOUSAND/UL (ref 0–0.2)
IMM GRANULOCYTES NFR BLD AUTO: 1 % (ref 0–2)
LYMPHOCYTES # BLD AUTO: 1.04 THOUSANDS/ÂΜL (ref 0.6–4.47)
LYMPHOCYTES NFR BLD AUTO: 9 % (ref 14–44)
MCH RBC QN AUTO: 29.7 PG (ref 26.8–34.3)
MCHC RBC AUTO-ENTMCNC: 31.7 G/DL (ref 31.4–37.4)
MCV RBC AUTO: 94 FL (ref 82–98)
MONOCYTES # BLD AUTO: 1.1 THOUSAND/ÂΜL (ref 0.17–1.22)
MONOCYTES NFR BLD AUTO: 10 % (ref 4–12)
NEUTROPHILS # BLD AUTO: 8.91 THOUSANDS/ÂΜL (ref 1.85–7.62)
NEUTS SEG NFR BLD AUTO: 80 % (ref 43–75)
NRBC BLD AUTO-RTO: 0 /100 WBCS
P AXIS: 52 DEGREES
PLATELET # BLD AUTO: 226 THOUSANDS/UL (ref 149–390)
PMV BLD AUTO: 10.1 FL (ref 8.9–12.7)
POTASSIUM SERPL-SCNC: 3.9 MMOL/L (ref 3.5–5.3)
PR INTERVAL: 160 MS
PROCALCITONIN SERPL-MCNC: 0.26 NG/ML
PROT SERPL-MCNC: 6.6 G/DL (ref 6.4–8.4)
QRS AXIS: 17 DEGREES
QRSD INTERVAL: 90 MS
QT INTERVAL: 370 MS
QTC INTERVAL: 421 MS
RBC # BLD AUTO: 4.04 MILLION/UL (ref 3.88–5.62)
RSV RNA RESP QL NAA+PROBE: NEGATIVE
SARS-COV-2 RNA RESP QL NAA+PROBE: NEGATIVE
SODIUM SERPL-SCNC: 134 MMOL/L (ref 135–147)
T WAVE AXIS: 64 DEGREES
VENTRICULAR RATE: 78 BPM
WBC # BLD AUTO: 11.21 THOUSAND/UL (ref 4.31–10.16)

## 2023-01-12 RX ORDER — CALCIUM CARBONATE 200(500)MG
1000 TABLET,CHEWABLE ORAL DAILY PRN
Status: DISCONTINUED | OUTPATIENT
Start: 2023-01-12 | End: 2023-01-14 | Stop reason: HOSPADM

## 2023-01-12 RX ORDER — ONDANSETRON 2 MG/ML
4 INJECTION INTRAMUSCULAR; INTRAVENOUS EVERY 6 HOURS PRN
Status: DISCONTINUED | OUTPATIENT
Start: 2023-01-12 | End: 2023-01-14 | Stop reason: HOSPADM

## 2023-01-12 RX ORDER — ALPRAZOLAM 0.25 MG/1
0.25 TABLET ORAL
Status: DISCONTINUED | OUTPATIENT
Start: 2023-01-12 | End: 2023-01-13

## 2023-01-12 RX ORDER — PANTOPRAZOLE SODIUM 40 MG/1
40 TABLET, DELAYED RELEASE ORAL
Status: DISCONTINUED | OUTPATIENT
Start: 2023-01-12 | End: 2023-01-14 | Stop reason: HOSPADM

## 2023-01-12 RX ORDER — LAMOTRIGINE 100 MG/1
200 TABLET ORAL DAILY
Status: DISCONTINUED | OUTPATIENT
Start: 2023-01-12 | End: 2023-01-14 | Stop reason: HOSPADM

## 2023-01-12 RX ORDER — ALBUTEROL SULFATE 90 UG/1
2 AEROSOL, METERED RESPIRATORY (INHALATION) EVERY 4 HOURS PRN
Status: DISCONTINUED | OUTPATIENT
Start: 2023-01-12 | End: 2023-01-14 | Stop reason: HOSPADM

## 2023-01-12 RX ORDER — BENZONATATE 100 MG/1
100 CAPSULE ORAL 3 TIMES DAILY
Status: DISCONTINUED | OUTPATIENT
Start: 2023-01-12 | End: 2023-01-14 | Stop reason: HOSPADM

## 2023-01-12 RX ORDER — MODAFINIL 100 MG/1
200 TABLET ORAL DAILY
Status: DISCONTINUED | OUTPATIENT
Start: 2023-01-12 | End: 2023-01-14 | Stop reason: HOSPADM

## 2023-01-12 RX ORDER — LISINOPRIL 10 MG/1
20 TABLET ORAL DAILY
Status: DISCONTINUED | OUTPATIENT
Start: 2023-01-12 | End: 2023-01-13

## 2023-01-12 RX ORDER — ENOXAPARIN SODIUM 100 MG/ML
40 INJECTION SUBCUTANEOUS DAILY
Status: DISCONTINUED | OUTPATIENT
Start: 2023-01-12 | End: 2023-01-14 | Stop reason: HOSPADM

## 2023-01-12 RX ORDER — OXYCODONE HYDROCHLORIDE 5 MG/1
5 TABLET ORAL EVERY 4 HOURS PRN
Status: DISCONTINUED | OUTPATIENT
Start: 2023-01-12 | End: 2023-01-14 | Stop reason: HOSPADM

## 2023-01-12 RX ORDER — LANOLIN ALCOHOL/MO/W.PET/CERES
3 CREAM (GRAM) TOPICAL ONCE
Status: COMPLETED | OUTPATIENT
Start: 2023-01-12 | End: 2023-01-12

## 2023-01-12 RX ORDER — LANOLIN ALCOHOL/MO/W.PET/CERES
3 CREAM (GRAM) TOPICAL
Status: DISCONTINUED | OUTPATIENT
Start: 2023-01-12 | End: 2023-01-12

## 2023-01-12 RX ORDER — BENZONATATE 100 MG/1
100 CAPSULE ORAL ONCE
Status: COMPLETED | OUTPATIENT
Start: 2023-01-12 | End: 2023-01-12

## 2023-01-12 RX ORDER — LANOLIN ALCOHOL/MO/W.PET/CERES
3 CREAM (GRAM) TOPICAL
Status: DISCONTINUED | OUTPATIENT
Start: 2023-01-12 | End: 2023-01-14 | Stop reason: HOSPADM

## 2023-01-12 RX ORDER — SODIUM CHLORIDE 9 MG/ML
75 INJECTION, SOLUTION INTRAVENOUS CONTINUOUS
Status: DISCONTINUED | OUTPATIENT
Start: 2023-01-12 | End: 2023-01-12

## 2023-01-12 RX ORDER — BENZONATATE 100 MG/1
100 CAPSULE ORAL 3 TIMES DAILY PRN
Status: DISCONTINUED | OUTPATIENT
Start: 2023-01-12 | End: 2023-01-12

## 2023-01-12 RX ORDER — MELOXICAM 7.5 MG/1
15 TABLET ORAL DAILY
Status: DISCONTINUED | OUTPATIENT
Start: 2023-01-12 | End: 2023-01-13

## 2023-01-12 RX ORDER — FUROSEMIDE 10 MG/ML
40 INJECTION INTRAMUSCULAR; INTRAVENOUS ONCE
Status: DISCONTINUED | OUTPATIENT
Start: 2023-01-12 | End: 2023-01-13

## 2023-01-12 RX ORDER — POLYETHYLENE GLYCOL 3350 17 G/17G
17 POWDER, FOR SOLUTION ORAL DAILY
Status: DISCONTINUED | OUTPATIENT
Start: 2023-01-12 | End: 2023-01-14 | Stop reason: HOSPADM

## 2023-01-12 RX ORDER — LEVOTHYROXINE SODIUM 0.1 MG/1
100 TABLET ORAL
Status: DISCONTINUED | OUTPATIENT
Start: 2023-01-12 | End: 2023-01-14 | Stop reason: HOSPADM

## 2023-01-12 RX ORDER — TAMSULOSIN HYDROCHLORIDE 0.4 MG/1
0.4 CAPSULE ORAL DAILY
Status: DISCONTINUED | OUTPATIENT
Start: 2023-01-12 | End: 2023-01-14 | Stop reason: HOSPADM

## 2023-01-12 RX ORDER — ACETAMINOPHEN 325 MG/1
650 TABLET ORAL EVERY 6 HOURS PRN
Status: DISCONTINUED | OUTPATIENT
Start: 2023-01-12 | End: 2023-01-14 | Stop reason: HOSPADM

## 2023-01-12 RX ORDER — PRAMIPEXOLE DIHYDROCHLORIDE 0.5 MG/1
2 TABLET ORAL
Status: DISCONTINUED | OUTPATIENT
Start: 2023-01-12 | End: 2023-01-14 | Stop reason: HOSPADM

## 2023-01-12 RX ADMIN — BENZONATATE 100 MG: 100 CAPSULE ORAL at 02:55

## 2023-01-12 RX ADMIN — CEFTRIAXONE SODIUM 1000 MG: 10 INJECTION, POWDER, FOR SOLUTION INTRAVENOUS at 03:18

## 2023-01-12 RX ADMIN — LISINOPRIL 20 MG: 10 TABLET ORAL at 09:16

## 2023-01-12 RX ADMIN — ENOXAPARIN SODIUM 40 MG: 40 INJECTION SUBCUTANEOUS at 09:16

## 2023-01-12 RX ADMIN — ACETAMINOPHEN 650 MG: 325 TABLET, FILM COATED ORAL at 09:15

## 2023-01-12 RX ADMIN — SERTRALINE HYDROCHLORIDE 50 MG: 50 TABLET ORAL at 05:32

## 2023-01-12 RX ADMIN — BENZONATATE 100 MG: 100 CAPSULE ORAL at 17:30

## 2023-01-12 RX ADMIN — ONDANSETRON 4 MG: 2 INJECTION INTRAMUSCULAR; INTRAVENOUS at 22:54

## 2023-01-12 RX ADMIN — IOHEXOL 85 ML: 350 INJECTION, SOLUTION INTRAVENOUS at 01:39

## 2023-01-12 RX ADMIN — POLYETHYLENE GLYCOL 3350 17 G: 17 POWDER, FOR SOLUTION ORAL at 09:16

## 2023-01-12 RX ADMIN — PANTOPRAZOLE SODIUM 40 MG: 40 TABLET, DELAYED RELEASE ORAL at 05:32

## 2023-01-12 RX ADMIN — MODAFINIL 200 MG: 100 TABLET ORAL at 09:15

## 2023-01-12 RX ADMIN — LAMOTRIGINE 200 MG: 100 TABLET ORAL at 09:16

## 2023-01-12 RX ADMIN — LEVOTHYROXINE SODIUM 100 MCG: 100 TABLET ORAL at 05:32

## 2023-01-12 RX ADMIN — BENZONATATE 100 MG: 100 CAPSULE ORAL at 09:15

## 2023-01-12 RX ADMIN — PRAMIPEXOLE DIHYDROCHLORIDE 2 MG: 0.5 TABLET ORAL at 21:43

## 2023-01-12 RX ADMIN — SODIUM CHLORIDE 75 ML/HR: 0.9 INJECTION, SOLUTION INTRAVENOUS at 05:32

## 2023-01-12 RX ADMIN — ACETAMINOPHEN 650 MG: 325 TABLET, FILM COATED ORAL at 14:25

## 2023-01-12 RX ADMIN — BENZONATATE 100 MG: 100 CAPSULE ORAL at 21:43

## 2023-01-12 RX ADMIN — OXYCODONE HYDROCHLORIDE 5 MG: 5 TABLET ORAL at 21:43

## 2023-01-12 RX ADMIN — Medication 3 MG: at 03:14

## 2023-01-12 RX ADMIN — Medication 3 MG: at 21:44

## 2023-01-12 RX ADMIN — ALPRAZOLAM 0.25 MG: 0.25 TABLET ORAL at 22:18

## 2023-01-12 RX ADMIN — Medication 500 MG: at 03:45

## 2023-01-12 RX ADMIN — TAMSULOSIN HYDROCHLORIDE 0.4 MG: 0.4 CAPSULE ORAL at 09:15

## 2023-01-12 NOTE — ASSESSMENT & PLAN NOTE
Lab Results   Component Value Date    SODIUM 132 (L) 01/13/2023    SODIUM 134 (L) 01/11/2023     Sodium 135 on admission and 134 this morning  Hyponatremia in the setting of poor oral intake due to recent surgery and pneumonia  Plans:  Recheck BMP tomorrow morning

## 2023-01-12 NOTE — H&P
[facility]  H&P- Annabelle Unicoi 1950, 81 y o  male MRN: [id number]  Unit/Bed#: S -Dianne Encounter: [id number]  Primary Care Provider: Matthew Ibrahim DO   Date and time admitted to hospital: 1/11/2023 10:56 PM    * Right lower lobe pneumonia  Assessment & Plan  Lab Results   Component Value Date    SARSCOV2 Negative 01/11/2023    INFLUA Negative 01/11/2023    INFLUB Negative 01/11/2023    RSV Negative 01/11/2023       Recent Labs     01/11/23  0644 01/11/23  2348   WBC 13 57* 11 21*     No results for input(s): PROCALCITONI in the last 72 hours  Chest x-ray read by me showed infiltrates in the right lower lobe concerning for pneumonia  Given patient's history of food choking yesterday aspiration pneumonia is very likely  Patient had received a dose of azithromycin and Rocephin each in the ED  Plan:  · Follow Urine Strep, Legionella, sputum cultures, Procalcitonin a m  · Continue IV ceftriaxone 1 g q  24 hours  · Tessalon Perles for cough  · Albuterol Nebs 2 puffs q6 h PRN  · Incentive spirometry        Acute respiratory failure with hypoxia [facility])  Assessment & Plan  Patient came in with desaturations 88-91% from his baseline > 95%  Patient was put on 3 L/min of oxygen in the ED  Currently on 2 L/min of oxygen via nasal cannula  He has history of obstructive sleep apnea  Not using CPAP  Chest x-ray and CT chest showed right lower lobe pneumonia  Acute hypoxic respiratory failure most likely due to pneumonia versus aspiration pneumonia  Please see plan for right lower lobe pneumonia  Hyponatremia  Assessment & Plan  Lab Results   Component Value Date    SODIUM 134 (L) 01/11/2023    SODIUM 135 01/11/2023     Sodium 135 on admission and 134 this morning  Hyponatremia in the setting of poor oral intake due to recent surgery and pneumonia  Plans:  Continue gentle IV hydration  Recheck BMP tomorrow morning      History of eosinophilic pneumonia ([facility] [address] )  Assessment & Plan  Patient stated that 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 ago  Osteoarthritis of right shoulder  Assessment & Plan  Patient had underwent an uncomplicated right reverse total shoulder arthroplasty for OA on 01/10/2023  VTE Pharmacologic Prophylaxis: VTE Score: 8 High Risk (Score >/= 5) - Pharmacological DVT Prophylaxis Ordered: enoxaparin (Lovenox)  Sequential Compression Devices Ordered  Code Status: Level 1 - Full Code   Discussion with family: Patient declined call to   Patient said that his wife was with him in the ED  she just left as she has to go to work in the morning  Anticipated Length of Stay: Patient will be admitted on an inpatient basis with an anticipated length of stay of greater than 2 midnights secondary to Pneumonia  Chief Complaint: Shortness of breath and hypoxia    History of Present Illness:  Cristobal Vazquez is a 81 y o  male with a PMH of hypertension, hyperlipidemia, coronary artery disease, asthma, bipolar disorder, with s/p reverse total shoulder surgery for OA 2 days ago who presents with increasing shortness of breath on 01/11/2023  Patient reports that he has had some exertional dyspnea over the past 2 days  He had a right shoulder surgery on 01/10/23 and he was discharged yesterday and he presented to the ED again on the same day with increasing shortness of breath  Per patient, his oxygen level at home was in the high 80s to low 90s (88-91%) yesterday  His O2 saturation is more than 95% at home  his baseline he reports that he had difficulty getting to the bathroom due to shortness of breath  He also endorsed that he has productive cough with clear phlegm  He has some nausea  He denied fever, chills, vomiting, chest pain, palpitation, abdominal pain  He endorsed that he choked with a piece of food yesterday while he was eating breakfast burrito  In the ED, flu COVID RSV swab was negative   troponin was 9, EKG unremarkable  He has leukocytosis on admission, however he did not meet SIRS criteria  His chest x-ray showed questionable right lower lobe pneumonia  CTA chest PE study was done to evaluate for the possibility of VTE versus pneumonia  CT showed evidence for right lower lobe atelectasis, with likely associated pneumonia  He had received nebulizer and antibiotics (azithromycin and Rocephin) in the ED  Patient is admitted for further management of pneumonia  Review of Systems:  Review of Systems   Constitutional: Negative for chills, fatigue and fever  HENT: Negative for congestion, drooling, ear pain, postnasal drip, rhinorrhea, sore throat, trouble swallowing and voice change  Eyes: Negative for pain and visual disturbance  Respiratory: Positive for cough, choking and shortness of breath  Negative for wheezing  Cardiovascular: Negative for chest pain, palpitations and leg swelling  Gastrointestinal: Positive for nausea  Negative for abdominal pain, diarrhea and vomiting  Endocrine: Negative  Genitourinary: Negative  Negative for dysuria and hematuria  Musculoskeletal: Negative for arthralgias and back pain  Skin: Negative for color change and rash  Allergic/Immunologic: Negative  Neurological: Negative  Negative for seizures and syncope  Hematological: Negative  Psychiatric/Behavioral: Negative  All other systems reviewed and are negative        Past Medical and Surgical History:   Past Medical History:   Diagnosis Date   • Arthritis    • Asthma    • Colon polyp    • 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[address] )    • 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 Authorizing Provider   albuterol (PROVENTIL HFA,VENTOLIN HFA) 90 mcg/act inhaler Inhale 2 puffs every 4 (four) hours as needed for wheezing 12/2/22  Yes Latisha Vernon DO   Diclofenac Sodium (VOLTAREN) 1 % Apply 2 g topically 4 (four) times a day 11/22/22  Yes Latisha Vernon DO   enalapril (VASOTEC) 10 mg tablet Take 1 tablet (10 mg total) by mouth daily 12/20/22  Yes Latisha Vernon DO   lamoTRIgine (LaMICtal) 200 MG tablet Take 200 mg by mouth daily 8/15/22  Yes Historical Provider, MD   levothyroxine 100 mcg tablet Take 1 tablet (100 mcg total) by mouth daily 12/29/22  Yes Latisha Vernon DO   meloxicam (Mobic) 15 mg tablet Take 1 tablet (15 mg total) by mouth daily 12/20/22  Yes Brian Bergman PA-C   modafinil (PROVIGIL) 200 MG tablet Take 200 mg by mouth daily 6/10/22  Yes Historical Provider, MD   montelukast (SINGULAIR) 10 mg tablet Take 10 mg by mouth in the morning 8/11/22  Yes Historical Provider, MD   ondansetron (ZOFRAN) 4 mg tablet Take 1 tablet (4 mg total) by mouth every 8 (eight) hours as needed for nausea or vomiting 12/28/22  Yes Kai Anthony PA-C   oxyCODONE (ROXICODONE) 5 immediate release tablet 1 tablets every 8 hours as needed for severe shoulder pain only  1/11/23  Yes Jael Welch PA-C   pantoprazole (PROTONIX) 40 mg tablet Take 1 tablet (40 mg total) by mouth daily Take 1 pill half hour before breakfast  12/21/22  Yes Iza Huang MD   pramipexole (MIRAPEX) 1 mg tablet Take 2 mg by mouth daily at bedtime 8/15/22  Yes Historical Provider, MD   tamsulosin (FLOMAX) 0 4 mg 0 4 mg in the morning 8/11/22  Yes Historical Provider, MD Chris Sanabria 200-62 5-25 MCG/INH AEPB inhaler Inhale 1 puff daily 9/28/22  Yes Irena Lucia, DO     I have reviewed home medications with patient personally  Allergies:    Allergies   Allergen Reactions   • Simvastatin Shortness Of Breath   • Spiriva Respimat [Tiotropium Bromide Monohydrate] Rash       Social History:  Marital Status: /Civil Union   Occupation:   Patient Pre-hospital Living Situation: Home  Patient Pre-hospital Level of Mobility: walks  Patient Pre-hospital Diet Restrictions: none  Substance Use History:   Social History     Substance and Sexual Activity   Alcohol Use Never     Social History     Tobacco Use   Smoking Status Never   Smokeless Tobacco Never     Social History     Substance and Sexual Activity   Drug Use Never       Family History:  Family History   Problem Relation Age of Onset   • Hypertension Mother    • Depression Mother    • Depression Father    • Arthritis Father    • Heart attack Paternal Uncle 32   • Colon cancer Neg Hx    • Prostate cancer Neg Hx        Physical Exam:     Vitals:   Blood Pressure: 131/67 (01/12/23 0400)  Pulse: 86 (01/12/23 0533)  Temperature: 99 1 °F (37 3 °C) (01/11/23 2251)  Temp Source: Oral (01/11/23 2251)  Respirations: 18 (01/12/23 0533)  SpO2: 96 % (01/12/23 0533)    Physical Exam  Vitals and nursing note reviewed  Constitutional:       General: He is not in acute distress  Appearance: He is well-developed  He is obese  He is ill-appearing  HENT:      Head: Normocephalic and atraumatic  Mouth/Throat:      Mouth: Mucous membranes are dry  Eyes:      Extraocular Movements: Extraocular movements intact  Conjunctiva/sclera: Conjunctivae normal       Pupils: Pupils are equal, round, and reactive to light  Cardiovascular:      Rate and Rhythm: Normal rate and regular rhythm  Heart sounds: Normal heart sounds  No murmur heard  Pulmonary:      Effort: Pulmonary effort is normal  No respiratory distress  Breath sounds: Normal breath sounds  No wheezing, rhonchi or rales  Abdominal:      General: Bowel sounds are normal  There is no distension  Palpations: Abdomen is soft  Tenderness: There is no abdominal tenderness  Musculoskeletal:         General: No swelling  Cervical back: Neck supple  Right lower leg: No edema  Comments: Right shoulder in sling  Skin:     General: Skin is warm and dry  Capillary Refill: Capillary refill takes less than 2 seconds  Coloration: Skin is not jaundiced  Findings: No bruising or lesion  Neurological:      Mental Status: He is alert and oriented to person, place, and time  Psychiatric:         Mood and Affect: Mood normal           Additional Data:     Lab Results:  Results from last 7 days   Lab Units 01/11/23  2348   WBC Thousand/uL 11 21*   HEMOGLOBIN g/dL 12 0   HEMATOCRIT % 37 8   PLATELETS Thousands/uL 226   NEUTROS PCT % 80*   LYMPHS PCT % 9*   MONOS PCT % 10   EOS PCT % 0     Results from last 7 days   Lab Units 01/11/23  2348   SODIUM mmol/L 134*   POTASSIUM mmol/L 3 9   CHLORIDE mmol/L 99   CO2 mmol/L 27   BUN mg/dL 19   CREATININE mg/dL 1 07   ANION GAP mmol/L 8   CALCIUM mg/dL 8 7   ALBUMIN g/dL 3 9   TOTAL BILIRUBIN mg/dL 0 38   ALK PHOS U/L 78   ALT U/L 49   AST U/L 57*   GLUCOSE RANDOM mg/dL 126         Results from last 7 days   Lab Units 01/10/23  1140   POC GLUCOSE mg/dl 135               Lines/Drains:  Invasive Devices     Peripheral Intravenous Line  Duration           Peripheral IV 01/11/23 Dorsal (posterior); Left Forearm <1 day                    Imaging: Reviewed radiology reports from this admission including: chest xray  XR chest 1 view portable    (Results Pending)   CTA ED chest PE study    (Results Pending)   Chest x-ray read by me showed some opacities in the right lower lobe concerning for pneumonia  EKG and Other Studies Reviewed on Admission:   · EKG: EKG ordered  ** Please Note: This note has been constructed using a voice recognition system   **

## 2023-01-12 NOTE — ASSESSMENT & PLAN NOTE
Patient came in with desaturations 88-91% from his baseline > 95%  Patient was put on 3 L/min of oxygen in the ED  Currently on 2 L/min of oxygen via nasal cannula  He has history of obstructive sleep apnea     Pending completion of CPAP evaluation     Acute hypoxic respiratory failure most likely due to Fluid overload  IMAGING:   On admission:Chest x-ray and CT chest showed congestion  13JAN23: Pending  HOME O2 eval failed  PLAN:  · Continue O2 and ween down keep Sp)2>90%  · One time IV lasix 40mg 13JAN23  · Incentive Spirometry   · STOP ABX   · Overnight Pulse Ox with ABG in morning   · Bladder scan to screen for urinary retention

## 2023-01-12 NOTE — ASSESSMENT & PLAN NOTE
Patient came in with desaturations 88-91% from his baseline > 95%  Patient was put on 3 L/min of oxygen in the ED  Currently on 2 L/min of oxygen via nasal cannula  He has history of obstructive sleep apnea  Not using CPAP  Chest x-ray and CT chest showed right lower lobe pneumonia  Acute hypoxic respiratory failure most likely due to pneumonia versus aspiration pneumonia  Please see plan for right lower lobe pneumonia

## 2023-01-12 NOTE — ED ATTENDING ATTESTATION
1/11/2023  I, Carito Arellano MD, saw and evaluated the patient  I have discussed the patient with the resident/non-physician practitioner and agree with the resident's/non-physician practitioner's findings, Plan of Care, and MDM as documented in the resident's/non-physician practitioner's note, except where noted  All available labs and Radiology studies were reviewed  I was present for key portions of any procedure(s) performed by the resident/non-physician practitioner and I was immediately available to provide assistance  At this point I agree with the current assessment done in the Emergency Department  I have conducted an independent evaluation of this patient a history and physical is as follows: This is a 72-year-old male patient with a relevant past medical history of hypertension, hyperlipidemia, CAD, bipolar disorder, with a 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 presenting to the ED today for shortness of breath  Patient states that he has had some exertional dyspnea over the past 2 days worsening since his surgery  He was discharged today, and upon going home his oxygen level is in the high 80s to low 90s  Patient states that he was having difficulty getting to the bathroom due to his shortness of breath as well  He denies any chest pain pressure or discomfort, nausea vomiting abdominal pain, fever chills sweats, or any other significantly related symptoms  He has chronic peripheral edema, which is unchanged today per the patient  His exam for the most part is unremarkable aside from the fact the patient is requiring oxygen to keep his saturation above 90%  He does not have any adventitious lung sounds on my auscultation, though I did examine him post nebulizer treatment  His differential diagnosis includes: Atelectasis versus VTE versus pneumonia versus other  Patient has CBC with slight leukocytosis and a left shift    His metabolic panel showed slight elevation of his AST, and a slight hyponatremia  His flu COVID RSV swab was negative  His troponin was 9, his EKG did not show any acute ischemic findings  His chest x-ray showed questionable area in the right lower/middle lobe concerning for consolidation  He underwent a CTA of his chest to evaluate for the possibility of VTE versus pneumonia especially with his borderline elevated temperature and his borderline hypoxemia  CT showed evidence for right lower lobe atelectasis, with likely associated pneumonia  Patient was started on Rocephin, azithromycin, and due to his hypoxemia patient was requested be hospitalized by the hospitalist provider who accepted without any further orders requested      ED Course         Critical Care Time  Procedures

## 2023-01-12 NOTE — ASSESSMENT & PLAN NOTE
Lab Results   Component Value Date    SARSCOV2 Negative 01/11/2023    INFLUA Negative 01/11/2023    INFLUB Negative 01/11/2023    RSV Negative 01/11/2023       Recent Labs     01/11/23  0644 01/11/23  2348   WBC 13 57* 11 21*     No results for input(s): PROCALCITONI in the last 72 hours  Chest x-ray read by me showed infiltrates in the right lower lobe concerning for pneumonia  Given patient's history of food choking yesterday aspiration pneumonia is very likely  Patient had received a dose of azithromycin and Rocephin each in the ED  Plan:  · Follow Urine Strep, Legionella, sputum cultures, Procalcitonin a m    · Continue IV ceftriaxone 1 g q  24 hours  · Tessalon Perles for cough  · Albuterol Nebs 2 puffs q6 h PRN  · Incentive spirometry

## 2023-01-12 NOTE — RESPIRATORY THERAPY NOTE
Home Oxygen Qualifying Test     Patient name: Иван Esquivel        : 1950   Date of Test:  2023  Diagnosis:    Home Oxygen Test:    **Medicare Guidelines require item(s) 1-5 on all ambulatory patients or 1 and 2 on non-ambulatory patients  1  Baseline SPO2 on Room Air at rest 95 %   a  If <= 88% on Room Air add O2 via NC to obtain SpO2 >=88%  If LPM needed, document LPM  needed to reach =>88%    2  SPO2 during exertion on Room Air 94  %  a  During exertion monitor SPO2  If SPO2 increases >=89%, do not add supplemental oxygen    3  SPO2 on Oxygen at Rest NA % at NA LPM    4  SPO2 during exertion on Oxygen NA% at NA LPM    5  Test performed during exertion activity  []  Supplemental Home Oxygen is indicated  [x]  Client does not qualify for home oxygen      Respiratory Additional Notes- pt doesn't qualify, pt ambulated on RA for [address] [facility]

## 2023-01-12 NOTE — ASSESSMENT & PLAN NOTE
Lab Results   Component Value Date    SODIUM 134 (L) 01/11/2023    SODIUM 135 01/11/2023     Sodium 135 on admission and 134 this morning  Hyponatremia in the setting of poor oral intake due to recent surgery and pneumonia  Plans:  Continue gentle IV hydration  Recheck BMP tomorrow morning

## 2023-01-12 NOTE — ASSESSMENT & PLAN NOTE
Pt CPAP in process of evaluation  Home O2 Eval  Ambulatory Pulse Ox     PLAN  Overnight Pulse Ox with ABG in morning

## 2023-01-12 NOTE — ED PROVIDER NOTES
History  Chief Complaint   Patient presents with   • Shortness of Breath     Recent shoulder replacement sx with anesthesia block  Reports low O2, 88-92% at rest, [place], SOB, insomnia  Patient is a 42-year-old male with history of asthma that presents to the emergency department with shortness of breath 1 day after right shoulder surgery for rheumatoid arthritis  He reports that he received a block injection of his right shoulder which he was told could cause some shortness of breath  He reports that upon going home he had O2 saturation between 88 and 91%  The patient normally has O2 saturation greater than 95% at home  He denies any chest pain, abdominal pain, pain at his incision site but does report that he took 5 mg oxycodone for generalized postsurgical pain in the right shoulder  He reports that he had the shortness of breath prior to taking his oxycodone today  He denies fever and otherwise feels in his normal state of health  With his history of asthma, patient reports taking 2 puffs of his albuterol inhaler without improvement of shortness of breath          Prior to Admission Medications   Prescriptions Last Dose Informant Patient Reported? Taking?    Diclofenac Sodium (VOLTAREN) 1 % Past Week  No Yes   Sig: Apply 2 g topically 4 (four) times a day   Trelegy Ellipta 200-62 5-25 MCG/INH AEPB inhaler 1/11/2023  No Yes   Sig: Inhale 1 puff daily   albuterol (PROVENTIL HFA,VENTOLIN HFA) 90 mcg/act inhaler Past Week  No Yes   Sig: Inhale 2 puffs every 4 (four) hours as needed for wheezing   enalapril (VASOTEC) 10 mg tablet 1/11/2023  No Yes   Sig: Take 1 tablet (10 mg total) by mouth daily   lamoTRIgine (LaMICtal) 200 MG tablet 1/11/2023  Yes Yes   Sig: Take 200 mg by mouth daily   levothyroxine 100 mcg tablet 1/11/2023  No Yes   Sig: Take 1 tablet (100 mcg total) by mouth daily   meloxicam (Mobic) 15 mg tablet 1/11/2023  No Yes   Sig: Take 1 tablet (15 mg total) by mouth daily   modafinil (PROVIGIL) 200 MG tablet 2023  Yes Yes   Sig: Take 200 mg by mouth daily   montelukast (SINGULAIR) 10 mg tablet 2023  Yes Yes   Sig: Take 10 mg by mouth in the morning   ondansetron (ZOFRAN) 4 mg tablet 2023  No Yes   Sig: Take 1 tablet (4 mg total) by mouth every 8 (eight) hours as needed for nausea or vomiting   oxyCODONE (ROXICODONE) 5 immediate release tablet 2023  No Yes   Si tablets every 8 hours as needed for severe shoulder pain only  pantoprazole (PROTONIX) 40 mg tablet 2023  No Yes   Sig: Take 1 tablet (40 mg total) by mouth daily Take 1 pill half hour before breakfast    pramipexole (MIRAPEX) 1 mg tablet 2023  Yes Yes   Sig: Take 2 mg by mouth daily at bedtime   tamsulosin (FLOMAX) 0 4 mg 2023  Yes Yes   Si 4 mg in the morning      Facility-Administered Medications: None       Past Medical History:   Diagnosis Date   • Arthritis    • Asthma    • Colon polyp    • 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HCC)    • 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[address] )    • Manic depression ([facility] [address] )    • Sleep apnea        Past Surgical History:   Procedure Laterality Date   • COLONOSCOPY  2022   • VA ARTHROPLASTY GLENOHUMERAL JOINT TOTAL SHOULDER Right 1/10/2023    Procedure: ARTHROPLASTY SHOULDER REVERSE WITH BICEPS TENODESIS;  Surgeon: Joyce Stanley MD;  Location: [facility];  Service: Orthopedics   • UMBILICAL HERNIA REPAIR         Family History   Problem Relation Age of Onset   • Hypertension Mother    • Depression Mother    • Depression Father    • Arthritis Father    • Heart attack Paternal Uncle 32   • Colon cancer Neg Hx    • Prostate cancer Neg Hx      I have reviewed and agree with the history as documented      E-Cigarette/Vaping   • E-Cigarette Use Never User      E-Cigarette/Vaping Substances   • Nicotine No    • THC No    • CBD No    • Flavoring No    • Other No    • Unknown No      Social History     Tobacco Use   • Smoking status: Never   • Smokeless tobacco: Never   Vaping Use   • Vaping Use: Never used   Substance Use Topics   • Alcohol use: Never   • Drug use: Never        Review of Systems   Constitutional: Negative for chills and fever  HENT: Negative for ear pain and sore throat  Eyes: Negative for pain and visual disturbance  Respiratory: Positive for shortness of breath and wheezing (mild)  Negative for cough and chest tightness  Cardiovascular: Negative for chest pain and palpitations  Gastrointestinal: Negative for abdominal pain and vomiting  Genitourinary: Negative for dysuria and hematuria  Musculoskeletal: Negative for arthralgias and back pain  Mild R shoulder pain after surgery   Skin: Negative for color change and rash  Neurological: Negative for syncope, light-headedness and headaches  All other systems reviewed and are negative  Physical Exam  ED Triage Vitals [01/11/23 2251]   Temperature Pulse Respirations Blood Pressure SpO2   99 1 °F (37 3 °C) 93 18 161/72 92 %      Temp Source Heart Rate Source Patient Position - Orthostatic VS BP Location FiO2 (%)   Oral Monitor Sitting Left arm --      Pain Score       --             Orthostatic Vital Signs  Vitals:    01/12/23 0400 01/12/23 0415 01/12/23 0430 01/12/23 0533   BP: 131/67      Pulse: 78 77 80 86   Patient Position - Orthostatic VS:           Physical Exam  Vitals and nursing note reviewed  Constitutional:       General: He is not in acute distress  Appearance: He is well-developed  He is not ill-appearing  HENT:      Head: Normocephalic and atraumatic  Eyes:      Extraocular Movements: Extraocular movements intact  Conjunctiva/sclera: Conjunctivae normal    Cardiovascular:      Rate and Rhythm: Normal rate and regular rhythm  Pulses: Normal pulses  Heart sounds: Normal heart sounds  No murmur heard  Pulmonary:      Effort: Pulmonary effort is normal  No respiratory distress  Breath sounds: Examination of the right-upper field reveals wheezing   Examination of the left-upper field reveals wheezing  Wheezing present  No decreased breath sounds, rhonchi or rales  Abdominal:      General: Abdomen is flat  Palpations: Abdomen is soft  Tenderness: There is no abdominal tenderness  There is no guarding or rebound  Musculoskeletal:         General: No swelling, tenderness or deformity  Normal range of motion  Cervical back: Normal range of motion and neck supple  Right lower leg: No tenderness  No edema  Left lower leg: No tenderness  No edema  Comments: Right shoulder with surgical dressing in place  No surrounding erythema  Mild swelling of the right bicep  No significant pain in the right arm  Skin:     General: Skin is warm and dry  Capillary Refill: Capillary refill takes less than 2 seconds  Neurological:      Mental Status: He is alert           ED Medications  Medications   albuterol (PROVENTIL HFA,VENTOLIN HFA) inhaler 2 puff (has no administration in time range)   Diclofenac Sodium (VOLTAREN) 1 % topical gel 2 g (has no administration in time range)   lisinopril (ZESTRIL) tablet 20 mg (has no administration in time range)   lamoTRIgine (LaMICtal) tablet 200 mg (has no administration in time range)   levothyroxine tablet 100 mcg (100 mcg Oral Given 1/12/23 0532)   meloxicam (MOBIC) tablet 15 mg (has no administration in time range)   modafinil (PROVIGIL) tablet 200 mg (has no administration in time range)   oxyCODONE (ROXICODONE) IR tablet 5 mg (has no administration in time range)   pantoprazole (PROTONIX) EC tablet 40 mg (40 mg Oral Given 1/12/23 0532)   pramipexole (MIRAPEX) tablet 2 mg (has no administration in time range)   tamsulosin (FLOMAX) capsule 0 4 mg (has no administration in time range)   sodium chloride 0 9 % infusion (75 mL/hr Intravenous New Bag 1/12/23 0532)   acetaminophen (TYLENOL) tablet 650 mg (has no administration in time range)   polyethylene glycol (MIRALAX) packet 17 g (has no administration in time range)   ondansetron (ZOFRAN) injection 4 mg (has no administration in time range)   calcium carbonate (TUMS) chewable tablet 1,000 mg (has no administration in time range)   enoxaparin (LOVENOX) subcutaneous injection 40 mg (has no administration in time range)   ceftriaxone (ROCEPHIN) 1 g/50 mL in dextrose IVPB (has no administration in time range)   benzonatate (TESSALON PERLES) capsule 100 mg (has no administration in time range)   albuterol inhalation solution 2 5 mg (2 5 mg Nebulization Given 1/11/23 5723)   iohexol (OMNIPAQUE) 350 MG/ML injection (SINGLE-DOSE) 85 mL (85 mL Intravenous Given 1/12/23 0139)   benzonatate (TESSALON PERLES) capsule 100 mg (100 mg Oral Given 1/12/23 0255)   melatonin tablet 3 mg (3 mg Oral Given 1/12/23 0314)   azithromycin (ZITHROMAX) 500 mg in sodium chloride 0 9% 250mL IVPB 500 mg (500 mg Intravenous New Bag 1/12/23 0345)   ceftriaxone (ROCEPHIN) 1 g/50 mL in dextrose IVPB (0 mg Intravenous Stopped 1/12/23 0345)   sertraline (ZOLOFT) tablet 50 mg (50 mg Oral Given 1/12/23 0532)       Diagnostic Studies  Results Reviewed     Procedure Component Value Units Date/Time    Procalcitonin [[id number]]     Lab Status: No result Specimen: Blood     Strep Pneumoniae, Urine [[id number]]     Lab Status: No result Specimen: Urine     Legionella antigen, Urine [[id number]]     Lab Status: No result Specimen: Urine     HS Troponin I 2hr [[id number]]  (Normal) Collected: 01/12/23 0320    Lab Status: Final result Specimen: Blood from Arm, Left Updated: 01/12/23 0357     hs TnI 2hr 8 ng/L      Delta 2hr hsTnI -1 ng/L     HS Troponin I 4hr [[id number]]     Lab Status: No result Specimen: Blood     HS Troponin 0hr (reflex protocol) [[id number]]  (Normal) Collected: 01/12/23 0120    Lab Status: Final result Specimen: Blood from Arm, Left Updated: 01/12/23 0150     hs TnI 0hr 9 ng/L     FLU/RSV/COVID - if FLU/RSV clinically relevant [[id number]]  (Normal) Collected: 01/11/23 5594    Lab Status: Final result Specimen: Nares from Nose Updated: 01/12/23 0035     SARS-CoV-2 Negative     INFLUENZA A PCR Negative     INFLUENZA B PCR Negative     RSV PCR Negative    Narrative:      FOR PEDIATRIC PATIENTS - copy/paste COVID Guidelines URL to browser: [email or web address]    SARS-CoV-2 assay is a Nucleic Acid Amplification assay intended for the  qualitative detection of nucleic acid from SARS-CoV-2 in nasopharyngeal  swabs  Results are for the presumptive identification of SARS-CoV-2 RNA  Positive results are indicative of infection with SARS-CoV-2, the virus  causing COVID-19, but do not rule out bacterial infection or co-infection  with other viruses  Laboratories within the [place] and its  territories are required to report all positive results to the appropriate  public health authorities  Negative results do not preclude SARS-CoV-2  infection and should not be used as the sole basis for treatment or other  patient management decisions  Negative results must be combined with  clinical observations, patient history, and epidemiological information  This test has not been FDA cleared or approved  This test has been authorized by FDA under an Emergency Use Authorization  (EUA)  This test is only authorized for the duration of time the  declaration that circumstances exist justifying the authorization of the  emergency use of an in vitro diagnostic tests for detection of SARS-CoV-2  virus and/or diagnosis of COVID-19 infection under section 564(b)(1) of  the Act, 21 U  S C  681RUW-3(S)(2), unless the authorization is terminated  or revoked sooner  The test has been validated but independent review by FDA  and CLIA is pending  Test performed using [employer] GeneXpert: This RT-PCR assay targets N2,  a region unique to SARS-CoV-2  A conserved region in the E-gene was chosen  for pan-Sarbecovirus detection which includes SARS-CoV-2      According to CMS-2020-01-R, this platform meets the definition of high-[employer] technology      Comprehensive metabolic panel [[id number]]  (Abnormal) Collected: 01/11/23 2348    Lab Status: Final result Specimen: Blood from Arm, Left Updated: 01/12/23 0019     Sodium 134 mmol/L      Potassium 3 9 mmol/L      Chloride 99 mmol/L      CO2 27 mmol/L      ANION GAP 8 mmol/L      BUN 19 mg/dL      Creatinine 1 07 mg/dL      Glucose 126 mg/dL      Calcium 8 7 mg/dL      AST 57 U/L      ALT 49 U/L      Alkaline Phosphatase 78 U/L      Total Protein 6 6 g/dL      Albumin 3 9 g/dL      Total Bilirubin 0 38 mg/dL      eGFR 69 ml/min/1 73sq m     Narrative:      [place] guidelines for Chronic Kidney Disease (CKD):   •  Stage 1 with normal or high GFR (GFR > 90 mL/min/1 73 square meters)  •  Stage 2 Mild CKD (GFR = 60-89 mL/min/1 73 square meters)  •  Stage 3A Moderate CKD (GFR = 45-59 mL/min/1 73 square meters)  •  Stage 3B Moderate CKD (GFR = 30-44 mL/min/1 73 square meters)  •  Stage 4 Severe CKD (GFR = 15-29 mL/min/1 73 square meters)  •  Stage 5 End Stage CKD (GFR <15 mL/min/1 73 square meters)  Note: GFR calculation is accurate only with a steady state creatinine    CBC and differential [[id number]]  (Abnormal) Collected: 01/11/23 2348    Lab Status: Final result Specimen: Blood from Arm, Left Updated: 01/12/23 0013     WBC 11 21 Thousand/uL      RBC 4 04 Million/uL      Hemoglobin 12 0 g/dL      Hematocrit 37 8 %      MCV 94 fL      MCH 29 7 pg      MCHC 31 7 g/dL      RDW 14 4 %      MPV 10 1 fL      Platelets 460 Thousands/uL      nRBC 0 /100 WBCs      Neutrophils Relative 80 %      Immat GRANS % 1 %      Lymphocytes Relative 9 %      Monocytes Relative 10 %      Eosinophils Relative 0 %      Basophils Relative 0 %      Neutrophils Absolute 8 91 Thousands/µL      Immature Grans Absolute 0 08 Thousand/uL      Lymphocytes Absolute 1 04 Thousands/µL      Monocytes Absolute 1 10 Thousand/µL      Eosinophils Absolute 0 04 Thousand/µL      Basophils Absolute 0 04 Thousands/µL                  XR chest 1 view portable    (Results Pending)   CTA ED chest PE study    (Results Pending)         Procedures  ECG 12 Lead Documentation Only    Date/Time: 1/12/2023 12:15 AM  Performed by: Phoebe Barriga DO  Authorized by: Phoebe Barriga DO     Indications / Diagnosis:  Shortness of breath  ECG reviewed by me, the ED Provider: yes    Patient location:  ED  Previous ECG:     Previous ECG:  Compared to current    Comparison ECG info:  PVC now present    Similarity:  Changes noted  Interpretation:     Interpretation: normal    Rate:     ECG rate:  78    ECG rate assessment: normal    Rhythm:     Rhythm: sinus rhythm    Ectopy:     Ectopy: PVCs      PVCs:  Infrequent  QRS:     QRS axis:  Normal    QRS intervals:  Normal  Conduction:     Conduction: normal    ST segments:     ST segments:  Normal  T waves:     T waves: normal            ED Course  ED Course as of 01/12/23 0605   u Jan 12, 2023   0316 [facility] CT PE Study Read:  Pulmonary arteries: Normal  No pulmonary emboli  Aorta: Unremarkable  No aortic aneurysm  No aortic dissection  Lungs: Consolidation with near complete collapse of the RLL possibly with underlying pneumonia  Pleural spaces: Unremarkable  No pneumothorax  No pleural effusion  Heart: Unremarkable  No cardiomegaly  No pericardial effusion  Lymph nodes: Unremarkable  No enlarged lymph nodes  Diaphragm: Asymmetrical elevation or paralysis of the right hemidiaphragm  Liver: Severe hepatic steatosis or steatohepatitis  Bones/joints: Unremarkable  No acute fracture  Soft tissues: Postoperative changes of the right shoulder noted with fluid versus hemorrhagic  infiltration of the right pectoralis major  IMPRESSION:  1  Postoperative changes of the right shoulder noted with fluid versus hemorrhagic infiltration of the  right pectoralis major  2  Asymmetrical elevation or paralysis of the right hemidiaphragm    3  Consolidation with near complete collapse of the RLL possibly with underlying pneumonia  4  Severe hepatic steatosis or steatohepatitis  HEART Risk Score    Flowsheet Row Most Recent Value   Heart Score Risk Calculator    History 0 Filed at: 01/12/2023 0151   ECG 0 Filed at: 01/12/2023 0151   Age 2 Filed at: 01/12/2023 0151   Risk Factors 2 Filed at: 01/12/2023 0151   Troponin 0 Filed at: 01/12/2023 0151   HEART Score 4 Filed at: 01/12/2023 0151                          Wells' Criteria for PE    Flowsheet Row Most Recent Value   Wells' Criteria for PE    Clinical signs and symptoms of DVT 3 Filed at: 01/12/2023 9916   PE is primary diagnosis or equally likely 0 Filed at: 01/12/2023 0121   HR >100 0 Filed at: 01/12/2023 0121   Immobilization at least 3 days or Surgery in the previous 4 weeks 1 5 Filed at: 01/12/2023 0121   Previous, objectively diagnosed PE or DVT 0 Filed at: 01/12/2023 0121   Hemoptysis 0 Filed at: 01/12/2023 0121   Malignancy with treatment within 6 months or palliative 0 Filed at: 01/12/2023 0121   Wells' Criteria Total 4 5 Filed at: 01/12/2023 Margy Muse with asthma presents to the emergency department with shortness of breath the day after right shoulder surgery  Patient reports that he had a right-sided nerve block and was told he may have some residual effects from that causing difficulty breathing  Patient also reports aspirating while eating a breakfast burrito earlier today  On exam patient has mild wheezing in the upper lung fields bilaterally  Surgical site looks clear without significant surrounding erythema  Patient has mild tenderness of his right anterior chest wall  Laboratory evaluation reveals mildly elevated WBC of 11 2  Patient's ECG is nonconcerning for ischemia or dysrhythmia  Chest x-ray is concerning for possible pneumonia on the right with elevated hemidiaphragm    CT PE study reveals fluid versus hemorrhagic infiltration of the right pectoralis major  Additionally CT recognizes elevation of the right hemidiaphragm, likely consistent with paralyzation from the block you received for the surgery  CT scan also identifies consolidation with near complete collapse of the right lower lobe with underlying pneumonia  The patient is already aware of the severe hepatic steatosis that was also identified on CT scan  In the presence of hypoxemia, right lower lobe pneumonia, paralysis of the right hemidiaphragm, patient is appropriate for admission under the care of [facility] internal medicine  IV antibiotics ordered prior to admission  Dyspnea: acute illness or injury  Hypoxia: acute illness or injury  RLL pneumonia: acute illness or injury  Amount and/or Complexity of Data Reviewed  Labs: ordered  Radiology: ordered  Risk  OTC drugs  Prescription drug management  Decision regarding hospitalization  Disposition  Final diagnoses:   Dyspnea   Hypoxia   RLL pneumonia     Time reflects when diagnosis was documented in both MDM as applicable and the Disposition within this note     Time User Action Codes Description Comment    1/12/2023  1:13 AM Valetta Iglesias Add [R45 1] Agitation     1/12/2023  1:15 AM Jason Iglesias Remove [R45 1] Agitation     1/12/2023  3:58 AM Pauline Pollen, Amr Add [R06 00] Dyspnea     1/12/2023  3:58 AM Pauline Pollen, Amr Add [R09 02] Hypoxia     1/12/2023  3:58 AM Pauline Pollen, Amr Add [J18 9] RLL pneumonia       ED Disposition     ED Disposition   Admit    Condition   Stable    Date/Time   Thu Jan 12, 2023  4:06 AM    Comment   Case was discussed with ROLANDO and the patient's admission status was agreed to be Admission Status: inpatient status to the service of Dr Mandie Thornton             Follow-up Information    None         Current Discharge Medication List      CONTINUE these medications which have NOT CHANGED    Details   albuterol (PROVENTIL HFA,VENTOLIN HFA) 90 mcg/act inhaler Inhale 2 puffs every 4 (four) hours as needed for wheezing  Qty: 6 7 g, Refills: 2    Comments: Substitution to a formulary equivalent within the same pharmaceutical class is authorized  Associated Diagnoses: Mild intermittent asthma without complication      Diclofenac Sodium (VOLTAREN) 1 % Apply 2 g topically 4 (four) times a day  Qty: 2 g, Refills: 0    Associated Diagnoses: Polyarthralgia      enalapril (VASOTEC) 10 mg tablet Take 1 tablet (10 mg total) by mouth daily  Qty: 30 tablet, Refills: 0    Associated Diagnoses: Essential hypertension      lamoTRIgine (LaMICtal) 200 MG tablet Take 200 mg by mouth daily      levothyroxine 100 mcg tablet Take 1 tablet (100 mcg total) by mouth daily  Qty: 90 tablet, Refills: 0    Associated Diagnoses: Hypothyroidism, unspecified type      meloxicam (Mobic) 15 mg tablet Take 1 tablet (15 mg total) by mouth daily  Qty: 30 tablet, Refills: 0    Associated Diagnoses: Greater trochanteric bursitis of both hips; Acute hip pain, bilateral      modafinil (PROVIGIL) 200 MG tablet Take 200 mg by mouth daily      montelukast (SINGULAIR) 10 mg tablet Take 10 mg by mouth in the morning      ondansetron (ZOFRAN) 4 mg tablet Take 1 tablet (4 mg total) by mouth every 8 (eight) hours as needed for nausea or vomiting  Qty: 30 tablet, Refills: 1    Associated Diagnoses: Nausea      oxyCODONE (ROXICODONE) 5 immediate release tablet 1 tablets every 8 hours as needed for severe shoulder pain only    Qty: 12 tablet, Refills: 0    Comments: Supervising physician - Ariane Nelson MD, cont tx s/p TSA 1/10/23  Associated Diagnoses: Primary osteoarthritis, left shoulder; Status post total shoulder replacement, right; S/P reverse total shoulder arthroplasty, right      pantoprazole (PROTONIX) 40 mg tablet Take 1 tablet (40 mg total) by mouth daily Take 1 pill half hour before breakfast   Qty: 90 tablet, Refills: 2    Associated Diagnoses: Gastroesophageal reflux disease with esophagitis without hemorrhage      pramipexole (MIRAPEX) 1 mg tablet Take 2 mg by mouth daily at bedtime      tamsulosin (FLOMAX) 0 4 mg 0 4 mg in the morning      Trelegy Ellipta 200-62 5-25 MCG/INH AEPB inhaler Inhale 1 puff daily  Qty: 60 blister, Refills: 2    Associated Diagnoses: Mild intermittent asthma, unspecified whether complicated           No discharge procedures on file  PDMP Review       Value Time User    PDMP Reviewed  Yes 1/10/2023 11:29 AM Joel Cerda PA-C           ED Provider  Attending physically available and evaluated Karyn Ballesteros I managed the patient along with the ED Attending      Electronically Signed by         Lino Castro DO  01/12/23 8812

## 2023-01-12 NOTE — PLAN OF CARE
Problem: PAIN - ADULT  Goal: Verbalizes/displays adequate comfort level or baseline comfort level  Description: Interventions:  - Encourage patient to monitor pain and request assistance  - Assess pain using appropriate pain scale  - Administer analgesics based on type and severity of pain and evaluate response  - Implement non-pharmacological measures as appropriate and evaluate response  - Consider cultural and social influences on pain and pain management  - Notify physician/advanced practitioner if interventions unsuccessful or patient reports new pain  Outcome: Progressing     Problem: INFECTION - ADULT  Goal: Absence or prevention of progression during hospitalization  Description: INTERVENTIONS:  - Assess and monitor for signs and symptoms of infection  - Monitor lab/diagnostic results  - Monitor all insertion sites, i e  indwelling lines, tubes, and drains  - Monitor endotracheal if appropriate and nasal secretions for changes in amount and color  - [place] appropriate cooling/warming therapies per order  - Administer medications as ordered  - Instruct and encourage patient and family to use good hand hygiene technique  - Identify and instruct in appropriate isolation precautions for identified infection/condition  Outcome: Progressing  Goal: Absence of fever/infection during neutropenic period  Description: INTERVENTIONS:  - Monitor WBC    Outcome: Progressing     Problem: SAFETY ADULT  Goal: Patient will remain free of falls  Description: INTERVENTIONS:  - Educate patient/family on patient safety including physical limitations  - Instruct patient to call for assistance with activity   - Consult OT/PT to assist with strengthening/mobility   - Keep Call bell within reach  - Keep bed low and locked with side rails adjusted as appropriate  - Keep care items and personal belongings within reach  - Initiate and maintain comfort rounds  - Make Fall Risk Sign visible to staff  - Offer Toileting every 2 Hours, in advance of need  - Initiate/Maintain bed alarm  - Apply yellow socks and bracelet for high fall risk patients  - Consider moving patient to room near nurses station  Outcome: Progressing  Goal: Maintain or return to baseline ADL function  Description: INTERVENTIONS:  -  Assess patient's ability to carry out ADLs; assess patient's baseline for ADL function and identify physical deficits which impact ability to perform ADLs (bathing, care of mouth/teeth, toileting, grooming, dressing, etc )  - Assess/evaluate cause of self-care deficits   - Assess range of motion  - Assess patient's mobility; develop plan if impaired  - Assess patient's need for assistive devices and provide as appropriate  - Encourage maximum independence but intervene and supervise when necessary  - Involve family in performance of ADLs  - Assess for home care needs following discharge   - Consider OT consult to assist with ADL evaluation and planning for discharge  - Provide patient education as appropriate  Outcome: Progressing  Goal: Maintains/Returns to pre admission functional level  Description: INTERVENTIONS:  - Perform BMAT or MOVE assessment daily    - Set and communicate daily mobility goal to care team and patient/family/caregiver  - Collaborate with rehabilitation services on mobility goals if consulted  - Perform Range of Motion 3 times a day  - Reposition patient every 2 hours    - Dangle patient 3 times a day  - Stand patient 3 times a day  - Ambulate patient 3 times a day  - Out of bed to chair 3 times a day   - Out of bed for meals 3 times a day  - Out of bed for toileting  - Record patient progress and toleration of activity level   Outcome: Progressing     Problem: DISCHARGE PLANNING  Goal: Discharge to home or other facility with appropriate resources  Description: INTERVENTIONS:  - Identify barriers to discharge w/patient and caregiver  - Arrange for needed discharge resources and transportation as appropriate  - Identify discharge learning needs (meds, wound care, etc )  - Arrange for interpretive services to assist at discharge as needed  - Refer to Case Management Department for coordinating discharge planning if the patient needs post-hospital services based on physician/advanced practitioner order or complex needs related to functional status, cognitive ability, or social support system  Outcome: Progressing     Problem: Knowledge Deficit  Goal: Patient/family/caregiver demonstrates understanding of disease process, treatment plan, medications, and discharge instructions  Description: Complete learning assessment and assess knowledge base    Interventions:  - Provide teaching at level of understanding  - Provide teaching via preferred learning methods  Outcome: Progressing

## 2023-01-12 NOTE — ASSESSMENT & PLAN NOTE
Lab Results   Component Value Date    SARSCOV2 Negative 01/11/2023    INFLUA Negative 01/11/2023    INFLUB Negative 01/11/2023    RSV Negative 01/11/2023       Recent Labs     01/11/23  0644 01/11/23  2348   WBC 13 57* 11 21*     No results for input(s): PROCALCITONI in the last 72 hours  Chest x-ray read by me showed infiltrates in the right lower lobe concerning for pneumonia  Given patient's history of food choking yesterday aspiration pneumonia is very likely  Patient had received a dose of azithromycin and Rocephin each in the ED  Plan:  · Continue IV ceftriaxone 1 g q  24 hours  ?  STOP [id number]  · Tessalon Perles for cough  · Albuterol Nebs 2 puffs q6 h PRN  · Incentive spirometry

## 2023-01-12 NOTE — ASSESSMENT & PLAN NOTE
Patient had underwent an uncomplicated right reverse total shoulder arthroplasty for OA on 01/10/2023

## 2023-01-12 NOTE — RESPIRATORY THERAPY NOTE
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[facility] )     Manic depression ([facility] )     Sleep apnea      Social History     Socioeconomic History    Marital status: /Civil Union     Spouse name: None    Number of children: None    Years of education: None    Highest education level: None   Occupational History    None   Tobacco Use    Smoking status: Never    Smokeless tobacco: Never   Vaping Use    Vaping Use: Never used   Substance and Sexual Activity    Alcohol use: Never    Drug use: Never    Sexual activity: None   Other Topics Concern    None   Social History Narrative    None     Social Determinants of Health     Financial Resource Strain: Not on file   Food Insecurity: No Food Insecurity    Worried About Running Out of Food in the Last Year: Never true    Ran Out of Food in the Last Year: Never true   Transportation Needs: No Transportation Needs    Lack of Transportation (Medical): No    Lack of Transportation (Non-Medical):  No   Physical Activity: Not on file   Stress: Not on file   Social Connections: Not on file   Intimate Partner Violence: Not on file   Housing Stability: Low Risk     Unable to Pay for Housing in the Last Year: No    Number of Places Lived in the Last Year: 1    Unstable Housing in the Last Year: No       Subjective         Objective    Physical Exam:   Assessment Type: Assess only  General Appearance: Alert, Awake  Respiratory Pattern: Dyspnea with exertion  Chest Assessment: Chest expansion symmetrical  Bilateral Breath Sounds: Clear  Cough: Non-productive  O2 Device: 2 lpm nc  Vitals:  Blood pressure 131/67, pulse 86, temperature 99 1 °F (37 3 °C), temperature source Oral, resp  rate 18, SpO2 96 %  Imaging and other studies: I have personally reviewed pertinent reports  O2 Device: 2 lpm nc       Plan    Respiratory Plan: Mild Distress pathway, Home Bronchodilator Patient pathway        Resp Comments: (S) pt refusing hospital Cpap, also stating does not want prn nebs

## 2023-01-13 ENCOUNTER — APPOINTMENT (INPATIENT)
Dept: RADIOLOGY | Facility: HOSPITAL | Age: 73
End: 2023-01-13

## 2023-01-13 ENCOUNTER — TELEPHONE (OUTPATIENT)
Dept: BARIATRICS | Facility: CLINIC | Age: 73
End: 2023-01-13

## 2023-01-13 ENCOUNTER — TELEPHONE (OUTPATIENT)
Dept: OBGYN CLINIC | Facility: HOSPITAL | Age: 73
End: 2023-01-13

## 2023-01-13 ENCOUNTER — APPOINTMENT (OUTPATIENT)
Dept: PHYSICAL THERAPY | Facility: REHABILITATION | Age: 73
End: 2023-01-13

## 2023-01-13 PROBLEM — N17.9 AKI (ACUTE KIDNEY INJURY) (HCC): Status: ACTIVE | Noted: 2023-01-13

## 2023-01-13 LAB
ANION GAP SERPL CALCULATED.3IONS-SCNC: 8 MMOL/L (ref 4–13)
ANION GAP SERPL CALCULATED.3IONS-SCNC: 8 MMOL/L (ref 4–13)
BASOPHILS # BLD AUTO: 0.06 THOUSANDS/ÂΜL (ref 0–0.1)
BASOPHILS NFR BLD AUTO: 1 % (ref 0–1)
BUN SERPL-MCNC: 23 MG/DL (ref 5–25)
BUN SERPL-MCNC: 26 MG/DL (ref 5–25)
CALCIUM SERPL-MCNC: 8.5 MG/DL (ref 8.4–10.2)
CALCIUM SERPL-MCNC: 8.6 MG/DL (ref 8.4–10.2)
CHLORIDE SERPL-SCNC: 96 MMOL/L (ref 96–108)
CHLORIDE SERPL-SCNC: 98 MMOL/L (ref 96–108)
CO2 SERPL-SCNC: 26 MMOL/L (ref 21–32)
CO2 SERPL-SCNC: 28 MMOL/L (ref 21–32)
CREAT SERPL-MCNC: 1.2 MG/DL (ref 0.6–1.3)
CREAT SERPL-MCNC: 1.53 MG/DL (ref 0.6–1.3)
EOSINOPHIL # BLD AUTO: 0.06 THOUSAND/ÂΜL (ref 0–0.61)
EOSINOPHIL NFR BLD AUTO: 1 % (ref 0–6)
ERYTHROCYTE [DISTWIDTH] IN BLOOD BY AUTOMATED COUNT: 14.6 % (ref 11.6–15.1)
GFR SERPL CREATININE-BSD FRML MDRD: 45 ML/MIN/1.73SQ M
GFR SERPL CREATININE-BSD FRML MDRD: 60 ML/MIN/1.73SQ M
GLUCOSE SERPL-MCNC: 109 MG/DL (ref 65–140)
GLUCOSE SERPL-MCNC: 130 MG/DL (ref 65–140)
HCT VFR BLD AUTO: 33.2 % (ref 36.5–49.3)
HGB BLD-MCNC: 10.5 G/DL (ref 12–17)
IMM GRANULOCYTES # BLD AUTO: 0.1 THOUSAND/UL (ref 0–0.2)
IMM GRANULOCYTES NFR BLD AUTO: 1 % (ref 0–2)
L PNEUMO1 AG UR QL IA.RAPID: NEGATIVE
LYMPHOCYTES # BLD AUTO: 1.01 THOUSANDS/ÂΜL (ref 0.6–4.47)
LYMPHOCYTES NFR BLD AUTO: 9 % (ref 14–44)
MCH RBC QN AUTO: 30.9 PG (ref 26.8–34.3)
MCHC RBC AUTO-ENTMCNC: 31.6 G/DL (ref 31.4–37.4)
MCV RBC AUTO: 98 FL (ref 82–98)
MONOCYTES # BLD AUTO: 0.96 THOUSAND/ÂΜL (ref 0.17–1.22)
MONOCYTES NFR BLD AUTO: 9 % (ref 4–12)
NEUTROPHILS # BLD AUTO: 8.6 THOUSANDS/ÂΜL (ref 1.85–7.62)
NEUTS SEG NFR BLD AUTO: 79 % (ref 43–75)
NRBC BLD AUTO-RTO: 0 /100 WBCS
PLATELET # BLD AUTO: 190 THOUSANDS/UL (ref 149–390)
PMV BLD AUTO: 9.8 FL (ref 8.9–12.7)
POTASSIUM SERPL-SCNC: 3.7 MMOL/L (ref 3.5–5.3)
POTASSIUM SERPL-SCNC: 4 MMOL/L (ref 3.5–5.3)
PROCALCITONIN SERPL-MCNC: 0.42 NG/ML
RBC # BLD AUTO: 3.4 MILLION/UL (ref 3.88–5.62)
S PNEUM AG UR QL: NEGATIVE
SODIUM SERPL-SCNC: 132 MMOL/L (ref 135–147)
SODIUM SERPL-SCNC: 132 MMOL/L (ref 135–147)
WBC # BLD AUTO: 10.79 THOUSAND/UL (ref 4.31–10.16)

## 2023-01-13 RX ORDER — FUROSEMIDE 10 MG/ML
40 INJECTION INTRAMUSCULAR; INTRAVENOUS ONCE
Status: COMPLETED | OUTPATIENT
Start: 2023-01-13 | End: 2023-01-13

## 2023-01-13 RX ADMIN — PRAMIPEXOLE DIHYDROCHLORIDE 2 MG: 0.5 TABLET ORAL at 21:32

## 2023-01-13 RX ADMIN — OXYCODONE HYDROCHLORIDE 5 MG: 5 TABLET ORAL at 16:10

## 2023-01-13 RX ADMIN — POLYETHYLENE GLYCOL 3350 17 G: 17 POWDER, FOR SOLUTION ORAL at 08:22

## 2023-01-13 RX ADMIN — FUROSEMIDE 40 MG: 10 INJECTION, SOLUTION INTRAMUSCULAR; INTRAVENOUS at 08:37

## 2023-01-13 RX ADMIN — ACETAMINOPHEN 650 MG: 325 TABLET, FILM COATED ORAL at 08:18

## 2023-01-13 RX ADMIN — Medication 3 MG: at 21:32

## 2023-01-13 RX ADMIN — TAMSULOSIN HYDROCHLORIDE 0.4 MG: 0.4 CAPSULE ORAL at 08:16

## 2023-01-13 RX ADMIN — LEVOTHYROXINE SODIUM 100 MCG: 100 TABLET ORAL at 04:25

## 2023-01-13 RX ADMIN — BENZONATATE 100 MG: 100 CAPSULE ORAL at 16:11

## 2023-01-13 RX ADMIN — OXYCODONE HYDROCHLORIDE 5 MG: 5 TABLET ORAL at 21:33

## 2023-01-13 RX ADMIN — MODAFINIL 200 MG: 100 TABLET ORAL at 08:17

## 2023-01-13 RX ADMIN — BENZONATATE 100 MG: 100 CAPSULE ORAL at 21:31

## 2023-01-13 RX ADMIN — LAMOTRIGINE 200 MG: 100 TABLET ORAL at 08:19

## 2023-01-13 RX ADMIN — ENOXAPARIN SODIUM 40 MG: 40 INJECTION SUBCUTANEOUS at 08:22

## 2023-01-13 RX ADMIN — BENZONATATE 100 MG: 100 CAPSULE ORAL at 08:17

## 2023-01-13 RX ADMIN — PANTOPRAZOLE SODIUM 40 MG: 40 TABLET, DELAYED RELEASE ORAL at 04:24

## 2023-01-13 RX ADMIN — CEFTRIAXONE SODIUM 1000 MG: 10 INJECTION, POWDER, FOR SOLUTION INTRAVENOUS at 04:15

## 2023-01-13 NOTE — PROGRESS NOTES
[facility]  Progress Note Deborah Loud 1950, 81 y o  male MRN: [id number]  Unit/Bed#: S -01 Encounter: [id number]  Primary Care Provider: Carlos Cuellar DO   Date and time admitted to hospital: 1/11/2023 10:56 PM    * Acute respiratory failure with hypoxia [facility])  Assessment & Plan  Patient came in with desaturations 88-91% from his baseline > 95%  Patient was put on 3 L/min of oxygen in the ED  Currently on 2 L/min of oxygen via nasal cannula  He has history of obstructive sleep apnea  Pending completion of CPAP evaluation     Acute hypoxic respiratory failure most likely due to Fluid overload  IMAGING:   On admission:Chest x-ray and CT chest showed congestion  13JAN23: Pending  HOME O2 eval failed  PLAN:  · Continue O2 and ween down keep Sp)2>90%  · One time IV lasix 40mg 13JAN23  · Incentive Spirometry   · STOP ABX   · Overnight Pulse Ox with ABG in morning   · Bladder scan to screen for urinary retention       DONALD (obstructive sleep apnea)  Assessment & Plan  Pt CPAP in process of evaluation  Home O2 Eval  Ambulatory Pulse Ox     PLAN  Overnight Pulse Ox with ABG in morning        Osteoarthritis of right shoulder  Assessment & Plan  Patient had underwent an uncomplicated right reverse total shoulder arthroplasty for OA on 01/10/2023  History of eosinophilic pneumonia [facility])  Assessment & Plan  Patient stated that 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 ago  Right lower lobe pneumonia  Assessment & Plan  Lab Results   Component Value Date    SARSCOV2 Negative 01/11/2023    INFLUA Negative 01/11/2023    INFLUB Negative 01/11/2023    RSV Negative 01/11/2023       Recent Labs     01/11/23  0644 01/11/23  2348   WBC 13 57* 11 21*     No results for input(s): PROCALCITONI in the last 72 hours  Chest x-ray read by me showed infiltrates in the right lower lobe concerning for pneumonia    Given patient's history of food choking yesterday aspiration pneumonia is very likely  Patient had received a dose of azithromycin and Rocephin each in the ED  Plan:  · Continue IV ceftriaxone 1 g q  24 hours  ? STOP [id number]  · Tessalon Perles for cough  · Albuterol Nebs 2 puffs q6 h PRN  · Incentive spirometry        ARIANE (acute kidney injury) [facility])  Assessment & Plan  Recent Labs     01/11/23  0533 01/11/23  2348 01/13/23  0539   CREATININE 1 20 1 07 1 53*   EGFR 60 69 45     Estimated Creatinine Clearance: 52 2 mL/min (A) (by C-G formula based on SCr of 1 53 mg/dL (H))  BUN 26    PLAN:  Etiology unsure as BUN:Cr more suggestive as intra renal/post renal   Urine w/ microscopy pending  Urine Na Pending  Lasix 40mg IV once ordered   Bladder scan to evaluate for urine retention ordered 13JAN23 13JAN23 pm BMP to assess kidney function pending  Hyponatremia  Assessment & Plan  Lab Results   Component Value Date    SODIUM 132 (L) 01/13/2023    SODIUM 134 (L) 01/11/2023     Sodium 135 on admission and 134 this morning  Hyponatremia in the setting of poor oral intake due to recent surgery and pneumonia  Plans:  Recheck BMP tomorrow morning  VTE Pharmacologic Prophylaxis: VTE Score: 8 High Risk (Score >/= 5) - Pharmacological DVT Prophylaxis Ordered: enoxaparin (Lovenox)  Sequential Compression Devices Ordered  Patient Centered Rounds: I performed bedside rounds with nursing staff today  Discussions with Specialists or Other Care Team Provider: None    Education and Discussions with Family / Patient: Updated  (wife) via phone  Current Length of Stay: 1 day(s)  Current Patient Status: Inpatient   Discharge Plan: Anticipate discharge in 24-48 hrs to home  Code Status: Level 1 - Full Code    Subjective:   Overnight pt noted some altered mental status after oxycodone and xanax  Pt also noted sob in the morning (inhale more than exhale)  Pr also noted not peeing as much as drinking       Objective:     Vitals:   Temp (24hrs), Av 7 °F (37 1 °C), Min:98 3 °F (36 8 °C), Max:99 °F (37 2 °C)    Temp:  [98 3 °F (36 8 °C)-99 °F (37 2 °C)] 98 3 °F (36 8 °C)  HR:  [69-83] 69  Resp:  [18] 18  BP: (100-121)/(49-63) 121/63  SpO2:  [93 %-98 %] 94 %  Body mass index is 37 33 kg/m²  Input and Output Summary (last 24 hours):   No intake or output data in the 24 hours ending 23 5656    Physical Exam:   Physical Exam  Vitals and nursing note reviewed  Constitutional:       General: He is not in acute distress  Appearance: He is well-developed  He is obese  He is not ill-appearing  HENT:      Head: Normocephalic and atraumatic  Mouth/Throat:      Mouth: Mucous membranes are dry  Eyes:      Extraocular Movements: Extraocular movements intact  Conjunctiva/sclera: Conjunctivae normal       Pupils: Pupils are equal, round, and reactive to light  Cardiovascular:      Rate and Rhythm: Normal rate and regular rhythm  Heart sounds: Normal heart sounds  No murmur heard  Pulmonary:      Effort: Respiratory distress present  Breath sounds: Examination of the right-middle field reveals decreased breath sounds  Examination of the right-lower field reveals decreased breath sounds  Decreased breath sounds present  No wheezing, rhonchi or rales  Abdominal:      General: Bowel sounds are normal  There is no distension  Palpations: Abdomen is soft  Tenderness: There is no abdominal tenderness  Musculoskeletal:         General: No swelling  Cervical back: Neck supple  Right lower leg: Edema present  Left lower leg: Edema present  Comments: Right shoulder in sling  Skin:     General: Skin is warm and dry  Capillary Refill: Capillary refill takes less than 2 seconds  Coloration: Skin is not jaundiced  Findings: No bruising or lesion  Neurological:      Mental Status: He is alert and oriented to person, place, and time     Psychiatric:         Mood and Affect: Mood normal  Additional Data:     Labs:  Results from last 7 days   Lab Units 01/13/23  0539   WBC Thousand/uL 10 79*   HEMOGLOBIN g/dL 10 5*   HEMATOCRIT % 33 2*   PLATELETS Thousands/uL 190   NEUTROS PCT % 79*   LYMPHS PCT % 9*   MONOS PCT % 9   EOS PCT % 1     Results from last 7 days   Lab Units 01/13/23  0539 01/11/23  2348   SODIUM mmol/L 132* 134*   POTASSIUM mmol/L 4 0 3 9   CHLORIDE mmol/L 98 99   CO2 mmol/L 26 27   BUN mg/dL 26* 19   CREATININE mg/dL 1 53* 1 07   ANION GAP mmol/L 8 8   CALCIUM mg/dL 8 5 8 7   ALBUMIN g/dL  --  3 9   TOTAL BILIRUBIN mg/dL  --  0 38   ALK PHOS U/L  --  78   ALT U/L  --  49   AST U/L  --  57*   GLUCOSE RANDOM mg/dL 130 126         Results from last 7 days   Lab Units 01/10/23  1140   POC GLUCOSE mg/dl 135         Results from last 7 days   Lab Units 01/13/23  0539 01/11/23  2348   PROCALCITONIN ng/ml 0 42* 0 26*       Lines/Drains:  Invasive Devices     Peripheral Intravenous Line  Duration           Peripheral IV 01/11/23 Dorsal (posterior); Left Forearm 1 day                      Imaging: Reviewed radiology reports from this admission including: chest xray and Personally reviewed the following imaging: chest xray    Recent Cultures (last 7 days):   Results from last 7 days   Lab Units 01/13/23  0522   LEGIONELLA URINARY ANTIGEN  Negative       Last 24 Hours Medication List:   Current Facility-Administered Medications   Medication Dose Route Frequency Provider Last Rate   • acetaminophen  650 mg Oral Q6H PRN May Nazia Rasmussen MD     • albuterol  2 puff Inhalation Q4H PRN May Nazia Rasmussen MD     • benzonatate  100 mg Oral TID May Johnu Ronan Rasmussen MD     • calcium carbonate  1,000 mg Oral Daily PRN May Nazia Santiago MD     • Diclofenac Sodium  2 g Topical 4x Daily May Nazia Rasmussen MD     • enoxaparin  40 mg Subcutaneous Daily May Johnu Ronan Rasmussen MD     • lamoTRIgine  200 mg Oral Daily May Johnu Ronan Rasmussen MD     • levothyroxine  100 mcg Oral Early Morning May Nazia Rasmussen MD     • melatonin  3 mg Oral  Shaun Mike Ormond MD Malissa     • modafinil  200 mg Oral Daily May Nazia Salguero MD     • ondansetron  4 mg Intravenous Q6H PRN May Nazia Salguero MD     • oxyCODONE  5 mg Oral Q4H PRN May Nazia Salguero MD     • pantoprazole  40 mg Oral Early Morning May Nazia Salguero MD     • polyethylene glycol  17 g Oral Daily May Nazia Salguero MD     • pramipexole  2 mg Oral HS May Nazia Salguero MD     • tamsulosin  0 4 mg Oral Daily May Nazia Salguero MD          Today, Patient Was Seen By: Jaya Lee MD    **Please Note: This note may have been constructed using a voice recognition system  **

## 2023-01-13 NOTE — TELEPHONE ENCOUNTER
PCM call placed  Patient is inpatient due to problems with the pain block causing inability to expand the lungs  He is doing okay shoulder surgery wise and pain is controlled  Just having problems with the sling digging into his neck and causing irritation  Advised patient that he should reach out to nursing taking care of him to put in order for PT to come look at the sling to make him comfortable  Patient will be discharged tomorrow  Verbalized understanding

## 2023-01-13 NOTE — PLAN OF CARE
Problem: PAIN - ADULT  Goal: Verbalizes/displays adequate comfort level or baseline comfort level  Description: Interventions:  - Encourage patient to monitor pain and request assistance  - Assess pain using appropriate pain scale  - Administer analgesics based on type and severity of pain and evaluate response  - Implement non-pharmacological measures as appropriate and evaluate response  - Consider cultural and social influences on pain and pain management  - Notify physician/advanced practitioner if interventions unsuccessful or patient reports new pain  Outcome: Progressing     Problem: INFECTION - ADULT  Goal: Absence or prevention of progression during hospitalization  Description: INTERVENTIONS:  - Assess and monitor for signs and symptoms of infection  - Monitor lab/diagnostic results  - Monitor all insertion sites, i e  indwelling lines, tubes, and drains  - Monitor endotracheal if appropriate and nasal secretions for changes in amount and color  - [place] appropriate cooling/warming therapies per order  - Administer medications as ordered  - Instruct and encourage patient and family to use good hand hygiene technique  - Identify and instruct in appropriate isolation precautions for identified infection/condition  Outcome: Progressing  Goal: Absence of fever/infection during neutropenic period  Description: INTERVENTIONS:  - Monitor WBC    Outcome: Progressing     Problem: SAFETY ADULT  Goal: Patient will remain free of falls  Description: INTERVENTIONS:  - Educate patient/family on patient safety including physical limitations  - Instruct patient to call for assistance with activity   - Consult OT/PT to assist with strengthening/mobility   - Keep Call bell within reach  - Keep bed low and locked with side rails adjusted as appropriate  - Keep care items and personal belongings within reach  - Initiate and maintain comfort rounds  - Make Fall Risk Sign visible to staff  - Offer Toileting every 2 Hours, in advance of need  - Initiate/Maintain bed alarm  - Apply yellow socks and bracelet for high fall risk patients  - Consider moving patient to room near nurses station  Outcome: Progressing  Goal: Maintain or return to baseline ADL function  Description: INTERVENTIONS:  -  Assess patient's ability to carry out ADLs; assess patient's baseline for ADL function and identify physical deficits which impact ability to perform ADLs (bathing, care of mouth/teeth, toileting, grooming, dressing, etc )  - Assess/evaluate cause of self-care deficits   - Assess range of motion  - Assess patient's mobility; develop plan if impaired  - Assess patient's need for assistive devices and provide as appropriate  - Encourage maximum independence but intervene and supervise when necessary  - Involve family in performance of ADLs  - Assess for home care needs following discharge   - Consider OT consult to assist with ADL evaluation and planning for discharge  - Provide patient education as appropriate  Outcome: Progressing  Goal: Maintains/Returns to pre admission functional level  Description: INTERVENTIONS:  - Perform BMAT or MOVE assessment daily    - Set and communicate daily mobility goal to care team and patient/family/caregiver  - Collaborate with rehabilitation services on mobility goals if consulted  - Perform Range of Motion 3 times a day  - Reposition patient every 2 hours    - Dangle patient 3 times a day  - Stand patient 3 times a day  - Ambulate patient 3 times a day  - Out of bed to chair 3 times a day   - Out of bed for meals 3 times a day  - Out of bed for toileting  - Record patient progress and toleration of activity level   Outcome: Progressing     Problem: DISCHARGE PLANNING  Goal: Discharge to home or other facility with appropriate resources  Description: INTERVENTIONS:  - Identify barriers to discharge w/patient and caregiver  - Arrange for needed discharge resources and transportation as appropriate  - Identify discharge learning needs (meds, wound care, etc )  - Arrange for interpretive services to assist at discharge as needed  - Refer to Case Management Department for coordinating discharge planning if the patient needs post-hospital services based on physician/advanced practitioner order or complex needs related to functional status, cognitive ability, or social support system  Outcome: Progressing     Problem: Knowledge Deficit  Goal: Patient/family/caregiver demonstrates understanding of disease process, treatment plan, medications, and discharge instructions  Description: Complete learning assessment and assess knowledge base    Interventions:  - Provide teaching at level of understanding  - Provide teaching via preferred learning methods  Outcome: Progressing     Problem: Potential for Falls  Goal: Patient will remain free of falls  Description: INTERVENTIONS:  - Educate patient/family on patient safety including physical limitations  - Instruct patient to call for assistance with activity   - Consult OT/PT to assist with strengthening/mobility   - Keep Call bell within reach  - Keep bed low and locked with side rails adjusted as appropriate  - Keep care items and personal belongings within reach  - Initiate and maintain comfort rounds  - Make Fall Risk Sign visible to staff  - Offer Toileting every 2 Hours, in advance of need  - Initiate/Maintain bed alarm  - Apply yellow socks and bracelet for high fall risk patients  - Consider moving patient to room near nurses station  Outcome: Progressing

## 2023-01-13 NOTE — ASSESSMENT & PLAN NOTE
Recent Labs     01/11/23  0533 01/11/23  2348 01/13/23  0539   CREATININE 1 20 1 07 1 53*   EGFR 60 69 45     Estimated Creatinine Clearance: 52 2 mL/min (A) (by C-G formula based on SCr of 1 53 mg/dL (H))  BUN 26    PLAN:  Etiology unsure as BUN:Cr more suggestive as intra renal/post renal   Urine w/ microscopy pending  Urine Na Pending  Lasix 40mg IV once ordered   Bladder scan to evaluate for urine retention ordered 13JAN23 13JAN23 pm BMP to assess kidney function pending

## 2023-01-13 NOTE — TELEPHONE ENCOUNTER
MR called pt to confirm appt with RD for Monday  Pt informed MR he is currently in the hospital after being admitted with SOB after his shoulder surgery and is questioning if he wants to continue with the bariatric surgery process  Pt cancelled his appt for RD on Monday and MR advised RD of situation  RD called pt  Pt is still admitted to the hospital in the hopes of being discharged tomorrow  Pt is unsure if he wants to proceed with bariatric surgery after his current situation  His wife sounds to not want him to continue in the process but pt wants to make a more mindful, not emotional, about his decision  Advised pt that RD can call him back on Tuesday once he is home and settled for a few days so he has time to think through the pros and cons  Pt feels that is a good plan

## 2023-01-13 NOTE — UTILIZATION REVIEW
Initial Clinical Review    Admission: Date/Time/Statement:   Admission Orders (From admission, onward)     Ordered        01/12/23 0400  INPATIENT ADMISSION  Once                      Orders Placed This Encounter   Procedures   • INPATIENT ADMISSION     Standing Status:   Standing     Number of Occurrences:   1     Order Specific Question:   Level of Care     Answer:   Med Surg [16]     Order Specific Question:   Estimated length of stay     Answer:   More than 2 Midnights     Order Specific Question:   Certification     Answer:   I certify that inpatient services are medically necessary for this patient for a duration of greater than two midnights  See H&P and MD Progress Notes for additional information about the patient's course of treatment  ED Arrival Information     Expected   -    Arrival   1/11/2023 22:40    Acuity   Urgent            Means of arrival   Walk-In    Escorted by   Spouse    Service   Hospitalist    Admission type   Emergency            Arrival complaint   POST OP / [place] / LOW O2            Chief Complaint   Patient presents with   • Shortness of Breath     Recent shoulder replacement sx with anesthesia block  Reports low O2, 88-92% at rest, [place], SOB, insomnia  Initial Presentation: 81 y o  male  with a PMH of hypertension, hyperlipidemia, coronary artery disease, asthma, bipolar disorder, with s/p reverse total shoulder surgery for OA 2 days ago who presents with increasing shortness of breath on 01/11/2023  Patient reports that he has had some exertional dyspnea over the past 2 days  He had a right shoulder surgery on 01/10/23 and he was discharged yesterday and he presented to the ED again on the same day with increasing shortness of breath  Per patient, his oxygen level at home was in the high 80s to low 90s (88-91%) yesterday  His O2 saturation is more than 95% at home  His baseline he reports that he had difficulty getting to the bathroom due to shortness of breath  He also endorsed that he has productive cough with clear phlegm  He has some nausea  Plan: Inpatient admission for evaluation and treatment of RLL pneumonia, acute resp failure with hypoxia, hyponatremia: Follow Urine Strep, Legionella, sputum cultures, Procalcitonin a m, IV ceftriaxone, Tesraudel Menardes, currently on O2 2L NC, IV fluids, BMP in am      Date: 1/13   Day 2:     Internal medicine: Patient reported with confusion last night after receiving pain meds and Xanax  This has since resolved and he is back at his baseline mental status  Would recommended avoiding narcotics/benzo combination in the future  He reports SOB again this morning  O2 sats have been stable on room air at rest and with ambulation at 95% and 94% respectively  Respiration non labored, decreased BS right lower lung field  Received 1 dose Lasix 40 mg this morning but reports only minimal urinary output  Been going to the bathroom often but only having small volume urine  Will check bladder scan with PVR  Checking overnight pulse oximetry today to see if can qualify for q hs home O2 in view of DONALD symptoms  Continue to monitor with bladder scan, repeat labs today following diuretic  Discontinue antibiotics       ED Triage Vitals   Temperature Pulse Respirations Blood Pressure SpO2   01/11/23 2251 01/11/23 2251 01/11/23 2251 01/11/23 2251 01/11/23 2251   99 1 °F (37 3 °C) 93 18 161/72 92 %      Temp Source Heart Rate Source Patient Position - Orthostatic VS BP Location FiO2 (%)   01/11/23 2251 01/11/23 2251 01/11/23 2251 01/11/23 2251 --   Oral Monitor Sitting Left arm       Pain Score       01/12/23 0520       No Pain          Wt Readings from Last 1 Encounters:   01/13/23 107 kg (236 lb 8 9 oz)     Additional Vital Signs:     Date/Time Temp Pulse Resp BP MAP (mmHg) SpO2 Calculated FIO2 (%) - Nasal Cannula O2 Flow Rate (L/min) Nasal Cannula O2 Flow Rate (L/min) O2 Device   01/13/23 0900 -- -- -- -- -- 94 % 28 -- 2 L/min Nasal cannula 01/13/23 08:13:48 -- 69 -- 121/63 82 98 % -- -- -- --   01/13/23 07:52:26 98 3 °F (36 8 °C) 72 18 114/62 79 93 % -- -- -- --   01/13/23 01:00:20 -- 72 18 100/49 Abnormal  66 94 % -- 2 L/min -- Nasal cannula   01/12/23 22:14:07 98 7 °F (37 1 °C) 76 18 104/56 72 93 % 28 -- 2 L/min Nasal cannula   01/12/23 20:05:43 99 °F (37 2 °C) 83 -- 108/54 72 95 % 28 -- 2 L/min Nasal cannula   01/12/23 1731 -- -- -- 100/53 -- -- -- -- -- --   01/12/23 15:39:51 98 8 °F (37 1 °C) 73 -- 107/54 -- 94 % -- -- -- --   01/12/23 1419 -- -- -- 102/68 -- -- -- -- -- --   01/12/23 0900 -- -- -- -- -- 95 % -- -- -- --   01/12/23 0533 -- 86 18 128/66 87 96 % -- -- -- --   01/12/23 0430 -- 80 -- -- -- 94 % -- -- -- --   01/12/23 0415 -- 77 -- -- -- 94 % -- -- -- --   01/12/23 0400 -- 78 18 131/67 92 95 % -- -- -- --   01/12/23 0320 -- 79 18 136/65 -- 93 % 28 -- 2 L/min Nasal cannula   01/12/23 0211 -- -- -- -- -- -- -- 2 L/min -- Nasal cannula   01/12/23 0120 -- 75 18 136/68 -- 95 % 28 -- 2 L/min Nasal cannula     Pertinent Labs/Diagnostic Test Results:   XR chest portable   Final Result by Humaira Chase MD (01/13 4323)      Right basilar discoid opacities in keeping with atelectasis  Workstation performed: [id number]         CTA ED chest PE study   Final Result by Viola Mike MD (01/12 1002)      No pulmonary embolus  Moderate postoperative atelectasis in the right lower lobe with elevation of the right hemidiaphragm  Postoperative changes from reverse right shoulder arthroplasty  Diffuse hepatic steatosis  Workstation performed: [id number]         XR chest 1 view portable   Final Result by Michael Espinosa MD (01/12 0378)      Patchy right lung base atelectasis with small right pleural effusion  Mild central pulmonary vascular congestion  Partially visualized reverse total shoulder arthroplasty on the right                    Workstation performed: [id number]           Results from last 7 days   Lab Units 01/11/23  2348   SARS-COV-2  Negative     Results from last 7 days   Lab Units 01/13/23  0539 01/11/23  2348 01/11/23  0644   WBC Thousand/uL 10 79* 11 21* 13 57*   HEMOGLOBIN g/dL 10 5* 12 0 12 5   HEMATOCRIT % 33 2* 37 8 38 9   PLATELETS Thousands/uL 190 226 253   NEUTROS ABS Thousands/µL 8 60* 8 91*  --          Results from last 7 days   Lab Units 01/13/23  0539 01/11/23  2348 01/11/23  0533   SODIUM mmol/L 132* 134* 135   POTASSIUM mmol/L 4 0 3 9 3 8   CHLORIDE mmol/L 98 99 100   CO2 mmol/L 26 27 29   ANION GAP mmol/L 8 8 6   BUN mg/dL 26* 19 16   CREATININE mg/dL 1 53* 1 07 1 20   EGFR ml/min/1 73sq m 45 69 60   CALCIUM mg/dL 8 5 8 7 8 8     Results from last 7 days   Lab Units 01/11/23  2348   AST U/L 57*   ALT U/L 49   ALK PHOS U/L 78   TOTAL PROTEIN g/dL 6 6   ALBUMIN g/dL 3 9   TOTAL BILIRUBIN mg/dL 0 38     Results from last 7 days   Lab Units 01/10/23  1140   POC GLUCOSE mg/dl 135     Results from last 7 days   Lab Units 01/13/23  0539 01/11/23  2348 01/11/23  0533   GLUCOSE RANDOM mg/dL 130 126 124         Results from last 7 days   Lab Units 01/12/23  0320 01/12/23  0120   HS TNI 0HR ng/L  --  9   HS TNI 2HR ng/L 8  --    HSTNI D2 ng/L -1  --                  Results from last 7 days   Lab Units 01/13/23  0539 01/11/23  2348   PROCALCITONIN ng/ml 0 42* 0 26*         Results from last 7 days   Lab Units 01/13/23  0522 01/11/23  2348   LEGIONELLA URINARY ANTIGEN  Negative  --    INFLUENZA A PCR   --  Negative   INFLUENZA B PCR   --  Negative   RSV PCR   --  Negative         ED Treatment:   Medication Administration from 01/11/2023 2240 to 01/12/2023 0509       Date/Time Order Dose Route Action     01/11/2023 2353 EST albuterol inhalation solution 2 5 mg 2 5 mg Nebulization Given     01/12/2023 0139 EST iohexol (OMNIPAQUE) 350 MG/ML injection (SINGLE-DOSE) 85 mL 85 mL Intravenous Given     01/12/2023 0255 EST benzonatate (TESSALON PERLES) capsule 100 mg 100 mg Oral Given 01/12/2023 0314 EST melatonin tablet 3 mg 3 mg Oral Given     01/12/2023 0345 EST azithromycin (ZITHROMAX) 500 mg in sodium chloride 0 9% 250mL IVPB 500 mg 500 mg Intravenous New Bag     01/12/2023 0318 EST ceftriaxone (ROCEPHIN) 1 g/50 mL in dextrose IVPB 1,000 mg Intravenous New Bag        Past Medical History:   Diagnosis Date   • Arthritis    • Asthma    • Colon polyp    • 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HCC)    • 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HCC)    • Manic depression ([facility] [address] )    • Sleep apnea      Present on Admission:  • Right lower lobe pneumonia  • Acute respiratory failure with hypoxia (HCC)  • DONALD (obstructive sleep apnea)      Admitting Diagnosis: Shortness of breath [R06 02]  Dyspnea [R06 00]  Hypoxia [R09 02]  RLL pneumonia [J18 9]  Age/Sex: 81 y o  male  Admission Orders:  Scheduled Medications:  benzonatate, 100 mg, Oral, TID  cefTRIAXone, 1,000 mg, Intravenous, Q24H  Diclofenac Sodium, 2 g, Topical, 4x Daily  enoxaparin, 40 mg, Subcutaneous, Daily  lamoTRIgine, 200 mg, Oral, Daily  levothyroxine, 100 mcg, Oral, Early Morning  melatonin, 3 mg, Oral, HS  modafinil, 200 mg, Oral, Daily  pantoprazole, 40 mg, Oral, Early Morning  polyethylene glycol, 17 g, Oral, Daily  pramipexole, 2 mg, Oral, HS  tamsulosin, 0 4 mg, Oral, Daily      Continuous IV Infusions:     PRN Meds:  acetaminophen, 650 mg, Oral, Q6H PRN  albuterol, 2 puff, Inhalation, Q4H PRN  calcium carbonate, 1,000 mg, Oral, Daily PRN  ondansetron, 4 mg, Intravenous, Q6H PRN  oxyCODONE, 5 mg, Oral, Q4H PRN        None    Network Utilization Review Department  ATTENTION: Please call with any questions or concerns to [phone] and carefully listen to the prompts so that you are directed to the right person  All voicemails are confidential   Jessenia Lara all requests for admission clinical reviews, approved or denied determinations and any other requests to dedicated fax number below belonging to the campus where the patient is receiving treatment   List of dedicated fax numbers for the Facilities:  [address] [facility] [address] DENIALS (Administrative/Medical Necessity) [phone]   [address] (Maternity/NICU/Pediatrics) [phone]   [address] [phone]   [address] [address] Rosalba [facility] [address] [phone]   [address] [facility] [address] [place] [facility] [place] [address] [facility] [phone]

## 2023-01-13 NOTE — ASSESSMENT & PLAN NOTE
Patient came in with desaturations 88-91% from his baseline > 95%  Patient was put on 3 L/min of oxygen in the ED  Currently on 2 L/min of oxygen via nasal cannula  He has history of obstructive sleep apnea  Pending completion of CPAP evaluation     Acute hypoxic respiratory failure most likely due to Fluid overload  IMAGING:   On admission:Chest x-ray and CT chest showed congestion  13JAN23: Atelectasis   HOME O2 eval failed  Pt refused overnight pulse Ox w/ am ABG  Currently on 0L O2  Bladder scan to screen for urinary retention pending however pt notes resolution of symptoms     PLAN:  · Incentive Spirometry   · Outpatient pulm rehab

## 2023-01-14 ENCOUNTER — APPOINTMENT (INPATIENT)
Dept: VASCULAR ULTRASOUND | Facility: HOSPITAL | Age: 73
End: 2023-01-14

## 2023-01-14 VITALS
BODY MASS INDEX: 37.47 KG/M2 | SYSTOLIC BLOOD PRESSURE: 137 MMHG | DIASTOLIC BLOOD PRESSURE: 77 MMHG | TEMPERATURE: 97.7 F | OXYGEN SATURATION: 96 % | WEIGHT: 237.44 LBS | HEART RATE: 74 BPM | RESPIRATION RATE: 18 BRPM

## 2023-01-14 PROBLEM — R06.89 RESPIRATORY INSUFFICIENCY: Status: ACTIVE | Noted: 2023-01-12

## 2023-01-14 PROBLEM — R06.03 RESPIRATORY DISTRESS: Status: ACTIVE | Noted: 2023-01-12

## 2023-01-14 PROBLEM — J18.9 RIGHT LOWER LOBE PNEUMONIA: Status: RESOLVED | Noted: 2023-01-12 | Resolved: 2023-01-14

## 2023-01-14 PROBLEM — N17.9 AKI (ACUTE KIDNEY INJURY) (HCC): Status: RESOLVED | Noted: 2023-01-13 | Resolved: 2023-01-14

## 2023-01-14 LAB
ANION GAP SERPL CALCULATED.3IONS-SCNC: 9 MMOL/L (ref 4–13)
BASOPHILS # BLD AUTO: 0.04 THOUSANDS/ÂΜL (ref 0–0.1)
BASOPHILS NFR BLD AUTO: 1 % (ref 0–1)
BUN SERPL-MCNC: 18 MG/DL (ref 5–25)
CALCIUM SERPL-MCNC: 8.8 MG/DL (ref 8.4–10.2)
CHLORIDE SERPL-SCNC: 96 MMOL/L (ref 96–108)
CO2 SERPL-SCNC: 29 MMOL/L (ref 21–32)
CREAT SERPL-MCNC: 1 MG/DL (ref 0.6–1.3)
EOSINOPHIL # BLD AUTO: 0.17 THOUSAND/ÂΜL (ref 0–0.61)
EOSINOPHIL NFR BLD AUTO: 2 % (ref 0–6)
ERYTHROCYTE [DISTWIDTH] IN BLOOD BY AUTOMATED COUNT: 14.2 % (ref 11.6–15.1)
GFR SERPL CREATININE-BSD FRML MDRD: 75 ML/MIN/1.73SQ M
GLUCOSE SERPL-MCNC: 118 MG/DL (ref 65–140)
HCT VFR BLD AUTO: 33.2 % (ref 36.5–49.3)
HGB BLD-MCNC: 10.6 G/DL (ref 12–17)
IMM GRANULOCYTES # BLD AUTO: 0.05 THOUSAND/UL (ref 0–0.2)
IMM GRANULOCYTES NFR BLD AUTO: 1 % (ref 0–2)
LYMPHOCYTES # BLD AUTO: 1.05 THOUSANDS/ÂΜL (ref 0.6–4.47)
LYMPHOCYTES NFR BLD AUTO: 13 % (ref 14–44)
MCH RBC QN AUTO: 30.3 PG (ref 26.8–34.3)
MCHC RBC AUTO-ENTMCNC: 31.9 G/DL (ref 31.4–37.4)
MCV RBC AUTO: 95 FL (ref 82–98)
MONOCYTES # BLD AUTO: 0.72 THOUSAND/ÂΜL (ref 0.17–1.22)
MONOCYTES NFR BLD AUTO: 9 % (ref 4–12)
NEUTROPHILS # BLD AUTO: 6.4 THOUSANDS/ÂΜL (ref 1.85–7.62)
NEUTS SEG NFR BLD AUTO: 74 % (ref 43–75)
NRBC BLD AUTO-RTO: 0 /100 WBCS
PLATELET # BLD AUTO: 230 THOUSANDS/UL (ref 149–390)
PMV BLD AUTO: 10.1 FL (ref 8.9–12.7)
POTASSIUM SERPL-SCNC: 3.9 MMOL/L (ref 3.5–5.3)
RBC # BLD AUTO: 3.5 MILLION/UL (ref 3.88–5.62)
SODIUM SERPL-SCNC: 134 MMOL/L (ref 135–147)
WBC # BLD AUTO: 8.43 THOUSAND/UL (ref 4.31–10.16)

## 2023-01-14 RX ORDER — DOCUSATE SODIUM 100 MG/1
100 CAPSULE, LIQUID FILLED ORAL 2 TIMES DAILY
Status: DISCONTINUED | OUTPATIENT
Start: 2023-01-14 | End: 2023-01-14 | Stop reason: HOSPADM

## 2023-01-14 RX ORDER — DIPHENHYDRAMINE HCL 25 MG
25 TABLET ORAL DAILY PRN
Status: DISCONTINUED | OUTPATIENT
Start: 2023-01-14 | End: 2023-01-14 | Stop reason: HOSPADM

## 2023-01-14 RX ORDER — SERTRALINE HYDROCHLORIDE 25 MG/1
25 TABLET, FILM COATED ORAL DAILY
Qty: 30 TABLET | Refills: 0 | Status: SHIPPED | OUTPATIENT
Start: 2023-01-14 | End: 2023-01-23 | Stop reason: HOSPADM

## 2023-01-14 RX ORDER — ALPRAZOLAM 0.25 MG/1
0.25 TABLET ORAL DAILY PRN
Status: DISCONTINUED | OUTPATIENT
Start: 2023-01-14 | End: 2023-01-14 | Stop reason: HOSPADM

## 2023-01-14 RX ADMIN — ENOXAPARIN SODIUM 40 MG: 40 INJECTION SUBCUTANEOUS at 09:25

## 2023-01-14 RX ADMIN — LEVOTHYROXINE SODIUM 100 MCG: 100 TABLET ORAL at 05:25

## 2023-01-14 RX ADMIN — OXYCODONE HYDROCHLORIDE 5 MG: 5 TABLET ORAL at 05:26

## 2023-01-14 RX ADMIN — DOCUSATE SODIUM 100 MG: 100 CAPSULE, LIQUID FILLED ORAL at 09:25

## 2023-01-14 RX ADMIN — MODAFINIL 200 MG: 100 TABLET ORAL at 09:25

## 2023-01-14 RX ADMIN — BENZONATATE 100 MG: 100 CAPSULE ORAL at 09:25

## 2023-01-14 RX ADMIN — PANTOPRAZOLE SODIUM 40 MG: 40 TABLET, DELAYED RELEASE ORAL at 05:25

## 2023-01-14 RX ADMIN — TAMSULOSIN HYDROCHLORIDE 0.4 MG: 0.4 CAPSULE ORAL at 09:25

## 2023-01-14 RX ADMIN — LAMOTRIGINE 200 MG: 100 TABLET ORAL at 09:25

## 2023-01-14 RX ADMIN — POLYETHYLENE GLYCOL 3350 17 G: 17 POWDER, FOR SOLUTION ORAL at 09:24

## 2023-01-14 NOTE — ASSESSMENT & PLAN NOTE
Recent Labs     01/13/23  0539 01/13/23  1653 01/14/23  0436   CREATININE 1 53* 1 20 1 00   EGFR 45 60 75     Estimated Creatinine Clearance: 80 2 mL/min (by C-G formula based on SCr of 1 mg/dL)  BUN 26    PLAN:  Etiology unsure as BUN:Cr more suggestive as intra renal/post renal   Urine w/ microscopy pending  Urine Na Pending  Lasix 40mg IV once ordered   Bladder scan to evaluate for urine retention ordered 13JAN23 13JAN23 pm BMP to assess kidney function pending

## 2023-01-14 NOTE — PLAN OF CARE
Problem: PAIN - ADULT  Goal: Verbalizes/displays adequate comfort level or baseline comfort level  Description: Interventions:  - Encourage patient to monitor pain and request assistance  - Assess pain using appropriate pain scale  - Administer analgesics based on type and severity of pain and evaluate response  - Implement non-pharmacological measures as appropriate and evaluate response  - Consider cultural and social influences on pain and pain management  - Notify physician/advanced practitioner if interventions unsuccessful or patient reports new pain  Outcome: Progressing     Problem: INFECTION - ADULT  Goal: Absence or prevention of progression during hospitalization  Description: INTERVENTIONS:  - Assess and monitor for signs and symptoms of infection  - Monitor lab/diagnostic results  - Monitor all insertion sites, i e  indwelling lines, tubes, and drains  - Monitor endotracheal if appropriate and nasal secretions for changes in amount and color  - [place] appropriate cooling/warming therapies per order  - Administer medications as ordered  - Instruct and encourage patient and family to use good hand hygiene technique  - Identify and instruct in appropriate isolation precautions for identified infection/condition  Outcome: Progressing  Goal: Absence of fever/infection during neutropenic period  Description: INTERVENTIONS:  - Monitor WBC    Outcome: Progressing     Problem: SAFETY ADULT  Goal: Patient will remain free of falls  Description: INTERVENTIONS:  - Educate patient/family on patient safety including physical limitations  - Instruct patient to call for assistance with activity   - Consult OT/PT to assist with strengthening/mobility   - Keep Call bell within reach  - Keep bed low and locked with side rails adjusted as appropriate  - Keep care items and personal belongings within reach  - Initiate and maintain comfort rounds  - Make Fall Risk Sign visible to staff  - Offer Toileting every  Hours, in advance of need  - Initiate/Maintain alarm  - Obtain necessary fall risk management equipment:   - Apply yellow socks and bracelet for high fall risk patients  - Consider moving patient to room near nurses station  Outcome: Progressing  Goal: Maintain or return to baseline ADL function  Description: INTERVENTIONS:  -  Assess patient's ability to carry out ADLs; assess patient's baseline for ADL function and identify physical deficits which impact ability to perform ADLs (bathing, care of mouth/teeth, toileting, grooming, dressing, etc )  - Assess/evaluate cause of self-care deficits   - Assess range of motion  - Assess patient's mobility; develop plan if impaired  - Assess patient's need for assistive devices and provide as appropriate  - Encourage maximum independence but intervene and supervise when necessary  - Involve family in performance of ADLs  - Assess for home care needs following discharge   - Consider OT consult to assist with ADL evaluation and planning for discharge  - Provide patient education as appropriate  Outcome: Progressing  Goal: Maintains/Returns to pre admission functional level  Description: INTERVENTIONS:  - Perform BMAT or MOVE assessment daily    - Set and communicate daily mobility goal to care team and patient/family/caregiver  - Collaborate with rehabilitation services on mobility goals if consulted  - Perform Range of Motion times a day  - Reposition patient every  hours    - Dangle patient  times a day  - Stand patient  times a day  - Ambulate patient  times a day  - Out of bed to chair  times a day   - Out of bed for meals  times a day  - Out of bed for toileting  - Record patient progress and toleration of activity level   Outcome: Progressing     Problem: DISCHARGE PLANNING  Goal: Discharge to home or other facility with appropriate resources  Description: INTERVENTIONS:  - Identify barriers to discharge w/patient and caregiver  - Arrange for needed discharge resources and transportation as appropriate  - Identify discharge learning needs (meds, wound care, etc )  - Arrange for interpretive services to assist at discharge as needed  - Refer to Case Management Department for coordinating discharge planning if the patient needs post-hospital services based on physician/advanced practitioner order or complex needs related to functional status, cognitive ability, or social support system  Outcome: Progressing

## 2023-01-14 NOTE — DISCHARGE INSTR - AVS FIRST PAGE
Dear Oma Lim,     It was our pleasure to care for you here at [facility]  It is our hope that we were always able to exceed the expected standards for your care during your stay  You were hospitalized due to SOB  You were cared for on the 4th floor by Diego Cleaning MD under the service of Dylan Mcknight MD with the [facility] who covers for your primary care physician (PCP), Angle Pino DO, while you were hospitalized  If you have any questions or concerns related to this hospitalization, you may contact us at [phone]  For follow up as well as any medication refills, we recommend that you follow up with your primary care physician  A registered nurse will reach out to you by phone within a few days after your discharge to answer any additional questions that you may have after going home  However, at this time we provide for you here, the most important instructions / recommendations at discharge:     Notable Medication Adjustments -   Take Zoloft 25 mg daily  Testing Required after Discharge -   Please continue with sleeping evaluation for CPAP  Important follow up information -   Please follow up with pulmonary rehabilitation  Please follow up with orthopedics  Other Instructions -   Please get well  You were a pleasure to see every morning  I wish you the best recovery for both your shoulder and your respiratory concerns  Please consider ongoing use of the julio for mindfulness meditation  Also continue regular use of incentive spirometry and deep breathing exercises  Diego Cleaning MD  Please review this entire after visit summary as additional general instructions including medication list, appointments, activity, diet, any pertinent wound care, and other additional recommendations from your care team that may be provided for you        Sincerely,     Diego Cleaning MD

## 2023-01-14 NOTE — ASSESSMENT & PLAN NOTE
Patient had underwent an uncomplicated right reverse total shoulder arthroplasty for OA on 01/10/2023     [id number] Pt noted Red arm rugor, dulor sharp intermitent 8/10 when oxy off, pain iscolated to shoulder worst on mvmt, bettter when still  May be 2/2 to nerve block wearing off  PLAN:  Sling strap was loosened   Pt to see ortho outpatient

## 2023-01-14 NOTE — ASSESSMENT & PLAN NOTE
Lab Results   Component Value Date    SODIUM 134 (L) 01/14/2023    SODIUM 132 (L) 01/13/2023     Sodium 135 on admission and 134 this morning  Hyponatremia in the setting of poor oral intake due to recent surgery and pneumonia      Mild hyponatremia most likely 2/2 to volume overload (dilutional)

## 2023-01-14 NOTE — ASSESSMENT & PLAN NOTE
Lab Results   Component Value Date    SARSCOV2 Negative 01/11/2023    INFLUA Negative 01/11/2023    INFLUB Negative 01/11/2023    RSV Negative 01/11/2023       Recent Labs     01/11/23  2348 01/13/23  0539 01/14/23  0436   WBC 11 21* 10 79* 8 43     Recent Labs     01/11/23  2348 01/13/23  0539   PROCALCITONI 0 26* 0 42*        Chest x-ray read by me showed infiltrates in the right lower lobe concerning for pneumonia  Given patient's history of food choking yesterday aspiration pneumonia is very likely  Patient had received a dose of azithromycin and Rocephin each in the ED  Plan:  · Continue IV ceftriaxone 1 g q  24 hours  ?  STOP 14ITT50  · Tessalon Perles for cough  · Albuterol Nebs 2 puffs q6 h PRN  · Incentive spirometry

## 2023-01-14 NOTE — DISCHARGE SUMMARY
[facility]  Discharge- Marion Mitts 1950, 81 y o  male MRN: [id number]  Unit/Bed#: S -01 Encounter: [id number]  Primary Care Provider: Carlos Cuellar DO   Date and time admitted to hospital: 1/11/2023 10:56 PM    * Mild Respiratory insufficiency  Assessment & Plan  Patient came in with desaturations 88-91% from his baseline > 95%  Patient was put on 3 L/min of oxygen in the ED  Currently on 2 L/min of oxygen via nasal cannula  He has history of obstructive sleep apnea  Pending completion of CPAP evaluation     Acute hypoxic respiratory failure most likely due to Fluid overload  IMAGING:   On admission:Chest x-ray and CT chest showed congestion  13JAN23: Atelectasis   HOME O2 eval failed  Pt refused overnight pulse Ox w/ am ABG  Currently on 0L O2  Bladder scan to screen for urinary retention pending however pt notes resolution of symptoms  PLAN:  · Incentive Spirometry   · Outpatient pulm rehab      Osteoarthritis of right shoulder  Assessment & Plan  Patient had underwent an uncomplicated right reverse total shoulder arthroplasty for OA on 01/10/2023     [id number] Pt noted Red arm rugor, dulor sharp intermitent 8/10 when oxy off, pain iscolated to shoulder worst on mvmt, bettter when still  May be 2/2 to nerve block wearing off  PLAN:  Sling strap was loosened   Pt to see ortho outpatient  DONALD (obstructive sleep apnea)  Assessment & Plan  Pt CPAP in process of evaluation  Home O2 Eval  Ambulatory Pulse Ox   14JAN23 Overnight Pulse Ox with ABG in morning refused by pt  PLAN: Continue with outpatient DONALD eval        Hyponatremia  Assessment & Plan  Lab Results   Component Value Date    SODIUM 134 (L) 01/14/2023    SODIUM 132 (L) 01/13/2023     Sodium 135 on admission and 134 this morning  Hyponatremia in the setting of poor oral intake due to recent surgery and pneumonia      Mild hyponatremia most likely 2/2 to volume overload (dilutional)    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)  Assessment & Plan  Patient stated that 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 ago  ARIANE (acute kidney injury) (HCC)-resolved as of 1/14/2023  Assessment & Plan  Recent Labs     01/13/23  0539 01/13/23  1653 01/14/23  0436   CREATININE 1 53* 1 20 1 00   EGFR 45 60 75     Estimated Creatinine Clearance: 80 2 mL/min (by C-G formula based on SCr of 1 mg/dL)  BUN 26    PLAN:  Etiology unsure as BUN:Cr more suggestive as intra renal/post renal   Urine w/ microscopy pending  Urine Na Pending  Lasix 40mg IV once ordered   Bladder scan to evaluate for urine retention ordered 13JAN23 13JAN23 pm BMP to assess kidney function pending  Right lower lobe pneumonia-resolved as of 1/14/2023  Assessment & Plan  Lab Results   Component Value Date    SARSCOV2 Negative 01/11/2023    INFLUA Negative 01/11/2023    INFLUB Negative 01/11/2023    RSV Negative 01/11/2023       Recent Labs     01/11/23  2348 01/13/23  0539 01/14/23  0436   WBC 11 21* 10 79* 8 43     Recent Labs     01/11/23  2348 01/13/23  0539   PROCALCITONI 0 26* 0 42*        Chest x-ray read by me showed infiltrates in the right lower lobe concerning for pneumonia  Given patient's history of food choking yesterday aspiration pneumonia is very likely  Patient had received a dose of azithromycin and Rocephin each in the ED  Plan:  · Continue IV ceftriaxone 1 g q  24 hours  ?  STOP [id number]  · Tessalon Perles for cough  · Albuterol Nebs 2 puffs q6 h PRN  · Incentive spirometry          Medical Problems     Resolved Problems  Date Reviewed: 1/14/2023          Resolved    Right lower lobe pneumonia 1/14/2023     Resolved by  Leticia Parra MD    ARIANE (acute kidney injury) [facility]) 1/14/2023     Resolved by  Leticia Parra MD        Discharging Resident: Leticia Parra MD  Discharging Attending: Smooth Reeder MD  PCP: Lashonda Reaves DO  Admission Date:   Admission Orders (From admission, onward)     Ordered        01/12/23 0400  INPATIENT ADMISSION  Once                      Discharge Date: 01/14/23    Consultations During Hospital Stay:  · None    Procedures Performed:   · None    Significant Findings / Test Results:   XR chest portable  Result Date: 1/13/2023  Impression: Right basilar discoid opacities in keeping with atelectasis  Workstation performed: [id number]     XR chest 1 view portable  Result Date: 1/12/2023  Impression: Patchy right lung base atelectasis with small right pleural effusion  Mild central pulmonary vascular congestion  Partially visualized reverse total shoulder arthroplasty on the right  Workstation performed: [id number]     CTA ED chest PE study  Result Date: 1/12/2023  Impression: No pulmonary embolus  Moderate postoperative atelectasis in the right lower lobe with elevation of the right hemidiaphragm  Postoperative changes from reverse right shoulder arthroplasty  Diffuse hepatic steatosis  Workstation performed: [id number]     XR chest portable  Result Date: 1/13/2023  Impression Right basilar discoid opacities in keeping with atelectasis  Workstation performed: [id number]     Incidental Findings:   · None    Test Results Pending at Discharge (will require follow up): · None     Outpatient Tests Requested:  Please continue the evaluation for home CPAP  Complications:  None    Reason for Admission: SOB    Hospital Course: Sim Arnold is a 81 y o  male patient who originally presented to the hospital on 1/11/2023 due to SOB  Pt was a same day readmit after shoulder surgery and pmhx includes HTN, HLD, CAD, Bipolar      Pt noted exertional dyspnea worsening past 2 days since surgery  After D/C [id number] pt home pulse Ox high 80s low 90s  Gets SOB w/ few steps "getting to batheroom"   In the ED physical exam was unremarkabe except need for supplemental O2 to keep sat >90%    Imaging was suggestive of right lower lobe atelectasis and pt was admitted to the care of [facility] for possible right lower lobe pneumonia  Pt stabilized over the course of the hospital stay  Pt refused overnight pulse ox evaluation and morning ABG  During the inpatient admission pt saturation was always in the 90 but he endorsed continued SOB  Pt was considered stable for discharge on [id number] as they had no supplemental oxygen requirments  Pt was told to follow up with sleep study as them most likely have severe DONALD which contributes to the SOB due to increased CO2 levels  Please see above list of diagnoses and related plan for additional information  Condition at Discharge: stable    Discharge Day Visit / Exam:   Subjective:  Pt was seen in the morning and noted continuation of "air hunger" however pt was sating in 90s w/o supplemental O2  Pt was told most likely 2/2 to DONALD, body habitus, oxycodone all causing increased repiratory CO2  Pt also noted right should pain which was examen and red mare on solar plexus  Vitals: Blood Pressure: 137/77 (01/14/23 0714)  Pulse: 74 (01/14/23 0714)  Temperature: 97 7 °F (36 5 °C) (01/14/23 0714)  Temp Source: Oral (01/12/23 2214)  Respirations: 18 (01/13/23 0752)  Weight - Scale: 108 kg (237 lb 7 oz) (01/14/23 0600)  SpO2: 96 % (01/14/23 0714)  Exam:   Physical Exam  Vitals and nursing note reviewed  Constitutional:       General: He is not in acute distress  Appearance: He is well-developed  He is obese  He is not ill-appearing  HENT:      Head: Normocephalic and atraumatic  Mouth/Throat:      Mouth: Mucous membranes are dry  Eyes:      Extraocular Movements: Extraocular movements intact  Conjunctiva/sclera: Conjunctivae normal       Pupils: Pupils are equal, round, and reactive to light  Neck:      Trachea: No tracheal deviation  Cardiovascular:      Rate and Rhythm: Normal rate and regular rhythm  Heart sounds: Normal heart sounds  No murmur heard  Pulmonary:      Effort: Respiratory distress present        Breath sounds: Examination of the right-middle field reveals decreased breath sounds  Examination of the right-lower field reveals decreased breath sounds  Decreased breath sounds present  No wheezing, rhonchi or rales  Abdominal:      General: Bowel sounds are normal  There is no distension  Palpations: Abdomen is soft  Tenderness: There is no abdominal tenderness  Musculoskeletal:         General: No swelling  Arms:       Cervical back: Neck supple  Right lower leg: Edema present  Left lower leg: Edema present  Comments: Right shoulder in sling  Lymphadenopathy:      Cervical: No cervical adenopathy  Skin:     General: Skin is warm and dry  Capillary Refill: Capillary refill takes less than 2 seconds  Coloration: Skin is not jaundiced  Findings: Rash present  No bruising or lesion  Comments: Red dipak over solar plexus   Neurological:      Mental Status: He is alert and oriented to person, place, and time  Psychiatric:         Mood and Affect: Mood is anxious  Discussion with Family: Patient declined call to   Discharge instructions/Information to patient and family:   See after visit summary for information provided to patient and family  Provisions for Follow-Up Care:  See after visit summary for information related to follow-up care and any pertinent home health orders  Disposition:   Home    Planned Readmission: None    Discharge Medications:  See after visit summary for reconciled discharge medications provided to patient and/or family        **Please Note: This note may have been constructed using a voice recognition system**

## 2023-01-14 NOTE — ASSESSMENT & PLAN NOTE
Pt CPAP in process of evaluation  Home O2 Eval  Ambulatory Pulse Ox   14JAN23 Overnight Pulse Ox with ABG in morning refused by pt        PLAN: Continue with outpatient DONALD eval

## 2023-01-16 ENCOUNTER — APPOINTMENT (OUTPATIENT)
Dept: PHYSICAL THERAPY | Facility: REHABILITATION | Age: 73
End: 2023-01-16

## 2023-01-16 ENCOUNTER — TRANSITIONAL CARE MANAGEMENT (OUTPATIENT)
Dept: FAMILY MEDICINE CLINIC | Facility: CLINIC | Age: 73
End: 2023-01-16

## 2023-01-16 ENCOUNTER — TELEPHONE (OUTPATIENT)
Dept: OBGYN CLINIC | Facility: HOSPITAL | Age: 73
End: 2023-01-16

## 2023-01-16 NOTE — UTILIZATION REVIEW
Initial Clinical Review    Admission: Date/Time/Statement:   Admission Orders (From admission, onward)     Ordered        01/12/23 0400  INPATIENT ADMISSION  Once                      Orders Placed This Encounter   Procedures   • INPATIENT ADMISSION     Standing Status:   Standing     Number of Occurrences:   1     Order Specific Question:   Level of Care     Answer:   Med Surg [16]     Order Specific Question:   Estimated length of stay     Answer:   More than 2 Midnights     Order Specific Question:   Certification     Answer:   I certify that inpatient services are medically necessary for this patient for a duration of greater than two midnights  See H&P and MD Progress Notes for additional information about the patient's course of treatment  ED Arrival Information     Expected   -    Arrival   1/11/2023 22:40    Acuity   Urgent            Means of arrival   Walk-In    Escorted by   Spouse    Service   Hospitalist    Admission type   Emergency            Arrival complaint   POST OP / Cassy Benson / LOW O2            Chief Complaint   Patient presents with   • Shortness of Breath     Recent shoulder replacement sx with anesthesia block  Reports low O2, 88-92% at rest, [place], SOB, insomnia  Initial Presentation: 81 y o  male  with a PMH of hypertension, hyperlipidemia, coronary artery disease, asthma, bipolar disorder, with s/p reverse total shoulder surgery for OA 2 days ago who presents with increasing shortness of breath on 01/11/2023  Patient reports that he has had some exertional dyspnea over the past 2 days  He had a right shoulder surgery on 01/10/23 and he was discharged yesterday and he presented to the ED again on the same day with increasing shortness of breath  Per patient, his oxygen level at home was in the high 80s to low 90s (88-91%) yesterday  His O2 saturation is more than 95% at home  His baseline he reports that he had difficulty getting to the bathroom due to shortness of breath  He also endorsed that he has productive cough with clear phlegm  He has some nausea  Plan: Inpatient admission for evaluation and treatment of RLL pneumonia, acute resp failure with hypoxia, hyponatremia: Follow Urine Strep, Legionella, sputum cultures, Procalcitonin a m, IV ceftriaxone, Tessalroldan Willis, currently on O2 2L NC, IV fluids, BMP in am      Date: 1/13   Day 2:     Internal medicine: Patient reported with confusion last night after receiving pain meds and Xanax  This has since resolved and he is back at his baseline mental status  Would recommended avoiding narcotics/benzo combination in the future  He reports SOB again this morning  O2 sats have been stable on room air at rest and with ambulation at 95% and 94% respectively  Respiration non labored, decreased BS right lower lung field  Received 1 dose Lasix 40 mg this morning but reports only minimal urinary output  Been going to the bathroom often but only having small volume urine  Will check bladder scan with PVR  Checking overnight pulse oximetry today to see if can qualify for q hs home O2 in view of DONALD symptoms  Continue to monitor with bladder scan, repeat labs today following diuretic  Discontinue antibiotics       ED Triage Vitals   Temperature Pulse Respirations Blood Pressure SpO2   01/11/23 2251 01/11/23 2251 01/11/23 2251 01/11/23 2251 01/11/23 2251   99 1 °F (37 3 °C) 93 18 161/72 92 %      Temp Source Heart Rate Source Patient Position - Orthostatic VS BP Location FiO2 (%)   01/11/23 2251 01/11/23 2251 01/11/23 2251 01/11/23 2251 --   Oral Monitor Sitting Left arm       Pain Score       01/12/23 0520       No Pain          Wt Readings from Last 1 Encounters:   01/14/23 108 kg (237 lb 7 oz)     Additional Vital Signs:     Date/Time Temp Pulse Resp BP MAP (mmHg) SpO2 Calculated FIO2 (%) - Nasal Cannula O2 Flow Rate (L/min) Nasal Cannula O2 Flow Rate (L/min) O2 Device   01/13/23 0900 -- -- -- -- -- 94 % 28 -- 2 L/min Nasal cannula   01/13/23 08:13:48 -- 69 -- 121/63 82 98 % -- -- -- --   01/13/23 07:52:26 98 3 °F (36 8 °C) 72 18 114/62 79 93 % -- -- -- --   01/13/23 01:00:20 -- 72 18 100/49 Abnormal  66 94 % -- 2 L/min -- Nasal cannula   01/12/23 22:14:07 98 7 °F (37 1 °C) 76 18 104/56 72 93 % 28 -- 2 L/min Nasal cannula   01/12/23 20:05:43 99 °F (37 2 °C) 83 -- 108/54 72 95 % 28 -- 2 L/min Nasal cannula   01/12/23 1731 -- -- -- 100/53 -- -- -- -- -- --   01/12/23 15:39:51 98 8 °F (37 1 °C) 73 -- 107/54 -- 94 % -- -- -- --   01/12/23 1419 -- -- -- 102/68 -- -- -- -- -- --   01/12/23 0900 -- -- -- -- -- 95 % -- -- -- --   01/12/23 0533 -- 86 18 128/66 87 96 % -- -- -- --   01/12/23 0430 -- 80 -- -- -- 94 % -- -- -- --   01/12/23 0415 -- 77 -- -- -- 94 % -- -- -- --   01/12/23 0400 -- 78 18 131/67 92 95 % -- -- -- --   01/12/23 0320 -- 79 18 136/65 -- 93 % 28 -- 2 L/min Nasal cannula   01/12/23 0211 -- -- -- -- -- -- -- 2 L/min -- Nasal cannula   01/12/23 0120 -- 75 18 136/68 -- 95 % 28 -- 2 L/min Nasal cannula     Pertinent Labs/Diagnostic Test Results:   VAS upper limb venous duplex scan, unilateral/limited   Final Result by Blossom Domínguez MD (01/14 1651)      XR chest portable   Final Result by Fan Dill MD (01/13 8285)      Right basilar discoid opacities in keeping with atelectasis  Workstation performed: [id number]         CTA ED chest PE study   Final Result by Mingo Riedel, MD (01/12 1002)      No pulmonary embolus  Moderate postoperative atelectasis in the right lower lobe with elevation of the right hemidiaphragm  Postoperative changes from reverse right shoulder arthroplasty  Diffuse hepatic steatosis  Workstation performed: [id number]         XR chest 1 view portable   Final Result by Cherie Carter MD (01/12 9250)      Patchy right lung base atelectasis with small right pleural effusion  Mild central pulmonary vascular congestion        Partially visualized reverse total shoulder arthroplasty on the right                    Workstation performed: [id number]           Results from last 7 days   Lab Units 01/11/23  2348   SARS-COV-2  Negative     Results from last 7 days   Lab Units 01/14/23  0436 01/13/23  0539 01/11/23  2348 01/11/23  0644   WBC Thousand/uL 8 43 10 79* 11 21* 13 57*   HEMOGLOBIN g/dL 10 6* 10 5* 12 0 12 5   HEMATOCRIT % 33 2* 33 2* 37 8 38 9   PLATELETS Thousands/uL 230 190 226 253   NEUTROS ABS Thousands/µL 6 40 8 60* 8 91*  --          Results from last 7 days   Lab Units 01/14/23  0436 01/13/23  1653 01/13/23  0539 01/11/23  2348 01/11/23  0533   SODIUM mmol/L 134* 132* 132* 134* 135   POTASSIUM mmol/L 3 9 3 7 4 0 3 9 3 8   CHLORIDE mmol/L 96 96 98 99 100   CO2 mmol/L 29 28 26 27 29   ANION GAP mmol/L 9 8 8 8 6   BUN mg/dL 18 23 26* 19 16   CREATININE mg/dL 1 00 1 20 1 53* 1 07 1 20   EGFR ml/min/1 73sq m 75 60 45 69 60   CALCIUM mg/dL 8 8 8 6 8 5 8 7 8 8     Results from last 7 days   Lab Units 01/11/23  2348   AST U/L 57*   ALT U/L 49   ALK PHOS U/L 78   TOTAL PROTEIN g/dL 6 6   ALBUMIN g/dL 3 9   TOTAL BILIRUBIN mg/dL 0 38     Results from last 7 days   Lab Units 01/10/23  1140   POC GLUCOSE mg/dl 135     Results from last 7 days   Lab Units 01/14/23  0436 01/13/23  1653 01/13/23  0539 01/11/23  2348 01/11/23  0533   GLUCOSE RANDOM mg/dL 118 109 130 126 124         Results from last 7 days   Lab Units 01/12/23  0320 01/12/23  0120   HS TNI 0HR ng/L  --  9   HS TNI 2HR ng/L 8  --    HSTNI D2 ng/L -1  --                  Results from last 7 days   Lab Units 01/13/23  0539 01/11/23  2348   PROCALCITONIN ng/ml 0 42* 0 26*         Results from last 7 days   Lab Units 01/13/23  0522 01/11/23  2348   STREP PNEUMONIAE ANTIGEN, URINE  Negative  --    LEGIONELLA URINARY ANTIGEN  Negative  --    INFLUENZA A PCR   --  Negative   INFLUENZA B PCR   --  Negative   RSV PCR   --  Negative         ED Treatment:   Medication Administration from 01/11/2023 2240 to 01/12/2023 9795 Date/Time Order Dose Route Action     01/11/2023 2353 EST albuterol inhalation solution 2 5 mg 2 5 mg Nebulization Given     01/12/2023 0139 EST iohexol (OMNIPAQUE) 350 MG/ML injection (SINGLE-DOSE) 85 mL 85 mL Intravenous Given     01/12/2023 0255 EST benzonatate (TESSALON PERLES) capsule 100 mg 100 mg Oral Given     01/12/2023 0314 EST melatonin tablet 3 mg 3 mg Oral Given     01/12/2023 0345 EST azithromycin (ZITHROMAX) 500 mg in sodium chloride 0 9% 250mL IVPB 500 mg 500 mg Intravenous New Bag     01/12/2023 0318 EST ceftriaxone (ROCEPHIN) 1 g/50 mL in dextrose IVPB 1,000 mg Intravenous New Bag        Past Medical History:   Diagnosis Date   • ARIANE (acute kidney injury) ([facility] [address] ) 1/13/2023   • Arthritis    • Asthma    • Colon polyp    • 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[facility] )    • 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[facility] )    • Manic depression ([facility] )    • Right lower lobe pneumonia 1/12/2023   • Sleep apnea      Present on Admission:  • (Resolved) Right lower lobe pneumonia  • Mild Respiratory insufficiency  • DONALD (obstructive sleep apnea)      Admitting Diagnosis: Shortness of breath [R06 02]  Dyspnea [R06 00]  Hypoxia [R09 02]  RLL pneumonia [J18 9]  Age/Sex: 81 y o  male  Admission Orders:  Scheduled Medications:  No current facility-administered medications for this encounter  Continuous IV Infusions:  No current facility-administered medications for this encounter  PRN Meds:  No current facility-administered medications for this encounter  None    Network Utilization Review Department  ATTENTION: Please call with any questions or concerns to [phone] and carefully listen to the prompts so that you are directed to the right person  All voicemails are confidential   Colette Kennedy all requests for admission clinical reviews, approved or denied determinations and any other requests to dedicated fax number below belonging to the campus where the patient is receiving treatment   List of dedicated fax numbers for the Facilities:  FACILITY NAME UR FAX NUMBER   ADMISSION DENIALS (Administrative/Medical Necessity) [phone]   [address]  (Maternity/NICU/Pediatrics) [phone]    Saray Smith [address] [phone]   [address] [facility] 310 [facility] [place] [address] 8[address] [facility] [phone]

## 2023-01-16 NOTE — TELEPHONE ENCOUNTER
Spoke with wife and stated she called PCP that patient was having problems breathing and is retaining fluid  PCP told her to call Ortho surgeon for recommendation  I advised if patient is having worsening symptoms with SOB and retaining fluid she should call 911 and have him taken back to the hospital   Verbalized understanding

## 2023-01-16 NOTE — TELEPHONE ENCOUNTER
Patient had same complaints patient prior to surgery  If PCP unwilling to assist patient the ER likely best option    Thanks [facility]

## 2023-01-16 NOTE — TELEPHONE ENCOUNTER
Caller: Viry     Doctor/Office: Celina     Call regarding :  Patient having trouble breathing and retaining fluid       Call was transferred to: nurse

## 2023-01-18 ENCOUNTER — OFFICE VISIT (OUTPATIENT)
Dept: PHYSICAL THERAPY | Facility: REHABILITATION | Age: 73
End: 2023-01-18

## 2023-01-18 ENCOUNTER — TELEPHONE (OUTPATIENT)
Dept: NEUROLOGY | Facility: CLINIC | Age: 73
End: 2023-01-18

## 2023-01-18 DIAGNOSIS — M19.011 PRIMARY OSTEOARTHRITIS OF RIGHT SHOULDER: Primary | ICD-10-CM

## 2023-01-18 DIAGNOSIS — Z96.611 HISTORY OF TOTAL REPLACEMENT OF RIGHT SHOULDER JOINT: ICD-10-CM

## 2023-01-18 NOTE — TELEPHONE ENCOUNTER
Attempted to reach pt to offer earlier appt off the waitlist for Eric Byrd on 01/25 at 9:15 am   [facility]   Provided call back number and instructed pt to call back if interested in earlier appt   Stated that we could not guarantee the appt would still be available by the time they call back

## 2023-01-18 NOTE — PROGRESS NOTES
PT Re-Evaluation     Today's date: 2023  Patient name: Karyn Ballesteros  : 1950   MRN: [id number]  Referring provider: Chela Ibrahim  Dx:   Encounter Diagnosis     ICD-10-CM    1  Primary osteoarthritis of right shoulder  M19 011       2  History of total replacement of right shoulder joint  Z96 611                      Assessment  Assessment details: Karyn Ballesteros is a 81y o  year old male who presents with chronic right shoulder pain who is s/p R reverse TSA with biceps tenodesis on 1/10/23 performed by Dr Selene Rosario  Current deficits include impaired P/AROM of the R shoulder and elbow, impaired RUE strength, edema, and pain  These deficits are as anticipated following surgery and impaired his ability to perform all ADLs, household activities, and social/recreational activities  Recommend skilled PT services to address previously stated deficits to promote progress towards PLOF  Impairments: abnormal muscle tone, abnormal or restricted ROM, activity intolerance, impaired physical strength, lacks appropriate home exercise program, pain with function, scapular dyskinesis, poor posture  and poor body mechanics  Understanding of Dx/Px/POC: good   Prognosis: good    Goals  STG (4 weeks):  1  R shoulder PROM WFL to promote improved activity tolerance  2  Increase R shoulder strength to at least 3/5 for improved activity tolerance  3  Decrease pain at worst from 7/10 to 4/10 or less for improved QoL  4  Able to dress independently without symptom provocation  LTG (8 weeks):  1  Increased global R shoulder strength to at least 4/5 to promote improved activity tolerance  2  Able to reach overhead with 0-1/10 pain to promote improved ability to complete ADLs and household tasks  3  Able to sleep through the night without waking secondary to pain to promote improved sleep quality  4  Independent with HEP  Plan  Plan details:  Thank you for referring Karyn Ballesteros to Physical Therapy at [facility]  Sumi and for the opportunity to coordinate care  Patient would benefit from: skilled physical therapy  Planned modality interventions: cryotherapy, electrical stimulation/Russian stimulation, TENS, thermotherapy: hydrocollator packs and unattended electrical stimulation  Planned therapy interventions: abdominal trunk stabilization, activity modification, ADL retraining, balance, balance/weight bearing training, behavior modification, body mechanics training, breathing training, fine motor coordination training, flexibility, functional ROM exercises, gait training, graded activity, graded exercise, graded motor, home exercise program, work reintegration, transfer training, therapeutic training, therapeutic exercise, therapeutic activities, stretching, strengthening, self care, postural training, patient education, neuromuscular re-education, muscle pump exercises, motor coordination training, Hernandez taping, manual therapy, joint mobilization and IADL retraining  Frequency: 2x week  Duration in weeks: 8  Plan of Care beginning date: 1/18/2023  Plan of Care expiration date: 3/15/2023  Treatment plan discussed with: patient        Subjective Evaluation    History of Present Illness  Date of surgery: 1/10/2023  Mechanism of injury: surgery  Mechanism of injury: 01/18/23: Kee Silverio is a 81 y o  male patient presenting s/p R reverse TSA with biceps tenodesis performed by Dr You Soriano on 1/10/23  Noted increased difficulty breathing after being discharged from the hospital and went to the ED late on 1/11/23; was discharged home again on 1/14/23  Reports the anesthesia block wore off on Saturday after surgery  Александр Dobson is currently having difficulty with all typical activities  States he's been having neck pain due to the sling; bought a different sling but is still having trouble with the sling, presents to today's session with a standard sling  Does note some numbness in the R arm today   Pain is relieved or reduced with prescription medication, Tylenol and ice  States he's having difficulty sleeping  Pt would like to return to being able to do all activities, being able to type on a keyboard  Next follow up with the physician is 23: Danny Hurt is a 81 y o  male presenting with chronic R shoulder pain and pre TSA to be performed by Dr Judi Shah on 1/10/2023  Currently, Cliff Delaney is having difficulty with typing on the computer for more than 30 minutes, sleeping/laying on the R side, lifting the R arm above 90*, reaching behind back, donning a jacket, combing his hair  Pain  Current pain ratin  At best pain ratin  At worst pain ratin  Location: R shoulder  Quality: sharp  Relieving factors: medications    Social Support  Stairs in house: yes (at home in [place])   Lives in: [place] and multiple-level home (condo in [place], home in [place])    Employment status: working (Off until 2023)  Hand dominance: right      Diagnostic Tests  CT scan: abnormal  Treatments  Previous treatment: injection treatment and medication  Current treatment: physical therapy  Patient Goals  Patient goals for therapy: decreased pain, increased strength, return to sport/leisure activities, independence with ADLs/IADLs and increased motion          Objective     Postural Observations  Seated posture: fair        Observations     Right Shoulder  Positive for edema and incision  Additional Observation Details  Mild edema through RUE   Post-op dressing remained intact; will assess incision when post-op dressing is removed at first follow up appointment with MD      Active Range of Motion   Cervical/Thoracic Spine       Cervical    Flexion:  WFL  Extension:  WFL  Left lateral flexion:  WFL  Right lateral flexion:  WFL  Left rotation:  WFL  Right rotation:  [facility]  Left Shoulder   Flexion: 145 degrees   Abduction: 160 degrees   External rotation BTH: T4   Internal rotation BTB: T3     Right Elbow   Flexion: 90 degrees with pain  Extension: 0 degrees   Forearm supination: WFL  Forearm pronation: WFL    Passive Range of Motion     Right Shoulder   Flexion: 45 degrees   External rotation 0°: 0 degrees     Strength/Myotome Testing     Left Shoulder     Planes of Motion   Flexion: 5   Abduction: 5   External rotation at 0°: 5   Internal rotation at 0°: 5     Tests     Additional Tests Details  Special testing deferred secondary to post-op status             Precautions: sleep apnea, arthritis, asthma, manic depression, Ray's syndrome     Long head biceps tenodesis, subscapularis retraction and repair    * indicates given as part of HEP  HEP code: [email or web address]     Daily Treatment Diary    Manuals 12/28 1/18         R shoulder PROM nv done         Scapular mobility                                 Neuro Re-Ed           Scapular retraction  Try nv         Scapular depression  nv         Shoulder shrugging  nv         Scapular retraction with ER           Chin tuck           Prone I's           Prone T's           Prone Y's           Resisted W's           Sustained YWT's           Serratus push up           Plank shoulder taps           Plank to down dog                      Ther Ex           Upper trap stretch* HEP nv         Levator scap stretch* HEP nv         Gripping * HEP 1'x2 blue         Wrist flex/ext AROM* HEP nv         Elbow flex/ext AROM* HEP          Table slides           Supine shoulder flexion with cane           Sleeper stretch           Shoulder ER with cane           Shoulder flex/ext/abd isometrics against wall  nv         Shoulder flexion isometric w/ cane           Scapular retraction w/ shoulder ext w/ cane           Foam roller up wall           TB rows           TB lat pull down           Band pull aparts           IR/ER isometric walkouts           Active IR/ER           Band wall walks horizontal           Band wall walks vertical           Sidelying shoulder ER           Ball wall circles Patient education Done  done                    Ther Activity           1 arm row           1 arm press                      Suitcase carry           90/90 carry           OH carry                                            Modalities

## 2023-01-20 ENCOUNTER — OFFICE VISIT (OUTPATIENT)
Dept: PHYSICAL THERAPY | Facility: REHABILITATION | Age: 73
End: 2023-01-20

## 2023-01-20 ENCOUNTER — TELEPHONE (OUTPATIENT)
Dept: FAMILY MEDICINE CLINIC | Facility: CLINIC | Age: 73
End: 2023-01-20

## 2023-01-20 DIAGNOSIS — M19.011 PRIMARY OSTEOARTHRITIS OF RIGHT SHOULDER: Primary | ICD-10-CM

## 2023-01-20 DIAGNOSIS — Z96.611 HISTORY OF TOTAL REPLACEMENT OF RIGHT SHOULDER JOINT: ICD-10-CM

## 2023-01-20 NOTE — PROGRESS NOTES
Daily Note     Today's date: 2023  Patient name: Lyric De Dios  : 1950  MRN: [id number]  Referring provider: Niles Ren  Dx:   Encounter Diagnosis     ICD-10-CM    1  Primary osteoarthritis of right shoulder  M19 011       2  History of total replacement of right shoulder joint  Z96 611           Start Time: 1100  Stop Time: 1200  Total time in clinic (min): 60 minutes    Subjective: Samuel Mayers reports post-op pain is improving and he is no longer taking prescription pain medication, only Tylenol extra strength  Objective: See treatment diary below      Assessment: Tolerated treatment well  Tenderness noted in the R shoulder following manual therapy and with exercises throughout session but no reports of pain  Frequent VCs for submaximal effort during isometrics with good ability to incorporate feedback into performance  Noted good status at end of session following modalities  Patient demonstrated fatigue post treatment, exhibited good technique with therapeutic exercises and would benefit from continued PT  Plan: Continue per plan of care  Progress treatment as tolerated         Precautions: sleep apnea, arthritis, asthma, manic depression, Ray's syndrome     Long head biceps tenodesis, subscapularis retraction and repair    * indicates given as part of HEP  HEP code: [id number]     Daily Treatment Diary    Manuals         R shoulder PROM nv done done        R elbow PROM   done        Scapular mobility                                 Neuro Re-Ed           Scapular retraction*  Try nv 3x10x5"        Scapular depression  nv 3x10        Shoulder shrugging  nv 3x10        Scapular retraction with ER           Chin tuck           Prone I's           Prone T's           Prone Y's           Resisted W's           Sustained YWT's           Serratus push up           Plank shoulder taps           Plank to down dog                      Ther Ex           Upper trap stretch* HEP nv 10x5" ea        Levator scap stretch* HEP nv 10x5" ea        Gripping * HEP 1'x2 blue 1'x3 blue        Wrist flex/ext AROM* HEP nv 30x        Elbow flex/ext AROM HEP          Table slides           Supine shoulder flexion with cane           Sleeper stretch           Shoulder ER with cane           Shoulder flex/ext/abd isometrics against wall  nv 10x5" R        Shoulder flexion isometric w/ cane           Scapular retraction w/ shoulder ext w/ cane           Foam roller up wall           TB rows           TB lat pull down           Band pull aparts           IR/ER isometric walkouts           Active IR/ER           Band wall walks horizontal           Band wall walks vertical           Sidelying shoulder ER           Ball wall circles                      Patient education Done  done                    Ther Activity           1 arm row           1 arm press                      Suitcase carry           90/90 carry           OH carry                                            Modalities

## 2023-01-20 NOTE — TELEPHONE ENCOUNTER
Basia Esteban,  for pt's insurance called to check to see if patient had appt schedule for TCM  He was released from the [employer] on 1/14  She left her name and  Number if needed in the future

## 2023-01-23 ENCOUNTER — APPOINTMENT (OUTPATIENT)
Dept: RADIOLOGY | Facility: OTHER | Age: 73
End: 2023-01-23

## 2023-01-23 ENCOUNTER — OFFICE VISIT (OUTPATIENT)
Dept: OBGYN CLINIC | Facility: OTHER | Age: 73
End: 2023-01-23

## 2023-01-23 ENCOUNTER — CONSULT (OUTPATIENT)
Dept: CARDIOLOGY CLINIC | Facility: CLINIC | Age: 73
End: 2023-01-23

## 2023-01-23 ENCOUNTER — OFFICE VISIT (OUTPATIENT)
Dept: PHYSICAL THERAPY | Facility: REHABILITATION | Age: 73
End: 2023-01-23

## 2023-01-23 VITALS
SYSTOLIC BLOOD PRESSURE: 97 MMHG | WEIGHT: 228 LBS | HEART RATE: 92 BPM | HEIGHT: 66 IN | BODY MASS INDEX: 36.64 KG/M2 | DIASTOLIC BLOOD PRESSURE: 65 MMHG

## 2023-01-23 VITALS
HEIGHT: 66 IN | WEIGHT: 229 LBS | BODY MASS INDEX: 36.8 KG/M2 | DIASTOLIC BLOOD PRESSURE: 68 MMHG | TEMPERATURE: 97.8 F | SYSTOLIC BLOOD PRESSURE: 116 MMHG | HEART RATE: 86 BPM | OXYGEN SATURATION: 98 %

## 2023-01-23 DIAGNOSIS — Z98.84 BARIATRIC SURGERY STATUS: ICD-10-CM

## 2023-01-23 DIAGNOSIS — Z96.611 STATUS POST REVERSE TOTAL ARTHROPLASTY OF RIGHT SHOULDER: ICD-10-CM

## 2023-01-23 DIAGNOSIS — Z96.611 S/P REVERSE TOTAL SHOULDER ARTHROPLASTY, RIGHT: ICD-10-CM

## 2023-01-23 DIAGNOSIS — Z96.612 AFTERCARE FOLLOWING LEFT SHOULDER JOINT REPLACEMENT SURGERY: ICD-10-CM

## 2023-01-23 DIAGNOSIS — I25.10 CAD IN NATIVE ARTERY: ICD-10-CM

## 2023-01-23 DIAGNOSIS — Z01.810 PRE-OPERATIVE CARDIOVASCULAR EXAMINATION: Primary | ICD-10-CM

## 2023-01-23 DIAGNOSIS — Z96.611 HISTORY OF TOTAL REPLACEMENT OF RIGHT SHOULDER JOINT: ICD-10-CM

## 2023-01-23 DIAGNOSIS — E78.2 MIXED HYPERLIPIDEMIA: ICD-10-CM

## 2023-01-23 DIAGNOSIS — E66.9 CLASS 2 OBESITY: ICD-10-CM

## 2023-01-23 DIAGNOSIS — I49.3 ASYMPTOMATIC PVCS: ICD-10-CM

## 2023-01-23 DIAGNOSIS — J45.40 MODERATE PERSISTENT ASTHMA WITHOUT COMPLICATION: ICD-10-CM

## 2023-01-23 DIAGNOSIS — E03.9 ACQUIRED HYPOTHYROIDISM: ICD-10-CM

## 2023-01-23 DIAGNOSIS — M19.011 PRIMARY OSTEOARTHRITIS OF RIGHT SHOULDER: Primary | ICD-10-CM

## 2023-01-23 DIAGNOSIS — I10 ESSENTIAL HYPERTENSION: ICD-10-CM

## 2023-01-23 DIAGNOSIS — Z96.611 STATUS POST REVERSE TOTAL ARTHROPLASTY OF RIGHT SHOULDER: Primary | ICD-10-CM

## 2023-01-23 DIAGNOSIS — Z47.1 AFTERCARE FOLLOWING LEFT SHOULDER JOINT REPLACEMENT SURGERY: ICD-10-CM

## 2023-01-23 DIAGNOSIS — G47.33 OBSTRUCTIVE SLEEP APNEA: ICD-10-CM

## 2023-01-23 PROBLEM — E66.812 CLASS 2 OBESITY: Status: ACTIVE | Noted: 2022-11-07

## 2023-01-23 RX ORDER — QUETIAPINE FUMARATE 25 MG/1
25 TABLET, FILM COATED ORAL
COMMUNITY
Start: 2023-01-18

## 2023-01-23 NOTE — PROGRESS NOTES
Daily Note     Today's date: 2023  Patient name: Lester Mandujano  : 1950  MRN: [id number]  Referring provider: Nancy Schaefer  Dx:   Encounter Diagnosis     ICD-10-CM    1  Primary osteoarthritis of right shoulder  M19 011       2  History of total replacement of right shoulder joint  Z96 611           Start Time: 715  Stop Time: 0808  Total time in clinic (min): 53 minutes    Subjective: Susan Roderick reports he's been able to get a bit more sleep compared to last week  Objective: See treatment diary below    BP: 110/66mmHg L arm seated after reported feeling dizzy during shoulder isometrics    Assessment: Tolerated treatment well  Gradual improvement in PROM with manual therapy  During shoulder isometrics Susan Vaughn reported dizziness and elected to sit down  BP noted at 110/66 mmHg which he reports systolic is low for him; denies eating today  While sitting noted an "odd sensation, not numbness, just odd" in the L side of his face; denies HA, no UE weakness, denies vision changes, denies SOB, no facial droop, no speech changes  Reported improvement in facial sensation after approx  3 minutes  Reviewed signs of stroke with Radhalatihsa Roedrick verbalizing understanding  Reported improved status by end of session  Patient demonstrated fatigue post treatment, exhibited good technique with therapeutic exercises and would benefit from continued PT  Plan: Continue per plan of care  Progress treatment as tolerated         Precautions: sleep apnea, arthritis, asthma, manic depression, Ray's syndrome     Long head biceps tenodesis, subscapularis retraction and repair    * indicates given as part of HEP  HEP code: [id number]     Daily Treatment Diary    Manuals        R shoulder PROM nv done done done       R elbow PROM   done        Scapular mobility                                 Neuro Re-Ed           Radial nerve glide    15x3"       Scapular retraction*  Try nv 3x10x5" 3x10x5" Scapular depression  nv 3x10 3x10       Shoulder shrugging  nv 3x10 3x10       Scapular retraction with ER           Chin tuck           Prone I's           Prone T's           Prone Y's           Resisted W's           Sustained YWT's           Serratus push up           Plank shoulder taps           Plank to down dog                      Ther Ex           Upper trap stretch* HEP nv 10x5" ea 10x5" ea       Levator scap stretch* HEP nv 10x5" ea 10x5" ea       Gripping * HEP 1'x2 blue 1'x3 blue 1'x3 blue       Wrist flex/ext AROM* HEP nv 30x 30x       Elbow flex/ext AROM HEP   3x10       Table slides           Supine shoulder flexion with cane           Sleeper stretch           Shoulder ER with cane           Shoulder flex/ext/abd isometrics against wall  nv 10x5" R 12x5" R flex/abd only       Shoulder flexion isometric w/ cane           Scapular retraction w/ shoulder ext w/ cane           Foam roller up wall           TB rows           TB lat pull down           Band pull aparts           IR/ER isometric walkouts           Active IR/ER           Band wall walks horizontal           Band wall walks vertical           Sidelying shoulder ER           Ball wall circles                      Patient education Done  done                    Ther Activity           1 arm row           1 arm press                      Suitcase carry           90/90 carry           OH carry                                            Modalities           CP R shoulder

## 2023-01-23 NOTE — PROGRESS NOTES
Assessment:       1  Status post reverse total arthroplasty of right shoulder    2  Aftercare following left shoulder joint replacement surgery          Plan:        Patient is doing well postoperatively  He did go to the ED for dyspnea after his discharge from the hospital   He was told his symptoms were related to his anesthetic block  He feels his breathing is back to normal today  Operative note, images, and reverse total shoulder arthroplasty rehab protocol were discussed  We discussed the need for prophylactic antibiotics prior to dental procedures  All questions were addressed to the patient's satisfaction  Patient has started PT  Follow-up will be in 2 months to assess patient's progress  On a separate note, patient was instructed to follow-up with PCP regarding low BP noted at today's visit  Subjective:     Patient ID: Kandi Santos is a 81 y o  male  Chief Complaint:    HPI    Patient presents to the office for follow-up for right reverse total shoulder arthroplasty on 1/10/2023  He did report to the ED for shortness of breath on 1/12/2023 he was told his symptoms were related to his anesthetic block  He has had improvement of his breathing concerns and no longer causes an issue  He has no residual paresthesia following anesthetic block  He reports his pain is controlled  Social History     Occupational History   • Not on file   Tobacco Use   • Smoking status: Never   • Smokeless tobacco: Never   Vaping Use   • Vaping Use: Never used   Substance and Sexual Activity   • Alcohol use: Never   • Drug use: Never   • Sexual activity: Not on file      Review of Systems   Constitutional: Negative  Respiratory: Negative  Cardiovascular: Negative  Gastrointestinal: Negative  Genitourinary: Negative  Musculoskeletal: Positive for myalgias and neck pain  Negative for arthralgias  Skin: Positive for wound  Neurological: Positive for weakness  Negative for numbness  Hematological: Negative  Psychiatric/Behavioral: Negative  Objective:     Ortho ExamPhysical Exam  HENT:      Head: Atraumatic  Cardiovascular:      Pulses: Normal pulses  Pulmonary:      Effort: Pulmonary effort is normal    Musculoskeletal:      Comments: Painless circumduction right shoulder  Skin:     General: Skin is warm and dry  Capillary Refill: Capillary refill takes less than 2 seconds  Comments: Surgical incision dry and clean  Staples removed, Steri-Strips applied  Neurological:      Mental Status: He is alert and oriented to person, place, and time  Sensory: No sensory deficit  Psychiatric:         Mood and Affect: Mood normal            I have personally reviewed pertinent films in PACS and my interpretation is Consistent with reverse total shoulder arthroplasty, right  Prosthesis in excellent position, glenohumeral joint preserved  Ami Rhodes

## 2023-01-23 NOTE — PATIENT INSTRUCTIONS
1   Because of presence of moderate coronary artery calcification, you have a diagnosis of coronary artery disease, which likely is not severe at this time  2   There is also evidence of asymptomatic premature ventricular contractions, likely due to microscopic scarring of the heart muscle  3   We have ordered a treadmill stress test and 24-hour Holter monitor to be done on or after 4/10/2023 at [place] [address]   4   We have also ordered an updated lipid panel to recheck your elevated triglycerides, in view of the fatty liver uncovered on a recent CT scan  This is a fasting blood test, except for recommending ample amounts of water drinking the morning of the test   5   We will contact you with the results of all of the above studies, when they are available

## 2023-01-23 NOTE — PATIENT INSTRUCTIONS
1  Sling as per protocol  2  PT per protocol  3  Follow-up in 2 months  4  Contact PCP about BP today    Reverse Total Shoulder Rehabilitation Protocol  Jane Alvarez MD  [facility]      Notes for Physical Therapist:  Normal shoulder arthrokinematics are no longer relevant  Aggressive PROM and strengthening can lead to complications - please avoid combined movements of IR/ext and ER/abd  Advise patients to not carry heavy objects at side - i e  grocery bags  Special care with patients with known Osteoporosis and/or thinned acromion  Review operative report to understand RTC involvement and other variants which may require attention  Sling - D/C'd at 2 or 4 weeks  This will be dependent on surgical findings and will be specified in patient's operative report  Please direct protocol questions to one of the authors and patient issues to surgeon  Phase I - Protective phase: Day 1-2 weeks:  Patient/family education on surgery and rehab/functional expectations  PROM: flexion to first end-feel and ER to <30 degrees  Instruct in DELTOID isometrics and ensure completion at home throughout rehab process  Postural correction exercises  Phase II - Sub-Acute Phase:  2-6 weeks:  Initiate AAROM and progress to AROM to promote shoulder elevation/scaption avoiding scapular compensation  PROM to the following permanent limits:  flexion: 120 and ER: to first end feel and not to exceed 50 degrees  Functional IR: not past greater trochanter  Phase III - Return to Function:  6- 8 weeks:  Continue to progress AROM and functional activities to allow patient to perform desired ADL activities  3# weight limit with ADL activities  Consider light CKC activities appreciating PROM limitations  Discharge Criteria:  The goal is to discharge the patient by 8 weeks post-op with functional shoulder elevation/scaption    If the therapist feels that additional PT is needed based upon complications or patient's goals, this should be discussed with Dr Margarita Tompkins

## 2023-01-24 ENCOUNTER — OFFICE VISIT (OUTPATIENT)
Dept: BARIATRICS | Facility: CLINIC | Age: 73
End: 2023-01-24

## 2023-01-24 VITALS — HEIGHT: 66 IN | WEIGHT: 228 LBS | BODY MASS INDEX: 36.64 KG/M2

## 2023-01-24 DIAGNOSIS — E66.9 OBESITY, CLASS II, BMI 35-39.9: Primary | ICD-10-CM

## 2023-01-24 NOTE — PROGRESS NOTES
Bariatric Nutrition Assessment Note    Type of surgery    Preop (4 wt checks)--3 of 4 wt check  Surgery Date: TBD--leaning toward RYGB   Surgeon: Dr Cristiane Armstrong (consult on 11/18)    Nutrition Assessment   Freddie Romero  81 y o   male     Wt with BMI of 25: 152#  Pre-Op Excess Wt: 147#  Ht 5' 6" (1 676 m)   Wt 103 kg (228 lb)   BMI 36 80 kg/m²      Start weight on 11/18/22:  229 4#  Can go to BMI of 35:  212#  Net weight loss:  -5 5lbs     [place]  [place] Equation:    Estimated calories for weight loss 2358-0590 (1-2# per wk wt loss - sedentary )  Estimated protein needs 69-104gm (1 0-1 5 gms/kg IBW )   Estimated fluid needs 2072-2415ml (30-35 ml/kg IBW )      Weight History   Onset of Obesity: Adult, out of college was 150lbs and within the first 10 years maybe gained 10lbs to 160, then gradually increased to 170lb where stabilized for a while  Was 190# going into COVID in 2020 and since gained a lot of weight      Family history of obesity: Yes-sister  Wt Loss Attempts: Commercial Programs ([employer], K[employer]auff[employer], etc )  Exercise  High Protein/Low CHO diets (Atkins, Union, etc )  Self Created Diets (Portion Control, Healthy Food Choices, etc )  Keto    Patient has tried the above for 6 months or more with insufficient weight loss or weight regain, which is why patient has requested to be evaluated for weight loss surgery today  Maximum Wt Lost: 10-15lbs      Review of History and Medications   Past Medical History:   Diagnosis Date   • ARIANE (acute kidney injury) ([address] ) 1/13/2023   • Arthritis    • Asthma    • Colon polyp    • 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[address] )    • 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[address] )    • Manic depression ([address] )    • Right lower lobe pneumonia 1/12/2023   • Sleep apnea      Past Surgical History:   Procedure Laterality Date   • COLONOSCOPY  12/13/2022   • MS ARTHROPLASTY GLENOHUMERAL JOINT TOTAL SHOULDER Right 1/10/2023    Procedure: ARTHROPLASTY SHOULDER REVERSE WITH BICEPS TENODESIS;  Surgeon: Eli Benitez Jignesh Johnson MD;  Location[facility];  Service: Orthopedics   • UMBILICAL HERNIA REPAIR       Social History     Socioeconomic History   • Marital status: /Civil Union     Spouse name: Not on file   • Number of children: Not on file   • Years of education: Not on file   • Highest education level: Not on file   Occupational History   • Not on file   Tobacco Use   • Smoking status: Never   • Smokeless tobacco: Never   Vaping Use   • Vaping Use: Never used   Substance and Sexual Activity   • Alcohol use: Never   • Drug use: Never   • Sexual activity: Not on file   Other Topics Concern   • Not on file   Social History Narrative   • Not on file     Social Determinants of Health     Financial Resource Strain: Not on file   Food Insecurity: No Food Insecurity   • Worried About Running Out of Food in the Last Year: Never true   • Ran Out of Food in the Last Year: Never true   Transportation Needs: No Transportation Needs   • Lack of Transportation (Medical): No   • Lack of Transportation (Non-Medical):  No   Physical Activity: Not on file   Stress: Not on file   Social Connections: Not on file   Intimate Partner Violence: Not on file   Housing Stability: Low Risk    • Unable to Pay for Housing in the Last Year: No   • Number of Places Lived in the Last Year: 1   • Unstable Housing in the Last Year: No       Current Outpatient Medications:   •  albuterol (PROVENTIL HFA,VENTOLIN HFA) 90 mcg/act inhaler, Inhale 2 puffs every 4 (four) hours as needed for wheezing, Disp: 6 7 g, Rfl: 2  •  Diclofenac Sodium (VOLTAREN) 1 %, Apply 2 g topically 4 (four) times a day, Disp: 2 g, Rfl: 0  •  enalapril (VASOTEC) 10 mg tablet, Take 1 tablet (10 mg total) by mouth daily, Disp: 30 tablet, Rfl: 0  •  lamoTRIgine (LaMICtal) 200 MG tablet, Take 200 mg by mouth daily, Disp: , Rfl:   •  levothyroxine 100 mcg tablet, Take 1 tablet (100 mcg total) by mouth daily, Disp: 90 tablet, Rfl: 0  •  montelukast (SINGULAIR) 10 mg tablet, Take 10 mg by mouth in the morning, Disp: , Rfl:   •  pramipexole (MIRAPEX) 1 mg tablet, Take 2 mg by mouth daily at bedtime, Disp: , Rfl:   •  QUEtiapine (SEROquel) 25 mg tablet, Take 25 mg by mouth daily at bedtime, Disp: , Rfl:   •  tamsulosin (FLOMAX) 0 4 mg, 0 4 mg in the morning, Disp: , Rfl:   •  Trelegy Ellipta 200-62 5-25 MCG/INH AEPB inhaler, Inhale 1 puff daily, Disp: 60 blister, Rfl: 2    Food Intake and Lifestyle Assessment   Food Intake Assessment completed via usual diet recall  · Poor sleep quality due to severe DONALD and trouble using CPAP  Only gets 2-3 hrs per day  Does some 15 min cat naps through the day ie:  Has been up since 11pm last night  Has tried many meds and melatonin w/o success  · Trying to avoid sweets and decreased portions  · He ordered [employer] prepared meals which is coming this weekend--12 meals per week (lunch and dinner per day)  · The last two weeks since had shoulder sx has had some unusual eating pattern, not the norm  Breakfast: Will have eggs or cereal or yogurt--suggested to ensure has protein with breakfast  Snack: -   Lunch: Plan is to start a meal delivery program   Yesterday tuna sandwich  Snack: may have an ensure max protein  Dinner: last was pork chops with string beans prepared by wife  Does a lot of [employer]--pizza (1/2 pie) OR [place] foods, but going to start prepared meal delivery this weekend  Snack: -  Beverage intake: sugar free beverages and diet soda  Protein supplement: none  Estimated protein intake per day: <60gm  Estimated fluid intake per day: water/crystal light about 64-80oz per day  Meals eaten away from home: Most of the meals right now are door dash  Typical meal pattern: 1-2 meals per day and 0-1 snacks per day  Eating Behaviors: Consumption of high calorie/ high fat foods, Large portion sizes, Mindless eating and Poor meal schedule    Food allergies or intolerances:    Allergies   Allergen Reactions   • Simvastatin Shortness Of Breath   • Spiriva Respimat [Tiotropium Bromide Monohydrate] Rash     Cultural or Tenriism considerations: -    Physical Assessment  Physical Activity  Types of exercise: PT 2x/week for shoulder + doing some shoulder exercise at home  Will start walking again next week  Current physical limitations: some SOB    Psychosocial Assessment   Support systems: spouse  Socioeconomic factors: Right now he lives in [address] [place] [address] ([address]) and his wife lives in [place] (5 years) due to her job  He will be moving to [place] [address] with his wife fairly soon  Nutrition Diagnosis  Diagnosis: Overweight / Obesity (NC-3 3)  Related to: Physical inactivity and Excessive energy intake  As Evidenced by: BMI >25     Nutrition Prescription: Recommend the following diet  Regular    Interventions and Teaching   Discussed pre-op and post-op nutrition guidelines  Patient educated and handouts provided    Surgical changes to stomach / GI  Capacity of post-surgery stomach  Diet progression  Adequate hydration  Expected weight loss  Weight loss plateaus/ possibility of weight regain  Exercise  Suggestions for pre-op diet  Nutrition considerations after surgery  Protein supplements  Meal planning and preparation  Appropriate carbohydrate, protein, and fat intake, and food/fluid choices to maximize safe weight loss, nutrient intake, and tolerance   Dietary and lifestyle changes  Possible problems with poor eating habits  Intuitive eating  Techniques for self monitoring and keeping daily food journal  Vitamin / Mineral supplementation of Multivitamin with minerals and Vitamin D    Patient is not currently pregnant and doesn't desire to become pregnant a minimum of one year post-op    Education provided to: patient    Barriers to learning: No barriers identified    Readiness to change:  preparation    Prior research on procedure: books, internet, discussed with provider and friends or family    Comprehension: verbalizes understanding     Expected Compliance: good    Recommendations  Pt is an appropriate candidate for surgery  Yes    Evaluation / Monitoring  Dietitian to Monitor: Eating pattern as discussed Tolerance of nutrition prescription Body weight Lab values Physical activity    Pre-op wt loss:  Do not gain  Lose ~5% by DOS:  -11lbs (218#)  Can not go below BMI 35:  212#    Workflow:  • Psych and/or D+A Clearance: n/a  • PCP Letter: completed  • Support Group: incomplete  • Surgeon Appt completed  • EGD completed 12/13/22  • Cardiac Risk Assessment:  Had consult on 1/23/23, has imagining scheduled for 4/10/23 (cardiologist wants his PT completed before the cardiac imagine)  • Sleep Studies: sleep study scheduled for 6/14 at this time, will work on moving up  • Blood work completed 12/20/22--acceptable  • Nicotine test na/  • Pre-Operative Program: 3 of 4 wt check today    Pt presents today for 3 of 4 wt check with some weight loss  He did have shoulder surgery s/p a readmit due to respiratory issues  Pt is feeling much better and is going to PT for his shoulder  Pt states that since his surgery his eating pattern hasn't been consistent  He plans on starting a meal delivery program that will start this weekend  It will provide meals for lunch and dinner  Did encourage to ensure has protein at breakfast or a protein shake to start his day  Reviewed workflow as mentioned above        Goals  · Food journal via [employer]  · Complete lesson plans 1-6  · Start meal delivery program  · Have protein based breakfast in the morning--can be a protein shake  · Continue to avoid the mindless snacking  · F/U in Feb with SW for 4th wt check    Time Spent:   30 mins

## 2023-01-24 NOTE — PROGRESS NOTES
Cardiology Office Consultation   Kee Silverio 81 y o  male MRN: [id number]  01/23/23  9:31 PM        Assessment/Plan     1  Asymptomatic coronary artery disease, suggested by moderate coronary artery calcification on recent CT scan  2   Asymptomatic PVCs and incomplete right bundle branch block  3   Mixed dyslipidemia  4   Hepatic steatosis caused by dyslipidemia and obesity  5   Acquired hypothyroidism  6   Controlled essential hypertension  7   Class 2 obesity with future but not yet scheduled bariatric surgery possible  8   Chronic untreated obstructive sleep apnea  9   Recent reverse right total shoulder arthroplasty on 1/10/2023 with good recuperation thus far  10   Respiratory failure from recent aspiration pneumonia right lower lobe as postoperative complication from reverse right total shoulder arthroplasty on 1/10/2023  Currently resolved  11   History of moderate persistent asthma, currently suppressed  Plan and   Patient Instructions     1  Because of presence of moderate coronary artery calcification, you have a diagnosis of coronary artery disease, which likely is not severe at this time  2   There is also evidence of asymptomatic premature ventricular contractions, likely due to microscopic scarring of the heart muscle  3   We have ordered a treadmill stress test and 24-hour Holter monitor to be done on or after 4/10/2023 at [place] [address]   4   We have also ordered an updated lipid panel to recheck your elevated triglycerides, in view of the fatty liver uncovered on a recent CT scan  This is a fasting blood test, except for recommending ample amounts of water drinking the morning of the test   5   We will contact you with the results of all of the above studies, when they are available        Current Outpatient Medications   Medication Sig Dispense Refill   • albuterol (PROVENTIL HFA,VENTOLIN HFA) 90 mcg/act inhaler Inhale 2 puffs every 4 (four) hours as needed for wheezing 6 7 g 2   • Diclofenac Sodium (VOLTAREN) 1 % Apply 2 g topically 4 (four) times a day 2 g 0   • enalapril (VASOTEC) 10 mg tablet Take 1 tablet (10 mg total) by mouth daily 30 tablet 0   • lamoTRIgine (LaMICtal) 200 MG tablet Take 200 mg by mouth daily     • levothyroxine 100 mcg tablet Take 1 tablet (100 mcg total) by mouth daily 90 tablet 0   • montelukast (SINGULAIR) 10 mg tablet Take 10 mg by mouth in the morning     • pramipexole (MIRAPEX) 1 mg tablet Take 2 mg by mouth daily at bedtime     • QUEtiapine (SEROquel) 25 mg tablet Take 25 mg by mouth daily at bedtime     • tamsulosin (FLOMAX) 0 4 mg 0 4 mg in the morning     • Trelegy Ellipta 200-62 5-25 MCG/INH AEPB inhaler Inhale 1 puff daily 60 blister 2     No current facility-administered medications for this visit  History of Present Illness     Physician Requesting Consult: Rosendo Reilly MD/Kayley Hoffman MD      Reason for Consult / Principal Problem: Recent dyspnea, hypoxic respiratory failure, coronary artery disease, assessment of cardiac risk for future bariatric surgery  HPI:     Alvion Pinedo is a 81y o  year old male who presents for cardiology consultation and follow-up of recent hospitalization at [address] [place] [address] from 1/11 through 1/14/2023  He is also being seen for preoperative cardiac risk assessment in anticipation of unscheduled future bariatric surgery  This patient was recently hospitalized as above because of mild respiratory insufficiency and acute hypoxic respiratory failure with subsequent impression of right lower lobe pneumonia, possibly aspiration type, occurring as a postoperative complication following reverse right total shoulder arthroplasty on 1/10/2023  The patient had pre-existing severe osteoarthritis of right shoulder    The patient has underlying obstructive sleep apnea but has not been on any CPAP treatment with plan for new sleep evaluation for possible therapy in the coming months  During hospitalization, the patient also exhibited mild hyponatremia with sodium levels of 132-134  The patient has a history of eosinophilic pneumonia with 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  During recent hospitalization the patient exhibited acute kidney injury, which resolved as of 2023  The patient was treated with IV ceftriaxone, Tessalon Perles, albuterol nebulizer and incentive spirometry with good outcome  The patient does not have any known history of cardiac disease  He does have a background of essential hypertension, mixed dyslipidemia, depressive and bipolar disorder in remission, BPH, polyarthralgias  Since his discharge, the patient has been attending physical therapy with normal progress following his recent reverse right total shoulder arthroplasty  He has had no further acute dyspnea  He did have an episode of very brief lightheadedness with left facial numbness and somewhat low blood pressure, resolved after rest and snacking while at physical therapy today  He reports that he had skipped breakfast prior to his therapy session  The patient admits to chronic intermittent exertional dyspnea but denies history of chest pain, palpitations, orthopnea, paroxysmal nocturnal dyspnea, syncope, lightheadedness, dizziness, chronic cough, sputum production, wheezing, fever, chills  He currently sleeps 4-5 hours per night and currently is sleeping at a 45 degree angle for right shoulder comfort  He admits to longstanding pedal and ankle edema which he attributes to his chronic obesity and treats with over-the-counter medication  The patient has never smoked and does not abuse alcohol or illicit drugs  He lives home with his wife and will be moving to [place] with her by  of this year  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      The patient mentions in his family history that a paternal uncle  at a relatively young age of myocardial infarction  Historical Information   Past Medical History:   Diagnosis Date   • ARIANE (acute kidney injury) ([facility] ) 1/13/2023   • Arthritis    • Asthma    • Colon polyp    • 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[facility] )    • 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[facility] )    • Manic depression ([facility] )    • Right lower lobe pneumonia 1/12/2023   • Sleep apnea        Past Surgical History:  Past Surgical History:   Procedure Laterality Date   • COLONOSCOPY  12/13/2022   • NE ARTHROPLASTY GLENOHUMERAL JOINT TOTAL SHOULDER Right 1/10/2023    Procedure: ARTHROPLASTY SHOULDER REVERSE WITH BICEPS TENODESIS;  Surgeon: Kelton Brown MD;  Location: [facility];  Service: Orthopedics   • UMBILICAL HERNIA REPAIR       Social History     Substance and Sexual Activity   Alcohol Use Never     Social History     Substance and Sexual Activity   Drug Use Never     Social History     Tobacco Use   Smoking Status Never   Smokeless Tobacco Never     Family History   Problem Relation Age of Onset   • Hypertension Mother    • Depression Mother    • Depression Father    • Arthritis Father    • Heart attack Paternal Uncle 32   • Colon cancer Neg Hx    • Prostate cancer Neg Hx        Meds/Allergies   Prior to Admission medications    Medication Sig Start Date End Date Taking?  Authorizing Provider   albuterol (PROVENTIL HFA,VENTOLIN HFA) 90 mcg/act inhaler Inhale 2 puffs every 4 (four) hours as needed for wheezing 12/2/22  Yes Latisha Vernon DO   Diclofenac Sodium (VOLTAREN) 1 % Apply 2 g topically 4 (four) times a day 11/22/22  Yes Latisha Vernon DO   enalapril (VASOTEC) 10 mg tablet Take 1 tablet (10 mg total) by mouth daily 12/20/22  Yes Latisha Vernon DO   lamoTRIgine (LaMICtal) 200 MG tablet Take 200 mg by mouth daily 8/15/22  Yes Historical Provider, MD   levothyroxine 100 mcg tablet Take 1 tablet (100 mcg total) by mouth daily 12/29/22  Yes Latisha Vernon DO   montelukast (SINGULAIR) 10 mg tablet Take 10 mg by mouth in the morning 8/11/22  Yes Historical Provider, MD   pramipexole (MIRAPEX) 1 mg tablet Take 2 mg by mouth daily at bedtime 8/15/22  Yes Historical Provider, MD   QUEtiapine (SEROquel) 25 mg tablet Take 25 mg by mouth daily at bedtime 1/18/23  Yes Historical Provider, MD   tamsulosin (FLOMAX) 0 4 mg 0 4 mg in the morning 8/11/22  Yes Historical Provider, MD   Trestefanigy Ellipta 200-62 5-25 MCG/INH AEPB inhaler Inhale 1 puff daily 9/28/22  Yes Latisha Vernon DO   meloxicam (Mobic) 15 mg tablet Take 1 tablet (15 mg total) by mouth daily 12/20/22 1/23/23  Cassius Nielsen PA-C   modafinil (PROVIGIL) 200 MG tablet Take 200 mg by mouth daily 6/10/22 1/23/23  Historical Provider, MD   ondansetron (ZOFRAN) 4 mg tablet Take 1 tablet (4 mg total) by mouth every 8 (eight) hours as needed for nausea or vomiting 12/28/22 1/23/23  Severiano Ngoueira PA-C   oxyCODONE (ROXICODONE) 5 immediate release tablet 1 tablets every 8 hours as needed for severe shoulder pain only  1/11/23 1/23/23  Bean Watson PA-C   pantoprazole (PROTONIX) 40 mg tablet Take 1 tablet (40 mg total) by mouth daily Take 1 pill half hour before breakfast  12/21/22 1/23/23  Gonzales Barnett MD   sertraline (Zoloft) 25 mg tablet Take 1 tablet (25 mg total) by mouth daily 1/14/23 1/23/23  Elvira Barone MD     Allergies   Allergen Reactions   • Simvastatin Shortness Of Breath   • Spiriva Respimat [Tiotropium Bromide Monohydrate] Rash       Cardiovascular ROS:   More than 10 systems reviewed and all systems negative, except as noted in history of present illness    Objective     VITALS:   Blood pressure 116/68, pulse 86, temperature 97 8 °F (36 6 °C), height 5' 6" (1 676 m), weight 104 kg (229 lb), SpO2 98 %  Body mass index is 36 96 kg/m²  Physical Exam      General-Well-developed well-nourished moderately obese  male   in no acute distress  Patient is alert, oriented X3 and cooperative  Skin-Warm and dry with no pallor, rashes, ecchymoses, lesions  HEENT-Normocephalic  Pupils equally round and reactive to light and accommodation  Extraocular muscles intact  Mucous membranes pink and moist  Sclerae nonicteric  Optic fundi poorly seen  Neck-No jugular venous distention, AJR, masses, thyromegaly, adenopathy, bruits  Lungs-No rales, rhonchi, wheezes  Heart-No murmur, gallop, rub, click, heave, thrill  Occasional extrasystoles heard  Abdomen-Normal bowel sounds with no masses, organomegaly, guarding, tenderness, or rigidity  Moderate abdominal obesity noted  Extremities-No cyanosis, clubbing, edema, pulse decrease  Neurological-No focal neurological signs  EKG 01/23/23:     Normal sinus rhythm at 86 bpm  Incomplete right bundle branch block  Frequent PVCs  Abnormal ECG  More frequent PVCs with no other changes since 1/11/2023      IMAGING:    XR chest pa & lateral    Result Date: 9/30/2022  Impression Irregular parenchymal markings in the right lung base region could be secondary to scarring/fibrosis  Cannot exclude superimposed airspace disease  Otherwise no lobar airspace consolidation noted  Workstation performed: [id number]       Portable chest x-ray 1/13/2023:    Right basilar discoid opacities, consistent with atelectasis/scarring    CTA of chest PE study 1/12/2023:     Moderate coronary  artery calcification indicating atherosclerotic heart disease  Diffuse hepatic steatosis  New elevation of right hemidiaphragm with partial right lower lobe atelectasis  Reverse right shoulder arthroplasty  No pulmonary embolus    Transthoracic echocardiogram 10/14/2022:    LVEF 65% with normal diastolic function  Aortic valve sclerosis  1+ MR  Trace TR      LAB REVIEW:    Lab Results   Component Value Date    SODIUM 134 (L) 01/14/2023    K 3 9 01/14/2023    CL 96 01/14/2023    CO2 29 01/14/2023    BUN 18 01/14/2023    CREATININE 1 00 01/14/2023    GLUF 120 (H) 12/20/2022    CALCIUM 8 8 01/14/2023    AST 57 (H) 01/11/2023    ALT 49 01/11/2023 ALKPHOS 78 01/11/2023    EGFR 75 01/14/2023   Glucose 1/14/2023: 118  Lab Results   Component Value Date    CHOLESTEROL 171 09/14/2022     Lab Results   Component Value Date    HDL 31 (L) 09/14/2022       Lab Results   Component Value Date    LDLCALC 89 09/14/2022     No components found for: [facility]  Lab Results   Component Value Date    RIU9CCZUPXQU 2 743 10/13/2022    FMA8KDUKWQPE 0 917 09/14/2022     Lab Results   Component Value Date    TRIG 255 (H) 09/14/2022     10-year ASCVD RISK: 20 2%, high risk    CBC 1/14/2023: H/H 10 6/33 2 with normal WBC and platelets, normal H/H as of 1/11/2023  Legionella/strep pneumoniae, HS-TRN x 2, flu/RSV/COVID PCR, BNP, lipase 1/14/2023: All normal or negative  Total office time spent today 67  minutes           Krystle Avila MD

## 2023-01-25 ENCOUNTER — HOSPITAL ENCOUNTER (OUTPATIENT)
Dept: SLEEP CENTER | Facility: CLINIC | Age: 73
Discharge: HOME | End: 2023-01-25

## 2023-01-25 ENCOUNTER — OFFICE VISIT (OUTPATIENT)
Dept: PHYSICAL THERAPY | Facility: REHABILITATION | Age: 73
End: 2023-01-25

## 2023-01-25 DIAGNOSIS — G47.33 OSA (OBSTRUCTIVE SLEEP APNEA): ICD-10-CM

## 2023-01-25 DIAGNOSIS — M19.011 PRIMARY OSTEOARTHRITIS OF RIGHT SHOULDER: Primary | ICD-10-CM

## 2023-01-25 DIAGNOSIS — Z96.611 HISTORY OF TOTAL REPLACEMENT OF RIGHT SHOULDER JOINT: ICD-10-CM

## 2023-01-25 NOTE — PROGRESS NOTES
Daily Note     Today's date: 2023  Patient name: Ramu Chamorro  : 1950  MRN: [id number]  Referring provider: Angie Pierre  Dx:   Encounter Diagnosis     ICD-10-CM    1  Primary osteoarthritis of right shoulder  M19 011       2  History of total replacement of right shoulder joint  Z96 611           Start Time: 142  Stop Time: 922  Total time in clinic (min): 50 minutes    Subjective: An Scherer reports his shoulder is feeling good at start of session  Objective: See treatment diary below      Assessment: Tolerated treatment well  Introduced table slides this session for improved ROM with good tolerance  Less pain reported with isometrics this session compared to previous sessions indicating improved neuromuscular control  Trialed supine shoulder flexion with cane but held secondary to pain  Patient demonstrated appropriate level of challenge and muscular fatigue throughout session and noted good status at end of visit  Patient demonstrated fatigue post treatment, exhibited good technique with therapeutic exercises and would benefit from continued PT  Plan: Continue per plan of care  Progress treatment as tolerated         Precautions: sleep apnea, arthritis, asthma, manic depression, Ray's syndrome     Long head biceps tenodesis, subscapularis retraction and repair    * indicates given as part of HEP  HEP code: [id number]     Daily Treatment Diary    Manuals       R shoulder PROM nv done done done done      R elbow PROM   done        Scapular mobility                                 Neuro Re-Ed           Radial nerve glide    15x3"       Scapular retraction*  Try nv 3x10x5" 3x10x5" 3x10x5"      Scapular depression  nv 3x10 3x10 30x      Shoulder shrugging  nv 3x10 3x10 30x      Scapular retraction with ER           Chin tuck           Prone I's           Prone T's           Prone Y's           Resisted W's           Sustained YWT's           Serratus push up           Plank shoulder taps           Plank to down dog                      Ther Ex           Upper trap stretch* HEP nv 10x5" ea 10x5" ea       Levator scap stretch* HEP nv 10x5" ea 10x5" ea       Gripping * HEP 1'x2 blue 1'x3 blue 1'x3 blue       Wrist flex/ext AROM* HEP nv 30x 30x       Elbow flex/ext AROM HEP   3x10 3x10      Table slides - flexion*     15x5" R      Table slides - scaption*     15x5" R      Supine shoulder flexion with cane     trialed 3x - held 2* pain      Sleeper stretch           Shoulder ER with cane           Shoulder flex/ext/abd isometrics against wall  nv 10x5" R 12x5" R flex/abd only 15x5" R      Shoulder flexion isometric w/ cane           Scapular retraction w/ shoulder ext w/ cane           Foam roller up wall           TB rows     Pink TB 2x10       TB lat pull down           Band pull aparts           IR/ER isometric walkouts           Active IR/ER           Band wall walks horizontal           Band wall walks vertical           Sidelying shoulder ER           Ball wall circles                      Patient education Done  done                    Ther Activity           1 arm row           1 arm press                      Suitcase carry           90/90 carry           OH carry                                            Modalities           CP R shoulder     10' seated

## 2023-01-26 ENCOUNTER — TELEPHONE (OUTPATIENT)
Dept: SLEEP CENTER | Facility: CLINIC | Age: 73
End: 2023-01-26

## 2023-01-26 DIAGNOSIS — G47.33 OSA (OBSTRUCTIVE SLEEP APNEA): Primary | ICD-10-CM

## 2023-01-26 NOTE — PROGRESS NOTES
Sleep Study Documentation    Pre-Sleep Study       Sleep testing procedure explained to patient:YES    Patient napped prior to study:YES- less than 30 minutes  Napped after 2PM: no    Caffeine:Nightshift worker after midnight  Caffeine use:NO    Alcohol:Dayshift workers after 5PM: Alcohol use:NO    Typical day for patient:NO       Study Documentation    Sleep Study Indications:     Sleep Study: Treatment   Optimal PAP pressure: 14cm  Leak:None  Snore:Eliminated  REM Obtained:yes  Supplemental O2: no    Minimum SaO2 81%  Baseline SaO2 93 6%  PAP mask tried (list all)Resmed F20  PAP mask choice (final)Resmed F20  PAP mask type:full face  PAP pressure at which snoring was eliminated 10cm  Minimum SaO2 at final PAP pressure 91%  Mode of Therapy:CPAP  ETCO2:No  CPAP changed to BiPAP:No    Mode of Therapy:CPAP    EKG abnormalities: no     EEG abnormalities: no    Sleep Study Recorded < 2 hours: N/A    Sleep Study Recorded > 2 hours but incomplete study: N/A    Sleep Study Recorded 6 hours but no sleep obtained: NO    Patient classification: retired       Post-Sleep Study    Medication used at bedtime or during sleep study:YES other prescription medications    Patient reports time it took to fall asleep:less than 20 minutes    Patient reports waking up during study:1 to 2 times  Patient reports returning to sleep without difficulty  Patient reports sleeping 4 to 6 hours without dreaming  Patient reports sleep during study:better than usual    Patient rated sleepiness: Not sleepy or tired    PAP treatment:yes: Post PAP treatment patient reports feeling better and  would wear PAP mask at home

## 2023-01-26 NOTE — TELEPHONE ENCOUNTER
Called patient and left message advising CPAP study resulted and CPAP has been ordered  Advised to call office to schedule DME set up and compliance follow up appointments

## 2023-01-27 LAB

## 2023-01-30 ENCOUNTER — TELEPHONE (OUTPATIENT)
Dept: NEUROLOGY | Facility: CLINIC | Age: 73
End: 2023-01-30

## 2023-01-30 ENCOUNTER — OFFICE VISIT (OUTPATIENT)
Dept: PHYSICAL THERAPY | Facility: REHABILITATION | Age: 73
End: 2023-01-30

## 2023-01-30 DIAGNOSIS — M19.011 PRIMARY OSTEOARTHRITIS OF RIGHT SHOULDER: Primary | ICD-10-CM

## 2023-01-30 DIAGNOSIS — Z96.611 HISTORY OF TOTAL REPLACEMENT OF RIGHT SHOULDER JOINT: ICD-10-CM

## 2023-01-30 NOTE — PROGRESS NOTES
Daily Note     Today's date: 2023  Patient name: Tal Dowell  : 1950  MRN: [id number]  Referring provider: Bonifacio Arzate  Dx:   Encounter Diagnosis     ICD-10-CM    1  Primary osteoarthritis of right shoulder  M19 011       2  History of total replacement of right shoulder joint  Z96 611           Start Time: 831  Stop Time: 923  Total time in clinic (min): 52 minutes    Subjective: Brianna Olvera reports trying table slides at home but was getting some pain so he held of on doing much else  Objective: See treatment diary below       Assessment: Tolerated treatment well  Introduced shoulder pulleys with fait tolerance; pain noted during motion, slight improvement with cuing for more passive motions instead of AAROM but held additional time/reps secondary to pain  Patient demonstrated appropriate level of challenge and muscular fatigue throughout session and noted good status at end of visit  Patient demonstrated fatigue post treatment, exhibited good technique with therapeutic exercises and would benefit from continued PT  Plan: Continue per plan of care  Progress treatment as tolerated         Precautions: sleep apnea, arthritis, asthma, manic depression, Ray's syndrome     Long head biceps tenodesis, subscapularis retraction and repair    * indicates given as part of HEP  HEP code: Lorenzo Holiday     Daily Treatment Diary    Manuals      R shoulder PROM nv done done done done done     R elbow PROM   done        Scapular mobility                                 Neuro Re-Ed           Radial nerve glide    15x3"       Scapular retraction*  Try nv 3x10x5" 3x10x5" 3x10x5" 3x12x5"     Scapular depression  nv 3x10 3x10 30x 3x12     Shoulder shrugging  nv 3x10 3x10 30x 3x12     Scapular retraction with ER           Chin tuck           Prone I's           Prone T's           Prone Y's           Resisted W's           Sustained YWT's           Serratus push up Plank shoulder taps           Plank to down dog                      Ther Ex           Pulleys - flexion/scaption      2' flex only, pain     Upper trap stretch* HEP nv 10x5" ea 10x5" ea       Levator scap stretch* HEP nv 10x5" ea 10x5" ea       Gripping * HEP 1'x2 blue 1'x3 blue 1'x3 blue       Wrist flex/ext AROM* HEP nv 30x 30x       Elbow flex/ext AROM HEP   3x10 3x10 25x     Table slides - flexion*     15x5" R 20x5"     Table slides - scaption*     15x5" R 20x5"     Supine shoulder flexion with cane     trialed 3x - held 2* pain      Sleeper stretch           Shoulder ER with cane           Shoulder flex/ext/abd isometrics against wall  nv 10x5" R 12x5" R flex/abd only 15x5" R 15x5" R     Shoulder flexion isometric w/ cane           Scapular retraction w/ shoulder ext w/ cane           Foam roller up wall           TB rows     Pink TB 2x10       TB lat pull down           Band pull aparts           IR/ER isometric walkouts           Active IR/ER           Band wall walks horizontal           Band wall walks vertical           Sidelying shoulder ER           Ball wall circles                      Patient education Done  done                    Ther Activity           1 arm row           1 arm press                      Suitcase carry           90/90 carry           OH carry                                            Modalities           CP R shoulder     10' seated 10' seated

## 2023-01-30 NOTE — TELEPHONE ENCOUNTER
Attempted to reach pt to offer earlier appt off the waitlist on 01/31 at 10:45 AM   [facility]   Provided call back number and instructed pt to call back if interested in earlier appt   Stated that we could not guarantee the appt would still be available by the time they call back

## 2023-01-30 NOTE — TELEPHONE ENCOUNTER
Call complete to pt wife offering earlier appt time off the waitlist on 01/31 at 10:45 AM   Discussed time, date, location, and provider   Rescheduled existing appt for new appt day and time   ADD ON please advise

## 2023-01-31 ENCOUNTER — OFFICE VISIT (OUTPATIENT)
Dept: NEUROLOGY | Facility: CLINIC | Age: 73
End: 2023-01-31

## 2023-01-31 VITALS
SYSTOLIC BLOOD PRESSURE: 118 MMHG | BODY MASS INDEX: 36.95 KG/M2 | TEMPERATURE: 97.4 F | WEIGHT: 228.9 LBS | HEART RATE: 78 BPM | DIASTOLIC BLOOD PRESSURE: 58 MMHG

## 2023-01-31 DIAGNOSIS — G25.3 MYOCLONUS: ICD-10-CM

## 2023-01-31 DIAGNOSIS — M62.81 MUSCLE WEAKNESS: Primary | ICD-10-CM

## 2023-01-31 DIAGNOSIS — R25.9 ABNORMAL MOVEMENTS: ICD-10-CM

## 2023-01-31 DIAGNOSIS — R25.1 TREMOR OF RIGHT HAND: ICD-10-CM

## 2023-01-31 NOTE — PROGRESS NOTES
Patient ID: Charles Krishnan is a 81 y o  male  Assessment/Plan:    Muscle weakness  Patient presents for hospital follow up regarding symptoms of worsening shortness of breath, diffuse arthralgias (mainly shoulders and hips), ptosis, and weakness of the hips  He was also noted to have abnormal movements in the hospital described as  prominent med-high amplitude predominantly postural and kinetic tremor, stimulus-induced myoclonus, and atypical rest movements  All of the above symptoms have resolved since hospitalization  Work up was unrevealing as noted below  Patient states prior to his hospital admission the manufacture of his simvastatin changed (he had been on the medication for a number of years prior)  He states medication was stopped just prior to hospital discharge and symptoms improved  He did try to restart medication and his symptoms of weakness and arthralgias restarted so medication was stopped  He has had no return of abnormal movements noted in hospital     Symptoms likely related to medication use as he has not weakness noted on exam today  He does have some asymmetry of the left eyelid, however is likely senile as he has not other ocular symptoms or fatigable weakness  No further testing indicated at this time  He does have lipid panel pending and if he does require statin would suggest those with lower side effect profile such as rosuvastatin  Plan for follow up in 3-4 months to ensure stability  To contact the office sooner with any concerns or worsening symptoms            Diagnoses and all orders for this visit:    Muscle weakness    Tremor of right hand  -     Ambulatory referral to Neurology    Myoclonus  -     Ambulatory referral to Neurology    Abnormal movements           Subjective:    HPI  Charles Krishnan is a 81 y o  male w/ PMH DONALD, recurrent eosinophilic PNA, dysphagia w/ known esophageal narrowing, asthma, HLD, bipolar disorder x 40 years who presents today for hospital follow-up  Patient presented to the hospital 10/13/22 with worsening symptoms over the past several weeks to months  He noted worsening shortness of breath and dyspnea on exertion that is worsened from his baseline  Also noted diffuse arthralgias that started in the right shoulder and progressed to the left shoulder and bilateral hips  Is also noted on exam for him to have b/l ptosis, prominent med-high amplitude predominantly postural and kinetic tremor, stimulus-induced myoclonus, atypical rest movements that are not choreiform per se but could be akin to extrapyramidal sx seen in long term neuroleptic use  He was started on clonazepam, trazodone was discontinued for his jerking movements which did improve  He had also noted weakness in the hips and trouble with ambulation at the time  Interval history:  He tells me that about a month prior to this hospitalization that his simvastatin was switched from brand to generic, or a  was switched  It was stopped in the hospital and his symptoms slowly improved  He stopped the medication for a week and restarted-he started with symptoms again of abnormal movements, tremor, and muscle aching and weakness  He stopped the medication and symptoms improved  Since this this he not muscle aching or fatigue  He has no difficulty walking up stairs, getting in and out of a car, getting up from a chair, or using his arms above head (other then issues with shoulder arthritis)  He denies any muscle weakness  No further joint pain other then-he has arthritis of the shoulders and hips and has received cortisone injections for these  His pain is back to his normal baseline  He states the pain he had in the hospital was "different" and more severe  In the hospital he does note he had trouble keeping his eyes open-this improved when he stopped the statin   He states his left eye lid has been slowly drooping for a number of years but has been gradual-saw plastics regarding this  He does not notice any fluctuation of his eye lid droop  He denies any blurry vision or diplopia  No trouble swallowing or chewing  He tells me he has had respiratory issues since he was 7, diagnosed as asthma  He has SOB that comes and goes, this has been longstanding, no recent change other then when he was in the hospital earlier this month following shoulder surgery  H was hopsitalized for shortness of breath a few days after his surgery in which it was felt to be due to fluid overload/atelectasis, as well as underlying morbid obesity, asthma and DONALD  He follows with pulmonary  Plan for possible pulmonary rehab  He was seen by sleep medicine for sleep apnea which she is to have sleep study performed which showed severe DONALD, he did have cpap ordered    He has not discussed with anyone going back on a statin  He has orders for a lipid panel in the future  He is seeing cardiology  He did see plastics about procedure for his upper eyelid dermatochalasis and left blepharoptosis  He did have a right reverse total shoulder arthroplasty earlier this month  Hospital work-up:  Labs: , B12 1006, folate > 20, TSH 2 7, COVID/FLU/RSV neg, DENIS neg, CRP 1 8, ESR 15, Sjogren's negative, [id number], folate normal, AcHR blocking, binding, modulating antibodies neg, MUSK antibody neg  -RT assessment 10/15/2022: VC 4 5, NIF -60    - XR hips wnl  - XR shoulder L moderate glenohumeral joint osteoarthrosis, R severe osteoarthrosis glenohumeral joint  - CXR wnl  -MRI cervical spine 1  Reversal of mid to lower cervical lordosis centered at the fused C5-6 level  2  Grade 1 degenerative anterolisthesis at C3-4 and C4-5  Multilevel degenerative disc disease with superimposed disc protrusions at the C2-3 through the C4-5 levels  and mild central canal narrowing    3  The cervical cord appears normal in caliber and signal with no evidence of focal impingement   -speech and video barium swallow study was neg for oropharyngeal dysphagia  The following portions of the patient's history were reviewed and updated as appropriate: allergies, current medications, past family history, past medical history, past social history, past surgical history and problem list           Objective:    Blood pressure 118/58, pulse 78, temperature (!) 97 4 °F (36 3 °C), temperature source Temporal, weight 104 kg (228 lb 14 4 oz)  Physical Exam  Constitutional:       General: He is awake  HENT:      Right Ear: Hearing normal       Left Ear: Hearing normal    Eyes:      Extraocular Movements: Extraocular movements intact  Pupils: Pupils are equal, round, and reactive to light  Neurological:      Mental Status: He is alert  Deep Tendon Reflexes:      Reflex Scores:       Bicep reflexes are 2+ on the right side and 2+ on the left side  Brachioradialis reflexes are 2+ on the right side and 2+ on the left side  Patellar reflexes are 1+ on the right side and 1+ on the left side  Psychiatric:         Speech: Speech normal          Neurological Exam  Mental Status  Awake and alert  Oriented to person, place, time and situation  Speech is normal  Language is fluent with no aphasia  Cranial Nerves  CN II: Visual fields full to confrontation  CN III, IV, VI: Extraocular movements intact bilaterally  Pupils equal round and reactive to light bilaterally  Mild asymmetry of the eyelids on the left-chronic per patient, no worsening with sustained upward gaze  CN V:  Right: Facial sensation is normal   Left: Facial sensation is normal on the left  CN VII:  Right: There is no facial weakness  Left: There is no facial weakness  CN VIII:  Right: Hearing is normal   Left: Hearing is normal   CN IX, X: Palate elevates symmetrically  CN XI:  Right: Trapezius strength is normal   Left: Trapezius strength is normal   CN XII: Tongue midline without atrophy or fasciculations      Motor  Normal muscle bulk throughout  No fasciculations present  Normal muscle tone  Right                     Left  Neck flexion                          5                           Neck extension                     5                           Elbow flexion                         5                          5  Elbow extension                    5                          5  Wrist flexion                           5                          5  Wrist extension                      5                          5  Finger flexion                         5                          5  Finger abduction                    5                          5  Hip flexion                              5                          5  Knee flexion                           5                          5  Knee extension                      5                          5  Ankle inversion                      5                          5  Ankle eversion                       5                          5  Plantarflexion                         5                          5  Dorsiflexion                            5                          5    Sensory  Light touch is normal in upper and lower extremities  Pinprick is normal in upper and lower extremities  Temperature is normal in upper and lower extremities  Vibration is normal in upper and lower extremities       Reflexes                                            Right                      Left  Brachioradialis                    2+                         2+  Biceps                                 2+                         2+  Patellar                                1+                         1+  Achilles                                Tr                         Tr  Plantar                           Downgoing                Downgoing    Coordination  Right: Finger-to-nose normal  Rapid alternating movement normal Left: Finger-to-nose normal  Rapid alternating movement normal     Gait  Casual gait is normal including stance, stride, and arm swing  Able to rise from chair without using arms  I have personally reviewed the ROS performed by the MA     ROS:    Review of Systems   Constitutional: Negative  Negative for appetite change and fever  HENT: Negative  Negative for hearing loss, tinnitus, trouble swallowing and voice change  Eyes: Negative  Negative for photophobia, pain and visual disturbance  Respiratory: Positive for shortness of breath  Cardiovascular: Negative  Negative for palpitations  Gastrointestinal: Positive for nausea  Negative for vomiting  Endocrine: Negative  Negative for cold intolerance  Genitourinary: Negative  Negative for dysuria, frequency and urgency  Musculoskeletal: Positive for neck pain and neck stiffness  Negative for gait problem and myalgias  Skin: Negative  Negative for rash  Allergic/Immunologic: Negative  Neurological: Negative  Negative for dizziness, tremors, seizures, syncope, facial asymmetry, speech difficulty, weakness, light-headedness, numbness and headaches  Hematological: Negative  Does not bruise/bleed easily  Psychiatric/Behavioral: Positive for sleep disturbance (sleep apnea)  Negative for confusion and hallucinations  The patient is nervous/anxious  All other systems reviewed and are negative

## 2023-02-01 ENCOUNTER — OFFICE VISIT (OUTPATIENT)
Dept: PHYSICAL THERAPY | Facility: REHABILITATION | Age: 73
End: 2023-02-01

## 2023-02-01 DIAGNOSIS — Z96.611 HISTORY OF TOTAL REPLACEMENT OF RIGHT SHOULDER JOINT: ICD-10-CM

## 2023-02-01 DIAGNOSIS — M19.011 PRIMARY OSTEOARTHRITIS OF RIGHT SHOULDER: Primary | ICD-10-CM

## 2023-02-01 PROBLEM — M62.81 MUSCLE WEAKNESS: Status: ACTIVE | Noted: 2023-02-01

## 2023-02-01 LAB

## 2023-02-01 NOTE — ASSESSMENT & PLAN NOTE
Patient presents for hospital follow up regarding symptoms of worsening shortness of breath, diffuse arthralgias (mainly shoulders and hips), ptosis, and weakness of the hips  He was also noted to have abnormal movements in the hospital described as  prominent med-high amplitude predominantly postural and kinetic tremor, stimulus-induced myoclonus, and atypical rest movements  All of the above symptoms have resolved since hospitalization  Work up was unrevealing as noted below  Patient states prior to his hospital admission the manufacture of his simvastatin changed (he had been on the medication for a number of years prior)  He states medication was stopped just prior to hospital discharge and symptoms improved  He did try to restart medication and his symptoms of weakness and arthralgias restarted so medication was stopped  He has had no return of abnormal movements noted in hospital     Symptoms likely related to medication use as he has not weakness noted on exam today  He does have some asymmetry of the left eyelid, however is likely senile as he has not other ocular symptoms or fatigable weakness  No further testing indicated at this time  He does have lipid panel pending and if he does require statin would suggest those with lower side effect profile such as rosuvastatin  Plan for follow up in 3-4 months to ensure stability  To contact the office sooner with any concerns or worsening symptoms

## 2023-02-01 NOTE — PROGRESS NOTES
Daily Note     Today's date: 2023  Patient name: Annabelle Horne  : 1950  MRN: [id number]  Referring provider: Ainsley Paige  Dx:   Encounter Diagnosis     ICD-10-CM    1  Primary osteoarthritis of right shoulder  M19 011       2  History of total replacement of right shoulder joint  Z96 611           Start Time: 830  Stop Time: 918  Total time in clinic (min): 48 minutes    Subjective: Sonia Chapito reports he was given clearance to stop wearing the sling this coming Monday  States his shoulder is gradually improving  Objective: See treatment diary below      Assessment: Tolerated treatment well  Improved tolerance to shoulder pulleys this session  Demonstrates steady improvements in R shoulder ROM during manual therapy and table slides  Patient demonstrated appropriate level of challenge and muscular fatigue throughout session and noted good status at end of visit  Patient demonstrated fatigue post treatment, exhibited good technique with therapeutic exercises and would benefit from continued PT  Plan: Continue per plan of care  Progress treatment as tolerated         Precautions: sleep apnea, arthritis, asthma, manic depression, Ray's syndrome     Long head biceps tenodesis, subscapularis retraction and repair    * indicates given as part of HEP  HEP code: Asim Joiner     Daily Treatment Diary    Manuals     R shoulder PROM nv done done done done done done    R elbow PROM   done        Scapular mobility                                 Neuro Re-Ed           Radial nerve glide    15x3"       Scapular retraction*  Try nv 3x10x5" 3x10x5" 3x10x5" 3x12x5" 3x12x5"    Scapular depression  nv 3x10 3x10 30x 3x12     Shoulder shrugging  nv 3x10 3x10 30x 3x12 3x12    Scapular retraction with ER           Chin tuck           Prone I's           Prone T's           Prone Y's           Resisted W's           Sustained YWT's           Serratus push up Plank shoulder taps           Plank to down dog                      Ther Ex           Pulleys - flexion/scaption (ROM)      2' flex only, pain 2'/2'    Upper trap stretch* HEP nv 10x5" ea 10x5" ea       Levator scap stretch* HEP nv 10x5" ea 10x5" ea       Gripping * HEP 1'x2 blue 1'x3 blue 1'x3 blue       Wrist flex/ext AROM* HEP nv 30x 30x       Elbow flex/ext AROM HEP   3x10 3x10 25x     Table slides - flexion*     15x5" R 20x5" 20x5"    Table slides - scaption*     15x5" R 20x5" 20x5"    Supine shoulder flexion with cane     trialed 3x - held 2* pain  Try nv    Sleeper stretch           Shoulder ER with cane           Shoulder flex/ext/abd isometrics against wall  nv 10x5" R 12x5" R flex/abd only 15x5" R 15x5" R 15x5" R    Shoulder flexion isometric w/ cane           Scapular retraction w/ shoulder ext w/ cane           Foam roller up wall           TB rows     Pink TB 2x10   GTB 3x10    TB lat pull down           Band pull aparts           IR/ER isometric walkouts           Active IR/ER           Band wall walks horizontal           Band wall walks vertical           Sidelying shoulder ER           Ball wall circles                      Patient education Done  done                    Ther Activity           1 arm row           1 arm press                      Suitcase carry           90/90 carry           OH carry                                            Modalities           CP R shoulder     10' seated 10' seated declined

## 2023-02-06 ENCOUNTER — APPOINTMENT (OUTPATIENT)
Dept: PHYSICAL THERAPY | Facility: REHABILITATION | Age: 73
End: 2023-02-06

## 2023-02-07 ENCOUNTER — TELEPHONE (OUTPATIENT)
Dept: FAMILY MEDICINE CLINIC | Facility: CLINIC | Age: 73
End: 2023-02-07

## 2023-02-07 DIAGNOSIS — I10 ESSENTIAL HYPERTENSION: ICD-10-CM

## 2023-02-07 LAB

## 2023-02-07 RX ORDER — ENALAPRIL MALEATE 10 MG/1
10 TABLET ORAL DAILY
Qty: 30 TABLET | Refills: 0 | Status: SHIPPED | OUTPATIENT
Start: 2023-02-07

## 2023-02-08 ENCOUNTER — OFFICE VISIT (OUTPATIENT)
Dept: PHYSICAL THERAPY | Facility: REHABILITATION | Age: 73
End: 2023-02-08

## 2023-02-08 DIAGNOSIS — J45.20 MILD INTERMITTENT ASTHMA, UNSPECIFIED WHETHER COMPLICATED: Primary | ICD-10-CM

## 2023-02-08 DIAGNOSIS — M19.011 PRIMARY OSTEOARTHRITIS OF RIGHT SHOULDER: Primary | ICD-10-CM

## 2023-02-08 DIAGNOSIS — J45.20 MILD INTERMITTENT ASTHMA, UNSPECIFIED WHETHER COMPLICATED: ICD-10-CM

## 2023-02-08 DIAGNOSIS — Z96.611 HISTORY OF TOTAL REPLACEMENT OF RIGHT SHOULDER JOINT: ICD-10-CM

## 2023-02-08 RX ORDER — FLUTICASONE FUROATE, UMECLIDINIUM BROMIDE AND VILANTEROL TRIFENATATE 200; 62.5; 25 UG/1; UG/1; UG/1
1 POWDER RESPIRATORY (INHALATION) DAILY
Qty: 1 EACH | Refills: 0 | Status: SHIPPED | OUTPATIENT
Start: 2023-02-08 | End: 2023-02-08 | Stop reason: SDUPTHER

## 2023-02-08 NOTE — PROGRESS NOTES
Daily Note     Today's date: 2023  Patient name: Gio Valdovinos  : 1950  MRN: [id number]  Referring provider: Curtis Bansal  Dx:   Encounter Diagnosis     ICD-10-CM    1  Primary osteoarthritis of right shoulder  M19 011       2  History of total replacement of right shoulder joint  Z96 611           Start Time: 830  Stop Time: 905  Total time in clinic (min): 35 minutes    Subjective: Jace Ashton reports yesterday around 6pm when he was climbing his stairs he got a sharp pain at the incision which lasted for about 1 minute and left the incision feeling very sore  Denies redness and warmth around the incision  Does note some tingling/numbness below the elbow to the hand  Denies using his arm on the railing when ascending stairs and denies any provoking factors to cause pain  Objective: See treatment diary below      Assessment: Tolerated treatment poor  PROM limited by pain this session, minimal improvement in pain with cuing for relaxation  Attempted gentle scapular retraction but held secondary to sharp pain with movement  Incision appears to be well healed, no dehiscence, no excessive redness  Discussed reaching out to surgeon's office in addition to myself sending an inbox message to surgeon to inform them of symptoms; Jace Ashton verbalized understanding  Patient would benefit from continued PT  Plan: Continue per plan of care  Progress treatment as tolerated         Precautions: sleep apnea, arthritis, asthma, manic depression, Ray's syndrome     Long head biceps tenodesis, subscapularis retraction and repair    * indicates given as part of HEP  HEP code: [id number]     Daily Treatment Diary    Manuals    R shoulder PROM nv done done done done done done Done    R elbow PROM   done        Scapular mobility           Incision assessment        done   Gentle oscillations        done              Neuro Re-Ed           Radial nerve glide    15x3" Scapular retraction*  Try nv 3x10x5" 3x10x5" 3x10x5" 3x12x5" 3x12x5" held 2* pain   Scapular depression  nv 3x10 3x10 30x 3x12     Shoulder shrugging  nv 3x10 3x10 30x 3x12 3x12    Scapular retraction with ER           Chin tuck           Prone I's           Prone T's           Prone Y's           Resisted W's           Sustained YWT's           Serratus push up           Plank shoulder taps           Plank to down dog                      Ther Ex           Pulleys - flexion/scaption (ROM)      2' flex only, pain 2'/2'    Upper trap stretch* HEP nv 10x5" ea 10x5" ea       Levator scap stretch* HEP nv 10x5" ea 10x5" ea       Gripping * HEP 1'x2 blue 1'x3 blue 1'x3 blue       Wrist flex/ext AROM* HEP nv 30x 30x       Elbow flex/ext AROM HEP   3x10 3x10 25x     Table slides - flexion*     15x5" R 20x5" 20x5"    Table slides - scaption*     15x5" R 20x5" 20x5"    Supine shoulder flexion with cane     trialed 3x - held 2* pain  Try nv    Sleeper stretch           Shoulder ER with cane           Shoulder flex/ext/abd isometrics against wall  nv 10x5" R 12x5" R flex/abd only 15x5" R 15x5" R 15x5" R    Shoulder flexion isometric w/ cane           Scapular retraction w/ shoulder ext w/ cane           Foam roller up wall           TB rows     Pink TB 2x10   GTB 3x10    TB lat pull down           Band pull aparts           IR/ER isometric walkouts           Active IR/ER           Band wall walks horizontal           Band wall walks vertical           Sidelying shoulder ER           Ball wall circles                      Patient education Done  done      done              Ther Activity           1 arm row           1 arm press                      Suitcase carry           90/90 carry           OH carry                                            Modalities           CP R shoulder     10' seated 10' seated declined 10' seated

## 2023-02-09 ENCOUNTER — HOSPITAL ENCOUNTER (OUTPATIENT)
Dept: PULMONOLOGY | Facility: HOSPITAL | Age: 73
Discharge: HOME | End: 2023-02-09

## 2023-02-09 ENCOUNTER — APPOINTMENT (OUTPATIENT)
Dept: RADIOLOGY | Facility: OTHER | Age: 73
End: 2023-02-09

## 2023-02-09 ENCOUNTER — OFFICE VISIT (OUTPATIENT)
Dept: OBGYN CLINIC | Facility: OTHER | Age: 73
End: 2023-02-09

## 2023-02-09 VITALS
WEIGHT: 230 LBS | HEIGHT: 66 IN | HEART RATE: 80 BPM | DIASTOLIC BLOOD PRESSURE: 76 MMHG | BODY MASS INDEX: 36.96 KG/M2 | SYSTOLIC BLOOD PRESSURE: 134 MMHG

## 2023-02-09 DIAGNOSIS — R06.09 DYSPNEA ON EXERTION: ICD-10-CM

## 2023-02-09 DIAGNOSIS — Z96.611 S/P REVERSE TOTAL SHOULDER ARTHROPLASTY, RIGHT: Primary | ICD-10-CM

## 2023-02-09 DIAGNOSIS — Z96.611 S/P REVERSE TOTAL SHOULDER ARTHROPLASTY, RIGHT: ICD-10-CM

## 2023-02-09 RX ORDER — ALBUTEROL SULFATE 2.5 MG/3ML
2.5 SOLUTION RESPIRATORY (INHALATION) ONCE
Status: COMPLETED | OUTPATIENT
Start: 2023-02-09 | End: 2023-02-09

## 2023-02-09 RX ADMIN — ALBUTEROL SULFATE 2.5 MG: 2.5 SOLUTION RESPIRATORY (INHALATION) at 14:02

## 2023-02-09 NOTE — PROGRESS NOTES
Surgery: Right reverse total shoulder arthroplasty 1/10/23    S: Patient presents sooner than scheduled at request of therapist for paresthesias down arm into hand  Susan Vaughn reports sudden sharp pain in shoulder earlier this week  He was going up the stairs, holding onto banister with left hand when he had sharp pain over incision on the right  Pain is localized to lateral aspect of right shoulder  Any motion of shoulder exacerbates pain  Denies pain that interferes with sleep  Admits to paresthesias down arm as well  /76 (BP Location: Left arm, Patient Position: Sitting, Cuff Size: Adult)   Pulse 80   Ht 5' 6" (1 676 m)   Wt 104 kg (230 lb)   BMI 37 12 kg/m²     O: right shoulder  Incision without erythema or drainage  Active FF: 120 painful  Passive ABD: 90 painful  Pain with simple micromotion of shoulder  No crepitation/grinding on exam  Good elbow wrist and hand range of motion without pain  SI  NVI    I have personally reviewed pertinent films in PACS and my interpretation is stable prosthesis without dislocation or fracture  No interval change from previous x-rays    A/P: 4 weeks s/p reverse total shoulder arthoplasty     1  Hold formal PT  2  Susan Vaughn to return to sling throughout the day  He is not to use the arm for ADLs  3  HEP - elbow wrist and hand range of motion only  No shoulder exercises or motion  4  Ice and Tylenol PRN  5  Follow up 3 weeks to reevaluate

## 2023-02-10 ENCOUNTER — APPOINTMENT (OUTPATIENT)
Dept: PHYSICAL THERAPY | Facility: REHABILITATION | Age: 73
End: 2023-02-10

## 2023-02-13 ENCOUNTER — APPOINTMENT (OUTPATIENT)
Dept: PHYSICAL THERAPY | Facility: REHABILITATION | Age: 73
End: 2023-02-13

## 2023-02-14 ENCOUNTER — TELEPHONE (OUTPATIENT)
Dept: SLEEP CENTER | Facility: CLINIC | Age: 73
End: 2023-02-14

## 2023-02-14 ENCOUNTER — TELEPHONE (OUTPATIENT)
Dept: BARIATRICS | Facility: CLINIC | Age: 73
End: 2023-02-14

## 2023-02-14 NOTE — TELEPHONE ENCOUNTER
The pt  was set pt  up 2/7 in the [facility] office by Breann on RESMED S11 PAP 14cm flex 2 and gave mask F20 (M)

## 2023-02-14 NOTE — TELEPHONE ENCOUNTER
Call to patient regarding rescheduled 4 of 4 weight check, informed patient that insurance requires consecutive weight checks so one is needed for February to avoid having to redo all of them  Asked patient to call back to find a date in February for a visit and stay on track, direct number given

## 2023-02-14 NOTE — TELEPHONE ENCOUNTER
Patient called back, appreciated the follow up since he didn't realize he needed the visit this month to avoid disrupting process  He said he would be able to come in on 2/27 to meet with SW

## 2023-02-15 ENCOUNTER — APPOINTMENT (OUTPATIENT)
Dept: PHYSICAL THERAPY | Facility: REHABILITATION | Age: 73
End: 2023-02-15

## 2023-02-15 RX ORDER — FLUTICASONE FUROATE, UMECLIDINIUM BROMIDE AND VILANTEROL TRIFENATATE 200; 62.5; 25 UG/1; UG/1; UG/1
1 POWDER RESPIRATORY (INHALATION) DAILY
Qty: 1 EACH | Refills: 5 | Status: SHIPPED | OUTPATIENT
Start: 2023-02-15

## 2023-02-20 ENCOUNTER — APPOINTMENT (OUTPATIENT)
Dept: PHYSICAL THERAPY | Facility: REHABILITATION | Age: 73
End: 2023-02-20

## 2023-02-21 ENCOUNTER — TELEPHONE (OUTPATIENT)
Dept: SLEEP CENTER | Facility: CLINIC | Age: 73
End: 2023-02-21

## 2023-02-21 ENCOUNTER — TELEPHONE (OUTPATIENT)
Dept: OBGYN CLINIC | Facility: HOSPITAL | Age: 73
End: 2023-02-21

## 2023-02-21 NOTE — TELEPHONE ENCOUNTER
----- Message from Juan Jose Vivar sent at 2/21/2023 12:05 PM EST -----  Regarding: CPAP Machine  Contact: [phone]  Can you direct me to someone I can speak to regading my CPAP machine setup?   Thanks Antony Stout

## 2023-02-21 NOTE — TELEPHONE ENCOUNTER
Patient feels like he is suffocating when he starts the CPAP at night  He reports that his machine is starting at 4 and he feels it needs to be higher   Advised I would have compliance from his machine reviewed by Dr Krys Nair,    Patient also reports sleep is hard right now he had shoulder surgery and has a significant amount of pain at night, but he seems positive about using the machine

## 2023-02-22 ENCOUNTER — APPOINTMENT (OUTPATIENT)
Dept: PHYSICAL THERAPY | Facility: REHABILITATION | Age: 73
End: 2023-02-22

## 2023-02-22 ENCOUNTER — RA CDI HCC (OUTPATIENT)
Dept: OTHER | Facility: HOSPITAL | Age: 73
End: 2023-02-22

## 2023-02-22 DIAGNOSIS — Z96.611 STATUS POST REVERSE TOTAL ARTHROPLASTY OF RIGHT SHOULDER: Primary | ICD-10-CM

## 2023-02-22 NOTE — TELEPHONE ENCOUNTER
Called the patient left call back message Advising Dr Oneida Olvera wants him to adjust ramp times   Left message that I will have [facility] [employer] representative call him or he can schedule appointment to come in to see them and have instruction in person     E mail sent to [facility] [employer] representatives to contact the patient

## 2023-02-22 NOTE — TELEPHONE ENCOUNTER
E mail per Breann from [facility] called the PT and I changed the ramp to start at a higher pressure so that he feels more comfortable

## 2023-02-22 NOTE — PROGRESS NOTES
[facility]  coding opportunities       Chart reviewed, no opportunity found: CHART REVIEWED, NO OPPORTUNITY FOUND        Patients Insurance        Commercial Insurance: [address] [place] [address]

## 2023-02-24 ENCOUNTER — TELEPHONE (OUTPATIENT)
Dept: RADIOLOGY | Facility: HOSPITAL | Age: 73
End: 2023-02-24

## 2023-02-24 NOTE — TELEPHONE ENCOUNTER
Spoke with Farhad Linder in scheduling and she will call patient to reschedule to the [facility] since we can't do MARS Protocol at [facility]

## 2023-02-27 ENCOUNTER — HOSPITAL ENCOUNTER (OUTPATIENT)
Dept: CT IMAGING | Facility: HOSPITAL | Age: 73
Discharge: HOME | End: 2023-02-27

## 2023-02-27 ENCOUNTER — OFFICE VISIT (OUTPATIENT)
Dept: BARIATRICS | Facility: CLINIC | Age: 73
End: 2023-02-27

## 2023-02-27 ENCOUNTER — APPOINTMENT (OUTPATIENT)
Dept: PHYSICAL THERAPY | Facility: REHABILITATION | Age: 73
End: 2023-02-27

## 2023-02-27 VITALS — WEIGHT: 229.2 LBS | BODY MASS INDEX: 36.99 KG/M2

## 2023-02-27 DIAGNOSIS — E66.9 OBESITY, CLASS II, BMI 35-39.9: Primary | ICD-10-CM

## 2023-02-27 DIAGNOSIS — Z96.611 STATUS POST REVERSE TOTAL ARTHROPLASTY OF RIGHT SHOULDER: ICD-10-CM

## 2023-02-27 NOTE — PROGRESS NOTES
Patient presents for 4 of 4 weight check, current weight 229 2lbs  Eating behaviors/food choices: Reports having three meals a day, having some type of protein such as an egg, protein shake or cheese stick for breakfast  Not measuring portions, eating on smaller plates to modify portions, suggested logging foods which patient was doing but fell of track  He is doing some night time mindless snacking but avoiding bringing a lot of snack foods into the home  Encouraged to tune into times of mindless snacking and address the trigger of this impulse  Activity/Exercise:  Patient's shoulder is impeding all activity, he is struggling with a lot of pain that is being worked out by his doctors  Not able to do any activity which he is contributing to troubles with losing weight  Sleep/Rest:  Patient has and is using his CPAP machine but due to severe shoulder pain from shoulder replacement surgery he has not been able to get undisrupted sleep  His machine is registering about 2hrs of sleep a night, he is aware that he needs to use the machine consistently for 30 days  Encouraged patient to just keep trying to use the machine and to get the rest that he is able to  Mental Health/Wellness:  Patient denies any issues with mental health although feeling very discouraged with the healing progress from his shoulder surgery  He states he is still talking with his psychiatrist every three months or as needed, taking his medications consistently  He is not seeing a therapist, his psychiatrist spends the time talking with him about symptoms and symptoms management  He states he spends time with his family, especially his great granddaughter to manage symptoms of depression, he talks with friends and posts on [employer] which he enjoys    He was hopeful that he could get his shoulder surgery done and heal from that in time to have bariatric surgery but he is consulting with his orthopedic docs about shoulder pain he is having  They plan to do a CAT scan this week and hopefully have a plan for how to manage his pain from there  He is hoping he won't need further surgery but is preparing for this possibility  He is aware and voiced that bariatric surgery may need to be delayed until this is managed, he will follow up with office about the outcome of these up coming appointments  Workflow review:    Labs and PCP: both done  Psych and EGD: no eval needed, EGD done  Nicotine: NA  Support Group: done  Cardiology: cardiac testing scheduled for 4/10  Sleep: severe DONALD, using CPAP   Weight Checks: 4 weight checks    Goals:    - continue three meals a day focused on protein, portion foods and consider tracking  - be mindful of the impact pain and stress have on eating habits  - focus on times of mindless eating, use non-food coping skills to manage      Next Appointment:  With RD on 3/27

## 2023-03-01 ENCOUNTER — OFFICE VISIT (OUTPATIENT)
Dept: FAMILY MEDICINE CLINIC | Facility: CLINIC | Age: 73
End: 2023-03-01

## 2023-03-01 VITALS
OXYGEN SATURATION: 95 % | HEART RATE: 70 BPM | WEIGHT: 233 LBS | SYSTOLIC BLOOD PRESSURE: 118 MMHG | BODY MASS INDEX: 37.45 KG/M2 | RESPIRATION RATE: 16 BRPM | HEIGHT: 66 IN | DIASTOLIC BLOOD PRESSURE: 72 MMHG

## 2023-03-01 DIAGNOSIS — E78.1 HYPERTRIGLYCERIDEMIA: ICD-10-CM

## 2023-03-01 DIAGNOSIS — J45.20 MILD INTERMITTENT ASTHMA, UNSPECIFIED WHETHER COMPLICATED: ICD-10-CM

## 2023-03-01 DIAGNOSIS — R06.02 SOB (SHORTNESS OF BREATH): ICD-10-CM

## 2023-03-01 DIAGNOSIS — R73.03 PREDIABETES: ICD-10-CM

## 2023-03-01 DIAGNOSIS — E03.9 HYPOTHYROIDISM, UNSPECIFIED TYPE: ICD-10-CM

## 2023-03-01 DIAGNOSIS — I10 ESSENTIAL HYPERTENSION: ICD-10-CM

## 2023-03-01 DIAGNOSIS — Z00.00 ANNUAL PHYSICAL EXAM: Primary | ICD-10-CM

## 2023-03-01 DIAGNOSIS — E66.01 OBESITY, MORBID (HCC): ICD-10-CM

## 2023-03-01 DIAGNOSIS — R60.0 BILATERAL LEG EDEMA: ICD-10-CM

## 2023-03-01 RX ORDER — FUROSEMIDE 20 MG/1
20 TABLET ORAL
Qty: 10 TABLET | Refills: 0 | Status: SHIPPED | OUTPATIENT
Start: 2023-03-01

## 2023-03-01 RX ORDER — ROSUVASTATIN CALCIUM 5 MG/1
5 TABLET, COATED ORAL DAILY
Qty: 30 TABLET | Refills: 2 | Status: SHIPPED | OUTPATIENT
Start: 2023-03-01

## 2023-03-01 NOTE — PROGRESS NOTES
Assessment/Plan:   Diagnoses and all orders for this visit:      Mild intermittent asthma, unspecified whether complicated  - follows with Pulm   SOB (shortness of breath)  -     CBC and differential; Future    Essential hypertension  -     Comprehensive metabolic panel; Future  -     Microalbumin / creatinine urine ratio; Future  - BP stable in the office  - (+) b/l LE edema - will add Lasix 20mg Q72hrs   - RTO in 2wks with labs for f/u - pt aware and agreeable   Bilateral leg edema  -     furosemide (LASIX) 20 mg tablet; Take 1 tablet (20 mg total) by mouth every third day  -     Cancel: Basic metabolic panel; Future    Hypothyroidism, unspecified type  -     TSH, 3rd generation with Free T4 reflex  - cont Levothyroxine 100mcg QAM for now     Prediabetes  - A1c (12/20/2022): 6 0 <-- 6 4  - diet/exercise  - repeat in 6/2023     Hypertriglyceridemia  -     rosuvastatin (CRESTOR) 5 mg tablet; Take 1 tablet (5 mg total) by mouth daily  - has Lipids pending     Obesity, morbid ([facility] )  - BMI 37 6  - follows with Bariatrics for weight loss            Subjective:    Patient ID: Sangita Joyce is a 81 y o  male    HPI  Sangita Joyce is a 81 y o  male who presents to the office for his annual exam   1) Annual   - Specialists: Cardio, Bariatrics, Ortho, Pulm, Neuro, Sleep Med, Psych   - PMHx: Depression, BPH, HL, HTN, asthma, DONALD on CPAP, seasonal allergies, hypothyroidism, BMI 37 6  - allergies: Spiriva - rash, simvastatin - SOB  - Meds: see med rec   - PSHx: umbilical hernia repair, b/l cataract surgery, R-shoulder surgery   - FHx: M (HTN, Depression), F (Arthritis, Depression), PU (MI at 29yo), S (CVA - at 70yo), denies FHx of colon/prostate ca   - Immunizations: UTD with COVID IMMs and Booster x1, Tdap within the past 10yrs, UTD with PCV20, UTD with annual Flu vaccine   - Hm: has Cscope scheduled for 6/14/2023  - diet/exercise: not exercising, diet: follows with Bariatrics  - social: denies tob/illicits/EtOH  - last vision: follows with Optho annually   - last dental: goes Q6months   - ROS: today in the office pt denies F/C/N/V/HA/visual changes/CP/palpitations/abd pain/D/LE edema   - (+) MON[place]O and LE edema  2) General   - s/p R r-TSA for correction of primary OA of R-shoulder - has an appt with Ortho 3/2/2023 for continued pain   - recovery was extended as pt was hospitalized on 1/11-14/2023 for eval of dyspnea   - feels like there is a bubble at bottom of L-ribcage - after he stops exerting himself, the bubble resolves       The following portions of the patient's history were reviewed and updated as appropriate: allergies, current medications, past family history, past medical history, past social history, past surgical history and problem list     Review of Systems  as per HPI    Objective:  /72 (BP Location: Left arm, Patient Position: Sitting, Cuff Size: Standard)   Pulse 70   Resp 16   Ht 5' 6" (1 676 m)   Wt 106 kg (233 lb)   SpO2 95%   BMI 37 61 kg/m²    Physical Exam  Vitals reviewed  Constitutional:       General: He is not in acute distress  Appearance: Normal appearance  He is not ill-appearing, toxic-appearing or diaphoretic  HENT:      Head: Normocephalic and atraumatic  Right Ear: External ear normal       Left Ear: External ear normal       Nose: Nose normal    Eyes:      General: No scleral icterus  Right eye: No discharge  Left eye: No discharge  Extraocular Movements: Extraocular movements intact  Conjunctiva/sclera: Conjunctivae normal    Cardiovascular:      Rate and Rhythm: Normal rate and regular rhythm  Heart sounds: Normal heart sounds  Pulmonary:      Effort: Pulmonary effort is normal  No respiratory distress  Breath sounds: Normal breath sounds  No stridor  No wheezing, rhonchi or rales  Abdominal:      Palpations: Abdomen is soft  Tenderness: There is no abdominal tenderness     Musculoskeletal:      Cervical back: Normal range of motion  Right lower leg: Edema present  Left lower leg: Edema present  Comments: R-arm in sling s/p R-shoulder surgery    Neurological:      General: No focal deficit present  Mental Status: He is alert and oriented to person, place, and time  Psychiatric:         Mood and Affect: Mood normal          Behavior: Behavior normal          BMI Counseling: Body mass index is 37 61 kg/m²  The BMI is above normal  Nutrition recommendations include 3-5 servings of fruits/vegetables daily  Exercise recommendations include exercising 3-5 times per week  Depression Screening Follow-up Plan: Patient's depression screening was positive with a PHQ-2 score of 0  Their PHQ-9 score was   Continue regular follow-up with their psychologist/therapist/psychiatrist who is managing their mental health condition(s)  Answers for HPI/ROS submitted by the patient on 3/1/2023  How would you rate your overall health?: fair  Compared to last year, how is your physical health?: same  In general, how satisfied are you with your life?: satisfied  Compared to last year, how is your eyesight?: same  Compared to last year, how is your hearing?: same  Compared to last year, how is your emotional/mental health?: same  How often is anger a problem for you?: never, rarely  How often do you feel unusually tired/fatigued?: often  In the past 7 days, how much pain have you experienced?: a lot  If you answered "some" or "a lot", please rate the severity of your pain on a scale of 1 to 10 (1 being the least severe pain and 10 being the most intense pain) : 10/10  In the past 6 months, have you lost or gained 10 pounds without trying?: No  One or more falls in the last year: No  Do you have trouble with the stairs inside or outside your home?: No  Does your home have working smoke alarms?: Yes  Does your home have a carbon monoxide monitor?: Yes  Which safety hazards (if any) have you experienced in your home?  Please select all that apply : none  How would you describe your current diet? Please select all that apply : Limited junk food  In addition to prescription medications, are you taking any over-the-counter supplements?: Yes  If yes, what supplements are you taking?: Cetirizine HCl; Osteobiflex; Fish Oil; D3; Potassium; Centrum Multivitamin;  Ibuprofen; Diurex; Voltaren Gel; DerMend  Can you manage your medications?: Yes  Are you currently taking any opioid medications?: No  Can you walk and transfer into and out of your bed and chair?: Yes  Can you dress and groom yourself?: Yes  Can you bathe or shower yourself?: Yes  Can you feed yourself?: Yes  Can you do your laundry/ housekeeping?: No  Can you manage your money, pay your bills, and track your expenses?: Yes  Can you make your own meals?: Yes  Can you do your own shopping?: Yes  Within the last 12 months, have you had any hospitalizations or Emergency Department visits?: Yes  If yes, how many times have you been hospitalized within the past year?: 3-4  Do you have a living will?: No  Do you have a Durable POA (Power of ) for healthcare decisions?: Yes  Do you have an Advanced Directive for end of life decisions?: No  How often have you used an illegal drug (including marijuana) or a prescription medication for non-medical reasons in the past year?: never  What is the typical number of drinks you consume in a day?: 0  What is the typical number of drinks you consume in a week?: 0  How often did you have a drink containing alcohol in the past year?: never  How many drinks did you have on a typical day  when you were drinking in the past year?: 0  How often did you have 6 or more drinks on one occasion in the past year?: never

## 2023-03-01 NOTE — PROGRESS NOTES
Answers for HPI/ROS submitted by the patient on 3/1/2023  How would you rate your overall health?: fair  Compared to last year, how is your physical health?: same  In general, how satisfied are you with your life?: satisfied  Compared to last year, how is your eyesight?: same  Compared to last year, how is your hearing?: same  Compared to last year, how is your emotional/mental health?: same  How often is anger a problem for you?: never, rarely  How often do you feel unusually tired/fatigued?: often  In the past 7 days, how much pain have you experienced?: a lot  If you answered "some" or "a lot", please rate the severity of your pain on a scale of 1 to 10 (1 being the least severe pain and 10 being the most intense pain) : 10/10  In the past 6 months, have you lost or gained 10 pounds without trying?: No  One or more falls in the last year: No  Do you have trouble with the stairs inside or outside your home?: No  Does your home have working smoke alarms?: Yes  Does your home have a carbon monoxide monitor?: Yes  Which safety hazards (if any) have you experienced in your home? Please select all that apply : none  How would you describe your current diet? Please select all that apply : Limited junk food  In addition to prescription medications, are you taking any over-the-counter supplements?: Yes  If yes, what supplements are you taking?: Cetirizine HCl; Osteobiflex; Fish Oil; D3; Potassium; Centrum Multivitamin;  Ibuprofen; Diurex; Voltaren Gel; DerMend  Can you manage your medications?: Yes  Are you currently taking any opioid medications?: No  Can you walk and transfer into and out of your bed and chair?: Yes  Can you dress and groom yourself?: Yes  Can you bathe or shower yourself?: Yes  Can you feed yourself?: Yes  Can you do your laundry/ housekeeping?: No  Can you manage your money, pay your bills, and track your expenses?: Yes  Can you make your own meals?: Yes  Can you do your own shopping?: Yes  Within the last 12 months, have you had any hospitalizations or Emergency Department visits?: Yes  If yes, how many times have you been hospitalized within the past year?: 3-4  Do you have a living will?: No  Do you have a Durable POA (Power of ) for healthcare decisions?: Yes  Do you have an Advanced Directive for end of life decisions?: No  How often have you used an illegal drug (including marijuana) or a prescription medication for non-medical reasons in the past year?: never  What is the typical number of drinks you consume in a day?: 0  What is the typical number of drinks you consume in a week?: 0  How often did you have a drink containing alcohol in the past year?: never  How many drinks did you have on a typical day  when you were drinking in the past year?: 0  How often did you have 6 or more drinks on one occasion in the past year?: never      Assessment/Plan:   Diagnoses and all orders for this visit:    Annual physical exam  - reviewed PMHx, PSHx, meds, allergies, FHx, Soc Hx  - due for screening labs - script given   - UTD with COVID IMMs and Booster x1   - UTD with Tdap (within the past 10yrs)   - UTD with PCV20   - UTD with annual Flu vaccine   - encouraged to exercise   - follows with Bariatrics for weight loss    - has Cscope scheduled for 6/14/2023  - UTD with Prostate Ca screening (9/2022) - repeat annually   - UTD with Optho and Dental   - RTO in 1yr for annual exam - pt aware and agreeable     Mild intermittent asthma, unspecified whether complicated  - follows with Pulm   SOB (shortness of breath)  -     CBC and differential; Future    Essential hypertension  -     Comprehensive metabolic panel; Future  -     Microalbumin / creatinine urine ratio; Future  - BP stable in the office  - (+) b/l LE edema - will add Lasix 20mg Q72hrs   - RTO in 2wks with labs for f/u - pt aware and agreeable   Bilateral leg edema  -     furosemide (LASIX) 20 mg tablet;  Take 1 tablet (20 mg total) by mouth every third day  -     Cancel: Basic metabolic panel; Future    Hypothyroidism, unspecified type  -     TSH, 3rd generation with Free T4 reflex  - cont Levothyroxine 100mcg QAM for now     Prediabetes  - A1c (12/20/2022): 6 0 <-- 6 4  - diet/exercise  - repeat in 6/2023     Hypertriglyceridemia  -     rosuvastatin (CRESTOR) 5 mg tablet; Take 1 tablet (5 mg total) by mouth daily  - has Lipids pending     Obesity, morbid ([facility] [address] )  - BMI 37 6  - follows with Bariatrics for weight loss            Subjective:    Patient ID: Cindy More is a 81 y o  male    HPI  Cindy More is a 81 y o  male who presents to the office for his annual exam   1) Annual   - Specialists: Cardio, Bariatrics, Ortho, Pulm, Neuro, Sleep Med, Psych   - PMHx: Depression, BPH, HL, HTN, asthma, DONALD on CPAP, seasonal allergies, hypothyroidism, BMI 37 6  - allergies: Spiriva - rash, simvastatin - SOB  - Meds: see med rec   - PSHx: umbilical hernia repair, b/l cataract surgery, R-shoulder surgery   - FHx: M (HTN, Depression), F (Arthritis, Depression), PU (MI at 29yo), S (CVA - at 68yo), denies FHx of colon/prostate ca   - Immunizations: UTD with COVID IMMs and Booster x1, Tdap within the past 10yrs, UTD with PCV20, UTD with annual Flu vaccine   - Hm: has Cscope scheduled for 6/14/2023  - diet/exercise: not exercising, diet: follows with Bariatrics  - social: denies tob/illicits/EtOH  - last vision: follows with Optho annually   - last dental: goes Q6months   - ROS: today in the office pt denies F/C/N/V/HA/visual changes/CP/palpitations/abd pain/D/LE edema   - (+) [place] and LE edema  2) General   - s/p R r-TSA for correction of primary OA of R-shoulder - has an appt with Ortho 3/2/2023 for continued pain   - recovery was extended as pt was hospitalized on 1/11-14/2023 for eval of dyspnea   - feels like there is a bubble at bottom of L-ribcage - after he stops exerting himself, the bubble resolves       The following portions of the patient's history were reviewed and updated as appropriate: allergies, current medications, past family history, past medical history, past social history, past surgical history and problem list     Review of Systems  as per HPI    Objective:  /72 (BP Location: Left arm, Patient Position: Sitting, Cuff Size: Standard)   Pulse 70   Resp 16   Ht 5' 6" (1 676 m)   Wt 106 kg (233 lb)   SpO2 95%   BMI 37 61 kg/m²    Physical Exam  Vitals reviewed  Constitutional:       General: He is not in acute distress  Appearance: Normal appearance  He is not ill-appearing, toxic-appearing or diaphoretic  HENT:      Head: Normocephalic and atraumatic  Right Ear: External ear normal       Left Ear: External ear normal       Nose: Nose normal    Eyes:      General: No scleral icterus  Right eye: No discharge  Left eye: No discharge  Extraocular Movements: Extraocular movements intact  Conjunctiva/sclera: Conjunctivae normal    Cardiovascular:      Rate and Rhythm: Normal rate and regular rhythm  Heart sounds: Normal heart sounds  Pulmonary:      Effort: Pulmonary effort is normal  No respiratory distress  Breath sounds: Normal breath sounds  No stridor  No wheezing, rhonchi or rales  Abdominal:      Palpations: Abdomen is soft  Tenderness: There is no abdominal tenderness  Musculoskeletal:      Cervical back: Normal range of motion  Right lower leg: Edema present  Left lower leg: Edema present  Comments: R-arm in sling s/p R-shoulder surgery    Neurological:      General: No focal deficit present  Mental Status: He is alert and oriented to person, place, and time  Psychiatric:         Mood and Affect: Mood normal          Behavior: Behavior normal          BMI Counseling: Body mass index is 37 61 kg/m²  The BMI is above normal  Nutrition recommendations include 3-5 servings of fruits/vegetables daily  Exercise recommendations include exercising 3-5 times per week  Depression Screening Follow-up Plan: Patient's depression screening was positive with a PHQ-2 score of 0  Their PHQ-9 score was   Continue regular follow-up with their psychologist/therapist/psychiatrist who is managing their mental health condition(s)

## 2023-03-02 ENCOUNTER — OFFICE VISIT (OUTPATIENT)
Dept: OBGYN CLINIC | Facility: OTHER | Age: 73
End: 2023-03-02

## 2023-03-02 VITALS
SYSTOLIC BLOOD PRESSURE: 115 MMHG | BODY MASS INDEX: 37.68 KG/M2 | DIASTOLIC BLOOD PRESSURE: 67 MMHG | HEART RATE: 84 BPM | WEIGHT: 234.47 LBS | HEIGHT: 66 IN

## 2023-03-02 DIAGNOSIS — S42.114A CLOSED NONDISPLACED FRACTURE OF BODY OF RIGHT SCAPULA, INITIAL ENCOUNTER: Primary | ICD-10-CM

## 2023-03-02 DIAGNOSIS — Z96.611 S/P REVERSE TOTAL SHOULDER ARTHROPLASTY, RIGHT: ICD-10-CM

## 2023-03-02 NOTE — PROGRESS NOTES
Assessment  Diagnoses and all orders for this visit:    Closed nondisplaced periprosthetic fracture of body of right scapula, initial encounter    S/P reverse total shoulder arthroplasty, right      Discussion and Plan:    · Discussed with the patient that the CT scan does show a scapular body fracture  This is a uncommon injury but may be a residual of a stress reaction which completed itself with his low energy mechanism  Fortunately the implant appears to be stable, this was a very challenging reconstruction as he knew we had to place the implant where it is as there is no other reconstructive option and prior to his recent onset of symptoms he was very happy and functioning well  I do suspect this will continue to heal and as long as the implants a stable I am hopeful he will have a stable functional shoulder  If symptoms worsen we should reevaluate and if the implant becomes unstable then revision to hemiarthroplasty would be required explained this fracture will heal with conservative treatment  · Continue sling for 2-3 weeks, we reiterated how important it is to prevent this from displacing  · Continue Elbow, hand, and wrist ROM   · Hold formal physical therpay    Subjective:   Patient ID: Francisco Anderson is a 81 y o  male      HPI  Patient presents today to discuss the findings of his right shoulder CT scan  He is 7 weeks s/p right reverse total shoulder arthroplasty 1/10/23  Patient reports some improvement compared to the last visit  Uriel Patrick reports sudden sharp pain in shoulder earlier February 2023  He was going up the stairs, holding onto banister with left hand when he had sharp pain over incision on the right  Pain is localized to lateral aspect of right shoulder  Any motion of shoulder exacerbates pain  Denies pain that interferes with sleep        The following portions of the patient's history were reviewed and updated as appropriate: allergies, current medications, past family history, past medical history, past social history, past surgical history and problem list     Review of Systems   Constitutional: Negative for chills and fever  HENT: Negative for drooling and hearing loss  Eyes: Negative for visual disturbance  Respiratory: Negative for cough and shortness of breath  Cardiovascular: Negative for chest pain  Gastrointestinal: Negative for abdominal pain  Skin: Negative for rash  Psychiatric/Behavioral: Negative for agitation  Objective:  /67 (BP Location: Left arm, Patient Position: Sitting, Cuff Size: Adult)   Pulse 84   Ht 5' 6" (1 676 m)   Wt 106 kg (234 lb 7 5 oz)   BMI 37 84 kg/m²      Right shoulder  Well healed Incision without erythema or drainage  Active FF: 70 painful  Passive ABD: 70 painful  Pain with simple micromotion of shoulder  No crepitation/grinding on exam  Good elbow wrist and hand range of motion without pain  SI  NVI    Physical Exam  Vitals reviewed  Constitutional:       Appearance: He is well-developed  HENT:      Head: Normocephalic  Eyes:      Pupils: Pupils are equal, round, and reactive to light  Pulmonary:      Effort: Pulmonary effort is normal    Abdominal:      General: Abdomen is flat  There is no distension  Skin:     General: Skin is warm and dry  I have personally reviewed pertinent films in PACS and my interpretation is as follows    CT scan right shoulder demonstrates scapular body fracture, intact arthroplasty      Fracture / Dislocation Treatment    Date/Time: 3/2/2023 9:50 AM  Performed by: Debbie Lowry MD  Authorized by: Debbie Lowry MD     Injury location:  Shoulder  Location details:  Right shoulder  Injury type:  Fracture  Fracture type: scapular    Manipulation performed?: No            Scribe Attestation    I,:  Alvaro Paula am acting as a scribe while in the presence of the attending physician :       I,:  Debbie Lowry MD personally performed the services described in this documentation    as scribed in my presence :

## 2023-03-08 ENCOUNTER — TELEPHONE (OUTPATIENT)
Dept: FAMILY MEDICINE CLINIC | Facility: CLINIC | Age: 73
End: 2023-03-08

## 2023-03-08 DIAGNOSIS — J45.20 MILD INTERMITTENT ASTHMA, UNSPECIFIED WHETHER COMPLICATED: ICD-10-CM

## 2023-03-08 RX ORDER — SIMVASTATIN 20 MG
TABLET ORAL
COMMUNITY
Start: 2023-03-07 | End: 2023-03-30

## 2023-03-08 RX ORDER — FLUTICASONE FUROATE, UMECLIDINIUM BROMIDE AND VILANTEROL TRIFENATATE 200; 62.5; 25 UG/1; UG/1; UG/1
1 POWDER RESPIRATORY (INHALATION) DAILY
Qty: 1 EACH | Refills: 0 | Status: CANCELLED | OUTPATIENT
Start: 2023-03-08

## 2023-03-08 RX ORDER — TRAZODONE HYDROCHLORIDE 50 MG/1
TABLET ORAL
COMMUNITY
Start: 2023-03-07 | End: 2023-05-10 | Stop reason: ALTCHOICE

## 2023-03-08 RX ORDER — FLUTICASONE FUROATE, UMECLIDINIUM BROMIDE AND VILANTEROL TRIFENATATE 200; 62.5; 25 UG/1; UG/1; UG/1
1 POWDER RESPIRATORY (INHALATION) DAILY
Qty: 1 EACH | Refills: 5 | Status: CANCELLED | OUTPATIENT
Start: 2023-03-08

## 2023-03-08 NOTE — TELEPHONE ENCOUNTER
Jeffrey for pt to confirm he wants us to cancel refills in [facility] and send to [facility] in [place]

## 2023-03-08 NOTE — TELEPHONE ENCOUNTER
Patient was advised that the medication that was requested was not prescribed by Dr Rosealee Phoenix  Also advised that the has refills however they are at [address]

## 2023-03-08 NOTE — TELEPHONE ENCOUNTER
----- Message from Paty Hendrix sent at 3/8/2023 12:53 PM EST -----  Regarding: Help In General  Contact: [phone]  Request for a refill of   Trelegy Ellipta 200-62 5-25 mcg/actuation Aepb inhaler which was prescribed by you last time around  It would be filled from the [facility] pharmacy on [facility] in [address] [place] [address]  [phone]  Thank you    Oz Guzman

## 2023-03-09 DIAGNOSIS — J45.20 MILD INTERMITTENT ASTHMA, UNSPECIFIED WHETHER COMPLICATED: ICD-10-CM

## 2023-03-09 RX ORDER — FLUTICASONE FUROATE, UMECLIDINIUM BROMIDE AND VILANTEROL TRIFENATATE 200; 62.5; 25 UG/1; UG/1; UG/1
1 POWDER RESPIRATORY (INHALATION) DAILY
Qty: 1 EACH | Refills: 5 | Status: SHIPPED | OUTPATIENT
Start: 2023-03-09

## 2023-03-24 DIAGNOSIS — R60.0 BILATERAL LEG EDEMA: ICD-10-CM

## 2023-03-24 RX ORDER — FUROSEMIDE 20 MG/1
20 TABLET ORAL
Qty: 10 TABLET | Refills: 0 | Status: SHIPPED | OUTPATIENT
Start: 2023-03-24 | End: 2023-03-31

## 2023-03-27 ENCOUNTER — OFFICE VISIT (OUTPATIENT)
Dept: BARIATRICS | Facility: CLINIC | Age: 73
End: 2023-03-27

## 2023-03-27 ENCOUNTER — RA CDI HCC (OUTPATIENT)
Dept: OTHER | Facility: HOSPITAL | Age: 73
End: 2023-03-27

## 2023-03-27 VITALS — BODY MASS INDEX: 36.93 KG/M2 | HEIGHT: 66 IN | WEIGHT: 229.8 LBS

## 2023-03-27 DIAGNOSIS — E66.9 OBESITY, CLASS II, BMI 35-39.9: Primary | ICD-10-CM

## 2023-03-27 NOTE — PROGRESS NOTES
"Bariatric Nutrition Assessment Note    Type of surgery    Preop (4 wt checks)--4 of 4 wt check completed  Today pre-op check in due to still working through workflow  Surgery Date: TBD--leaning toward RYGB   Surgeon: Dr Kerry Hendrix (consult on 11/18)    Nutrition Assessment   Hassel Prude  81 y o   male     Wt with BMI of 25: 152#  Pre-Op Excess Wt: 147#  Ht 5' 6\" (1 676 m)   Wt 104 kg (229 lb 12 8 oz)   BMI 37 09 kg/m²      Start weight on 11/18/22:  229 4#  Can go to BMI of 35:  212#  Net weight loss:  -3 7lbs     [place] Equation:    Estimated calories for weight loss 1915-5567 (1-2# per wk wt loss - sedentary )  Estimated protein needs 69-104gm (1 0-1 5 gms/kg IBW )   Estimated fluid needs 2072-2415ml (30-35 ml/kg IBW )      Weight History   Onset of Obesity: Adult, out of college was 150lbs and within the first 10 years maybe gained 10lbs to 160, then gradually increased to 170lb where stabilized for a while  Was 190# going into COVID in 2020 and since gained a lot of weight      Family history of obesity: Yes-sister  Wt Loss Attempts: Commercial Programs ([employer], Anna Marie Pollock, etc )  Exercise  High Protein/Low CHO diets (Atkins, Union, etc )  Self Created Diets (Portion Control, Healthy Food Choices, etc )  Keto    Patient has tried the above for 6 months or more with insufficient weight loss or weight regain, which is why patient has requested to be evaluated for weight loss surgery today  Maximum Wt Lost: 10-15lbs      Review of History and Medications   Past Medical History:   Diagnosis Date   • ARIANE (acute kidney injury) ([facility] [address] ) 01/13/2023   • Arthritis    • Asthma    • Colon polyp    • 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[address] )    • Hypertension 2012   • 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[address] )    • Manic depression ([facility] [address] )    • Right lower lobe pneumonia 01/12/2023   • Sleep apnea      Past Surgical History:   Procedure Laterality Date   • COLONOSCOPY  12/13/2022   • MN ARTHROPLASTY GLENOHUMERAL JOINT TOTAL SHOULDER " Right 1/10/2023    Procedure: ARTHROPLASTY SHOULDER REVERSE WITH BICEPS TENODESIS;  Surgeon: Edmund Dalal MD;  Location: [facility];  Service: Orthopedics   • UMBILICAL HERNIA REPAIR       Social History     Socioeconomic History   • Marital status: /Civil Union     Spouse name: Not on file   • Number of children: Not on file   • Years of education: Not on file   • Highest education level: Not on file   Occupational History   • Not on file   Tobacco Use   • Smoking status: Never   • Smokeless tobacco: Never   Vaping Use   • Vaping Use: Never used   Substance and Sexual Activity   • Alcohol use: Never   • Drug use: Never   • Sexual activity: Not Currently     Partners: Female     Birth control/protection: None   Other Topics Concern   • Not on file   Social History Narrative   • Not on file     Social Determinants of Health     Financial Resource Strain: Low Risk    • Difficulty of Paying Living Expenses: Not hard at all   Food Insecurity: No Food Insecurity   • Worried About Running Out of Food in the Last Year: Never true   • Ran Out of Food in the Last Year: Never true   Transportation Needs: No Transportation Needs   • Lack of Transportation (Medical): No   • Lack of Transportation (Non-Medical):  No   Physical Activity: Not on file   Stress: Not on file   Social Connections: Not on file   Intimate Partner Violence: Not on file   Housing Stability: Low Risk    • Unable to Pay for Housing in the Last Year: No   • Number of Places Lived in the Last Year: 1   • Unstable Housing in the Last Year: No       Current Outpatient Medications:   •  albuterol (PROVENTIL HFA,VENTOLIN HFA) 90 mcg/act inhaler, Inhale 2 puffs every 4 (four) hours as needed for wheezing, Disp: 6 7 g, Rfl: 2  •  Diclofenac Sodium (VOLTAREN) 1 %, APPLY 2 GRAMS TOPICALLY TO THE AFFECTED AREA FOUR TIMES DAILY, Disp: 150 g, Rfl: 0  •  enalapril (VASOTEC) 10 mg tablet, TAKE 1 TABLET(10 MG) BY MOUTH DAILY, Disp: 30 tablet, Rfl: 0  • fluticasone-umeclidinium-vilanterol (Trelegy Ellipta) 200-62 5-25 mcg/actuation AEPB inhaler, Inhale 1 puff daily, Disp: 1 each, Rfl: 5  •  furosemide (LASIX) 20 mg tablet, Take 1 tablet (20 mg total) by mouth every third day, Disp: 10 tablet, Rfl: 0  •  lamoTRIgine (LaMICtal) 200 MG tablet, Take 200 mg by mouth daily, Disp: , Rfl:   •  levothyroxine 100 mcg tablet, Take 1 tablet (100 mcg total) by mouth daily, Disp: 90 tablet, Rfl: 0  •  montelukast (SINGULAIR) 10 mg tablet, Take 10 mg by mouth in the morning, Disp: , Rfl:   •  pramipexole (MIRAPEX) 1 mg tablet, Take 2 mg by mouth daily at bedtime, Disp: , Rfl:   •  QUEtiapine (SEROquel) 25 mg tablet, Take 25 mg by mouth daily at bedtime, Disp: , Rfl:   •  rosuvastatin (CRESTOR) 5 mg tablet, Take 1 tablet (5 mg total) by mouth daily, Disp: 30 tablet, Rfl: 2  •  simvastatin (ZOCOR) 20 mg tablet, , Disp: , Rfl:   •  tamsulosin (FLOMAX) 0 4 mg, 0 4 mg in the morning, Disp: , Rfl:   •  traZODone (DESYREL) 50 mg tablet, , Disp: , Rfl:     Food Intake and Lifestyle Assessment   Food Intake Assessment completed via usual diet recall  Poor sleep quality due to severe DONALD and trouble using CPAP  Only gets 2-3 hrs per day  Does some 15 min cat naps through the day ie:  Has been up since 11pm last night  Taking Trazodone (100mg) at night but doesn't seem to be helping for sleep  Breakfast: no breakfast as of yet today  Yesterday Ensure Max protein for breakfast   Snack: -   Lunch: yesterday pre-package Caesar salad  Snack: may have an ensure max protein or graze on snacks  Dinner: last night was chicken with rice OR [employer]--pizza (1/2 pie) OR [place] [employer]--D[employer] 2x/week--pizza and ice cream  Snack: last light was some cheese doodles  Snacking due to being discouraged about his health      Beverage intake: sugar free beverages and diet soda  Protein supplement: Ensure Max protein  Estimated protein intake per day: 50-70gm  Estimated fluid intake per day: water/crystal light about 64-80oz per day  Meals eaten away from home: 2-3 days per week are door dash  Typical meal pattern: 1-2 meals per day and grazes on snacks with frustartion  Eating Behaviors: Consumption of high calorie/ high fat foods, Large portion sizes, Mindless eating and Poor meal schedule    Food allergies or intolerances: Allergies   Allergen Reactions   • Simvastatin Shortness Of Breath     Per Cardio, thinks this was switch to a generic vs brand name    • Spiriva Respimat [Tiotropium Bromide Monohydrate] Rash     Cultural or Anglican considerations: -    Physical Assessment  Physical Activity  Types of exercise: PT was discontinued due to having a hair line fracture in shoulder blade, was put back in a sling for a few weeks and has f/u with ortho on 3/30   Current physical limitations: some SOB and joint pain    Psychosocial Assessment   Support systems: spouse  Socioeconomic factors: Right now he lives in [place] ([address]) and his wife lives in [place] (5 years) due to her job  He will be moving to [place] with his wife fairly soon--likely June  Nutrition Diagnosis  Diagnosis: Overweight / Obesity (NC-3 3)  Related to: Physical inactivity and Excessive energy intake  As Evidenced by: BMI >25     Nutrition Prescription: Recommend the following diet  Regular    Interventions and Teaching   Discussed pre-op and post-op nutrition guidelines  Patient educated and handouts provided    Surgical changes to stomach / GI  Capacity of post-surgery stomach  Diet progression  Adequate hydration  Expected weight loss  Weight loss plateaus/ possibility of weight regain  Exercise  Suggestions for pre-op diet  Nutrition considerations after surgery  Protein supplements  Meal planning and preparation  Appropriate carbohydrate, protein, and fat intake, and food/fluid choices to maximize safe weight loss, nutrient intake, and tolerance   Dietary and lifestyle changes  Possible problems with poor eating habits  Intuitive eating  Techniques for self monitoring and keeping daily food journal  Vitamin / Mineral supplementation of Multivitamin with minerals and Vitamin D    Education provided to: patient    Barriers to learning: No barriers identified    Readiness to change:  preparation    Prior research on procedure: books, internet, discussed with provider and friends or family    Comprehension: verbalizes understanding     Expected Compliance: good    Recommendations  Pt is an appropriate candidate for surgery  Yes    Evaluation / Monitoring  Dietitian to Monitor: Eating pattern as discussed Tolerance of nutrition prescription Body weight Lab values Physical activity    Pre-op wt loss:  Do not gain  Lose ~5% by DOS:  -11lbs (218#)  Can not go below BMI 35:  212#    Workflow:  • Psych and/or D+A Clearance: n/a  • PCP Letter: completed  • Support Group: incomplete  • Surgeon Appt completed  • EGD completed 12/13/22  • Cardiac Risk Assessment:  Had consult on 1/23/23, has imagining scheduled for 4/10/23 (cardiologist wants his PT completed before the cardiac imagine)  • Sleep Studies: started cpap 2 months ago, but having difficulty--average only 1 hr per night-has appt to f/u with sleep on 3/28  • Blood work completed 12/20/22--acceptable  • Nicotine test na/  • Pre-Operative Program: 4 of 4 wt check completed    Pt presents today, overall, maintaining his weight this past month  He is feeling very discouraged with his health lately  He wants bariatric surgery, but he is struggling with using the CPAP and having more issues with his shoulder  He continues to have shoulder pain and has f/u with ortho on 3/30  He questions if he will need another surgery  Pt feels very discouraged that these issues could be holding up his bariatric surgery process  He is meeting with sleep med tomorrow to discuss other possible options  Asked pt what he is doing in his time at home since he isn't currently working   He seems to be making a lot of connections with old friends and helping others out, but questioned what he is doing to keep himself accountable since he does admit to mindlessly eating due to his frustration  Suggested to pt to focus on himself also  Talked about getting back to logging to get a better understanding of where he is getting his calories from and start to make adjustment  He talks about the excess weight making it difficult for him to get some physical activity, therefore, again stressed the benefit of logging to assist with weight loss to help work on a better quality of life         Goals  · Food journal via [employer]ippi  · Est needs:  1100-1600cals  · Complete lesson plans 1-6  · Email RD in 2 weeks with logging/diet progress  · Pt to f/u with sleep and ortho  · F/U with SW in 1 month    Time Spent:   30 mins

## 2023-03-27 NOTE — PROGRESS NOTES
[facility]  coding opportunities       Chart reviewed, no opportunity found: CHART REVIEWED, NO OPPORTUNITY FOUND        Patients Insurance        Commercial Insurance: [address] [place] [address]

## 2023-03-28 ENCOUNTER — APPOINTMENT (OUTPATIENT)
Dept: LAB | Facility: CLINIC | Age: 73
End: 2023-03-28

## 2023-03-28 ENCOUNTER — OFFICE VISIT (OUTPATIENT)
Dept: SLEEP CENTER | Facility: CLINIC | Age: 73
End: 2023-03-28

## 2023-03-28 VITALS
WEIGHT: 233 LBS | DIASTOLIC BLOOD PRESSURE: 56 MMHG | BODY MASS INDEX: 37.45 KG/M2 | SYSTOLIC BLOOD PRESSURE: 98 MMHG | HEIGHT: 66 IN | HEART RATE: 84 BPM

## 2023-03-28 DIAGNOSIS — R06.02 SOB (SHORTNESS OF BREATH): ICD-10-CM

## 2023-03-28 DIAGNOSIS — G47.61 PLMD (PERIODIC LIMB MOVEMENT DISORDER): ICD-10-CM

## 2023-03-28 DIAGNOSIS — G47.33 OSA (OBSTRUCTIVE SLEEP APNEA): Primary | ICD-10-CM

## 2023-03-28 DIAGNOSIS — R73.03 PREDIABETES: ICD-10-CM

## 2023-03-28 DIAGNOSIS — G47.09 OTHER INSOMNIA: ICD-10-CM

## 2023-03-28 DIAGNOSIS — I10 ESSENTIAL HYPERTENSION: ICD-10-CM

## 2023-03-28 DIAGNOSIS — J45.40 MODERATE PERSISTENT ASTHMA, UNSPECIFIED WHETHER COMPLICATED: ICD-10-CM

## 2023-03-28 DIAGNOSIS — G47.34 SLEEP RELATED HYPOXIA: ICD-10-CM

## 2023-03-28 DIAGNOSIS — F31.0 BIPOLAR AFFECTIVE DISORDER, CURRENT EPISODE HYPOMANIC (HCC): ICD-10-CM

## 2023-03-28 DIAGNOSIS — G89.4 CHRONIC PAIN DISORDER: ICD-10-CM

## 2023-03-28 DIAGNOSIS — R40.0 DAYTIME SLEEPINESS: ICD-10-CM

## 2023-03-28 LAB
ALBUMIN SERPL BCP-MCNC: 4.3 G/DL (ref 3.5–5)
ALP SERPL-CCNC: 126 U/L (ref 46–116)
ALT SERPL W P-5'-P-CCNC: 51 U/L (ref 12–78)
ANION GAP SERPL CALCULATED.3IONS-SCNC: 4 MMOL/L (ref 4–13)
AST SERPL W P-5'-P-CCNC: 41 U/L (ref 5–45)
BASOPHILS # BLD AUTO: 0.05 THOUSANDS/ÂΜL (ref 0–0.1)
BASOPHILS NFR BLD AUTO: 1 % (ref 0–1)
BILIRUB SERPL-MCNC: 0.36 MG/DL (ref 0.2–1)
BUN SERPL-MCNC: 25 MG/DL (ref 5–25)
CALCIUM SERPL-MCNC: 9.7 MG/DL (ref 8.3–10.1)
CHLORIDE SERPL-SCNC: 101 MMOL/L (ref 96–108)
CHOLEST SERPL-MCNC: 161 MG/DL
CO2 SERPL-SCNC: 28 MMOL/L (ref 21–32)
CREAT SERPL-MCNC: 1.2 MG/DL (ref 0.6–1.3)
CREAT UR-MCNC: 115 MG/DL
EOSINOPHIL # BLD AUTO: 0.1 THOUSAND/ÂΜL (ref 0–0.61)
EOSINOPHIL NFR BLD AUTO: 1 % (ref 0–6)
ERYTHROCYTE [DISTWIDTH] IN BLOOD BY AUTOMATED COUNT: 14 % (ref 11.6–15.1)
GFR SERPL CREATININE-BSD FRML MDRD: 60 ML/MIN/1.73SQ M
GLUCOSE P FAST SERPL-MCNC: 129 MG/DL (ref 65–99)
HCT VFR BLD AUTO: 40.9 % (ref 36.5–49.3)
HDLC SERPL-MCNC: 38 MG/DL
HGB BLD-MCNC: 13.3 G/DL (ref 12–17)
IMM GRANULOCYTES # BLD AUTO: 0.06 THOUSAND/UL (ref 0–0.2)
IMM GRANULOCYTES NFR BLD AUTO: 1 % (ref 0–2)
LDLC SERPL CALC-MCNC: 103 MG/DL (ref 0–100)
LYMPHOCYTES # BLD AUTO: 1.24 THOUSANDS/ÂΜL (ref 0.6–4.47)
LYMPHOCYTES NFR BLD AUTO: 17 % (ref 14–44)
MCH RBC QN AUTO: 29.4 PG (ref 26.8–34.3)
MCHC RBC AUTO-ENTMCNC: 32.5 G/DL (ref 31.4–37.4)
MCV RBC AUTO: 91 FL (ref 82–98)
MICROALBUMIN UR-MCNC: 14.3 MG/L (ref 0–20)
MICROALBUMIN/CREAT 24H UR: 12 MG/G CREATININE (ref 0–30)
MONOCYTES # BLD AUTO: 0.6 THOUSAND/ÂΜL (ref 0.17–1.22)
MONOCYTES NFR BLD AUTO: 8 % (ref 4–12)
NEUTROPHILS # BLD AUTO: 5.47 THOUSANDS/ÂΜL (ref 1.85–7.62)
NEUTS SEG NFR BLD AUTO: 72 % (ref 43–75)
NONHDLC SERPL-MCNC: 123 MG/DL
NRBC BLD AUTO-RTO: 0 /100 WBCS
PLATELET # BLD AUTO: 263 THOUSANDS/UL (ref 149–390)
PMV BLD AUTO: 9.8 FL (ref 8.9–12.7)
POTASSIUM SERPL-SCNC: 4.1 MMOL/L (ref 3.5–5.3)
PROT SERPL-MCNC: 7.3 G/DL (ref 6.4–8.4)
RBC # BLD AUTO: 4.52 MILLION/UL (ref 3.88–5.62)
SODIUM SERPL-SCNC: 133 MMOL/L (ref 135–147)
TRIGL SERPL-MCNC: 102 MG/DL
TSH SERPL DL<=0.05 MIU/L-ACNC: 1.62 UIU/ML (ref 0.45–4.5)
WBC # BLD AUTO: 7.52 THOUSAND/UL (ref 4.31–10.16)

## 2023-03-28 RX ORDER — ENALAPRIL MALEATE 5 MG/1
TABLET ORAL
COMMUNITY
Start: 2023-03-07 | End: 2023-03-30

## 2023-03-28 NOTE — PROGRESS NOTES
Follow-Up Note - Sleep Center   Meryl Gillespie  81 y o  male  :1950  CZJ:[id number]  DOS:3/28/2023    CC: I saw this patient for follow-up in clinic today for Sleep disordered breathing, Coexisting Sleep and Medical Problems  The patient had both diagnostic and therapeutic sleep studies and is here to review results and to adjust therapy  The diagnostic study confirmed obstructive sleep apnea:  (AHI) of 45 5 events per hour of sleep  The AHI in the supine position was 90 9  The AHI during REM sleep was 76 4  Loud intensity snoring was noted  The lowest oxygen saturation was 82 0%  The amount of sleep time less than or equal to 89% was 17 6 minutes  During the subsequent therapeutic study, sleep disordered breathing wassuccessfully treated with positive airway pressure at an optimal setting of 14 cm H2O  There were mild periodic limb movements of sleep and he had Sleep maintenance insomnia     PFSH, Problem List, Medications & Allergies were reviewed in EMR  Interval changes: Recent shoulder surgery and subsequently developed a hairline fracture of the scapula  He  has a past medical history of ARIANE (acute kidney injury) ([facility] [address] ) (2023), Arthritis, Asthma, Colon polyp, 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[facility] [address] ), Hypertension (), 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[facility] [address] ), Manic depression ([facility] [address] ), Right lower lobe pneumonia (2023), and Sleep apnea  He has a current medication list which includes the following prescription(s): albuterol, diclofenac sodium, enalapril, trelegy ellipta, furosemide, lamotrigine, levothyroxine, montelukast, pramipexole, quetiapine, tamsulosin, trazodone, enalapril, rosuvastatin, and simvastatin  PHYSIOLOGICAL DATA REVIEW AND INTERPRETATION: Using PAP > 4 hours/night 37%  Device has been used 27 out of past 30 days and average on days used is 3 hours and 22 minutes  He has shoulder pain that is disrupting sleep and medications have not been effective for the pain    Estimated "WILLIAM 5 5/hour with pressure of 14cm H2O ; compliance is fair; Pressure setting is:in acceptable range; He is experiencing no adverse effects:; Patient has not been using non FDA approved devices to sanitize the machine  SUBJECTIVE: With respect to use of PAP, Ana Marie reports is benefiting from use and is satisfied    Other issues: Chronic pain and depression  He feels the latter is controlled on current medication ;  He is not aware of jerking limb movements during sleep  Sleep Routine: Ana Marie reports getting 2 hrs sleep out of approximately 4 hours in bed; he has difficulty initiating and maintaining sleep  In spite of trazodone as a sleep aid  Wendsonia Greenw He arises spontaneously and never feels rested  Ana Marie reports excessive daytime sleepiness, [feels like napping & is dozing off unintentionally ] He rated [himself] at Total score: 7 /24 on the [place] Sleepiness Scale  Habits:[ reports that he has never smoked  He has never used smokeless tobacco ], [ reports no history of alcohol use ], [ reports no history of drug use ], Caffeine use:very little; Exercise routine: none  ROS: Significant for weight has been fluctuating in the range of a few pounds  Allergies are controlled  Since he had a regional block he is experiencing frequently breathing (lung volumes were affected for which he needed hospitalization)  He has his prescription for albuterol that does not help  In addition to shoulder pain from his surgery, he also has contralateral shoulder pain, back and hip pain due to osteoarthritis that are disturbing sleep    EXAM: BP 98/56 (BP Location: Left arm, Patient Position: Sitting, Cuff Size: Large)   Pulse 84   Ht 5' 6\" (1 676 m)   Wt 106 kg (233 lb)   BMI 37 61 kg/m²     Wt Readings from Last 3 Encounters:   03/28/23 106 kg (233 lb)   03/27/23 104 kg (229 lb 12 8 oz)   03/02/23 106 kg (234 lb 7 5 oz)      Patient is well groomed; well appearing     CNS: Alert, orientated, clear & coherent " speech  Psych: cooperative and in no distress  Mental state: Appears normal   H&N: EOMI; NC/AT: No facial pressure marks, no rashes  Skin/Extrem: col & hydration normal; no edema  Resp: Respiratory effort is normal  Physical findings otherwise essentially unchanged from previous  IMPRESSION: Problem List Items & Comorbidities Addressed this Visit    1  DONALD (obstructive sleep apnea)  PAP DME Resupply/Reorder    PAP DME Pressure Change      2  Sleep related hypoxia        3  Other insomnia        4  Chronic pain disorder        5  Daytime sleepiness        6  PLMD (periodic limb movement disorder)        7  Bipolar affective disorder, current episode hypomanic ([facility] [address] )        8  Moderate persistent asthma, unspecified whether complicated        9  Essential hypertension        10  BMI 35 0-35 9,adult            PLAN:  1  I reviewed results of prior studies and physiologic data with the patient  2  I discussed treatment options with risks and benefits  3  Treatment with  PAP is medically necessary and Sherri Robins is agreable to continue use  4  Care of equipment, methods to improve comfort using PAP and importance of compliance with therapy were discussed  5  Pressure setting: change 15 cmH2O     6  Rx provided to replace supplies and Care coordinated with DME provider  7  Multi component Cognitive behavioral therapy for Insomnia undertaken - Sleep Restriction, Stimulus control, Relaxation techniques and Sleep hygiene were discussed  8  Consideration to be given to duloxetine to address his chronic pain  9  Since trazodone has not been effective, he has prescription for Seroquel that can be helpful for sleep  10  If periodic limb movements of sleep are disturbing he may benefit from medication such as gabapentin  11  He is due to follow-up with orthopedics with regard to his pain  12  He may need reviewing of his pulmonary medications  13  Discussed strategies for weight reduction      14  Follow-up is "advised in 1 year or sooner if needed to monitor progress, compliance and to adjust therapy  Thank you for allowing me to participate in the care of this patient  Sincerely,     Authenticated electronically on 17/50/41   Board Certified Specialist     Portions of the record may have been created with voice recognition software  Occasional wrong word or \"sound a like\" substitutions may have occurred due to the inherent limitations of voice recognition software  There may also be notations and random deletions of words or characters from malfunctioning software  Read the chart carefully and recognize, using context, where substitutions/deletions have occurred      "

## 2023-03-28 NOTE — PATIENT INSTRUCTIONS

## 2023-03-29 ENCOUNTER — TELEPHONE (OUTPATIENT)
Dept: SLEEP CENTER | Facility: CLINIC | Age: 73
End: 2023-03-29

## 2023-03-29 DIAGNOSIS — E78.2 MIXED HYPERLIPIDEMIA: ICD-10-CM

## 2023-03-29 DIAGNOSIS — I25.10 CAD IN NATIVE ARTERY: Primary | ICD-10-CM

## 2023-03-29 RX ORDER — ATORVASTATIN CALCIUM 40 MG/1
40 TABLET, FILM COATED ORAL DAILY
Qty: 30 TABLET | Refills: 5 | Status: SHIPPED | OUTPATIENT
Start: 2023-03-29

## 2023-03-30 ENCOUNTER — APPOINTMENT (OUTPATIENT)
Dept: RADIOLOGY | Facility: OTHER | Age: 73
End: 2023-03-30

## 2023-03-30 ENCOUNTER — OFFICE VISIT (OUTPATIENT)
Dept: OBGYN CLINIC | Facility: OTHER | Age: 73
End: 2023-03-30

## 2023-03-30 VITALS
DIASTOLIC BLOOD PRESSURE: 72 MMHG | HEIGHT: 66 IN | HEART RATE: 81 BPM | SYSTOLIC BLOOD PRESSURE: 124 MMHG | BODY MASS INDEX: 37.35 KG/M2 | WEIGHT: 232.4 LBS

## 2023-03-30 DIAGNOSIS — S42.114A CLOSED NONDISPLACED FRACTURE OF BODY OF RIGHT SCAPULA, INITIAL ENCOUNTER: Primary | ICD-10-CM

## 2023-03-30 DIAGNOSIS — S42.114A CLOSED NONDISPLACED FRACTURE OF BODY OF RIGHT SCAPULA, INITIAL ENCOUNTER: ICD-10-CM

## 2023-03-30 DIAGNOSIS — M19.012 PRIMARY OSTEOARTHRITIS, LEFT SHOULDER: ICD-10-CM

## 2023-03-30 DIAGNOSIS — Z96.611 S/P REVERSE TOTAL SHOULDER ARTHROPLASTY, RIGHT: ICD-10-CM

## 2023-03-30 LAB
DME PARACHUTE DELIVERY DATE ACTUAL: NORMAL
DME PARACHUTE DELIVERY DATE REQUESTED: NORMAL
DME PARACHUTE ITEM DESCRIPTION: NORMAL
DME PARACHUTE ORDER STATUS: NORMAL
DME PARACHUTE SUPPLIER NAME: NORMAL
DME PARACHUTE SUPPLIER PHONE: NORMAL
EST. AVERAGE GLUCOSE BLD GHB EST-MCNC: 126 MG/DL
HBA1C MFR BLD: 6 %

## 2023-03-30 RX ORDER — BETAMETHASONE SODIUM PHOSPHATE AND BETAMETHASONE ACETATE 3; 3 MG/ML; MG/ML
6 INJECTION, SUSPENSION INTRA-ARTICULAR; INTRALESIONAL; INTRAMUSCULAR; SOFT TISSUE
Status: COMPLETED | OUTPATIENT
Start: 2023-03-30 | End: 2023-03-30

## 2023-03-30 RX ORDER — BUPIVACAINE HYDROCHLORIDE 2.5 MG/ML
2 INJECTION, SOLUTION INFILTRATION; PERINEURAL
Status: COMPLETED | OUTPATIENT
Start: 2023-03-30 | End: 2023-03-30

## 2023-03-30 RX ADMIN — BETAMETHASONE SODIUM PHOSPHATE AND BETAMETHASONE ACETATE 6 MG: 3; 3 INJECTION, SUSPENSION INTRA-ARTICULAR; INTRALESIONAL; INTRAMUSCULAR; SOFT TISSUE at 08:41

## 2023-03-30 RX ADMIN — BUPIVACAINE HYDROCHLORIDE 2 ML: 2.5 INJECTION, SOLUTION INFILTRATION; PERINEURAL at 08:41

## 2023-03-30 NOTE — PATIENT INSTRUCTIONS

## 2023-03-30 NOTE — PROGRESS NOTES
Assessment  Diagnoses and all orders for this visit:    Closed nondisplaced fracture of body of right scapula, subsequent encounter    S/P reverse total shoulder arthroplasty, right    Left Glenohumeral Osteoarthritis    Discussion and Plan:    · Explained to the patient that on his x rays today we can see healing of his non displaced scapular fracture with stable alignment and position of his reverse total shoulder prosthesis  As we had discussed preoperatively and postoperatively his glenoid deformity required me to place the implant in the position he currently is which is not the most ideal location on x-ray but is functioning well and is stable  There is no other location for which to implant this prosthesis and I am very happy that it is stable and the fracture is healing  He was still instructed to not overuse his right shoulder to allow the fracture to heal  If he experiences an increase in his pain to go back in the sling for a few days  He understood and all questions were answered  · He also mentioned increasing left shoulder pain due to moderate glenohumeral joint osteoarthritis  He was provided with a cortisone injection into his left glenohumeral joint today in the office  This is documented appropriately below  · He will follow up in about 2 months with repeat x rays of the right shoulder at this time  Subjective:   Patient ID: Mary Jones is a 81 y o  male    80 y/o male who presents today for a follow up visit for his right shoulder  He is 11 weeks s/p right reverse total shoulder arthroplasty 1/10/23  Patient reports some improvement compared to the last visit  Katerina Zack reports sudden sharp pain in shoulder earlier February 2023  [facility] was going up the stairs, holding onto banister with left hand when he had sharp pain over incision on the right   Pain is localized to lateral aspect of right shoulder  He also mentions pain about his left shoulder   He states that this pain is worse than "the pain in his right shoulder at the moment  No numbness or tingling  No fevers or chills  The following portions of the patient's history were reviewed and updated as appropriate: allergies, current medications, past family history, past medical history, past social history, past surgical history and problem list     Objective:  /72 (BP Location: Left arm, Patient Position: Sitting, Cuff Size: Standard)   Pulse 81   Ht 5' 6\" (1 676 m)   Wt 105 kg (232 lb 6 4 oz)   BMI 37 51 kg/m²       Right Shoulder Exam     Range of Motion   Forward flexion: 90     Other   Erythema: absent  Sensation: normal  Pulse: present    Comments: Well healed incision      Left Shoulder Exam     Range of Motion   Forward flexion: 100     Muscle Strength   Abduction: 4/5   External rotation: 4/5     Other   Erythema: absent  Sensation: normal  Pulse: present             Physical Exam  Constitutional:       General: He is not in acute distress  Appearance: He is well-developed  Eyes:      Conjunctiva/sclera: Conjunctivae normal       Pupils: Pupils are equal, round, and reactive to light  Cardiovascular:      Rate and Rhythm: Normal rate and regular rhythm  Pulmonary:      Effort: Pulmonary effort is normal       Breath sounds: Normal breath sounds  Abdominal:      General: Bowel sounds are normal       Palpations: Abdomen is soft  Musculoskeletal:      Cervical back: Normal range of motion and neck supple  Skin:     General: Skin is warm and dry  Findings: No erythema or rash  Neurological:      Mental Status: He is alert and oriented to person, place, and time  Deep Tendon Reflexes: Reflexes are normal and symmetric  Psychiatric:         Behavior: Behavior normal        Large joint arthrocentesis: L glenohumeral  Universal Protocol:  Consent: Verbal consent obtained    Risks and benefits: risks, benefits and alternatives were discussed  Consent given by: patient  Time out: Immediately prior " "to procedure a \"time out\" was called to verify the correct patient, procedure, equipment, support staff and site/side marked as required  Site marked: the operative site was marked  Radiology Images displayed and confirmed  If images not available, report reviewed: imaging studies available  Patient identity confirmed: verbally with patient    Supporting Documentation  Indications: pain and diagnostic evaluation   Procedure Details  Location: shoulder - L glenohumeral  Preparation: Patient was prepped and draped in the usual sterile fashion  Needle size: 22 G  Ultrasound guidance: no  Approach: posterior  Medications administered: 2 mL bupivacaine 0 25 %; 6 mg betamethasone acetate-betamethasone sodium phosphate 6 (3-3) mg/mL    Patient tolerance: patient tolerated the procedure well with no immediate complications  Dressing:  Sterile dressing applied            I have personally reviewed pertinent films in PACS and my interpretation is as follows  X Ray Right Shoulder 3/30/23: Evidence of healing non displaced scapular body fracture  Reverse total shoulder prosthesis remain in stable alignment and position without signs of loosening or failure  X Ray Left Shoulder 9/26/22: Moderate glenohumeral joint osteoarthritis  No other acute osseous abnormalities    Scribe Attestation    I,:  Elyce Riedel am acting as a scribe while in the presence of the attending physician :       I,:  Ardiana Cabello MD personally performed the services described in this documentation    as scribed in my presence  :           "

## 2023-03-31 ENCOUNTER — OFFICE VISIT (OUTPATIENT)
Dept: FAMILY MEDICINE CLINIC | Facility: CLINIC | Age: 73
End: 2023-03-31

## 2023-03-31 VITALS
SYSTOLIC BLOOD PRESSURE: 110 MMHG | DIASTOLIC BLOOD PRESSURE: 60 MMHG | HEIGHT: 66 IN | HEART RATE: 91 BPM | OXYGEN SATURATION: 95 % | RESPIRATION RATE: 16 BRPM | WEIGHT: 233 LBS | BODY MASS INDEX: 37.45 KG/M2

## 2023-03-31 DIAGNOSIS — R60.0 BILATERAL LEG EDEMA: ICD-10-CM

## 2023-03-31 DIAGNOSIS — E78.6 LOW HDL (UNDER 40): ICD-10-CM

## 2023-03-31 DIAGNOSIS — G47.9 SLEEP DISTURBANCE: ICD-10-CM

## 2023-03-31 DIAGNOSIS — E66.01 OBESITY, MORBID (HCC): ICD-10-CM

## 2023-03-31 DIAGNOSIS — R73.03 PREDIABETES: ICD-10-CM

## 2023-03-31 DIAGNOSIS — I10 ESSENTIAL HYPERTENSION: Primary | ICD-10-CM

## 2023-03-31 DIAGNOSIS — E03.9 HYPOTHYROIDISM, UNSPECIFIED TYPE: ICD-10-CM

## 2023-03-31 DIAGNOSIS — E78.1 HYPERTRIGLYCERIDEMIA: ICD-10-CM

## 2023-03-31 RX ORDER — FUROSEMIDE 20 MG/1
20 TABLET ORAL
Qty: 30 TABLET | Refills: 0 | Status: SHIPPED | OUTPATIENT
Start: 2023-03-31

## 2023-03-31 RX ORDER — ENALAPRIL MALEATE 5 MG/1
10 TABLET ORAL DAILY
Qty: 30 TABLET | Refills: 0
Start: 2023-03-31

## 2023-03-31 NOTE — PROGRESS NOTES
Assessment/Plan:   Diagnoses and all orders for this visit:    Essential hypertension  -     enalapril (VASOTEC) 5 mg tablet; Take 2 tablets (10 mg total) by mouth daily  -     Basic metabolic panel; Future  - was seen in the office on 3/1/2023 and Lasix 20mg Q72hr was added for b/l edema  - of note, also on ACEI 10mg QD  - BP in the office 110/60  - Na = 133  - nml renal function and urine microalbumin   - for now, advised to cut ACEI in 1/2 (take 5mg QD) and to increase Lasix to 20mg Q48hrs  - RTO in 2-3wks with repeat BMP for f/u - pt and wife aware and agreeable   Bilateral leg edema  -     furosemide (LASIX) 20 mg tablet; Take 1 tablet (20 mg total) by mouth every other day  -     Basic metabolic panel; Future    Hypothyroidism, unspecified type  - nml TSH   - cont Levothyroxine 100mcg QAM     Prediabetes  - A1c (3/28/2023): 6 0   - diet/exercise  - repeat in 6months   Obesity, morbid (HCC)  - BMI 37 6  - follows with Bariatrics as contemplating weight loss surgery     Hypertriglyceridemia  - Lipids (3/28/2023): ,  (<-- 225), HDL 38,   - has been taking Crestor 5mg Q48 (had an issue with body aches and tremors when takes it QHS)   - also as per Cardio on Lipitor 40mg QHS (but has not been taking as on Crestor)  - The 10-year ASCVD risk score (Constantino RAMOS, et al , 2019) is: 16 5%    Values used to calculate the score:      Age: 81 years      Sex: Male      Is Non- : No      Diabetic: No      Tobacco smoker: No      Systolic Blood Pressure: 904 mmHg      Is BP treated: Yes      HDL Cholesterol: 38 mg/dL      Total Cholesterol: 161 mg/dL   Low HDL (under 40)    Sleep disturbance  - on Trazodone 50mg   - has an appt with Psych at 1230pm today           Subjective:    Patient ID: Renea Green is a 81 y o  male    HPI   69yo M presents to the office with his wife for f/u   - was seen in the office on 3/1/2023 and Lasix 20mg Q72hr was added for b/l edema  - of note, also on "ACEI 10mg QD  - BP in the office 110/60  - Na = 133  - saw Ortho 3/30/2023 - (+) \"hairline fracture of R-shoulder\" - f/u in 2months with repeat Xray, of note was given a CSI in L-shoulder  - (+) sleep disturbance - has an appt with Psych at 1230pm today   - of note, h/o DONALD and on CPAP  - (+) OA of b/l hips - last CSI (1st one) was ~6months ago (Dr Rufino Butler)   - has an appt with Pulm on 4/12/2023   - has been taking Crestor 5mg Q48 (had an issue with body aches and tremors when takes it QHS)   - also as per Cardio on Lipitor 40mg QHS (but has not been taking as on Crestor)         The following portions of the patient's history were reviewed and updated as appropriate: allergies, current medications, past family history, past medical history, past social history, past surgical history and problem list     Review of Systems  as per HPI    Objective:  /60   Pulse 91   Resp 16   Ht 5' 6\" (1 676 m)   Wt 106 kg (233 lb)   SpO2 95%   BMI 37 61 kg/m²    Physical Exam  Vitals reviewed  Constitutional:       General: He is not in acute distress  Appearance: Normal appearance  He is not ill-appearing, toxic-appearing or diaphoretic  HENT:      Head: Normocephalic and atraumatic  Right Ear: External ear normal       Left Ear: External ear normal       Nose: Nose normal    Eyes:      General: No scleral icterus  Right eye: No discharge  Left eye: No discharge  Extraocular Movements: Extraocular movements intact  Conjunctiva/sclera: Conjunctivae normal    Pulmonary:      Effort: Pulmonary effort is normal    Musculoskeletal:      Right lower leg: No edema  Left lower leg: No edema  Skin:     General: Skin is warm  Neurological:      General: No focal deficit present  Mental Status: He is alert and oriented to person, place, and time  Psychiatric:         Mood and Affect: Mood normal          Behavior: Behavior normal          BMI Counseling:  Body mass index is " 37 61 kg/m²  The BMI is above normal  Nutrition recommendations include 3-5 servings of fruits/vegetables daily  Exercise recommendations include exercising 3-5 times per week

## 2023-04-11 PROBLEM — R74.01 TRANSAMINITIS: Status: ACTIVE | Noted: 2023-04-11

## 2023-04-11 PROBLEM — R06.01 ORTHOPNEA: Status: ACTIVE | Noted: 2022-10-12

## 2023-04-11 PROBLEM — R26.89 BALANCE PROBLEM: Status: ACTIVE | Noted: 2023-04-11

## 2023-04-11 PROBLEM — R93.89 ABNORMAL CT SCAN: Status: ACTIVE | Noted: 2023-04-11

## 2023-04-11 PROBLEM — M79.622 PAIN IN LEFT AXILLA: Status: ACTIVE | Noted: 2023-04-11

## 2023-04-18 DIAGNOSIS — I10 ESSENTIAL HYPERTENSION: ICD-10-CM

## 2023-04-18 NOTE — TELEPHONE ENCOUNTER
Pt's wife called and stated that her  accidentally threw away hid meds  He called the insurance company and they said it can be sent to his local and they will push it through to be filled   [facility]

## 2023-04-19 RX ORDER — CLONAZEPAM 1 MG/1
1 TABLET ORAL
OUTPATIENT
Start: 2023-04-19

## 2023-04-19 RX ORDER — ENALAPRIL MALEATE 5 MG/1
10 TABLET ORAL DAILY
Qty: 30 TABLET | Refills: 0 | Status: SHIPPED | OUTPATIENT
Start: 2023-04-19 | End: 2023-04-21 | Stop reason: SDUPTHER

## 2023-04-19 RX ORDER — PRAMIPEXOLE DIHYDROCHLORIDE 1 MG/1
2 TABLET ORAL
OUTPATIENT
Start: 2023-04-19

## 2023-04-19 RX ORDER — MONTELUKAST SODIUM 10 MG/1
10 TABLET ORAL DAILY
OUTPATIENT
Start: 2023-04-19

## 2023-04-25 ENCOUNTER — TELEPHONE (OUTPATIENT)
Dept: NEUROLOGY | Facility: CLINIC | Age: 73
End: 2023-04-25

## 2023-04-25 DIAGNOSIS — K82.9 GALL BLADDER DISEASE: Primary | ICD-10-CM

## 2023-04-25 NOTE — PROGRESS NOTES
"Pulmonary Follow Up Note   Tammy Wang 81 y o  male MRN: [id number]  4/26/2023      Assessment/Plan:    1  Moderate persistent asthma  · Well controlled on Trelegy Ellipta  2  History of eosinophilic pneumonia  · Diagnosed 12 years ago, treated with prednisone  · No recurrence for the past 5 years  3  Severe DONALD  · Sleep study from 12/02/22 with AHI of 45 consistent with severe DONALD  · Also evidence of oxygen desaturation to 82% during sleep study and PLMs  · Patient has not been using the CPAP for the past 3 weeks and when he did used it it was only for 1-2 hours  · Compliance report from 01/17-04/16 with average usage of 50/90 days for 1 hour and 30 minutes in average  AHI 5 8  CPAP pressure set at 14 cmH2O  · Plan:  · Patient complaining of mask and pressures  · Will order autoCPAP with pressure set at 12-20 cmH2O and follow up in 6-8 weeks  If this does not work, will transition to BiPAP  · Mask fitting ordered  4  Chronic hypercapnia  · Evidence on CBC  · Likely secondary to CPAP noncompliance  5  Obesity  · Patient following with weight management  Return in about 8 weeks (around 6/21/2023)  History of Present Illness   HPI:  Tammy Wang is a 81 y o  male who comes to the office for a follow up visit after recent [facility] from 04/11 to 04/12 due to orthopnea and difficulty ambulating  Patient reports he was restarted on statin therapy prior to his symptoms worsening  This was since held  He also reports his breathing is okay  He continues to use Trelegy and he feels his asthma is overall well controlled on this medication  Patient reports a feeling of dyspnea on exertion and a \"bubble under my left rib cage\"  Regarding his DONALD, he has been noncompliant to CPAP for at least the last 3 weeks due to mask and pressure discomfort  He has been trying to lose weight and will be seeing   [facility] weight management to help with this journey         Review of " Systems   Constitutional: Negative for appetite change and fever  HENT: Negative for ear pain, postnasal drip, rhinorrhea, sneezing, sore throat and trouble swallowing  Respiratory: Positive for shortness of breath  Cardiovascular: Negative for chest pain  Musculoskeletal: Positive for myalgias  Neurological: Negative for headaches         Historical Information   Past Medical History:   Diagnosis Date   • ARIANE (acute kidney injury) ([facility] [address] ) 01/13/2023   • Arthritis    • Asthma    • Colon polyp    • 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[facility] )    • Hypertension 2012   • 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[address] )    • Manic depression ([facility] )    • Right lower lobe pneumonia 01/12/2023   • Sleep apnea      Past Surgical History:   Procedure Laterality Date   • COLONOSCOPY  12/13/2022   • [place] ARTHROPLASTY GLENOHUMERAL JOINT TOTAL SHOULDER Right 1/10/2023    Procedure: ARTHROPLASTY SHOULDER REVERSE WITH BICEPS TENODESIS;  Surgeon: Melissa Asher MD;  [facility];  Service: Orthopedics   • UMBILICAL HERNIA REPAIR       Family History   Problem Relation Age of Onset   • Hypertension Mother    • Depression Mother    • Depression Father    • Arthritis Father    • Heart attack Paternal Uncle 32   • Colon cancer Neg Hx    • Prostate cancer Neg Hx          Meds/Allergies     Current Outpatient Medications:   •  albuterol (PROVENTIL HFA,VENTOLIN HFA) 90 mcg/act inhaler, Inhale 2 puffs every 4 (four) hours as needed for wheezing, Disp: 6 7 g, Rfl: 2  •  clonazePAM (KlonoPIN) 1 mg tablet, Take 1 mg by mouth daily at bedtime, Disp: , Rfl:   •  Diclofenac Sodium (VOLTAREN) 1 %, APPLY 2 GRAMS TOPICALLY TO THE AFFECTED AREA FOUR TIMES DAILY, Disp: 150 g, Rfl: 0  •  enalapril (VASOTEC) 5 mg tablet, Take 1 tablet (5 mg total) by mouth daily, Disp: 30 tablet, Rfl: 2  •  fluticasone-umeclidinium-vilanterol (Trelegy Ellipta) 200-62 5-25 mcg/actuation AEPB inhaler, Inhale 1 puff daily, Disp: 1 each, Rfl: 5  •  furosemide (LASIX) 20 mg tablet, Take 1 tablet (20 mg total) by mouth every other day, Disp: 30 tablet, Rfl: 0  •  lamoTRIgine (LaMICtal) 200 MG tablet, Take 200 mg by mouth daily, Disp: , Rfl:   •  levothyroxine 100 mcg tablet, Take 1 tablet (100 mcg total) by mouth daily, Disp: 90 tablet, Rfl: 0  •  montelukast (SINGULAIR) 10 mg tablet, Take 10 mg by mouth in the morning, Disp: , Rfl:   •  pramipexole (MIRAPEX) 1 mg tablet, Take 2 mg by mouth daily at bedtime, Disp: , Rfl:   •  tamsulosin (FLOMAX) 0 4 mg, 0 4 mg in the morning, Disp: , Rfl:   •  traZODone (DESYREL) 50 mg tablet, , Disp: , Rfl:   Allergies   Allergen Reactions   • Crestor [Rosuvastatin] Other (See Comments)     Lost ability to walk and balance   • Simvastatin Shortness Of Breath     Per Cardio, thinks this was switch to a generic vs brand name    • Spiriva Respimat [Tiotropium Bromide Monohydrate] Rash       Vitals: Blood pressure 132/70, pulse 85, temperature 98 3 °F (36 8 °C), temperature source Tympanic, weight 112 kg (246 lb 3 2 oz), SpO2 97 %  Body mass index is 39 74 kg/m²  Oxygen Therapy  SpO2: 97 %  Oxygen Therapy: None (Room air)      Physical Exam  Physical Exam  Vitals reviewed  Constitutional:       General: He is not in acute distress  Appearance: He is not ill-appearing or toxic-appearing  HENT:      Head: Normocephalic  Eyes:      General: No scleral icterus  Pupils: Pupils are equal, round, and reactive to light  Cardiovascular:      Rate and Rhythm: Normal rate and regular rhythm  Heart sounds: No murmur heard  Pulmonary:      Effort: Pulmonary effort is normal  No respiratory distress  Breath sounds: Rales present  No wheezing or rhonchi  Abdominal:      General: There is no distension  Palpations: Abdomen is soft  Tenderness: There is no abdominal tenderness  There is no guarding  Musculoskeletal:      Right lower leg: No edema  Left lower leg: No edema  Skin:     General: Skin is warm        Capillary Refill: Capillary refill takes less than 2 seconds  Neurological:      General: No focal deficit present  Mental Status: He is alert and oriented to person, place, and time  Mental status is at baseline  Cranial Nerves: No cranial nerve deficit  Psychiatric:         Mood and Affect: Mood normal            Labs: I have personally reviewed pertinent lab results  Lab Results   Component Value Date    WBC 8 04 04/12/2023    HGB 12 7 04/12/2023    HCT 39 8 04/12/2023    MCV 92 04/12/2023     04/12/2023     Lab Results   Component Value Date    CALCIUM 9 3 04/12/2023    K 4 1 04/12/2023    CO2 30 04/12/2023     04/12/2023    BUN 19 04/12/2023    CREATININE 1 13 04/12/2023     No results found for: IGE  Lab Results   Component Value Date    ALT 84 (H) 04/12/2023    AST 49 (H) 04/12/2023    ALKPHOS 119 (H) 04/12/2023           Imaging and other studies: I have personally reviewed pertinent reports  CTA PE study 04/11/2023: No consolidation or pulmonary edema  No evidence of pneumothorax or pleural effusions  Pulmonary function testing 02/09/2023:   FEV1/FVC ratio: 79%  FVC 2 78 L  75% predicted  FEV1 2 21 L  78% predicted  No bronchodilator response  Normal lung volumes  Normal DLCO 85%      EKG, Pathology, and Other Studies: I have personally reviewed pertinent reports  TTE 04/12/2023: LVEF 65%  Normal diastolic function  Mild MR  Trace TR  No pericardial effusion       Melissa Ko MD  Pulmonary and Critical Care Fellowship, PGY4  [facility]        Answers for HPI/ROS submitted by the patient on 4/20/2023  Do you have difficulty breathing?: Yes  Chronicity: new  When did you first notice your symptoms?: in the past 7 days  How often do your symptoms occur?: intermittently  Since you first noticed this problem, how has it changed?: gradually improving  Do you have shortness of breath that occurs with effort or exertion?: Yes  Do you have ear congestion?: No  Do you have heartburn?: No  Do you have fatigue?: No  Do you have nasal congestion?: No  Do you have shortness of breath when lying flat?: Yes  Do you have shortness of breath when you wake up?: Yes  Do you have sweats?: No  Have you experienced weight loss?: No  Which of the following makes your symptoms worse?: lying down  Which of the following makes your symptoms better?: change in position

## 2023-04-26 ENCOUNTER — TELEPHONE (OUTPATIENT)
Dept: PULMONOLOGY | Facility: CLINIC | Age: 73
End: 2023-04-26

## 2023-04-26 ENCOUNTER — OFFICE VISIT (OUTPATIENT)
Dept: PULMONOLOGY | Facility: CLINIC | Age: 73
End: 2023-04-26

## 2023-04-26 ENCOUNTER — DOCUMENTATION (OUTPATIENT)
Dept: PULMONOLOGY | Facility: CLINIC | Age: 73
End: 2023-04-26

## 2023-04-26 VITALS
HEART RATE: 85 BPM | OXYGEN SATURATION: 97 % | WEIGHT: 246.2 LBS | SYSTOLIC BLOOD PRESSURE: 132 MMHG | DIASTOLIC BLOOD PRESSURE: 70 MMHG | TEMPERATURE: 98.3 F | BODY MASS INDEX: 39.74 KG/M2

## 2023-04-26 DIAGNOSIS — J82.81 EOSINOPHILIC PNEUMONIA (HCC): ICD-10-CM

## 2023-04-26 DIAGNOSIS — E66.9 CLASS 2 OBESITY: ICD-10-CM

## 2023-04-26 DIAGNOSIS — G47.33 OSA (OBSTRUCTIVE SLEEP APNEA): Primary | ICD-10-CM

## 2023-04-26 DIAGNOSIS — J45.40 MODERATE PERSISTENT ASTHMA WITHOUT COMPLICATION: ICD-10-CM

## 2023-04-26 PROBLEM — E66.812 CLASS 2 OBESITY IN ADULT: Status: ACTIVE | Noted: 2022-11-18

## 2023-04-26 LAB
DME PARACHUTE DELIVERY DATE REQUESTED: NORMAL
DME PARACHUTE ITEM DESCRIPTION: NORMAL
DME PARACHUTE ORDER STATUS: NORMAL
DME PARACHUTE SUPPLIER NAME: NORMAL
DME PARACHUTE SUPPLIER PHONE: NORMAL

## 2023-04-26 NOTE — TELEPHONE ENCOUNTER
Called patient regarding Mask fitting order placed by Dr Sree Petersen  Patient mentioned he lives in [place] and prefers a mask fitting appointment closer to [place]  I mentioned [facility] and [facility] to the patient and he asked that I leave a voicemail with her contact   LVM per patient's request

## 2023-04-26 NOTE — LETTER
April 26, 2023     Kamar David DO  [address]  [place] [address]    Patient: Gilma Walker   YOB: 1950   Date of Visit: 4/26/2023       Dear Dr Paris Valera:    Thank you for referring Gilma Walker to me for evaluation  Below are my notes for this consultation  If you have questions, please do not hesitate to call me  I look forward to following your patient along with you  Sincerely,        Ella Soto MD        CC: No Recipients  Ella Soto MD  4/26/2023  5:33 PM  Attested  Pulmonary Follow Up Note   Gilma Walker 81 y o  male MRN: [id number]  4/26/2023      Assessment/Plan:    1  Moderate persistent asthma  · Well controlled on Trelegy Ellipta  2  History of eosinophilic pneumonia  · Diagnosed 12 years ago, treated with prednisone  · No recurrence for the past 5 years  3  Severe DONALD  · Sleep study from 12/02/22 with AHI of 45 consistent with severe DONALD  · Also evidence of oxygen desaturation to 82% during sleep study and PLMs  · Patient has not been using the CPAP for the past 3 weeks and when he did used it it was only for 1-2 hours  · Compliance report from 01/17-04/16 with average usage of 50/90 days for 1 hour and 30 minutes in average  AHI 5 8  CPAP pressure set at 14 cmH2O  · Plan:  · Patient complaining of mask and pressures  · Will order autoCPAP with pressure set at 12-20 cmH2O and follow up in 6-8 weeks  If this does not work, will transition to BiPAP  · Mask fitting ordered  4  Chronic hypercapnia  · Evidence on CBC  · Likely secondary to CPAP noncompliance  5  Obesity  · Patient following with weight management  Return in about 8 weeks (around 6/21/2023)  History of Present Illness   HPI:  Gilma Walker is a 81 y o  male who comes to the office for a follow up visit after recent hospitalization at [facility] from 04/11 to 04/12 due to orthopnea and difficulty ambulating       Patient reports he was restarted on statin therapy "prior to his symptoms worsening  This was since held  He also reports his breathing is okay  He continues to use Trelegy and he feels his asthma is overall well controlled on this medication  Patient reports a feeling of dyspnea on exertion and a \"bubble under my left rib cage\"  Regarding his DONALD, he has been noncompliant to CPAP for at least the last 3 weeks due to mask and pressure discomfort  He has been trying to lose weight and will be seeing [facility] weight management to help with this journey  Review of Systems   Constitutional: Negative for appetite change and fever  HENT: Negative for ear pain, postnasal drip, rhinorrhea, sneezing, sore throat and trouble swallowing  Respiratory: Positive for shortness of breath  Cardiovascular: Negative for chest pain  Musculoskeletal: Positive for myalgias  Neurological: Negative for headaches         Historical Information   Past Medical History:   Diagnosis Date   • ARIANE (acute kidney injury) ([facility] [address] ) 01/13/2023   • Arthritis    • Asthma    • Colon polyp    • 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[facility] )    • Hypertension 2012   • 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[facility] )    • Manic depression ([facility] )    • Right lower lobe pneumonia 01/12/2023   • Sleep apnea      Past Surgical History:   Procedure Laterality Date   • COLONOSCOPY  12/13/2022   • [place] ARTHROPLASTY GLENOHUMERAL JOINT TOTAL SHOULDER Right 1/10/2023    Procedure: ARTHROPLASTY SHOULDER REVERSE WITH BICEPS TENODESIS;  Surgeon: Lourdes Pruett MD;  Location[facility];  Service: Orthopedics   • UMBILICAL HERNIA REPAIR       Family History   Problem Relation Age of Onset   • Hypertension Mother    • Depression Mother    • Depression Father    • Arthritis Father    • Heart attack Paternal Uncle 32   • Colon cancer Neg Hx    • Prostate cancer Neg Hx          Meds/Allergies      Current Outpatient Medications:   •  albuterol (PROVENTIL HFA,VENTOLIN HFA) 90 mcg/act inhaler, Inhale 2 puffs every 4 (four) hours as needed for " wheezing, Disp: 6 7 g, Rfl: 2  •  clonazePAM (KlonoPIN) 1 mg tablet, Take 1 mg by mouth daily at bedtime, Disp: , Rfl:   •  Diclofenac Sodium (VOLTAREN) 1 %, APPLY 2 GRAMS TOPICALLY TO THE AFFECTED AREA FOUR TIMES DAILY, Disp: 150 g, Rfl: 0  •  enalapril (VASOTEC) 5 mg tablet, Take 1 tablet (5 mg total) by mouth daily, Disp: 30 tablet, Rfl: 2  •  fluticasone-umeclidinium-vilanterol (Trelegy Ellipta) 200-62 5-25 mcg/actuation AEPB inhaler, Inhale 1 puff daily, Disp: 1 each, Rfl: 5  •  furosemide (LASIX) 20 mg tablet, Take 1 tablet (20 mg total) by mouth every other day, Disp: 30 tablet, Rfl: 0  •  lamoTRIgine (LaMICtal) 200 MG tablet, Take 200 mg by mouth daily, Disp: , Rfl:   •  levothyroxine 100 mcg tablet, Take 1 tablet (100 mcg total) by mouth daily, Disp: 90 tablet, Rfl: 0  •  montelukast (SINGULAIR) 10 mg tablet, Take 10 mg by mouth in the morning, Disp: , Rfl:   •  pramipexole (MIRAPEX) 1 mg tablet, Take 2 mg by mouth daily at bedtime, Disp: , Rfl:   •  tamsulosin (FLOMAX) 0 4 mg, 0 4 mg in the morning, Disp: , Rfl:   •  traZODone (DESYREL) 50 mg tablet, , Disp: , Rfl:   Allergies   Allergen Reactions   • Crestor [Rosuvastatin] Other (See Comments)     Lost ability to walk and balance   • Simvastatin Shortness Of Breath     Per Cardio, thinks this was switch to a generic vs brand name    • Spiriva Respimat [Tiotropium Bromide Monohydrate] Rash       Vitals: Blood pressure 132/70, pulse 85, temperature 98 3 °F (36 8 °C), temperature source Tympanic, weight 112 kg (246 lb 3 2 oz), SpO2 97 %  Body mass index is 39 74 kg/m²  Oxygen Therapy  SpO2: 97 %  Oxygen Therapy: None (Room air)      Physical Exam  Physical Exam  Vitals reviewed  Constitutional:       General: He is not in acute distress  Appearance: He is not ill-appearing or toxic-appearing  HENT:      Head: Normocephalic  Eyes:      General: No scleral icterus  Pupils: Pupils are equal, round, and reactive to light     Cardiovascular: Rate and Rhythm: Normal rate and regular rhythm  Heart sounds: No murmur heard  Pulmonary:      Effort: Pulmonary effort is normal  No respiratory distress  Breath sounds: Rales present  No wheezing or rhonchi  Abdominal:      General: There is no distension  Palpations: Abdomen is soft  Tenderness: There is no abdominal tenderness  There is no guarding  Musculoskeletal:      Right lower leg: No edema  Left lower leg: No edema  Skin:     General: Skin is warm  Capillary Refill: Capillary refill takes less than 2 seconds  Neurological:      General: No focal deficit present  Mental Status: He is alert and oriented to person, place, and time  Mental status is at baseline  Cranial Nerves: No cranial nerve deficit  Psychiatric:         Mood and Affect: Mood normal            Labs: I have personally reviewed pertinent lab results  Lab Results   Component Value Date    WBC 8 04 04/12/2023    HGB 12 7 04/12/2023    HCT 39 8 04/12/2023    MCV 92 04/12/2023     04/12/2023     Lab Results   Component Value Date    CALCIUM 9 3 04/12/2023    K 4 1 04/12/2023    CO2 30 04/12/2023     04/12/2023    BUN 19 04/12/2023    CREATININE 1 13 04/12/2023     No results found for: IGE  Lab Results   Component Value Date    ALT 84 (H) 04/12/2023    AST 49 (H) 04/12/2023    ALKPHOS 119 (H) 04/12/2023           Imaging and other studies: I have personally reviewed pertinent reports  CTA PE study 04/11/2023: No consolidation or pulmonary edema  No evidence of pneumothorax or pleural effusions  Pulmonary function testing 02/09/2023:   FEV1/FVC ratio: 79%  FVC 2 78 L  75% predicted  FEV1 2 21 L  78% predicted  No bronchodilator response  Normal lung volumes  Normal DLCO 85%      EKG, Pathology, and Other Studies: I have personally reviewed pertinent reports  TTE 04/12/2023: LVEF 65%  Normal diastolic function  Mild MR  Trace TR  No pericardial effusion       Darylene Dikes Stacie Call MD  Pulmonary and Critical Care Fellowship, PGY4  [facility]        Answers for HPI/ROS submitted by the patient on 4/20/2023  Do you have difficulty breathing?: Yes  Chronicity: new  When did you first notice your symptoms?: in the past 7 days  How often do your symptoms occur?: intermittently  Since you first noticed this problem, how has it changed?: gradually improving  Do you have shortness of breath that occurs with effort or exertion?: Yes  Do you have ear congestion?: No  Do you have heartburn?: No  Do you have fatigue?: No  Do you have nasal congestion?: No  Do you have shortness of breath when lying flat?: Yes  Do you have shortness of breath when you wake up?: Yes  Do you have sweats?: No  Have you experienced weight loss?: No  Which of the following makes your symptoms worse?: lying down  Which of the following makes your symptoms better?: change in position      Attestation signed by Yany Medina DO at 4/26/2023  9:59 PM:  I have personally seen and examined  I discussed the patient with the  fellow including, but not limited to, verifying findings; reviewing labs and x-rays;developing the plan of care with patient  I have reviewed the note and assessment performed by the fellow and agree with the fellow's documented findings and plan of care with the following additions  Please see my following comments for details and adjustments  Assessment/Plan  1  Chronic hypercapnic respiratory failure likely secondary to OHS/Severe DONALD that is not adequately managed  He is not consistently using his CPAP set to 14 cc of H2O pressure  -  switch to autoPAP 12-20 cc of H2O pressure  We will follow compliance in 6 weeks        -  If there is no clinical improvement, we will move to BIPAP instead         -  Mask fitting         -  May still need a PAP titration study    2  Moderate persistent asthma  - continue trelegy Daily        3  Hx of eosinophilic pneumonia - resolved with prednisone     Mr Joay Levels returns to the office after a hospitalization  He was admitted for new neurologic changes, myocllonic type activity which was ultimately attributed to statin therapy  He has noted shortness of breath  He has been on trelegy and feels that his breathing has been stable  He also notes dyspnea on exertion and feels like there is a bubble under the rib case  He was previously diagnosed with DONALD and has lots of difficulty with PAP tolerance  /70 (BP Location: Left arm, Patient Position: Sitting, Cuff Size: Standard)   Pulse 85   Temp 98 3 °F (36 8 °C) (Tympanic)   Wt 112 kg (246 lb 3 2 oz)   SpO2 97%   BMI 39 74 kg/m²   RA  General:  Patient is awake, alert, non-toxic and in no acute respiratory distress  Eyes: PERRL, no scleral icterus  Neck: No JVD  CV:  Regular, +S1 and S2, No murmurs, gallops or rubs appreciated  Lungs: Crackles in lung bases  No wheezing or rhonchi  Abdomen: Soft, +BS, Non-tender, non-distended  Extremities: No clubbing, cyanosis or edema  Neuro: No focal motor/sensory deficits  Skin: Warm, No rashes or ulcerations  Lab Results   Component Value Date    WBC 8 04 04/12/2023    HGB 12 7 04/12/2023    HCT 39 8 04/12/2023    MCV 92 04/12/2023     04/12/2023     Lab Results   Component Value Date    SODIUM 137 04/12/2023    K 4 1 04/12/2023     04/12/2023    CO2 30 04/12/2023    BUN 19 04/12/2023    CREATININE 1 13 04/12/2023    GLUC 123 04/12/2023    CALCIUM 9 3 04/12/2023     Imaging and other studies: I have personally reviewed pertinent reports  CTA PE study 04/11/2023: No consolidation or pulmonary edema  No evidence of pneumothorax or pleural effusions  Pulmonary function testing 02/09/2023:   FEV1/FVC ratio: 79%  FVC 2 78 L  75% predicted  FEV1 2 21 L  78% predicted  No bronchodilator response  Normal lung volumes    Normal DLCO 85%        EKG, Pathology, and Other Studies: I have personally reviewed pertinent reports  TTE 04/12/2023: LVEF 65%  Normal diastolic function  Mild MR  Trace TR  No pericardial effusion  Compliance report from 01/17-04/16 with average usage of 50/90 days for 1 hour and 30 minutes in average  AHI 5 8   CPAP pressure set at 14 cmH2O

## 2023-05-01 ENCOUNTER — OFFICE VISIT (OUTPATIENT)
Dept: OBGYN CLINIC | Facility: CLINIC | Age: 73
End: 2023-05-01

## 2023-05-01 VITALS
HEART RATE: 76 BPM | WEIGHT: 246 LBS | SYSTOLIC BLOOD PRESSURE: 122 MMHG | DIASTOLIC BLOOD PRESSURE: 74 MMHG | BODY MASS INDEX: 39.53 KG/M2 | HEIGHT: 66 IN

## 2023-05-01 DIAGNOSIS — M70.61 GREATER TROCHANTERIC BURSITIS OF BOTH HIPS: Primary | ICD-10-CM

## 2023-05-01 DIAGNOSIS — M70.62 GREATER TROCHANTERIC BURSITIS OF BOTH HIPS: Primary | ICD-10-CM

## 2023-05-01 RX ORDER — METHYLPREDNISOLONE ACETATE 40 MG/ML
1 INJECTION, SUSPENSION INTRA-ARTICULAR; INTRALESIONAL; INTRAMUSCULAR; SOFT TISSUE
Status: COMPLETED | OUTPATIENT
Start: 2023-05-01 | End: 2023-05-01

## 2023-05-01 RX ORDER — MOMETASONE FUROATE 200 UG/1
AEROSOL RESPIRATORY (INHALATION)
COMMUNITY
Start: 2023-04-18

## 2023-05-01 RX ORDER — BUPIVACAINE HYDROCHLORIDE 2.5 MG/ML
4 INJECTION, SOLUTION INFILTRATION; PERINEURAL
Status: COMPLETED | OUTPATIENT
Start: 2023-05-01 | End: 2023-05-01

## 2023-05-01 RX ADMIN — BUPIVACAINE HYDROCHLORIDE 4 ML: 2.5 INJECTION, SOLUTION INFILTRATION; PERINEURAL at 13:08

## 2023-05-01 RX ADMIN — METHYLPREDNISOLONE ACETATE 1 ML: 40 INJECTION, SUSPENSION INTRA-ARTICULAR; INTRALESIONAL; INTRAMUSCULAR; SOFT TISSUE at 13:08

## 2023-05-01 NOTE — PROGRESS NOTES
Orthopaedics Office Visit - Follow up  Patient Visit    ASSESSMENT/PLAN:    Assessment:   Bilateral greater trochanteric bursitis     Plan:   1  Patient to be weight-bearing as tolerated, activity as tolerated to bilateral lower extremities  2   The patient had good relief from the previous bilateral trochanter actions and has been continuing to try to work on weight loss management and increasing his mobility  He would like repeat corticosteroid injections  Bilateral corticosteroid injection's give, procedure tolerated well  Post injection protocol advised    3  May taking OTC NSAIDS  Or Tylenol for pain relief   4  Patient can follow-up on an as-needed basis for trochanteric bursitis       To Do Next Visit:  None    _____________________________________________________  CHIEF COMPLAINT:  Chief Complaint   Patient presents with    Left Hip - Follow-up    Right Hip - Follow-up         SUBJECTIVE:  Ketty Gamboa is a 81 y o  male who presents today for initial evaluation of bilateral hip pain  Patient states his pain started about 3 months ago  He denies any mechanism of injury or trauma  He indicates the greater trochanter as the location of his pain bilaterally  He denies any groin pain  He describes his pain as a dull and constant ache  Prolonged weight-bearing aggravates while rest alleviates  He also notes 'unsteadiness' when he is standing  He has not tried any previous treatments  Interval history 5/1/23   Patient presents today follow up for bilateral hip pain due to greater trochanteric bursitis  He had bilateral greater trochanteric bursitis steroid injection on 10/4/22 and had relief for 6 months  He is having pain over lateral aspect bilateral hip  No groin pain  No post immobilization stiffness  No radiating pain down the legs  He is going to be working with weight management for weight loss  He is taking OTC NSAIDS for pain relief      PAST MEDICAL HISTORY:  Past Medical History: Diagnosis Date    ARIANE (acute kidney injury) ([facility] ) 01/13/2023    Arthritis     Asthma     Colon polyp     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[facility] )     Hypertension 2012    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[facility] )     Manic depression ([facility] )     Right lower lobe pneumonia 01/12/2023    Sleep apnea        PAST SURGICAL HISTORY:  Past Surgical History:   Procedure Laterality Date    COLONOSCOPY  12/13/2022    MI ARTHROPLASTY GLENOHUMERAL JOINT TOTAL SHOULDER Right 1/10/2023    Procedure: ARTHROPLASTY SHOULDER REVERSE WITH BICEPS TENODESIS;  Surgeon: Shorty Perez MD;  Location: [facility];  Service: Orthopedics    UMBILICAL HERNIA REPAIR         FAMILY HISTORY:  Family History   Problem Relation Age of Onset    Hypertension Mother     Depression Mother     Depression Father     Arthritis Father     Heart attack Paternal Uncle 32    Colon cancer Neg Hx     Prostate cancer Neg Hx        SOCIAL HISTORY:  Social History     Tobacco Use    Smoking status: Never    Smokeless tobacco: Never   Vaping Use    Vaping Use: Never used   Substance Use Topics    Alcohol use: Never    Drug use: Never       MEDICATIONS:    Current Outpatient Medications:     albuterol (PROVENTIL HFA,VENTOLIN HFA) 90 mcg/act inhaler, Inhale 2 puffs every 4 (four) hours as needed for wheezing, Disp: 6 7 g, Rfl: 2    Asmanex  MCG/ACT AERO, INHALE 1 PUFF TWICE A DAY IN THE MORNING AND EVENING, Disp: , Rfl:     clonazePAM (KlonoPIN) 1 mg tablet, Take 1 mg by mouth daily at bedtime, Disp: , Rfl:     Diclofenac Sodium (VOLTAREN) 1 %, APPLY 2 GRAMS TOPICALLY TO THE AFFECTED AREA FOUR TIMES DAILY, Disp: 150 g, Rfl: 0    enalapril (VASOTEC) 5 mg tablet, Take 1 tablet (5 mg total) by mouth daily, Disp: 30 tablet, Rfl: 2    fluticasone-umeclidinium-vilanterol (Trelegy Ellipta) 200-62 5-25 mcg/actuation AEPB inhaler, Inhale 1 puff daily, Disp: 1 each, Rfl: 5    furosemide (LASIX) 20 mg tablet, Take 1 tablet (20 mg total) by mouth every other "day, Disp: 30 tablet, Rfl: 0    lamoTRIgine (LaMICtal) 200 MG tablet, Take 200 mg by mouth daily, Disp: , Rfl:     levothyroxine 100 mcg tablet, Take 1 tablet (100 mcg total) by mouth daily, Disp: 90 tablet, Rfl: 0    montelukast (SINGULAIR) 10 mg tablet, Take 10 mg by mouth in the morning, Disp: , Rfl:     pramipexole (MIRAPEX) 1 mg tablet, Take 2 mg by mouth daily at bedtime, Disp: , Rfl:     tamsulosin (FLOMAX) 0 4 mg, 0 4 mg in the morning, Disp: , Rfl:     traZODone (DESYREL) 50 mg tablet, , Disp: , Rfl:     ALLERGIES:  Allergies   Allergen Reactions    Crestor [Rosuvastatin] Other (See Comments)     Lost ability to walk and balance    Simvastatin Shortness Of Breath     Per Cardio, thinks this was switch to a generic vs brand name     Spiriva Respimat [Tiotropium Bromide Monohydrate] Rash       REVIEW OF SYSTEMS:  MSK: bilateral hip  Neuro: no paraesthesias, numbness or tingling  Pertinent items are otherwise noted in HPI  A comprehensive review of systems was otherwise negative  LABS:  HgA1c:   Lab Results   Component Value Date    HGBA1C 6 0 (H) 03/28/2023     BMP:   Lab Results   Component Value Date    CALCIUM 9 3 04/12/2023    K 4 1 04/12/2023    CO2 30 04/12/2023     04/12/2023    BUN 19 04/12/2023    CREATININE 1 13 04/12/2023     CBC: No components found for: CBC    _____________________________________________________  PHYSICAL EXAMINATION:  Vital signs: /74 (BP Location: Left arm, Patient Position: Sitting, Cuff Size: Adult)   Pulse 76   Ht 5' 6\" (1 676 m)   Wt 112 kg (246 lb)   BMI 39 71 kg/m²   General: No acute distress, awake and alert  Psychiatric: Mood and affect appear appropriate  HEENT: Trachea Midline, No torticollis, no apparent facial trauma  Cardiovascular: No audible murmurs; Extremities appear perfused  Pulmonary: No audible wheezing or stridor  Skin: No open lesions; see further details (if any) below    MUSCULOSKELETAL EXAMINATION:  Extremities:   The " "right hip was exposed inspected  No overlying skin lesions or ecchymosis  Pain with resisted hip abduction No masses  Patient has tenderness to palpation over the trochanteric laterally  No pain with lateral compression of the pelvis  Patient's hip range of motion flexion extension was full  Patient has limited internal rotation with some pain with internal rotation to approximately 15-20 degrees  Patient neurovascularly intact distally  The left hip was exposed inspected  No overlying skin lesions or ecchymosis  Pain with resisted hip abduction No masses  Patient has tenderness to palpation over the trochanteric bursa  Patient's hip range of motion flexion extension was full  Patient has limited internal rotation with some pain with internal rotation to approximately 20 degrees  Patient neurovascularly intact distally  _____________________________________________________  STUDIES REVIEWED:  Not taken today     PROCEDURES PERFORMED:  Large joint arthrocentesis: bilateral greater trochanteric bursa  Universal Protocol:  Consent: Verbal consent obtained  Risks and benefits: risks, benefits and alternatives were discussed  Consent given by: patient  Time out: Immediately prior to procedure a \"time out\" was called to verify the correct patient, procedure, equipment, support staff and site/side marked as required    Timeout called at: 5/1/2023 1:07 PM   Patient understanding: patient states understanding of the procedure being performed  Site marked: the operative site was marked  Supporting Documentation  Indications: pain   Procedure Details  Location: hip - bilateral greater trochanteric bursa  Preparation: Patient was prepped and draped in the usual sterile fashion  Needle size: 22 G  Approach: lateral    Medications (Right): 4 mL bupivacaine 0 25 %; 1 mL methylPREDNISolone acetate 40 mg/mLMedications (Left): 4 mL bupivacaine 0 25 %; 1 mL methylPREDNISolone acetate 40 mg/mL   Patient tolerance: " patient tolerated the procedure well with no immediate complications  Dressing:  Sterile dressing applied          Scribe Attestation    I,:  Donal Mariano am acting as a scribe while in the presence of the attending physician :       I,:  Jonathan Franks DO personally performed the services described in this documentation    as scribed in my presence :

## 2023-05-02 ENCOUNTER — OFFICE VISIT (OUTPATIENT)
Dept: NEUROLOGY | Facility: CLINIC | Age: 73
End: 2023-05-02

## 2023-05-02 VITALS
BODY MASS INDEX: 36.8 KG/M2 | DIASTOLIC BLOOD PRESSURE: 65 MMHG | SYSTOLIC BLOOD PRESSURE: 135 MMHG | HEART RATE: 81 BPM | WEIGHT: 229 LBS | HEIGHT: 66 IN

## 2023-05-02 DIAGNOSIS — R25.1 TREMOR OF RIGHT HAND: Primary | ICD-10-CM

## 2023-05-02 DIAGNOSIS — M62.81 MUSCLE WEAKNESS: ICD-10-CM

## 2023-05-02 DIAGNOSIS — R25.9 ABNORMAL MOVEMENTS: ICD-10-CM

## 2023-05-02 NOTE — ASSESSMENT & PLAN NOTE
Patient with previous history of shortness of breath, diffuse arthralgias, ptosis, hip weakness and abnormal movements in which she was previously hospitalized for with unrevealing work-up  Prior to the onset of his symptoms the  of his simvastatin had changed was felt to be potentially contributing to his symptoms and medication was discontinued  He noted abrupt improvement in all of his symptoms  With restart of medication he did notice return of symptoms  He did trial alternative statin-low-dose Crestor, in which he did note return of symptoms of weakness, imbalance and pain  He is now not on any statin  We did again discuss his symptoms are likely related to medication side effect  He has no weakness noted on exam today or abnormal movements  Given resolution of his symptoms no further testing recommended at this time  He should discuss with his cardiologist if he does require medication for his cholesterol  If so would suggest alternative to statin given his side effects  He is currently working on diet and weight loss to assist with cholesterol management as well  He will follow-up with our office on an as-needed basis  He should contact the office if he has any new concerns or return of his symptoms

## 2023-05-02 NOTE — PATIENT INSTRUCTIONS
Discuss cholesterol management with cardiology-non statin alternative or diet changes    Would be very hesitant to trial any other statin given reactions to simvastatin and crestor

## 2023-05-02 NOTE — PROGRESS NOTES
Patient ID: Ofelia Rodriguez is a 81 y o  male  Assessment/Plan:    Muscle weakness  Patient with previous history of shortness of breath, diffuse arthralgias, ptosis, hip weakness and abnormal movements in which she was previously hospitalized for with unrevealing work-up  Prior to the onset of his symptoms the  of his simvastatin had changed was felt to be potentially contributing to his symptoms and medication was discontinued  He noted abrupt improvement in all of his symptoms  With restart of medication he did notice return of symptoms  He did trial alternative statin-low-dose Crestor, in which he did note return of symptoms of weakness, imbalance and pain  He is now not on any statin  We did again discuss his symptoms are likely related to medication side effect  He has no weakness noted on exam today or abnormal movements  Given resolution of his symptoms no further testing recommended at this time  He should discuss with his cardiologist if he does require medication for his cholesterol  If so would suggest alternative to statin given his side effects  He is currently working on diet and weight loss to assist with cholesterol management as well  He will follow-up with our office on an as-needed basis  He should contact the office if he has any new concerns or return of his symptoms  Diagnoses and all orders for this visit:    Tremor of right hand    Abnormal movements    Muscle weakness           Subjective:    HPI    Ofelia Rodriguez is a 81 y o  male w/ PMH DONALD, recurrent eosinophilic PNA, dysphagia w/ known esophageal narrowing, asthma, HLD, bipolar disorder x 40 years who presents today for follow up for weakness and abnormal movements        To review Patient presented to the hospital 10/13/22 with worsening symptoms over the past several weeks to months   He noted worsening shortness of breath and dyspnea on exertion that is worsened from his baseline   Also noted diffuse arthralgias that started in the right shoulder and progressed to the left shoulder and bilateral hips   Is also noted on exam for him to have b/l ptosis, prominent med-high amplitude predominantly postural and kinetic tremor, stimulus-induced myoclonus, atypical rest movements that are not choreiform per se but could be akin to extrapyramidal sx seen in long term neuroleptic use   He was started on clonazepam, trazodone was discontinued for his jerking movements which did improve  He had also noted weakness in the hips and trouble with ambulation at the time  His simvastatin was stopped and symptoms improved, it was felt the change in  had contributed to some of his symptoms given when he restarted the medication his symptoms restarted  Interval history:  He did trial crestor and had worsening balance, ambulatory dysfunction, pain-symptoms improved with stopping medication  He denies any abnormal movements, ptosis, weakness since stopping crestor a little less then a month ago  He still has some mild imbalance but is slowly improving  He denies any abnormal joint pain  He is continuing to follow with ortho for hip pain-did get steroid injections for bursitis  He did have a scapula fracture since last visit and has completed therapy  He is currently working weight bariatric medicine for weight loss-he is going to do diet changes as he is leery about complications with weight loss surgeries  He does not notice any tremor at rest or with eating/drinking/handwriting        Hospital work-up:  Labs: , B12 1006, folate > 20, TSH 2 7, COVID/FLU/RSV neg, DENIS neg, CRP 1 8, ESR 15, Sjogren's negative, [id number], folate normal, AcHR blocking, binding, modulating antibodies neg, MUSK antibody neg  -RT assessment 10/15/2022: VC 4 5, NIF -60     - XR hips wnl  - XR shoulder L moderate glenohumeral joint osteoarthrosis, R severe osteoarthrosis glenohumeral joint  - CXR wnl  -MRI cervical "spine 1  Reversal of mid to lower cervical lordosis centered at the fused C5-6 level  2  Grade 1 degenerative anterolisthesis at C3-4 and C4-5  Multilevel degenerative disc disease with superimposed disc protrusions at the C2-3 through the C4-5 levels  and mild central canal narrowing  3  The cervical cord appears normal in caliber and signal with no evidence of focal impingement   -speech and video barium swallow study was neg for oropharyngeal dysphagia  The following portions of the patient's history were reviewed and updated as appropriate: allergies, current medications, past family history, past medical history, past social history, past surgical history and problem list         Objective:    Blood pressure 135/65, pulse 81, height 5' 6\" (1 676 m), weight 104 kg (229 lb)  Physical Exam  Constitutional:       General: He is awake  HENT:      Right Ear: Hearing normal       Left Ear: Hearing normal    Eyes:      Extraocular Movements: Extraocular movements intact  Pupils: Pupils are equal, round, and reactive to light  Neurological:      Mental Status: He is alert  Deep Tendon Reflexes:      Reflex Scores:       Bicep reflexes are 2+ on the right side and 2+ on the left side  Brachioradialis reflexes are 2+ on the right side and 2+ on the left side  Patellar reflexes are 1+ on the right side and 1+ on the left side  Psychiatric:         Speech: Speech normal          Neurological Exam  Mental Status  Awake and alert  Oriented to person, place, time and situation  Speech is normal  Language is fluent with no aphasia  Cranial Nerves  CN II: Visual fields full to confrontation  CN III, IV, VI: Extraocular movements intact bilaterally  Pupils equal round and reactive to light bilaterally  Mild asymmetry of the eyelids on the left-chronic per patient, no worsening with sustained upward gaze    CN V:  Right: Facial sensation is normal   Left: Facial sensation is normal on the " left   CN VII:  Right: There is no facial weakness  Left: There is no facial weakness  CN VIII:  Right: Hearing is normal   Left: Hearing is normal   CN IX, X: Palate elevates symmetrically  CN XI:  Right: Trapezius strength is normal   Left: Trapezius strength is normal   CN XII: Tongue midline without atrophy or fasciculations  Motor  Normal muscle bulk throughout  No fasciculations present  Normal muscle tone  Right                     Left  Neck flexion                          5                           Neck extension                     5                           Elbow flexion                         5                          5  Elbow extension                    5                          5  Wrist flexion                           5                          5  Wrist extension                      5                          5  Finger flexion                         5                          5  Finger abduction                    5                          5  Hip flexion                              5                          5  Knee flexion                           5                          5  Knee extension                      5                          5  Ankle inversion                      5                          5  Ankle eversion                       5                          5  Plantarflexion                         5                          5  Dorsiflexion                            5                          5    Sensory  Light touch is normal in upper and lower extremities  Pinprick is normal in upper and lower extremities  Temperature is normal in upper and lower extremities  Vibration is normal in upper and lower extremities       Reflexes                                            Right                      Left  Brachioradialis                    2+                         2+  Biceps                                 2+ 2+  Patellar                                1+                         1+  Achilles                                Tr                         Tr  Plantar                           Downgoing                Downgoing    Coordination  Right: Finger-to-nose normal  Rapid alternating movement normal Left: Finger-to-nose normal  Rapid alternating movement normal     Gait  Casual gait is normal including stance, stride, and arm swing  Able to rise from chair without using arms  I have personally reviewed the ROS performed by the MA     ROS:    Review of Systems   Constitutional: Negative  Negative for appetite change and fever  HENT: Negative  Negative for hearing loss, tinnitus, trouble swallowing and voice change  Eyes: Negative  Negative for photophobia, pain and visual disturbance  Respiratory: Negative  Negative for shortness of breath  Cardiovascular: Negative  Negative for palpitations  Gastrointestinal: Negative  Negative for nausea and vomiting  Endocrine: Negative  Negative for cold intolerance  Genitourinary: Negative  Negative for dysuria, frequency and urgency  Musculoskeletal: Negative  Negative for gait problem, myalgias and neck pain  Skin: Negative  Negative for rash  Allergic/Immunologic: Negative  Neurological: Negative  Negative for dizziness, tremors, seizures, syncope, facial asymmetry, speech difficulty, weakness, light-headedness, numbness and headaches  Hematological: Negative  Does not bruise/bleed easily  Psychiatric/Behavioral: Negative  Negative for confusion, hallucinations and sleep disturbance  All other systems reviewed and are negative

## 2023-05-08 ENCOUNTER — TELEPHONE (OUTPATIENT)
Dept: PULMONOLOGY | Facility: CLINIC | Age: 73
End: 2023-05-08

## 2023-05-08 NOTE — TELEPHONE ENCOUNTER
Patient called and lvm, he said at his last office visit Dr Arreola made a recommendation on a medication change for him  He wanted to discuss with his PCP further but he said he does not see any note of this medication on the chart notes from his last visit  He was hoping you could provide the information of the change you had in mind   Please advise

## 2023-05-09 NOTE — TELEPHONE ENCOUNTER
Patient left a  very upset requesting to speak with Dr Miladis Ordonez or Dr Phil Grey because he never heard from them after the first time he asked  He said that if he doesn't hear from one of them soon he will be reporting the office  Please advise

## 2023-05-10 ENCOUNTER — OFFICE VISIT (OUTPATIENT)
Dept: BARIATRICS | Facility: CLINIC | Age: 73
End: 2023-05-10

## 2023-05-10 ENCOUNTER — TELEPHONE (OUTPATIENT)
Dept: BARIATRICS | Facility: CLINIC | Age: 73
End: 2023-05-10

## 2023-05-10 VITALS
HEIGHT: 65 IN | BODY MASS INDEX: 38.39 KG/M2 | WEIGHT: 230.4 LBS | DIASTOLIC BLOOD PRESSURE: 60 MMHG | HEART RATE: 82 BPM | SYSTOLIC BLOOD PRESSURE: 112 MMHG

## 2023-05-10 DIAGNOSIS — F31.0 BIPOLAR AFFECTIVE DISORDER, CURRENT EPISODE HYPOMANIC (HCC): ICD-10-CM

## 2023-05-10 DIAGNOSIS — G47.33 OSA (OBSTRUCTIVE SLEEP APNEA): ICD-10-CM

## 2023-05-10 DIAGNOSIS — I10 ESSENTIAL HYPERTENSION: ICD-10-CM

## 2023-05-10 DIAGNOSIS — K76.0 FATTY LIVER: ICD-10-CM

## 2023-05-10 DIAGNOSIS — R73.03 PRE-DIABETES: ICD-10-CM

## 2023-05-10 DIAGNOSIS — E66.01 CLASS 2 SEVERE OBESITY DUE TO EXCESS CALORIES WITH SERIOUS COMORBIDITY AND BODY MASS INDEX (BMI) OF 38.0 TO 38.9 IN ADULT (HCC): Primary | ICD-10-CM

## 2023-05-10 DIAGNOSIS — E03.9 ACQUIRED HYPOTHYROIDISM: ICD-10-CM

## 2023-05-10 DIAGNOSIS — E78.2 MIXED HYPERLIPIDEMIA: ICD-10-CM

## 2023-05-10 NOTE — PROGRESS NOTES
Assessment/Plan:     Class 2 severe obesity due to excess calories with serious comorbidity and body mass index (BMI) of 38 0 to 38 9 in adult [facility])  - Discussed options of HealthyCORE-Intensive Lifestyle Intervention Program, Very Low Calorie Diet-VLCD, Conservative Program, Kya-En-Y Gastric Bypass and Vertical Sleeve Gastrectomy and the role of weight loss medications  - Explained the importance of making lifestyle changes in addition to starting anti-obesity medications  - Patient is interested in pursuing Conservative Program for now but will enroll in [facility] - your [employer] [facility] once established in [place]  Also will have follow up visits with medical weight management provider  - Initial weight loss goal of 5-10% weight loss for improved health  - Weight loss can improve patient's co-morbid conditions and/or prevent weight-related complications  - Labs reviewed from 3/2023 and 4/2023  -Patient lifestyle habits and barriers to his weight loss have been discussed today  Patient was encouraged to start tracking his meals more regularly and to utilize a more protein rich meal to start the day  Protein shakes and protein bars, yogurt, eggs were all discussed as options to get his day started and avoid skipping breakfast   Patient was encouraged to start cutting back on diet soda intake and to utilize more water within his day  Activity level was encouraged and patient was advised to at least do some activity for 30 minutes twice a week to start  Weight loss tools were reviewed with patient and the possible percentage of weight loss with age tool  Bariatric surgery would provide the most rapid improvement in weight, and improvement in comorbidities  Patient understood limitations of medications and lifestyle changes in regards to potential weight loss outcome but would like to continue with this path for now      Medications were reviewed:  Phentermine to be avoided due to age  Patient had previously been on topiramate and Wellbutrin for psychiatric reasons in the past   Will be cautious with topiramate due to risk for kidney stones and patient soda consumption and use of diuretic  Wellbutrin to be avoided as he is under the care of psychiatrist for bipolar disease  KLAUS and Elton Almazan were both reviewed and [facility]  And was demonstrated in the office today and all questions were answered regarding administration  NMAFEZ Instructions:    - Begin Wegovy 0 25 mg subcutaneously once a week  Dose changes may occur after 4 doses if medication is tolerated  You will be assessed prior to each dose change to make sure you are tolerating the medication well  - Please message me when you have 2 pens left from the prescription so there are no lapses in treatment  - Visit [email or web address]  com for further information/injection instructions    -Please eat small frequent meals to help reduce nausea  Lemon water and saltine crackers may help with this  - Side effects of Wegovy discussed: nausea, vomiting, diarrhea, and constipation  If you experience fever, nausea/vomiting, and pain radiating to your back this may be a sign of pancreatitis  Please go to the emergency room if this occurs  - Patient understands the side effects of the medication and proper administration  Patient agrees with the treatment plan and all questions were answered  Goals:  Do not skip meals  Food log via julio - options include  www  myfitnesspal com, sparkpeople  com, loseit com, calorieking  com, [employer]  No sugary beverages - reduce diet soda intake to no more than 3 diet sodas daily  At least 64oz of water daily  Increase physical activity by 10 minutes daily   Gradually increase physical activity to a goal of 5 days per week for 30 minutes of MODERATE intensity PLUS 2 days per week of FULL BODY resistance training      Pre-diabetes  Most recent hemoglobin A1c 6 0  Weight loss and diet changes will likely help control blood sugars    Bipolar disorder ([facility] )  Patient's symptoms are stable on Lamictal and Mirapex  Patient is followed by psychiatrist    Mixed hyperlipidemia  Patient unable to tolerate statins  Low-fat and low-cholesterol diet were discussed  Weight loss and diet changes will likely help with cholesterol levels    Essential hypertension  Treatment currently includes enalapril 5 mg  Blood pressure stable today  Managed by PCP  Has upcoming appointment with cardiologist    DONALD (obstructive sleep apnea)  Patient is following up with sleep specialist regarding CPAP adjustments    Hypothyroidism  Thyroid lab is stable in 3/2023  Treatment includes levothyroxine 100 mcg    Fatty liver  LFTs elevated  Ultrasound from April shows enlarged fatty liver  Weight loss will hopefully improve fatty liver disease         Mora Jimenez was seen today for follow-up  Diagnoses and all orders for this visit:    Class 2 severe obesity due to excess calories with serious comorbidity and body mass index (BMI) of 38 0 to 38 9 in adult ([facility] )  -     Semaglutide-Weight Management (WEGOVY) 0 25 MG/0 5ML; Inject 0 5 mL (0 25 mg total) under the skin once a week    DONALD (obstructive sleep apnea)    Acquired hypothyroidism    Essential hypertension    Fatty liver    Pre-diabetes  -     Semaglutide-Weight Management (WEGOVY) 0 25 MG/0 5ML; Inject 0 5 mL (0 25 mg total) under the skin once a week    Bipolar affective disorder, current episode hypomanic ([place] [address] )    Mixed hyperlipidemia         Patient denies personal history of pancreatitis  Patient also denies personal and family history of medullary thyroid cancer and multiple endocrine neoplasia type 2 (MEN 2 tumor)  Patient denies any history of kidney stones, seizures, or glaucoma  Total time spent reviewing chart, interviewing patient, examining patient, discussing plan, answering all questions, and documentin minutes with >50% face-to-face time with the patient      Follow up in approximately 6 weeks with Non-Surgical Physician/Advanced Practitioner  Subjective:   Chief Complaint   Patient presents with   • Follow-up     Pt is here for MWM f/u       Patient ID: Hollie Hernandez  is a 81 y o  male with excess weight/obesity here to pursue weight management  Patient is pursuing Conservative Program    Most recent notes and records were reviewed  HPI    Wt Readings from Last 20 Encounters:   05/10/23 105 kg (230 lb 6 4 oz)   05/02/23 104 kg (229 lb)   05/01/23 112 kg (246 lb)   04/26/23 112 kg (246 lb 3 2 oz)   04/12/23 110 kg (242 lb 8 1 oz)   04/11/23 106 kg (233 lb)   03/31/23 106 kg (233 lb)   03/30/23 105 kg (232 lb 6 4 oz)   03/28/23 106 kg (233 lb)   03/27/23 104 kg (229 lb 12 8 oz)   03/02/23 106 kg (234 lb 7 5 oz)   03/01/23 106 kg (233 lb)   02/27/23 104 kg (229 lb 3 2 oz)   02/09/23 104 kg (230 lb)   01/31/23 104 kg (228 lb 14 4 oz)   01/24/23 103 kg (228 lb)   01/23/23 104 kg (229 lb)   01/23/23 103 kg (228 lb)   01/14/23 108 kg (237 lb 7 oz)   01/10/23 104 kg (230 lb)       Patient presents today to medical weight management office for follow up  Patient had been in surgical process but after doing some reading has decided he would like to try to pursue a medical pathway for weight loss instead  Patient states he consumes about 1200 [place]/day, consisting of lunch and dinner only  He will snack throughout the day but often skips breakfast   He drinks 12 diet sodas throughout the day and no water  He is currently in transition moving from [place] [address]  to [place] cannot commit to healthy core program however would like to once he is more local to the office  Patient has uncontrolled sleep apnea and is working with sleep specialist to adjust CPAP  If CPAP is not successful we will be transitioning to BiPAP for better control  He is currently treated with Lamictal and Mirapex for his bipolar disorder and his symptoms are stable    He had previously been treated with ECT therapy, but "this combination of medication has helped him just as much  Current weight: 230 4 lbs  Goal: 170 lbs        B- skips  L- chicken, steak, hamburger, salads, string beans  S- peanuts, cheese doodles, yogurt  D- chicken, steak, hamburger, salads, string beans      Hydration- 12 cans of diet soda  Alcohol- no  Exercise- limited        The following portions of the patient's history were reviewed and updated as appropriate: allergies, current medications, past family history, past medical history, past social history, past surgical history, and problem list     Family History   Problem Relation Age of Onset   • Hypertension Mother    • Depression Mother    • Depression Father    • Arthritis Father    • Heart attack Paternal Uncle 32   • Colon cancer Neg Hx    • Prostate cancer Neg Hx         Review of Systems   Constitutional: Positive for fatigue  HENT: Negative for sore throat  Respiratory: Positive for shortness of breath  Negative for cough  Cardiovascular: Positive for leg swelling  Negative for chest pain and palpitations  Gastrointestinal: Negative for abdominal pain, constipation, diarrhea and nausea  Genitourinary: Negative for dysuria  Musculoskeletal: Positive for arthralgias and back pain  Skin: Negative for rash  Neurological: Negative for headaches  Psychiatric/Behavioral: Negative for dysphoric mood (well controlled on treatment)  The patient is not nervous/anxious  Objective:  /60   Pulse 82   Ht 5' 5 2\" (1 656 m)   Wt 105 kg (230 lb 6 4 oz)   BMI 38 11 kg/m²     Physical Exam  Vitals and nursing note reviewed  Constitutional:       Appearance: Normal appearance  He is obese  HENT:      Head: Normocephalic  Pulmonary:      Effort: Pulmonary effort is normal    Neurological:      General: No focal deficit present  Mental Status: He is alert and oriented to person, place, and time     Psychiatric:         Mood and Affect: Mood normal          Behavior: " Behavior normal          Thought Content:  Thought content normal          Judgment: Judgment normal             Labs   Most recent labs reviewed   Lab Results   Component Value Date    SODIUM 137 04/12/2023    K 4 1 04/12/2023     04/12/2023    CO2 30 04/12/2023    AGAP 6 04/12/2023    BUN 19 04/12/2023    CREATININE 1 13 04/12/2023    GLUC 123 04/12/2023    GLUF 129 (H) 03/28/2023    CALCIUM 9 3 04/12/2023    AST 49 (H) 04/12/2023    ALT 84 (H) 04/12/2023    ALKPHOS 119 (H) 04/12/2023    TP 6 8 04/12/2023    TBILI 0 31 04/12/2023    EGFR 65 04/12/2023     Lab Results   Component Value Date    HGBA1C 6 0 (H) 03/28/2023     Lab Results   Component Value Date    ALD1FDVLAVOZ 1 620 03/28/2023     Lab Results   Component Value Date    CHOLESTEROL 161 03/28/2023     Lab Results   Component Value Date    HDL 38 (L) 03/28/2023     Lab Results   Component Value Date    TRIG 102 03/28/2023     Lab Results   Component Value Date    LDLCALC 103 (H) 03/28/2023

## 2023-05-10 NOTE — TELEPHONE ENCOUNTER
[place]  Rx #: [id number]  NNWZMX 0 25MG/0 5ML auto-injectors       Form  [employer] Electronic Prescription Drug Authorization Form

## 2023-05-10 NOTE — ASSESSMENT & PLAN NOTE
Treatment currently includes enalapril 5 mg  Blood pressure stable today  Managed by PCP  Has upcoming appointment with cardiologist

## 2023-05-10 NOTE — ASSESSMENT & PLAN NOTE
- Discussed options of HealthyCORE-Intensive Lifestyle Intervention Program, Very Low Calorie Diet-VLCD, Conservative Program, Kya-En-Y Gastric Bypass and Vertical Sleeve Gastrectomy and the role of weight loss medications  - Explained the importance of making lifestyle changes in addition to starting anti-obesity medications  - Patient is interested in pursuing Conservative Program for now but will enroll in [facility] once established in [place]  Also will have follow up visits with medical weight management provider  - Initial weight loss goal of 5-10% weight loss for improved health  - Weight loss can improve patient's co-morbid conditions and/or prevent weight-related complications  - Labs reviewed from 3/2023 and 4/2023  -Patient lifestyle habits and barriers to his weight loss have been discussed today  Patient was encouraged to start tracking his meals more regularly and to utilize a more protein rich meal to start the day  Protein shakes and protein bars, yogurt, eggs were all discussed as options to get his day started and avoid skipping breakfast   Patient was encouraged to start cutting back on diet soda intake and to utilize more water within his day  Activity level was encouraged and patient was advised to at least do some activity for 30 minutes twice a week to start  Weight loss tools were reviewed with patient and the possible percentage of weight loss with age tool  Bariatric surgery would provide the most rapid improvement in weight, and improvement in comorbidities  Patient understood limitations of medications and lifestyle changes in regards to potential weight loss outcome but would like to continue with this path for now      Medications were reviewed:  Phentermine to be avoided due to age  Patient had previously been on topiramate and Wellbutrin for psychiatric reasons in the past   Will be cautious with topiramate due to risk for kidney stones and patient soda consumption and use of diuretic  Wellbutrin to be avoided as he is under the care of psychiatrist for bipolar disease  MP and Kendra Ford were both reviewed and patient showed interest in [facility]  And was demonstrated in the office today and all questions were answered regarding administration  THTPUN Instructions:    - Begin Wegovy 0 25 mg subcutaneously once a week  Dose changes may occur after 4 doses if medication is tolerated  You will be assessed prior to each dose change to make sure you are tolerating the medication well  - Please message me when you have 2 pens left from the prescription so there are no lapses in treatment  - Visit [email or web address]  com for further information/injection instructions    -Please eat small frequent meals to help reduce nausea  Lemon water and saltine crackers may help with this  - Side effects of Wegovy discussed: nausea, vomiting, diarrhea, and constipation  If you experience fever, nausea/vomiting, and pain radiating to your back this may be a sign of pancreatitis  Please go to the emergency room if this occurs  - Patient understands the side effects of the medication and proper administration  Patient agrees with the treatment plan and all questions were answered  Goals:  Do not skip meals  Food log via julio - options include  www  myfitnesspal com, sparkpeople  com, [employer] com, calorieking  com, [employer]  No sugary beverages - reduce diet soda intake to no more than 3 diet sodas daily  At least 64oz of water daily  Increase physical activity by 10 minutes daily   Gradually increase physical activity to a goal of 5 days per week for 30 minutes of MODERATE intensity PLUS 2 days per week of FULL BODY resistance training

## 2023-05-10 NOTE — PATIENT INSTRUCTIONS
VDITEO Instructions:    - Begin Wegovy 0 25 mg subcutaneously once a week  Dose changes may occur after 4 doses if medication is tolerated  You will be assessed prior to each dose change to make sure you are tolerating the medication well  - Please message me when you have 2 pens left from the prescription so there are no lapses in treatment  - Visit [email or web address]  com for further information/injection instructions    -Please eat small frequent meals to help reduce nausea  Lemon water and saltine crackers may help with this  - Side effects of Wegovy discussed: nausea, vomiting, diarrhea, and constipation  If you experience fever, nausea/vomiting, and pain radiating to your back this may be a sign of pancreatitis  Please go to the emergency room if this occurs

## 2023-05-10 NOTE — ASSESSMENT & PLAN NOTE
Patient unable to tolerate statins  Low-fat and low-cholesterol diet were discussed  Weight loss and diet changes will likely help with cholesterol levels

## 2023-05-11 ENCOUNTER — APPOINTMENT (OUTPATIENT)
Dept: LAB | Facility: CLINIC | Age: 73
End: 2023-05-11

## 2023-05-11 ENCOUNTER — OFFICE VISIT (OUTPATIENT)
Dept: FAMILY MEDICINE CLINIC | Facility: CLINIC | Age: 73
End: 2023-05-11

## 2023-05-11 VITALS
BODY MASS INDEX: 38.82 KG/M2 | OXYGEN SATURATION: 95 % | RESPIRATION RATE: 16 BRPM | SYSTOLIC BLOOD PRESSURE: 110 MMHG | HEART RATE: 73 BPM | DIASTOLIC BLOOD PRESSURE: 60 MMHG | HEIGHT: 65 IN | WEIGHT: 233 LBS

## 2023-05-11 DIAGNOSIS — I10 ESSENTIAL HYPERTENSION: Primary | ICD-10-CM

## 2023-05-11 DIAGNOSIS — E66.01 OBESITY, MORBID (HCC): ICD-10-CM

## 2023-05-11 DIAGNOSIS — R60.0 BILATERAL LEG EDEMA: ICD-10-CM

## 2023-05-11 DIAGNOSIS — R73.03 PREDIABETES: ICD-10-CM

## 2023-05-11 DIAGNOSIS — I10 ESSENTIAL HYPERTENSION: ICD-10-CM

## 2023-05-11 DIAGNOSIS — G47.33 OSA (OBSTRUCTIVE SLEEP APNEA): ICD-10-CM

## 2023-05-11 DIAGNOSIS — E78.6 LOW HDL (UNDER 40): ICD-10-CM

## 2023-05-11 DIAGNOSIS — E78.1 HYPERTRIGLYCERIDEMIA: ICD-10-CM

## 2023-05-11 LAB
CREAT UR-MCNC: 105 MG/DL
MICROALBUMIN UR-MCNC: 6.9 MG/L (ref 0–20)
MICROALBUMIN/CREAT 24H UR: 7 MG/G CREATININE (ref 0–30)

## 2023-05-11 RX ORDER — FUROSEMIDE 20 MG/1
20 TABLET ORAL DAILY
Qty: 30 TABLET | Refills: 2 | Status: SHIPPED | OUTPATIENT
Start: 2023-05-11

## 2023-05-11 RX ORDER — ENALAPRIL MALEATE 2.5 MG/1
2.5 TABLET ORAL DAILY
Status: CANCELLED | OUTPATIENT
Start: 2023-05-11

## 2023-05-11 NOTE — PROGRESS NOTES
"Assessment/Plan:   Diagnoses and all orders for this visit:    Essential hypertension  -     Albumin / creatinine urine ratio; Future  - BP in the office 110/60 on my repeat  - Sleep Med/Pulm adjusted pressures on his CPAP   - was advised to take Lasix 20mg QD as per Pulm/Sleep Med - has overall beneficial effects on DONALD   - advised to STOP ACEI 5mg QD and cont Lasix 20mg QD for now  - RTO in 3-4wks, with repeat CMP and urine microalbumin, for BP check - pt aware and agreeable   Bilateral leg edema  -     furosemide (LASIX) 20 mg tablet; Take 1 tablet (20 mg total) by mouth daily  -     Albumin / creatinine urine ratio; Future  DONALD (obstructive sleep apnea)    Prediabetes  - has been following with Bariatrics - has decided against bariatric surgery and will be starting Wegovy  Obesity, morbid ([address] )    Hypertriglyceridemia  Low HDL (under 40)  - allergic to Crestor - unable to walk - has stopped statin  - has Stress Test scheduled on 6/20 and has an appt with Dr Janeth Power on 6/22          Subjective:    Patient ID: Katelyn Antonio is a 81 y o  male    HPI   69yo M presents to the office with his wife for BP check   - was advised to take Lasix 20mg QD as per Pulm/Sleep Med - has overall beneficial effects on DONALD   - BP in the office 123/55 and on repeat 110/60  - current antihypertensive regimen: ACEI 5mg QD and Lasix 20mg Q48   - Shoulder surgery standpoint - feeling \"normal\"   - allergic to Crestor - unable to walk - has stopped statin  - has Stress Test scheduled on 6/20 and has an appt with Dr Janeth Power on 6/22  - has a Rheum appt scheduled for 5/31/2023   - following with GI - has a scope scheduled on 6/14/2023   - Sleep Med/Pulm adjusted pressures on his CPAP   - has been following with Bariatrics - has decided against bariatric surgery and will be starting Wegovy  - denies F/C/V/CP/palpitations  - N yesterday but in general never   - SOB is better today than its been   - (+) LE edema       The following portions of " "the patient's history were reviewed and updated as appropriate: allergies, current medications, past family history, past medical history, past social history, past surgical history and problem list     Review of Systems  as per HPI    Objective:  /60 (BP Location: Right arm, Patient Position: Sitting, Cuff Size: Large)   Pulse 73   Resp 16   Ht 5' 5 2\" (1 656 m)   Wt 106 kg (233 lb)   SpO2 95%   BMI 38 54 kg/m²    Physical Exam  Vitals reviewed  Constitutional:       General: He is not in acute distress  Appearance: Normal appearance  He is not ill-appearing, toxic-appearing or diaphoretic  HENT:      Head: Normocephalic and atraumatic  Right Ear: External ear normal       Left Ear: External ear normal    Eyes:      General: No scleral icterus  Right eye: No discharge  Left eye: No discharge  Extraocular Movements: Extraocular movements intact  Conjunctiva/sclera: Conjunctivae normal    Cardiovascular:      Rate and Rhythm: Normal rate and regular rhythm  Heart sounds: Normal heart sounds  No murmur heard  No friction rub  No gallop  Pulmonary:      Effort: Pulmonary effort is normal  No respiratory distress  Breath sounds: Normal breath sounds  No stridor  No wheezing, rhonchi or rales  Abdominal:      Palpations: Abdomen is soft  Musculoskeletal:         General: Normal range of motion  Cervical back: Normal range of motion  Right lower leg: No edema  Left lower leg: No edema  Skin:     General: Skin is warm  Neurological:      General: No focal deficit present  Mental Status: He is alert and oriented to person, place, and time  Psychiatric:         Mood and Affect: Mood normal          Behavior: Behavior normal          BMI Counseling: Body mass index is 38 54 kg/m²  The BMI is above normal  Nutrition recommendations include 3-5 servings of fruits/vegetables daily   Exercise recommendations include exercising 3-5 times " per week

## 2023-05-16 ENCOUNTER — TELEPHONE (OUTPATIENT)
Dept: FAMILY MEDICINE CLINIC | Facility: CLINIC | Age: 73
End: 2023-05-16

## 2023-05-19 ENCOUNTER — TELEPHONE (OUTPATIENT)
Dept: CCU | Facility: HOSPITAL | Age: 73
End: 2023-05-19

## 2023-05-19 NOTE — TELEPHONE ENCOUNTER
Patient contacted via phone call  Discussed medication changes recommended on previous pulmonary visit including daily Lasix and possible discontinuation of enalapril  Both of these changes were done PCP  All questions answered

## 2023-05-22 ENCOUNTER — TELEPHONE (OUTPATIENT)
Dept: BARIATRICS | Facility: CLINIC | Age: 73
End: 2023-05-22

## 2023-05-22 DIAGNOSIS — E66.01 CLASS 2 SEVERE OBESITY DUE TO EXCESS CALORIES WITH SERIOUS COMORBIDITY AND BODY MASS INDEX (BMI) OF 38.0 TO 38.9 IN ADULT (HCC): Primary | ICD-10-CM

## 2023-05-22 NOTE — TELEPHONE ENCOUNTER
Pt calling today with 2 doses of  25mg dose remaining  Patient states that he has been very successful on the medication and is not dealing with any side effects from the medication  He is requesting the  5mg dose sent to his pharmacy

## 2023-05-30 NOTE — PROGRESS NOTES
Assessment and Plan:   Mr Jason Rubi is a 74-year-old male with history significant for primary generalized osteoarthritis status post right shoulder reverse arthroplasty and bilateral trochanteric bursitis who presents for an evaluation of joint pains  He is referred by Dr Viry Dong for a rheumatology consult  Shonda Veloz presents today for an evaluation of left shoulder pain and bilateral lateral aspect hip pain which has been diagnosed as left shoulder primary osteoarthritis and bilateral trochanteric bursitis, respectively which are both diagnoses I agree with  For management of these conditions he may follow-up with his primary care physician and and orthopedics with measures such as pain medications, physical therapy and cortisone injections as needed  We reviewed based on his presentation and normal serologies that there are no concerns for an underlying inflammatory arthropathy contributing to his symptoms  In this case he does not need a rheumatology follow-up  Plan:  Diagnoses and all orders for this visit:    Polyarthralgia  -     Ambulatory Referral to Rheumatology  -     Ambulatory Referral to Rheumatology    Primary generalized (osteo)arthritis    Trochanteric bursitis of both hips      I have personally reviewed pertinent films in PACS of the left shoulder XR which shows glenohumeral osteoarthritis  Activities as tolerated  Exercise: try to maintain a low impact exercise regimen as much as possible  Continue other medications as prescribed by PCP and other specialists  RTC PRN  HPI  Mr Jason Rubi is a 74-year-old male with history significant for primary generalized osteoarthritis status post right shoulder reverse arthroplasty and bilateral trochanteric bursitis who presents for an evaluation of joint pains  He is referred by Dr Viry Dong for a rheumatology consult      Patient reports he has experienced left shoulder pain for the past 2 to 3 years which he describes to a moderate degree but states overall it is stable  He manages his symptoms with daily topical diclofenac gel, Tylenol and ibuprofen as needed which does help  He is also following with orthopedics for intra-articular cortisone injections as needed  He was seeing them for advanced right shoulder osteoarthritis which eventually required a reverse arthroplasty in January 2023  He states it took a few months to recover from the surgery but he has been feeling well over the past week and no longer has pain in his right shoulder  He reports as a result of a prolonged recovery period he did gain weight and this has been contributing to pain on the outer aspects of his bilateral hips  He was diagnosed with trochanteric bursitis and received bursal injections but states that this did not help  The diclofenac gel does not help either  Other than these areas of pain he denies joint pains, swollen joints or morning stiffness of his joints  No history of inflammatory eye disease, psoriasis or inflammatory bowel disease  He reports a history of multiple family members with osteoarthritis  Over the past few months he did have autoimmune testing done which showed a normal DENIS, Sjogren's antibodies, ESR, CRP, CK, rheumatoid factor and anti-CCP antibody  The following portions of the patient's history were reviewed and updated as appropriate: allergies, current medications, past family history, past medical history, past social history, past surgical history and problem list       Review of Systems  Constitutional: Negative for weight change, fevers, chills, night sweats, fatigue  ENT/Mouth: Negative for hearing changes, ear pain, nasal congestion, sinus pain, hoarseness, sore throat, rhinorrhea, swallowing difficulty  Eyes: Negative for pain, redness, discharge, vision changes  Cardiovascular: Negative for chest pain, SOB, palpitations  Respiratory: Negative for cough, sputum, wheezing, dyspnea  Gastrointestinal: Negative for nausea, vomiting, diarrhea, constipation, pain, heartburn  Genitourinary: Negative for dysuria, urinary frequency, hematuria  Musculoskeletal: As per HPI  Skin: Negative for skin rash, color changes  Neuro: Negative for weakness, numbness, tingling, loss of consciousness  Psych: Negative for anxiety, depression  Heme/Lymph: Negative for easy bruising, bleeding, lymphadenopathy          Past Medical History:   Diagnosis Date   • ARIANE (acute kidney injury) ([facility] ) 01/13/2023   • Arthritis    • Asthma    • Colon polyp    • 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[facility] )    • Hypertension 2012   • 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[facility] )    • Manic depression ([facility] )    • Right lower lobe pneumonia 01/12/2023   • Sleep apnea        Past Surgical History:   Procedure Laterality Date   • COLONOSCOPY  12/13/2022   • [place] ARTHROPLASTY GLENOHUMERAL JOINT TOTAL SHOULDER Right 1/10/2023    Procedure: ARTHROPLASTY SHOULDER REVERSE WITH BICEPS TENODESIS;  Surgeon: Giovani Leija MD;  Location: [facility];  Service: Orthopedics   • UMBILICAL HERNIA REPAIR         Social History     Socioeconomic History   • Marital status: /Civil Union     Spouse name: Not on file   • Number of children: Not on file   • Years of education: Not on file   • Highest education level: Not on file   Occupational History   • Not on file   Tobacco Use   • Smoking status: Never   • Smokeless tobacco: Never   Vaping Use   • Vaping Use: Never used   Substance and Sexual Activity   • Alcohol use: Never   • Drug use: Never   • Sexual activity: Not Currently     Partners: Female     Birth control/protection: None   Other Topics Concern   • Not on file   Social History Narrative   • Not on file     Social Determinants of Health     Financial Resource Strain: Low Risk  (2/22/2023)    Overall Financial Resource Strain (CARDIA)    • Difficulty of Paying Living Expenses: Not hard at all   Food Insecurity: No Food Insecurity (10/14/2022)    Hunger Vital Sign    • Worried About Running Out of Food in the Last Year: Never true    • Ran Out of Food in the Last Year: Never true   Transportation Needs: No Transportation Needs (2/22/2023)    PRAPARE - Transportation    • Lack of Transportation (Medical): No    • Lack of Transportation (Non-Medical):  No   Physical Activity: Not on file   Stress: Not on file   Social Connections: Not on file   Intimate Partner Violence: Not on file   Housing Stability: Low Risk  (10/14/2022)    Housing Stability Vital Sign    • Unable to Pay for Housing in the Last Year: No    • Number of Places Lived in the Last Year: 1    • Unstable Housing in the Last Year: No       Family History   Problem Relation Age of Onset   • Hypertension Mother    • Depression Mother    • Depression Father    • Arthritis Father    • Heart attack Paternal Uncle 32   • Colon cancer Neg Hx    • Prostate cancer Neg Hx        Allergies   Allergen Reactions   • Crestor [Rosuvastatin] Other (See Comments)     Lost ability to walk and balance   • Simvastatin Shortness Of Breath     Per Cardio, thinks this was switch to a generic vs brand name    • Spiriva Respimat [Tiotropium Bromide Monohydrate] Rash       Current Outpatient Medications:   •  albuterol (PROVENTIL HFA,VENTOLIN HFA) 90 mcg/act inhaler, Inhale 2 puffs every 4 (four) hours as needed for wheezing (Patient not taking: Reported on 5/10/2023), Disp: 6 7 g, Rfl: 2  •  Asmanex  MCG/ACT AERO, INHALE 1 PUFF TWICE A DAY IN THE MORNING AND EVENING (Patient not taking: Reported on 5/10/2023), Disp: , Rfl:   •  clonazePAM (KlonoPIN) 1 mg tablet, Take 1 mg by mouth daily at bedtime, Disp: , Rfl:   •  Diclofenac Sodium (VOLTAREN) 1 %, APPLY 2 GRAMS TOPICALLY TO THE AFFECTED AREA FOUR TIMES DAILY, Disp: 150 g, Rfl: 0  •  fluticasone-umeclidinium-vilanterol (Trelegy Ellipta) 200-62 5-25 mcg/actuation AEPB inhaler, Inhale 1 puff daily, Disp: 1 each, Rfl: 5  •  furosemide (LASIX) 20 mg tablet, Take 1 tablet (20 mg total) by mouth daily, Disp: 30 tablet, Rfl: 2  •  lamoTRIgine (LaMICtal) 200 MG tablet, Take 200 mg by mouth daily, Disp: , Rfl:   •  levothyroxine 100 mcg tablet, Take 1 tablet (100 mcg total) by mouth daily, Disp: 90 tablet, Rfl: 0  •  montelukast (SINGULAIR) 10 mg tablet, Take 10 mg by mouth in the morning, Disp: , Rfl:   •  pramipexole (MIRAPEX) 1 mg tablet, Take 2 mg by mouth daily at bedtime, Disp: , Rfl:   •  Semaglutide-Weight Management (WEGOVY) 0 5 MG/0 5ML, Inject 0 5 mL (0 5 mg total) under the skin once a week, Disp: 2 mL, Rfl: 0  •  tamsulosin (FLOMAX) 0 4 mg, 0 4 mg in the morning, Disp: , Rfl:       Objective:    Vitals:    05/31/23 0859   BP: 142/78   Pulse: 86   Weight: 103 kg (226 lb 11 2 oz)       Physical Exam  General: Well appearing, well nourished, in no distress  Oriented x 3, normal mood and affect  Ambulating without difficulty  Skin: Good turgor, no rash, unusual bruising or prominent lesions  Hair: Normal texture and distribution  HEENT:  Head: Normocephalic, atraumatic  Eyes: Conjunctiva clear, sclera non-icteric, EOM intact  Extremities: No amputations or deformities, cyanosis, edema  Musculoskeletal:   Left shoulder without tenderness or soft tissue swelling at this time but crepitus noted  Neurologic: Alert and oriented  No focal neurological deficits appreciated  Psychiatric: Normal mood and affect  IVÁN Almodovar    Rheumatology

## 2023-05-31 ENCOUNTER — APPOINTMENT (OUTPATIENT)
Dept: LAB | Facility: CLINIC | Age: 73
End: 2023-05-31
Payer: COMMERCIAL

## 2023-05-31 ENCOUNTER — OFFICE VISIT (OUTPATIENT)
Dept: RHEUMATOLOGY | Facility: CLINIC | Age: 73
End: 2023-05-31

## 2023-05-31 VITALS
DIASTOLIC BLOOD PRESSURE: 78 MMHG | SYSTOLIC BLOOD PRESSURE: 142 MMHG | WEIGHT: 226.7 LBS | HEART RATE: 86 BPM | BODY MASS INDEX: 37.49 KG/M2

## 2023-05-31 DIAGNOSIS — I10 ESSENTIAL HYPERTENSION: ICD-10-CM

## 2023-05-31 DIAGNOSIS — M70.62 TROCHANTERIC BURSITIS OF BOTH HIPS: ICD-10-CM

## 2023-05-31 DIAGNOSIS — M15.0 PRIMARY GENERALIZED (OSTEO)ARTHRITIS: ICD-10-CM

## 2023-05-31 DIAGNOSIS — M25.50 POLYARTHRALGIA: Primary | ICD-10-CM

## 2023-05-31 DIAGNOSIS — M70.61 TROCHANTERIC BURSITIS OF BOTH HIPS: ICD-10-CM

## 2023-05-31 DIAGNOSIS — K82.9 GALL BLADDER DISEASE: ICD-10-CM

## 2023-05-31 DIAGNOSIS — R60.0 BILATERAL LEG EDEMA: ICD-10-CM

## 2023-05-31 LAB
ALBUMIN SERPL BCP-MCNC: 4.6 G/DL (ref 3.5–5)
ALP SERPL-CCNC: 116 U/L (ref 34–104)
ALT SERPL W P-5'-P-CCNC: 42 U/L (ref 7–52)
ANION GAP SERPL CALCULATED.3IONS-SCNC: 8 MMOL/L (ref 4–13)
AST SERPL W P-5'-P-CCNC: 34 U/L (ref 13–39)
BASOPHILS # BLD AUTO: 0.06 THOUSANDS/ÂΜL (ref 0–0.1)
BASOPHILS NFR BLD AUTO: 1 % (ref 0–1)
BILIRUB SERPL-MCNC: 0.59 MG/DL (ref 0.2–1)
BUN SERPL-MCNC: 18 MG/DL (ref 5–25)
CALCIUM SERPL-MCNC: 9.8 MG/DL (ref 8.4–10.2)
CHLORIDE SERPL-SCNC: 91 MMOL/L (ref 96–108)
CHOLEST SERPL-MCNC: 240 MG/DL
CO2 SERPL-SCNC: 34 MMOL/L (ref 21–32)
CREAT SERPL-MCNC: 1.28 MG/DL (ref 0.6–1.3)
EOSINOPHIL # BLD AUTO: 0.07 THOUSAND/ÂΜL (ref 0–0.61)
EOSINOPHIL NFR BLD AUTO: 1 % (ref 0–6)
ERYTHROCYTE [DISTWIDTH] IN BLOOD BY AUTOMATED COUNT: 14.6 % (ref 11.6–15.1)
GFR SERPL CREATININE-BSD FRML MDRD: 56 ML/MIN/1.73SQ M
GLUCOSE P FAST SERPL-MCNC: 108 MG/DL (ref 65–99)
HCT VFR BLD AUTO: 46.3 % (ref 36.5–49.3)
HDLC SERPL-MCNC: 48 MG/DL
HGB BLD-MCNC: 14.9 G/DL (ref 12–17)
IMM GRANULOCYTES # BLD AUTO: 0.09 THOUSAND/UL (ref 0–0.2)
IMM GRANULOCYTES NFR BLD AUTO: 1 % (ref 0–2)
LDLC SERPL CALC-MCNC: 168 MG/DL (ref 0–100)
LYMPHOCYTES # BLD AUTO: 1.38 THOUSANDS/ÂΜL (ref 0.6–4.47)
LYMPHOCYTES NFR BLD AUTO: 13 % (ref 14–44)
MCH RBC QN AUTO: 29.3 PG (ref 26.8–34.3)
MCHC RBC AUTO-ENTMCNC: 32.2 G/DL (ref 31.4–37.4)
MCV RBC AUTO: 91 FL (ref 82–98)
MONOCYTES # BLD AUTO: 0.71 THOUSAND/ÂΜL (ref 0.17–1.22)
MONOCYTES NFR BLD AUTO: 7 % (ref 4–12)
NEUTROPHILS # BLD AUTO: 8.04 THOUSANDS/ÂΜL (ref 1.85–7.62)
NEUTS SEG NFR BLD AUTO: 77 % (ref 43–75)
NONHDLC SERPL-MCNC: 192 MG/DL
NRBC BLD AUTO-RTO: 0 /100 WBCS
PLATELET # BLD AUTO: 242 THOUSANDS/UL (ref 149–390)
PMV BLD AUTO: 9.5 FL (ref 8.9–12.7)
POTASSIUM SERPL-SCNC: 4.1 MMOL/L (ref 3.5–5.3)
PROT SERPL-MCNC: 7.9 G/DL (ref 6.4–8.4)
RBC # BLD AUTO: 5.08 MILLION/UL (ref 3.88–5.62)
SODIUM SERPL-SCNC: 133 MMOL/L (ref 135–147)
TRIGL SERPL-MCNC: 119 MG/DL
WBC # BLD AUTO: 10.35 THOUSAND/UL (ref 4.31–10.16)

## 2023-05-31 PROCEDURE — 85025 COMPLETE CBC W/AUTO DIFF WBC: CPT

## 2023-05-31 PROCEDURE — 80053 COMPREHEN METABOLIC PANEL: CPT

## 2023-05-31 PROCEDURE — 80061 LIPID PANEL: CPT

## 2023-05-31 PROCEDURE — 36415 COLL VENOUS BLD VENIPUNCTURE: CPT

## 2023-06-01 ENCOUNTER — APPOINTMENT (OUTPATIENT)
Dept: RADIOLOGY | Facility: OTHER | Age: 73
End: 2023-06-01
Payer: COMMERCIAL

## 2023-06-01 ENCOUNTER — TELEPHONE (OUTPATIENT)
Dept: SLEEP CENTER | Facility: CLINIC | Age: 73
End: 2023-06-01

## 2023-06-01 ENCOUNTER — PATIENT MESSAGE (OUTPATIENT)
Dept: SLEEP CENTER | Facility: CLINIC | Age: 73
End: 2023-06-01

## 2023-06-01 ENCOUNTER — OFFICE VISIT (OUTPATIENT)
Dept: OBGYN CLINIC | Facility: OTHER | Age: 73
End: 2023-06-01

## 2023-06-01 VITALS
DIASTOLIC BLOOD PRESSURE: 64 MMHG | HEIGHT: 65 IN | WEIGHT: 226 LBS | SYSTOLIC BLOOD PRESSURE: 125 MMHG | BODY MASS INDEX: 37.65 KG/M2 | HEART RATE: 81 BPM

## 2023-06-01 DIAGNOSIS — S42.114A CLOSED NONDISPLACED FRACTURE OF BODY OF RIGHT SCAPULA, INITIAL ENCOUNTER: Primary | ICD-10-CM

## 2023-06-01 DIAGNOSIS — E66.01 CLASS 2 SEVERE OBESITY DUE TO EXCESS CALORIES WITH SERIOUS COMORBIDITY AND BODY MASS INDEX (BMI) OF 38.0 TO 38.9 IN ADULT (HCC): ICD-10-CM

## 2023-06-01 DIAGNOSIS — M19.011 PRIMARY OSTEOARTHRITIS, RIGHT SHOULDER: ICD-10-CM

## 2023-06-01 DIAGNOSIS — M19.012 PRIMARY OSTEOARTHRITIS, LEFT SHOULDER: ICD-10-CM

## 2023-06-01 PROCEDURE — 73030 X-RAY EXAM OF SHOULDER: CPT

## 2023-06-01 NOTE — TELEPHONE ENCOUNTER
----- Message from Isatu Dudley sent at 2023  6:16 AM EDT -----  [facility] Asking For A Prescription  Contact: [phone]  I am not sure if this request should go through you or through [facility]   It is for a CPAP nasal pillow and related supplies    Their Fax Number is [phone]  Thanks very much

## 2023-06-01 NOTE — TELEPHONE ENCOUNTER
"Last office visit 3/28/2023  Dr Magy Diaz, please provide amended Rx for CPAP supplies for \"nasal pillows\"      Thank you  "

## 2023-06-01 NOTE — PROGRESS NOTES
"  Assessment  Diagnoses and all orders for this visit:    Closed nondisplaced fracture of body of right scapula, initial encounter    Other orders  -     Cancel: XR shoulder 2+ vw right; Future        Discussion and Plan:    Brynn Felix has healed his scapula body fracture  He may continue with all activities to tolerance  We will see him back in the office as needed for the right shoulder  If he develops pain he will let us know  For the left shoulder, he is aware the injection can be repeated 4-6 months after initial injection  If pain returns, he will let us know  Subjective:   Patient ID: Emely Gaffney is a 81 y o  male    Brynn Felix returns in follow up of the right shoulder  He had a scapula body fracture post-operatively  Reports complete resolution of his right shoulder pain  He is able to use the arm for ADLs, driving and activities of enjoyment without any pain  He denies pain that interferes with sleep  He is pleased with his function  Reports improvement with the left shoulder injection  The painful popping has resolved as well  The following portions of the patient's history were reviewed and updated as appropriate: allergies, current medications, past family history, past medical history, past social history, past surgical history and problem list     Review of Systems   Constitutional: Negative for chills and fever  HENT: Negative for hearing loss  Eyes: Negative for visual disturbance  Respiratory: Negative for shortness of breath  Cardiovascular: Negative for chest pain  Gastrointestinal: Negative for abdominal pain  Musculoskeletal:        As reviewed in the HPI   Skin: Negative for rash  Neurological:        As reviewed in the HPI   Psychiatric/Behavioral: Negative for agitation         Objective:  /64   Pulse 81   Ht 5' 5 2\" (1 656 m)   Wt 103 kg (226 lb)   BMI 37 38 kg/m²       Right Shoulder Exam     Tenderness   The patient is experiencing no " tenderness  Range of Motion   Passive abduction: normal   External rotation: 40   Forward flexion: 160   Internal rotation 0 degrees: Sacrum     Other   Erythema: absent  Scars: present (healed deltopectoral incision)  Sensation: normal  Pulse: present            Physical Exam  Constitutional:       Appearance: He is well-developed  HENT:      Head: Normocephalic  Pulmonary:      Effort: No respiratory distress  Breath sounds: No wheezing  Musculoskeletal:      Cervical back: Normal range of motion  Skin:     General: Skin is warm and dry  Neurological:      Mental Status: He is alert and oriented to person, place, and time  Psychiatric:         Behavior: Behavior normal          Thought Content: Thought content normal          Judgment: Judgment normal            I have personally reviewed pertinent films in PACS and my interpretation is as follows  2 views right shoulder - healed scapular body fracture - callous formation present    Stable reverse total shoulder prosthesis

## 2023-06-02 ENCOUNTER — OFFICE VISIT (OUTPATIENT)
Dept: FAMILY MEDICINE CLINIC | Facility: CLINIC | Age: 73
End: 2023-06-02

## 2023-06-02 VITALS
HEART RATE: 86 BPM | RESPIRATION RATE: 16 BRPM | BODY MASS INDEX: 37.65 KG/M2 | WEIGHT: 226 LBS | OXYGEN SATURATION: 93 % | SYSTOLIC BLOOD PRESSURE: 122 MMHG | HEIGHT: 65 IN | DIASTOLIC BLOOD PRESSURE: 72 MMHG

## 2023-06-02 DIAGNOSIS — R60.0 BILATERAL LEG EDEMA: ICD-10-CM

## 2023-06-02 DIAGNOSIS — G47.33 OSA (OBSTRUCTIVE SLEEP APNEA): Primary | ICD-10-CM

## 2023-06-02 DIAGNOSIS — E78.1 HYPERTRIGLYCERIDEMIA: ICD-10-CM

## 2023-06-02 DIAGNOSIS — E66.01 OBESITY, MORBID (HCC): ICD-10-CM

## 2023-06-02 DIAGNOSIS — E78.00 ELEVATED LDL CHOLESTEROL LEVEL: ICD-10-CM

## 2023-06-02 DIAGNOSIS — I10 ESSENTIAL HYPERTENSION: Primary | ICD-10-CM

## 2023-06-02 DIAGNOSIS — E78.6 LOW HDL (UNDER 40): ICD-10-CM

## 2023-06-02 DIAGNOSIS — Z78.9 STATIN INTOLERANCE: ICD-10-CM

## 2023-06-02 DIAGNOSIS — R73.03 PREDIABETES: ICD-10-CM

## 2023-06-02 DIAGNOSIS — G47.33 OSA (OBSTRUCTIVE SLEEP APNEA): ICD-10-CM

## 2023-06-02 DIAGNOSIS — N40.0 BENIGN PROSTATIC HYPERPLASIA, UNSPECIFIED WHETHER LOWER URINARY TRACT SYMPTOMS PRESENT: ICD-10-CM

## 2023-06-02 RX ORDER — TAMSULOSIN HYDROCHLORIDE 0.4 MG/1
0.8 CAPSULE ORAL DAILY
Qty: 180 CAPSULE | Refills: 0 | Status: SHIPPED | OUTPATIENT
Start: 2023-06-02

## 2023-06-02 NOTE — PROGRESS NOTES
Assessment/Plan:   Diagnoses and all orders for this visit:    Essential hypertension  Bilateral leg edema  DONALD (obstructive sleep apnea)  - BP stable in the office  - cont Lasix 20mg QD for now  - Sleep Med/Pulm adjusted pressures on his CPAP   - RTO in 3months for BP check - pt aware and agreeable     Prediabetes  Obesity, morbid ([address] )  - has been following with Bariatrics - on Wegovy  - has lost 6lbs since last OV on 5/11/2023     Elevated LDL cholesterol level  Hypertriglyceridemia  -     Lipid panel; Future  Low HDL (under 40)  -     Lipid panel; Future  Statin intolerance  - Lipids (5/31/2023): , , HDL 48,     - allergic to Crestor - unable to walk   - unable to tolerate Simvastatin - SOB   - diet/exercise  - has Stress Test scheduled on 6/20 and has an appt with Dr Noel Nieves on 6/22 - appreciate Cardio recommendations     Benign prostatic hyperplasia, unspecified whether lower urinary tract symptoms present  -     tamsulosin (FLOMAX) 0 4 mg; Take 2 capsules (0 8 mg total) by mouth in the morning          Subjective:    Patient ID: Josias Gomez is a 81 y o  male    HPI  69yo M presents to the office for f/u   - was advised to take Lasix 20mg QD as per Pulm/Sleep Med - has overall beneficial effects on DONALD   - ACEI was stopped at last OV   - BP in the office 122/72   - took Lasix an hour prior to OV  - reviewed labs done 5/31/2023 - of note, did have an episode of emesis on way to getting labs done as has started [facility]  - of note, has lost 6lbs since last OV on 5/11/2023  - allergic to Crestor - unable to walk - has stopped statin  - unable to tolerate Simvastatin - SOB   - has Stress Test scheduled on 6/20 and has an appt with Dr Noel Nieves on 6/22  - following with GI - has a scope scheduled on 6/14/2023   - Sleep Med/Pulm adjusted pressures on his CPAP   - denies F/C/V/CP/palpitations  - SOB is better today than its been       The following portions of the patient's history were reviewed and "updated as appropriate: allergies, current medications, past family history, past medical history, past social history, past surgical history and problem list     Review of Systems  as per HPI    Objective:  /72   Pulse 86   Resp 16   Ht 5' 5 25\" (1 657 m)   Wt 103 kg (226 lb)   SpO2 93%   BMI 37 32 kg/m²    Physical Exam  Vitals reviewed  Constitutional:       General: He is not in acute distress  Appearance: Normal appearance  He is not ill-appearing, toxic-appearing or diaphoretic  HENT:      Head: Normocephalic and atraumatic  Right Ear: External ear normal       Left Ear: External ear normal       Nose: Nose normal    Eyes:      General: No scleral icterus  Right eye: No discharge  Left eye: No discharge  Extraocular Movements: Extraocular movements intact  Conjunctiva/sclera: Conjunctivae normal    Pulmonary:      Effort: Pulmonary effort is normal    Musculoskeletal:         General: Normal range of motion  Cervical back: Normal range of motion  Right lower leg: Edema present  Left lower leg: Edema present  Skin:     General: Skin is warm  Neurological:      General: No focal deficit present  Mental Status: He is alert and oriented to person, place, and time     Psychiatric:         Mood and Affect: Mood normal          Behavior: Behavior normal          "

## 2023-06-05 NOTE — TELEPHONE ENCOUNTER
Updated Rx for PAP resupply sent to [facility] via [place]  Patient made aware via MyChart reply

## 2023-06-06 LAB

## 2023-06-08 ENCOUNTER — TELEPHONE (OUTPATIENT)
Dept: BARIATRICS | Facility: CLINIC | Age: 73
End: 2023-06-08

## 2023-06-08 DIAGNOSIS — E66.01 CLASS 2 SEVERE OBESITY DUE TO EXCESS CALORIES WITH SERIOUS COMORBIDITY AND BODY MASS INDEX (BMI) OF 38.0 TO 38.9 IN ADULT (HCC): ICD-10-CM

## 2023-06-08 DIAGNOSIS — E66.01 CLASS 2 SEVERE OBESITY DUE TO EXCESS CALORIES WITH SERIOUS COMORBIDITY AND BODY MASS INDEX (BMI) OF 38.0 TO 38.9 IN ADULT (HCC): Primary | ICD-10-CM

## 2023-06-08 NOTE — TELEPHONE ENCOUNTER
[facility] (Key: [id number]) - [id number]  Saxenda 18MG/3ML pen-injectors  Status: PA RequestCreated: June 8th, 9098 [phone]: ONRQ 9FL, 7506  Open

## 2023-06-13 ENCOUNTER — ANESTHESIA EVENT (OUTPATIENT)
Dept: ANESTHESIOLOGY | Facility: HOSPITAL | Age: 73
End: 2023-06-13

## 2023-06-13 ENCOUNTER — ANESTHESIA (OUTPATIENT)
Dept: ANESTHESIOLOGY | Facility: HOSPITAL | Age: 73
End: 2023-06-13

## 2023-06-14 ENCOUNTER — ANESTHESIA (OUTPATIENT)
Dept: GASTROENTEROLOGY | Facility: HOSPITAL | Age: 73
End: 2023-06-14

## 2023-06-14 ENCOUNTER — HOSPITAL ENCOUNTER (OUTPATIENT)
Dept: GASTROENTEROLOGY | Facility: HOSPITAL | Age: 73
Setting detail: OUTPATIENT SURGERY
Discharge: HOME | End: 2023-06-14
Attending: INTERNAL MEDICINE
Payer: COMMERCIAL

## 2023-06-14 ENCOUNTER — ANESTHESIA EVENT (OUTPATIENT)
Dept: GASTROENTEROLOGY | Facility: HOSPITAL | Age: 73
End: 2023-06-14

## 2023-06-14 VITALS
DIASTOLIC BLOOD PRESSURE: 56 MMHG | HEIGHT: 67 IN | OXYGEN SATURATION: 95 % | HEART RATE: 78 BPM | RESPIRATION RATE: 15 BRPM | WEIGHT: 226.2 LBS | BODY MASS INDEX: 35.5 KG/M2 | TEMPERATURE: 97.3 F | SYSTOLIC BLOOD PRESSURE: 116 MMHG

## 2023-06-14 DIAGNOSIS — Z86.010 HISTORY OF COLON POLYPS: ICD-10-CM

## 2023-06-14 PROCEDURE — 45380 COLONOSCOPY AND BIOPSY: CPT | Performed by: INTERNAL MEDICINE

## 2023-06-14 PROCEDURE — 88305 TISSUE EXAM BY PATHOLOGIST: CPT | Performed by: PATHOLOGY

## 2023-06-14 PROCEDURE — 45385 COLONOSCOPY W/LESION REMOVAL: CPT | Performed by: INTERNAL MEDICINE

## 2023-06-14 RX ORDER — ONDANSETRON 2 MG/ML
4 INJECTION INTRAMUSCULAR; INTRAVENOUS ONCE AS NEEDED
Status: CANCELLED | OUTPATIENT
Start: 2023-06-14

## 2023-06-14 RX ORDER — LIDOCAINE HYDROCHLORIDE 10 MG/ML
INJECTION, SOLUTION EPIDURAL; INFILTRATION; INTRACAUDAL; PERINEURAL AS NEEDED
Status: DISCONTINUED | OUTPATIENT
Start: 2023-06-14 | End: 2023-06-14

## 2023-06-14 RX ORDER — SODIUM CHLORIDE, SODIUM LACTATE, POTASSIUM CHLORIDE, CALCIUM CHLORIDE 600; 310; 30; 20 MG/100ML; MG/100ML; MG/100ML; MG/100ML
INJECTION, SOLUTION INTRAVENOUS CONTINUOUS PRN
Status: DISCONTINUED | OUTPATIENT
Start: 2023-06-14 | End: 2023-06-14

## 2023-06-14 RX ORDER — FENTANYL CITRATE/PF 50 MCG/ML
25 SYRINGE (ML) INJECTION
Status: CANCELLED | OUTPATIENT
Start: 2023-06-14

## 2023-06-14 RX ORDER — ALBUTEROL SULFATE 2.5 MG/3ML
2.5 SOLUTION RESPIRATORY (INHALATION) ONCE AS NEEDED
Status: CANCELLED | OUTPATIENT
Start: 2023-06-14

## 2023-06-14 RX ORDER — PROPOFOL 10 MG/ML
INJECTION, EMULSION INTRAVENOUS AS NEEDED
Status: DISCONTINUED | OUTPATIENT
Start: 2023-06-14 | End: 2023-06-14

## 2023-06-14 RX ADMIN — SODIUM CHLORIDE, SODIUM LACTATE, POTASSIUM CHLORIDE, AND CALCIUM CHLORIDE: .6; .31; .03; .02 INJECTION, SOLUTION INTRAVENOUS at 09:17

## 2023-06-14 RX ADMIN — PROPOFOL 40 MG: 10 INJECTION, EMULSION INTRAVENOUS at 09:41

## 2023-06-14 RX ADMIN — PROPOFOL 40 MG: 10 INJECTION, EMULSION INTRAVENOUS at 09:29

## 2023-06-14 RX ADMIN — PROPOFOL 40 MG: 10 INJECTION, EMULSION INTRAVENOUS at 09:45

## 2023-06-14 RX ADMIN — PROPOFOL 20 MG: 10 INJECTION, EMULSION INTRAVENOUS at 09:24

## 2023-06-14 RX ADMIN — LIDOCAINE HYDROCHLORIDE 50 MG: 10 INJECTION, SOLUTION EPIDURAL; INFILTRATION; INTRACAUDAL at 09:22

## 2023-06-14 RX ADMIN — PROPOFOL 40 MG: 10 INJECTION, EMULSION INTRAVENOUS at 09:23

## 2023-06-14 RX ADMIN — PROPOFOL 100 MG: 10 INJECTION, EMULSION INTRAVENOUS at 09:22

## 2023-06-14 RX ADMIN — PROPOFOL 30 MG: 10 INJECTION, EMULSION INTRAVENOUS at 09:36

## 2023-06-14 RX ADMIN — PROPOFOL 40 MG: 10 INJECTION, EMULSION INTRAVENOUS at 09:26

## 2023-06-14 RX ADMIN — PROPOFOL 40 MG: 10 INJECTION, EMULSION INTRAVENOUS at 09:32

## 2023-06-14 NOTE — DISCHARGE SUMMARY
Discharge Summary - An Green 81 y o  male MRN: [id number]    Unit/Bed#:  Encounter: [id number]    Admission Date:  6/14/2023    Admitting Diagnosis: History of colon polyps [Z86 010]    HPI: History of multiple colon polyps removed 6 months ago  Prep was suboptimal     Procedures Performed: No orders of the defined types were placed in this encounter  Summary of Hospital Course: Tolerated procedure well    Significant Findings, Care, Treatment and Services Provided: Multiple large polyps removed again  Extensive diverticulosis noted  Complications: None    Discharge Diagnosis: See above    Medical Problems     Resolved Problems  Date Reviewed: 6/2/2023   None         Condition at Discharge: good         Discharge instructions/Information to patient and family:   See after visit summary for information provided to patient and family  Provisions for Follow-Up Care:  See after visit summary for information related to follow-up care and any pertinent home health orders        PCP: Odin Quach DO    Disposition: Home

## 2023-06-14 NOTE — ANESTHESIA PREPROCEDURE EVALUATION
Procedure:  COLONOSCOPY  Prior GA Mac 3 G2a   TTE: EF 65%m RV wnl   ECG: NSR with R axis     Cardiac meds: lasix 20 mg  Asthma - Trelegy well controlled   On klonopin and lamictal - takes for depression  DM-2 Saxenda   DONALD - wears CPAP I recommended wearing for naps today and sleeping tonight    Denies the following: CP/SOB with exertion, COPD, stroke/TIA, seizure    Relevant Problems   CARDIO   (+) Asymptomatic PVCs   (+) CAD in native artery   (+) Essential hypertension   (+) Mixed hyperlipidemia   (+) Orthopnea      ENDO   (+) Hypothyroidism      GI/HEPATIC   (+) Dysphagia   (+) Fatty liver      /RENAL   (+) BPH (benign prostatic hyperplasia)      MUSCULOSKELETAL   (+) Arthritis   (+) Disease characterized by destruction of skeletal muscle   (+) Osteoarthritis of right shoulder   (+) Primary osteoarthritis, left shoulder      NEURO/PSYCH   (+) Chronic pain in right shoulder      PULMONARY   (+) Asthma   (+) Moderate persistent asthma without complication   (+) DONALD (obstructive sleep apnea)   (+) Orthopnea        Physical Exam    Airway    Mallampati score: II  TM Distance: >3 FB  Neck ROM: full     Dental   No notable dental hx     Cardiovascular      Pulmonary      Other Findings        Anesthesia Plan  ASA Score- 3     Anesthesia Type- IV sedation with anesthesia with ASA Monitors  Additional Monitors:   Airway Plan:           Plan Factors-Exercise tolerance (METS): >4 METS  Chart reviewed  EKG reviewed  Existing labs reviewed  Patient summary reviewed  Patient is not a current smoker  Obstructive sleep apnea risk education given perioperatively  Induction-     Postoperative Plan-     Informed Consent- Anesthetic plan and risks discussed with patient  I personally reviewed this patient with the CRNA  Discussed and agreed on the Anesthesia Plan with the CRNA  Meredith Corona

## 2023-06-14 NOTE — ANESTHESIA POSTPROCEDURE EVALUATION
Post-Op Assessment Note    CV Status:  Stable    Pain management: adequate     Mental Status:  Alert and awake   Hydration Status:  Stable   Airway Patency:  Patent and adequate      Post Op Vitals Reviewed: Yes      Staff: Anesthesiologist, CRNA         No notable events documented      BP   100/54   Temp 97 7   Pulse 76   Resp 16   SpO2 94% on RA

## 2023-06-14 NOTE — H&P
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[facility] )    • Manic depression ([facility] )    • Right lower lobe pneumonia 01/12/2023   • Sleep apnea      Past Surgical History:   Procedure Laterality Date   • COLONOSCOPY  12/13/2022   • [place] ARTHROPLASTY GLENOHUMERAL JOINT TOTAL SHOULDER Right 1/10/2023    Procedure: ARTHROPLASTY SHOULDER REVERSE WITH BICEPS TENODESIS;  Surgeon: Barber Kruger MD;  Location: [facility];  Service: Orthopedics   • UMBILICAL HERNIA REPAIR       Social History   Social History     Substance and Sexual Activity   Alcohol Use Never     Social History     Substance and Sexual Activity   Drug Use Never     Social History     Tobacco Use   Smoking Status Never   Smokeless Tobacco Never     Family History   Problem Relation Age of Onset   • Hypertension Mother    • Depression Mother    • Depression Father    • Arthritis Father    • Heart attack Paternal Uncle 32   • Colon cancer Neg Hx    • Prostate cancer Neg Hx        Meds/Allergies       Current Outpatient Medications:   •  clonazePAM (KlonoPIN) 1 mg tablet  •  fluticasone-umeclidinium-vilanterol (Trelegy Ellipta) 200-62 5-25 mcg/actuation AEPB inhaler  •  furosemide (LASIX) 20 mg tablet  •  lamoTRIgine (LaMICtal) 200 MG tablet  •  levothyroxine 100 mcg tablet  •  liraglutide (SAXENDA) injection  •  montelukast (SINGULAIR) 10 mg tablet  •  pramipexole (MIRAPEX) 1 mg tablet  •  tamsulosin (FLOMAX) 0 4 mg  •  Diclofenac Sodium (VOLTAREN) 1 %  •  Insulin Pen Needle 32G X 4 MM MISC    Allergies "  Allergen Reactions   • Crestor [Rosuvastatin] Other (See Comments)     Lost ability to walk and balance   • Simvastatin Shortness Of Breath     Per Cardio, thinks this was switch to a generic vs brand name    • Spiriva Respimat [Tiotropium Bromide Monohydrate] Rash       Objective     /68   Pulse 90   Temp 97 9 °F (36 6 °C) (Temporal)   Resp 16   Ht 5' 7\" (1 702 m)   Wt 103 kg (226 lb 3 2 oz)   SpO2 96%   BMI 35 43 kg/m²       PHYSICAL EXAM    Gen: NAD  Head: NCAT  CV: RRR  CHEST: Clear  ABD: soft, NT/ND  EXT: no edema      ASSESSMENT/PLAN:  This is a 81y o  year old male here for colonoscopy, and he is stable and optimized for his procedure        "

## 2023-06-14 NOTE — INTERVAL H&P NOTE
H&P reviewed  After examining the patient I find no changes in the patients condition since the H&P had been written      Vitals:    06/14/23 0820   BP: 141/68   Pulse: 90   Resp: 16   Temp: 97 9 °F (36 6 °C)   SpO2: 96%

## 2023-06-19 PROCEDURE — 88305 TISSUE EXAM BY PATHOLOGIST: CPT | Performed by: PATHOLOGY

## 2023-06-20 ENCOUNTER — HOSPITAL ENCOUNTER (OUTPATIENT)
Dept: NON INVASIVE DIAGNOSTICS | Facility: CLINIC | Age: 73
Discharge: HOME | End: 2023-06-20
Payer: COMMERCIAL

## 2023-06-20 VITALS — WEIGHT: 226 LBS | HEIGHT: 67 IN | BODY MASS INDEX: 35.47 KG/M2

## 2023-06-20 PROCEDURE — 93016 CV STRESS TEST SUPVJ ONLY: CPT | Performed by: INTERNAL MEDICINE

## 2023-06-20 PROCEDURE — 93017 CV STRESS TEST TRACING ONLY: CPT

## 2023-06-20 PROCEDURE — 93018 CV STRESS TEST I&R ONLY: CPT | Performed by: INTERNAL MEDICINE

## 2023-06-21 LAB
CHEST PAIN STATEMENT: NORMAL
MAX DIASTOLIC BP: 76 MMHG
MAX HEART RATE: 151 BPM
MAX PREDICTED HEART RATE: 150 BPM
MAX. SYSTOLIC BP: 220 MMHG
PROTOCOL NAME: NORMAL
REASON FOR TERMINATION: NORMAL
TARGET HR FORMULA: NORMAL
TEST INDICATION: NORMAL
TIME IN EXERCISE PHASE: NORMAL

## 2023-06-26 ENCOUNTER — TELEMEDICINE (OUTPATIENT)
Dept: BARIATRICS | Facility: CLINIC | Age: 73
End: 2023-06-26
Payer: COMMERCIAL

## 2023-06-26 VITALS
HEART RATE: 81 BPM | DIASTOLIC BLOOD PRESSURE: 71 MMHG | WEIGHT: 227 LBS | SYSTOLIC BLOOD PRESSURE: 135 MMHG | BODY MASS INDEX: 37.82 KG/M2 | HEIGHT: 65 IN | OXYGEN SATURATION: 94 %

## 2023-06-26 DIAGNOSIS — E66.01 CLASS 2 SEVERE OBESITY DUE TO EXCESS CALORIES WITH SERIOUS COMORBIDITY AND BODY MASS INDEX (BMI) OF 38.0 TO 38.9 IN ADULT (HCC): Primary | ICD-10-CM

## 2023-06-26 DIAGNOSIS — R73.03 PRE-DIABETES: ICD-10-CM

## 2023-06-26 PROCEDURE — 99214 OFFICE O/P EST MOD 30 MIN: CPT | Performed by: NURSE PRACTITIONER

## 2023-06-26 NOTE — PROGRESS NOTES
"Subjective:   Chief Complaint   Patient presents with   • Follow-up     Pt is being seen virtually today for MWM f/u  Pt is concerned about some side effects after changing from [facility] to J[facility]RVEN[facility]  Telemedicine consent    Patient: Bianca Villaseñor  Provider: LOLLY Marr  Provider located at [address] [place] [address] 29 [facility]  [address]  [address]  [address] [facility]  [phone]    The patient was identified by name and date of birth  Bianca Villaseñor was informed that this is a telemedicine visit and that the visit is being conducted through the [employer]  He agrees to proceed     My office door was closed  No one else was in the room  He acknowledged consent and understanding of privacy and security of the video platform  The patient has agreed to participate and understands they can discontinue the visit at any time  The patient is located in the [facility] which I do hold an active license  Patient is aware this is a billable service  Patient ID: Bianca Villaseñor  is a 81 y o  male with excess weight/obesity here to pursue weight management  HPI:    Patient did well on the [facility] for the 1st month - hunger decrease and fullness feeling  Patient denied any side effects or adverse reaction to the [facility]  Due to supply concerns was switched to JÄRVENPÄÄ  Had some mild nausea and less impact on hunger compared to [facility]  After the increased to 1 2mg the nausea worsened and he had 1 episode of vomiting  Developed chest pain on left side of his chest - pressure  State he recently had a stress test which was normal recently  Also noted a change in his gait and walking on the medication - a \"tin man\" like walk  Stopped the medication 2 days ago - symptoms are all resolved now  Has signed up for Noom recently as well to keep him on track  He continues to stick to 1200 garland/day and continues to track his food      Initial " "weight: 330 4mg  Current stated weight 227      Review Of Systems:  Constitutional: Negative for activity change  Fatigue  HENT: Negative for masses or swelling  Respiratory: Negative for shortness of breath or cough  Cardiovascular: Negative for chest pain, palpitations, edema  No recent notable decrease in exercise tolerance  Gastrointestinal: Negative for abdominal pain, nausea, vomiting, reflux, dysphagia, constipation, diarrhea  Endocrine: Negative for hot/cold intolerance  No changes in hair or skin  Genitourinary: Negative for dysuria or retention  Musculoskeletal: Negative for gait problem and myalgias  Psychiatric/Behavioral: Negative for anxiety, depression, SI/HI  Objective:    /71   Pulse 81   Ht 5' 5 2\" (1 656 m)   Wt 103 kg (227 lb)   SpO2 94%   BMI 37 54 kg/m²     General:  Alert  Non-diaphoretic  No acute distress  Increased adiposity  HEENT:  Atraumatic    Eyes: No scleral injection  No drainage  Skin: No ulcers, wounds, lesions or rashes on exposed areas of face and hands  Cardiopulmonary: Speaking in full sentences  No respiratory distress  No increase in work of breathing  Musculoskeletal:  No abnormalities in the observed use of hands  Neurologic: No facial asymmetry  Alert and oriented x3  Psychiatric: Well-groomed  Pleasant  Cooperative  Normal behavior  Normal/appropriate mood and affect  Assessment/Plan:    Class 2 severe obesity due to excess calories with serious comorbidity and body mass index (BMI) of 38 0 to 38 9 in adult [facility])  - Patient is pursuing Conservative Program and follow up visits with medical weight management provider  - Initial weight loss goal of 5-10% weight loss for improved health  - Labs reviewed: hemoglobin A1C 6 0  - Patient lifestyle habits were reviewed  Patient continues to follow 1200 calorie diet and has attempted to increase his activity    Medications were reviewed - patient tolerated " semeglutide but did not tolerate liraglutide  Will start on Ozempic today as this will help with his prediabetes as well as hopefully improve his weight loss  May consider topiramate in the future if Ozempic not covered by insurance  Ozempic Instructions:    - Inject Ozempic once WEEKLY subcutaneously in abdomen or thigh   - Starting dose is Ozempic 0 25 mg subcutaneously once a week for 4 weeks   - Please eat small frequent meals to help reduce nausea  Lemon water and saltine crackers may help with this  - Side effects of Ozempic discussed: nausea, vomiting, diarrhea, and constipation  If you experience fever, nausea/vomiting, and pain radiating to your back this may be a sign of pancreatitis  Please go to the emergency room if this occurs  - Patient understands the side effects of the medication and proper administration  Patient agrees with the treatment plan and all questions were answered  Goals:  Do not skip meals  Food log via julio - options include  www  myfitnesspal com, sparkpeople  com, loseit com, calorieking  com, baritastic  No sugary beverages  At least 64oz of water daily  Increase physical activity by 10 minutes daily  Gradually increase physical activity to a goal of 5 days per week for 30 minutes of MODERATE intensity PLUS 2 days per week of FULL BODY resistance training    Pre-diabetes  Will start on Ozempic for blood sugar control  Hemoglobin A1C 6 0 in 3/2023    Follow up in approximately 2 months with Non-Surgical Physician/Advanced Practitioner  Goals:  Food log (ie ) www myfitnesspal com,sparkpeople  com,loseit com,calorieking  com,etc  baritastic  No sugary beverages  At least 64oz of water daily  Increase physical activity by 10 minutes daily   Gradually increase physical activity to a goal of 5 days per week for 30 minutes of MODERATE intensity PLUS 2 days per week of FULL BODY resistance training

## 2023-06-27 ENCOUNTER — TELEPHONE (OUTPATIENT)
Dept: BARIATRICS | Facility: CLINIC | Age: 73
End: 2023-06-27

## 2023-06-27 NOTE — ASSESSMENT & PLAN NOTE
- Patient is pursuing Conservative Program and follow up visits with medical weight management provider  - Initial weight loss goal of 5-10% weight loss for improved health  - Labs reviewed: hemoglobin A1C 6 0  - Patient lifestyle habits were reviewed  Patient continues to follow 1200 calorie diet and has attempted to increase his activity  Medications were reviewed - patient tolerated semeglutide but did not tolerate liraglutide  Will start on Ozempic today as this will help with his prediabetes as well as hopefully improve his weight loss  May consider topiramate in the future if Ozempic not covered by insurance  Ozempic Instructions:    - Inject Ozempic once WEEKLY subcutaneously in abdomen or thigh   - Starting dose is Ozempic 0 25 mg subcutaneously once a week for 4 weeks   - Please eat small frequent meals to help reduce nausea  Lemon water and saltine crackers may help with this  - Side effects of Ozempic discussed: nausea, vomiting, diarrhea, and constipation  If you experience fever, nausea/vomiting, and pain radiating to your back this may be a sign of pancreatitis  Please go to the emergency room if this occurs  - Patient understands the side effects of the medication and proper administration  Patient agrees with the treatment plan and all questions were answered  Goals:  Do not skip meals  Food log via julio - options include  www  myfitnesspal com, sparkpeople  com, loseit com, calorieking  com, [facility]  No sugary beverages  At least 64oz of water daily  Increase physical activity by 10 minutes daily   Gradually increase physical activity to a goal of 5 days per week for 30 minutes of MODERATE intensity PLUS 2 days per week of FULL BODY resistance training

## 2023-06-27 NOTE — TELEPHONE ENCOUNTER
Called to schedule an 8 week MWM appointment after seeing Yvrose quick  Pt stated he was dealing with some family issues now and will call back and ask for me to re-schedule

## 2023-06-28 ENCOUNTER — TELEPHONE (OUTPATIENT)
Dept: BARIATRICS | Facility: CLINIC | Age: 73
End: 2023-06-28

## 2023-06-28 NOTE — TELEPHONE ENCOUNTER
Maryln Cooks (Bach: [id number]) - [id number]  Ozempic (0 25 or 0 5 MG/DOSE) 2MG/3ML pen-injectors  Status: PA RequestCreated: June 28th, 8814 [phone]: RONY 10BY, 2964  Open          Your information has been submitted to [employer]  [facility] is reviewing the PA request and you will receive an electronic response  You may check for the updated outcome later by reopening this request  The standard fax determination will also be sent to you directly  If you have any questions about your PA submission, contact [employer]  [phone]

## 2023-07-05 DIAGNOSIS — E66.01 CLASS 2 SEVERE OBESITY DUE TO EXCESS CALORIES WITH SERIOUS COMORBIDITY AND BODY MASS INDEX (BMI) OF 38.0 TO 38.9 IN ADULT (HCC): Primary | ICD-10-CM

## 2023-07-05 RX ORDER — TOPIRAMATE 25 MG/1
TABLET ORAL
Qty: 60 TABLET | Refills: 1 | Status: SHIPPED | OUTPATIENT
Start: 2023-07-05

## 2023-07-19 ENCOUNTER — TELEPHONE (OUTPATIENT)
Dept: PULMONOLOGY | Facility: CLINIC | Age: 73
End: 2023-07-19

## 2023-07-19 DIAGNOSIS — J45.40 MODERATE PERSISTENT ASTHMA WITHOUT COMPLICATION: Primary | ICD-10-CM

## 2023-07-19 DIAGNOSIS — E03.9 HYPOTHYROIDISM, UNSPECIFIED TYPE: ICD-10-CM

## 2023-07-19 DIAGNOSIS — R06.89 RESPIRATORY INSUFFICIENCY: ICD-10-CM

## 2023-07-19 RX ORDER — LEVOTHYROXINE SODIUM 0.1 MG/1
100 TABLET ORAL DAILY
Qty: 90 TABLET | Refills: 0 | Status: SHIPPED | OUTPATIENT
Start: 2023-07-19

## 2023-07-19 RX ORDER — MONTELUKAST SODIUM 10 MG/1
10 TABLET ORAL DAILY
Qty: 90 TABLET | Refills: 0 | Status: SHIPPED | OUTPATIENT
Start: 2023-07-19

## 2023-07-19 NOTE — TELEPHONE ENCOUNTER
Patient is calling, he is experiencing major labored breathing. His O2 levels at rest are 95%, on exertion he has found his O2 levels to drop to 85%. It is very difficult for him to walk even a block without having to sit down and rest which is not normal for him. This has all started about 2 days ago. He is not on oxygen and currently takes an Albuterol and Trelegy inhalers. He wasn't sure what the next steps should be. He asked if you wanted to up his Trelegy inhaler from 100 to 200 or if you have any other recommendations.  Please advise

## 2023-07-20 RX ORDER — ALBUTEROL SULFATE 2.5 MG/3ML
2.5 SOLUTION RESPIRATORY (INHALATION) EVERY 6 HOURS PRN
Qty: 360 ML | Refills: 3 | Status: SHIPPED | OUTPATIENT
Start: 2023-07-20

## 2023-07-20 NOTE — TELEPHONE ENCOUNTER
Patient feels his breathing has gotten more labored with little exertion. He is using his inhalers with no relief. Has increased mucous production, no fevers, no sick contacts, no wheezing. He went to the ED 2 weeks ago and got a CT chest and CXR which he reports as normal. He is concerned because he feels he is more labored then he should be, however admits he is not active and is overweight. Is already on Trelegy 200. Denies CP, dizziness, N/V. Agreeable to try walking exercise every day to increase cardiopulmonary stamina and will prescribe nebulized albuterol. He is appreciative of the call and discussion.

## 2023-07-22 LAB
DME PARACHUTE DELIVERY DATE ACTUAL: NORMAL
DME PARACHUTE DELIVERY DATE REQUESTED: NORMAL
DME PARACHUTE ITEM DESCRIPTION: NORMAL
DME PARACHUTE ORDER STATUS: NORMAL
DME PARACHUTE SUPPLIER NAME: NORMAL
DME PARACHUTE SUPPLIER PHONE: NORMAL

## 2023-07-30 DIAGNOSIS — R60.0 BILATERAL LEG EDEMA: ICD-10-CM

## 2023-08-02 RX ORDER — FUROSEMIDE 20 MG/1
20 TABLET ORAL DAILY
Qty: 30 TABLET | Refills: 1 | Status: SHIPPED | OUTPATIENT
Start: 2023-08-02

## 2023-08-11 ENCOUNTER — PREP FOR PROCEDURE (OUTPATIENT)
Dept: PLASTIC SURGERY | Facility: CLINIC | Age: 73
End: 2023-08-11

## 2023-08-11 DIAGNOSIS — H02.31 BLEPHAROCHALASIS OF UPPER AND LOWER EYELIDS OF BOTH EYES: Primary | ICD-10-CM

## 2023-08-11 DIAGNOSIS — Z41.1 ENCOUNTER FOR COSMETIC SURGERY: ICD-10-CM

## 2023-08-11 DIAGNOSIS — H02.35 BLEPHAROCHALASIS OF UPPER AND LOWER EYELIDS OF BOTH EYES: Primary | ICD-10-CM

## 2023-08-11 DIAGNOSIS — H02.34 BLEPHAROCHALASIS OF UPPER AND LOWER EYELIDS OF BOTH EYES: Primary | ICD-10-CM

## 2023-08-11 DIAGNOSIS — H02.32 BLEPHAROCHALASIS OF UPPER AND LOWER EYELIDS OF BOTH EYES: Primary | ICD-10-CM

## 2023-08-21 RX ORDER — ZOSTER VACCINE RECOMBINANT, ADJUVANTED 50 MCG/0.5
KIT INTRAMUSCULAR
Qty: 1 EACH | OUTPATIENT
Start: 2023-08-21

## 2023-08-28 DIAGNOSIS — E66.01 CLASS 2 SEVERE OBESITY DUE TO EXCESS CALORIES WITH SERIOUS COMORBIDITY AND BODY MASS INDEX (BMI) OF 38.0 TO 38.9 IN ADULT: ICD-10-CM

## 2023-08-28 RX ORDER — TOPIRAMATE 25 MG/1
25 TABLET ORAL 2 TIMES DAILY
Qty: 60 TABLET | Refills: 1 | Status: SHIPPED | OUTPATIENT
Start: 2023-08-28 | End: 2023-08-31 | Stop reason: ALTCHOICE

## 2023-08-30 ENCOUNTER — APPOINTMENT (OUTPATIENT)
Dept: LAB | Facility: CLINIC | Age: 73
End: 2023-08-30
Payer: COMMERCIAL

## 2023-08-30 ENCOUNTER — OFFICE VISIT (OUTPATIENT)
Dept: BARIATRICS | Facility: CLINIC | Age: 73
End: 2023-08-30
Payer: COMMERCIAL

## 2023-08-30 VITALS
WEIGHT: 229 LBS | SYSTOLIC BLOOD PRESSURE: 120 MMHG | HEART RATE: 79 BPM | BODY MASS INDEX: 38.15 KG/M2 | HEIGHT: 65 IN | DIASTOLIC BLOOD PRESSURE: 70 MMHG

## 2023-08-30 DIAGNOSIS — H02.32 BLEPHAROCHALASIS OF UPPER AND LOWER EYELIDS OF BOTH EYES: ICD-10-CM

## 2023-08-30 DIAGNOSIS — Z41.1 ENCOUNTER FOR COSMETIC SURGERY: ICD-10-CM

## 2023-08-30 DIAGNOSIS — H02.34 BLEPHAROCHALASIS OF UPPER AND LOWER EYELIDS OF BOTH EYES: ICD-10-CM

## 2023-08-30 DIAGNOSIS — E88.81 METABOLIC SYNDROME: ICD-10-CM

## 2023-08-30 DIAGNOSIS — R73.03 PRE-DIABETES: ICD-10-CM

## 2023-08-30 DIAGNOSIS — H02.31 BLEPHAROCHALASIS OF UPPER AND LOWER EYELIDS OF BOTH EYES: ICD-10-CM

## 2023-08-30 DIAGNOSIS — H02.35 BLEPHAROCHALASIS OF UPPER AND LOWER EYELIDS OF BOTH EYES: ICD-10-CM

## 2023-08-30 DIAGNOSIS — E66.01 CLASS 2 SEVERE OBESITY DUE TO EXCESS CALORIES WITH SERIOUS COMORBIDITY AND BODY MASS INDEX (BMI) OF 38.0 TO 38.9 IN ADULT (HCC): Primary | ICD-10-CM

## 2023-08-30 LAB
ANION GAP SERPL CALCULATED.3IONS-SCNC: 9 MMOL/L
BASOPHILS # BLD AUTO: 0.07 THOUSANDS/ÂΜL (ref 0–0.1)
BASOPHILS NFR BLD AUTO: 1 % (ref 0–1)
BUN SERPL-MCNC: 23 MG/DL (ref 5–25)
CALCIUM SERPL-MCNC: 9.8 MG/DL (ref 8.4–10.2)
CHLORIDE SERPL-SCNC: 99 MMOL/L (ref 96–108)
CO2 SERPL-SCNC: 30 MMOL/L (ref 21–32)
CREAT SERPL-MCNC: 1.2 MG/DL (ref 0.6–1.3)
EOSINOPHIL # BLD AUTO: 0.13 THOUSAND/ÂΜL (ref 0–0.61)
EOSINOPHIL NFR BLD AUTO: 2 % (ref 0–6)
ERYTHROCYTE [DISTWIDTH] IN BLOOD BY AUTOMATED COUNT: 13.8 % (ref 11.6–15.1)
GFR SERPL CREATININE-BSD FRML MDRD: 60 ML/MIN/1.73SQ M
GLUCOSE P FAST SERPL-MCNC: 124 MG/DL (ref 65–99)
HCT VFR BLD AUTO: 45.8 % (ref 36.5–49.3)
HGB BLD-MCNC: 14.4 G/DL (ref 12–17)
IMM GRANULOCYTES # BLD AUTO: 0.06 THOUSAND/UL (ref 0–0.2)
IMM GRANULOCYTES NFR BLD AUTO: 1 % (ref 0–2)
LYMPHOCYTES # BLD AUTO: 1.47 THOUSANDS/ÂΜL (ref 0.6–4.47)
LYMPHOCYTES NFR BLD AUTO: 20 % (ref 14–44)
MCH RBC QN AUTO: 29.3 PG (ref 26.8–34.3)
MCHC RBC AUTO-ENTMCNC: 31.4 G/DL (ref 31.4–37.4)
MCV RBC AUTO: 93 FL (ref 82–98)
MONOCYTES # BLD AUTO: 0.57 THOUSAND/ÂΜL (ref 0.17–1.22)
MONOCYTES NFR BLD AUTO: 8 % (ref 4–12)
NEUTROPHILS # BLD AUTO: 5.09 THOUSANDS/ÂΜL (ref 1.85–7.62)
NEUTS SEG NFR BLD AUTO: 68 % (ref 43–75)
NRBC BLD AUTO-RTO: 0 /100 WBCS
PLATELET # BLD AUTO: 261 THOUSANDS/UL (ref 149–390)
PMV BLD AUTO: 10.1 FL (ref 8.9–12.7)
POTASSIUM SERPL-SCNC: 4.2 MMOL/L (ref 3.5–5.3)
RBC # BLD AUTO: 4.92 MILLION/UL (ref 3.88–5.62)
SODIUM SERPL-SCNC: 138 MMOL/L (ref 135–147)
WBC # BLD AUTO: 7.39 THOUSAND/UL (ref 4.31–10.16)

## 2023-08-30 PROCEDURE — 80048 BASIC METABOLIC PNL TOTAL CA: CPT

## 2023-08-30 PROCEDURE — 85025 COMPLETE CBC W/AUTO DIFF WBC: CPT

## 2023-08-30 PROCEDURE — 36415 COLL VENOUS BLD VENIPUNCTURE: CPT

## 2023-08-30 PROCEDURE — 99214 OFFICE O/P EST MOD 30 MIN: CPT | Performed by: NURSE PRACTITIONER

## 2023-08-30 NOTE — ASSESSMENT & PLAN NOTE
Patient will likely benefit from GLP-1/GIP medication for blood sugar control  Weight loss and diet changes will also help with blood sugar control

## 2023-08-30 NOTE — PROGRESS NOTES
Assessment/Plan:     Class 2 severe obesity due to excess calories with serious comorbidity and body mass index (BMI) of 38.0 to 38.9 in adult [facility])  - Patient is pursuing Conservative Program and follow up visits with medical weight management provider  - Initial weight loss goal of 5-10% weight loss for improved health  - Labs reviewed from 5/2023  -Patient lifestyle habits were reviewed and patient was congratulated on his efforts to get his eating under control by obtaining the Nutrisystem program.  He was encouraged to continue with this program and when he is completed to reach out and he can meet with our dietitian to continue to keep his habits on track. He was encouraged to continue to avoid diet soda and also to increase his activity level by doing short walks a few times a week. Patient had success on GLP-1 in the past with controlling his hunger and satiety. Mounjaro was prescribed to help with blood sugar control, metabolic syndrome, and hopefully weight loss. Patient was educated that he needs to continue to focus on nutrition even while on the medication to maintain his muscle strength and fuel his body properly. Mounjaro Instructions:    - Begin Mounjaro 2.5 mg subcutaneously once a week. Dose changes may occur after 4 doses if medication is tolerated. You will be assessed prior to each dose change to make sure you are tolerating the medication well. - Please message me when you have 2 pens left from the prescription so there are no lapses in treatment. - Visit Ambassador for further information/injection instructions.   -Please eat small frequent meals to help reduce nausea. Lemon water and saltine crackers may help with this. - Side effects of Mounjaro discussed: nausea, vomiting, diarrhea, and constipation. If you experience fever, nausea/vomiting, and pain radiating to your back this may be a sign of pancreatitis. Please go to the emergency room if this occurs.   - Patient understands the side effects of the medication and proper administration. Patient agrees with the treatment plan and all questions were answered. Goals:  Do not skip meals. Calorie goal of 1100 to 1300 garland/day  Food log via julio - options include  www. eMagin.com, sparkpeople. com, loseit.com, calorieking. com, baritasimplifyMD  No sugary beverages. At least 64oz of water daily. Increase physical activity by 10 minutes daily. Gradually increase physical activity to a goal of 5 days per week for 30 minutes of MODERATE intensity PLUS 2 days per week of FULL BODY resistance training    Metabolic syndrome  Patient may benefit from GLP-1/GIP medication to help with blood sugar and insulin regulation    Pre-diabetes  Patient will likely benefit from GLP-1/GIP medication for blood sugar control  Weight loss and diet changes will also help with blood sugar control         Parish Mccormack was seen today for follow-up. Diagnoses and all orders for this visit:    Class 2 severe obesity due to excess calories with serious comorbidity and body mass index (BMI) of 38.0 to 38.9 in [facility])  -     tirzepatide (Mounjaro) 2.5 MG/0.5ML; Inject 0.5 mL (2.5 mg total) under the skin every 7 days    Metabolic syndrome  -     tirzepatide (Mounjaro) 2.5 MG/0.5ML; Inject 0.5 mL (2.5 mg total) under the skin every 7 days    Pre-diabetes  -     tirzepatide (Mounjaro) 2.5 MG/0.5ML; Inject 0.5 mL (2.5 mg total) under the skin every 7 days      Patient denies personal history of pancreatitis. Patient also denies personal and family history of medullary thyroid cancer and multiple endocrine neoplasia type 2 (MEN 2 tumor). Total time spent reviewing chart, interviewing patient, examining patient, discussing plan, answering all questions, and documentin minutes with >50% face-to-face time with the patient. Follow up in approximately 2 months with Non-Surgical Physician/Advanced Practitioner.     Subjective:   Chief Complaint Patient presents with   • Follow-up     Pt is here for MWM f/u       Patient ID: Karley Marino  is a 72 y.o. male with excess weight/obesity here to pursue weight management. Patient is pursuing Conservative Program.   Most recent notes and records were reviewed. HPI    Wt Readings from Last 20 Encounters:   08/30/23 104 kg (229 lb)   06/26/23 103 kg (227 lb)   06/20/23 103 kg (226 lb)   06/14/23 103 kg (226 lb 3.2 oz)   06/02/23 103 kg (226 lb)   06/01/23 103 kg (226 lb)   05/31/23 103 kg (226 lb 11.2 oz)   05/11/23 106 kg (233 lb)   05/10/23 105 kg (230 lb 6.4 oz)   05/02/23 104 kg (229 lb)   05/01/23 112 kg (246 lb)   04/26/23 112 kg (246 lb 3.2 oz)   04/12/23 110 kg (242 lb 8.1 oz)   04/11/23 106 kg (233 lb)   03/31/23 106 kg (233 lb)   03/30/23 105 kg (232 lb 6.4 oz)   03/28/23 106 kg (233 lb)   03/27/23 104 kg (229 lb 12.8 oz)   03/02/23 106 kg (234 lb 7.5 oz)   03/01/23 106 kg (233 lb)       Patient presents today to medical weight management office for follow up. Patient has been on topiramate for a couple of months after being unable to find the [facility]. He states in the beginning the topiramate helped with his hunger but in the past month has not noticed any improvement and he has gone back to his normal habits. He has recently ordered Nutrisystem and is just started to get his eating habits back on track. He has had success with this program in the past and is looking forward to it resetting his routine. Patient has checked with his insurance and was told that they cover [facility] for obesity and prediabetes. Patient would like to pursue this medication as [facility] but only stopped due to supply concerns.     Weight loss medication and dose: topiramate 25 mg BID  Started MWM date and weight:  230.4 lbs in 5/2023  Current weight: 229 lbs (230.4 lbs last OV)  Difference: -1.4 lbs  Goal: 170 lbs     *starting nutrisystem this week*  B- skips  L- chicken, steak, hamburger, salads, string beans  S- peanuts, cheese doodles, yogurt  D- chicken, steak, hamburger, salads, string beans      Hydration- crystal light   Alcohol- no  Exercise- limited        The following portions of the patient's history were reviewed and updated as appropriate: allergies, current medications, past family history, past medical history, past social history, past surgical history, and problem list.    Family History   Problem Relation Age of Onset   • Hypertension Mother    • Depression Mother    • Depression Father    • Arthritis Father    • Heart attack Paternal Uncle 32   • Colon cancer Neg Hx    • Prostate cancer Neg Hx         Review of Systems   Constitutional: Positive for fatigue. HENT: Negative for sore throat. Respiratory: Negative for cough and shortness of breath. Cardiovascular: Negative for chest pain, palpitations and leg swelling. Gastrointestinal: Negative for abdominal pain, constipation, diarrhea and nausea. Genitourinary: Negative for dysuria. Musculoskeletal: Positive for arthralgias. Negative for back pain. Skin: Negative for rash. Neurological: Negative for headaches. Psychiatric/Behavioral: Positive for dysphoric mood. The patient is nervous/anxious. Objective:  /70   Pulse 79   Ht 5' 5.25" (1.657 m)   Wt 104 kg (229 lb)   BMI 37.82 kg/m²     Physical Exam  Vitals and nursing note reviewed. Constitutional:       Appearance: Normal appearance. He is obese. HENT:      Head: Normocephalic. Pulmonary:      Effort: Pulmonary effort is normal.   Neurological:      General: No focal deficit present. Mental Status: He is alert and oriented to person, place, and time. Psychiatric:         Mood and Affect: Mood normal.         Behavior: Behavior normal.         Thought Content:  Thought content normal.         Judgment: Judgment normal.            Labs   Most recent labs reviewed   Lab Results   Component Value Date    SODIUM 133 (L) 05/31/2023    K 4.1 05/31/2023    CL 91 (L) 05/31/2023    CO2 34 (H) 05/31/2023    AGAP 8 05/31/2023    BUN 18 05/31/2023    CREATININE 1.28 05/31/2023    GLUC 123 04/12/2023    GLUF 108 (H) 05/31/2023    CALCIUM 9.8 05/31/2023    AST 34 05/31/2023    ALT 42 05/31/2023    ALKPHOS 116 (H) 05/31/2023    TP 7.9 05/31/2023    TBILI 0.59 05/31/2023    EGFR 56 05/31/2023     Lab Results   Component Value Date    HGBA1C 6.0 (H) 03/28/2023     Lab Results   Component Value Date    KQA7VFLHWSNJ 1.620 03/28/2023     Lab Results   Component Value Date    CHOLESTEROL 240 (H) 05/31/2023     Lab Results   Component Value Date    HDL 48 05/31/2023     Lab Results   Component Value Date    TRIG 119 05/31/2023     Lab Results   Component Value Date    LDLCALC 168 (H) 05/31/2023

## 2023-08-30 NOTE — ASSESSMENT & PLAN NOTE
- Patient is pursuing Conservative Program and follow up visits with medical weight management provider  - Initial weight loss goal of 5-10% weight loss for improved health  - Labs reviewed from 5/2023  -Patient lifestyle habits were reviewed and patient was congratulated on his efforts to get his eating under control by obtaining the [facility] [address] program.  He was encouraged to continue with this program and when he is completed to reach out and he can meet with our dietitian to continue to keep his habits on track. He was encouraged to continue to avoid diet soda and also to increase his activity level by doing short walks a few times a week. Patient had success on GLP-1 in the past with controlling his hunger and satiety. Mounjaro was prescribed to help with blood sugar control, metabolic syndrome, and hopefully weight loss. Patient was educated that he needs to continue to focus on nutrition even while on the medication to maintain his muscle strength and fuel his body properly. Mounjaro Instructions:    - Begin Mounjaro 2.5 mg subcutaneously once a week. Dose changes may occur after 4 doses if medication is tolerated. You will be assessed prior to each dose change to make sure you are tolerating the medication well. - Please message me when you have 2 pens left from the prescription so there are no lapses in treatment. - Visit [facility] for further information/injection instructions.   -Please eat small frequent meals to help reduce nausea. Lemon water and saltine crackers may help with this. - Side effects of Mounjaro discussed: nausea, vomiting, diarrhea, and constipation. If you experience fever, nausea/vomiting, and pain radiating to your back this may be a sign of pancreatitis. Please go to the emergency room if this occurs. - Patient understands the side effects of the medication and proper administration.  Patient agrees with the treatment plan and all questions were answered. Goals:  Do not skip meals. Calorie goal of 1100 to 1300 garland/day  Food log via julio - options include  www. 99taojin.compal.com, sparkpeople. com, blur Group.com, TeamPages. com, baritaArvirago  No sugary beverages. At least 64oz of water daily. Increase physical activity by 10 minutes daily.  Gradually increase physical activity to a goal of 5 days per week for 30 minutes of MODERATE intensity PLUS 2 days per week of FULL BODY resistance training

## 2023-08-31 DIAGNOSIS — E66.01 CLASS 2 SEVERE OBESITY DUE TO EXCESS CALORIES WITH SERIOUS COMORBIDITY AND BODY MASS INDEX (BMI) OF 38.0 TO 38.9 IN ADULT (HCC): Primary | ICD-10-CM

## 2023-09-05 DIAGNOSIS — E66.01 CLASS 2 SEVERE OBESITY DUE TO EXCESS CALORIES WITH SERIOUS COMORBIDITY AND BODY MASS INDEX (BMI) OF 38.0 TO 38.9 IN ADULT (HCC): ICD-10-CM

## 2023-09-07 ENCOUNTER — APPOINTMENT (OUTPATIENT)
Dept: LAB | Facility: HOSPITAL | Age: 73
End: 2023-09-07
Attending: STUDENT IN AN ORGANIZED HEALTH CARE EDUCATION/TRAINING PROGRAM
Payer: COMMERCIAL

## 2023-09-07 ENCOUNTER — OFFICE VISIT (OUTPATIENT)
Dept: FAMILY MEDICINE CLINIC | Facility: CLINIC | Age: 73
End: 2023-09-07
Payer: COMMERCIAL

## 2023-09-07 ENCOUNTER — COSMETIC (OUTPATIENT)
Dept: PLASTIC SURGERY | Facility: CLINIC | Age: 73
End: 2023-09-07

## 2023-09-07 ENCOUNTER — OFFICE VISIT (OUTPATIENT)
Dept: PLASTIC SURGERY | Facility: CLINIC | Age: 73
End: 2023-09-07
Payer: COMMERCIAL

## 2023-09-07 VITALS
WEIGHT: 230 LBS | BODY MASS INDEX: 38.32 KG/M2 | OXYGEN SATURATION: 96 % | DIASTOLIC BLOOD PRESSURE: 80 MMHG | SYSTOLIC BLOOD PRESSURE: 118 MMHG | RESPIRATION RATE: 16 BRPM | HEIGHT: 65 IN | HEART RATE: 88 BPM

## 2023-09-07 DIAGNOSIS — Z78.9 STATIN INTOLERANCE: ICD-10-CM

## 2023-09-07 DIAGNOSIS — E78.00 ELEVATED LDL CHOLESTEROL LEVEL: ICD-10-CM

## 2023-09-07 DIAGNOSIS — H02.34 BLEPHAROCHALASIS OF UPPER AND LOWER EYELIDS OF BOTH EYES: ICD-10-CM

## 2023-09-07 DIAGNOSIS — Z41.1 ENCOUNTER FOR COSMETIC SURGERY: Primary | ICD-10-CM

## 2023-09-07 DIAGNOSIS — E66.01 OBESITY, MORBID (HCC): ICD-10-CM

## 2023-09-07 DIAGNOSIS — H02.31 BLEPHAROCHALASIS OF UPPER AND LOWER EYELIDS OF BOTH EYES: ICD-10-CM

## 2023-09-07 DIAGNOSIS — H02.35 BLEPHAROCHALASIS OF UPPER AND LOWER EYELIDS OF BOTH EYES: ICD-10-CM

## 2023-09-07 DIAGNOSIS — E03.9 HYPOTHYROIDISM, UNSPECIFIED TYPE: ICD-10-CM

## 2023-09-07 DIAGNOSIS — Z41.1 ENCOUNTER FOR COSMETIC SURGERY: ICD-10-CM

## 2023-09-07 DIAGNOSIS — H02.834 DERMATOCHALASIS OF BOTH UPPER EYELIDS: Primary | ICD-10-CM

## 2023-09-07 DIAGNOSIS — N40.0 BENIGN PROSTATIC HYPERPLASIA, UNSPECIFIED WHETHER LOWER URINARY TRACT SYMPTOMS PRESENT: ICD-10-CM

## 2023-09-07 DIAGNOSIS — I10 ESSENTIAL HYPERTENSION: Primary | ICD-10-CM

## 2023-09-07 DIAGNOSIS — E78.1 HYPERTRIGLYCERIDEMIA: ICD-10-CM

## 2023-09-07 DIAGNOSIS — R73.03 PREDIABETES: ICD-10-CM

## 2023-09-07 DIAGNOSIS — H02.831 DERMATOCHALASIS OF BOTH UPPER EYELIDS: Primary | ICD-10-CM

## 2023-09-07 DIAGNOSIS — R60.0 BILATERAL LEG EDEMA: ICD-10-CM

## 2023-09-07 DIAGNOSIS — H02.32 BLEPHAROCHALASIS OF UPPER AND LOWER EYELIDS OF BOTH EYES: ICD-10-CM

## 2023-09-07 DIAGNOSIS — G47.33 OSA (OBSTRUCTIVE SLEEP APNEA): ICD-10-CM

## 2023-09-07 DIAGNOSIS — E78.6 LOW HDL (UNDER 40): ICD-10-CM

## 2023-09-07 PROCEDURE — CP99022 PRE-OP COSMETIC EVALUATION: Performed by: STUDENT IN AN ORGANIZED HEALTH CARE EDUCATION/TRAINING PROGRAM

## 2023-09-07 PROCEDURE — 99214 OFFICE O/P EST MOD 30 MIN: CPT | Performed by: FAMILY MEDICINE

## 2023-09-07 PROCEDURE — RECHECK: Performed by: STUDENT IN AN ORGANIZED HEALTH CARE EDUCATION/TRAINING PROGRAM

## 2023-09-07 PROCEDURE — 99214 OFFICE O/P EST MOD 30 MIN: CPT | Performed by: STUDENT IN AN ORGANIZED HEALTH CARE EDUCATION/TRAINING PROGRAM

## 2023-09-07 RX ORDER — TAMSULOSIN HYDROCHLORIDE 0.4 MG/1
CAPSULE ORAL
Qty: 180 CAPSULE | Refills: 0 | Status: SHIPPED | OUTPATIENT
Start: 2023-09-07

## 2023-09-07 RX ORDER — ALBUTEROL SULFATE 90 UG/1
AEROSOL, METERED RESPIRATORY (INHALATION)
COMMUNITY
Start: 2023-07-20

## 2023-09-07 NOTE — PROGRESS NOTES
Assessment/Plan:   Diagnoses and all orders for this visit:    Essential hypertension  Bilateral leg edema  DONALD (obstructive sleep apnea)  - BP stable in the office  - cont Lasix 20mg QD for now  - Sleep Med/Pulm adjusted pressures on his CPAP   - RTO in 3months for BP check - pt aware and agreeable     Prediabetes  -     HEMOGLOBIN A1C W/ EAG ESTIMATION; Future  Obesity, morbid ([address])  - has been following with Bariatrics - on Wegovy    Hypothyroidism, unspecified type  - cont Levothyroxine 100mcg QAM     Elevated LDL cholesterol level  Hypertriglyceridemia  Low HDL (under 40)  Statin intolerance  - Lipids (5/31/2023): , , HDL 48,     - allergic to Crestor - unable to walk   - unable to tolerate Simvastatin - SOB   - diet/exercise  - s/p Stress on 6/20  - advised to f/u with Cardio - input appreciated  - The 10-year ASCVD risk score (Constantino RAMOS, et al., 2019) is: 21.5%    Values used to calculate the score:      Age: 72 years      Sex: Male      Is Non- : No      Diabetic: No      Tobacco smoker: No      Systolic Blood Pressure: 948 mmHg      Is BP treated: Yes      HDL Cholesterol: 48 mg/dL      Total Cholesterol: 240 mg/dL  =    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 male.   81yo M presents to the office for f/u   - was advised to take Lasix 20mg QD as per Pulm/Sleep Med - has overall beneficial effects on DONALD   - ACEI was stopped at last OV   - BP in the office 118/80  - starting Wegovy on Sat   - denies F/C/V/CP/palpitations  - SOB "is not great - I have to lose weight"   - allergic to Crestor - unable to walk - has stopped statin  - unable to tolerate Simvastatin - SOB   - has Stress Test done 6/20/2023   - following with GI - has a scope scheduled on 6/14/2023 - repeat in 2yrs   - Sleep Med/Pulm adjusted pressures on his CPAP   - scheduled for b/l BLEPHAROPLASTY upper and lower for treatment of 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    Review of Systems  as per HPI    Objective:  /80 (BP Location: Left arm, Patient Position: Sitting, Cuff Size: Large)   Pulse 88   Resp 16   Ht 5' 5.25" (1.657 m)   Wt 104 kg (230 lb)   SpO2 96%   BMI 37.98 kg/m²    Physical Exam  Vitals reviewed. Constitutional:       General: He is not in acute distress. Appearance: Normal appearance. He is not ill-appearing, toxic-appearing or diaphoretic. HENT:      Head: Normocephalic and atraumatic. Right Ear: External ear normal.      Left Ear: External ear normal.      Nose: Nose normal.   Eyes:      General: No scleral icterus. Right eye: No discharge. Left eye: No discharge. Extraocular Movements: Extraocular movements intact. Conjunctiva/sclera: Conjunctivae normal.   Cardiovascular:      Rate and Rhythm: Normal rate and regular rhythm. Heart sounds: Normal heart sounds. Pulmonary:      Effort: Pulmonary effort is normal.      Breath sounds: Normal breath sounds. Abdominal:      Palpations: Abdomen is soft. Musculoskeletal:         General: Normal range of motion. Cervical back: Normal range of motion. Right lower leg: No edema. Left lower leg: No edema. Neurological:      General: No focal deficit present. Mental Status: He is alert and oriented to person, place, and time. Psychiatric:         Mood and Affect: Mood normal.         Behavior: Behavior normal.         BMI Counseling: Body mass index is 37.98 kg/m². The BMI is above normal. Nutrition recommendations include 3-5 servings of fruits/vegetables daily. Exercise recommendations include exercising 3-5 times per week.

## 2023-09-07 NOTE — PRE-PROCEDURE INSTRUCTIONS
Pre-Surgery Instructions:   Medication Instructions   • albuterol (2.5 mg/3 mL) 0.083 % nebulizer solution Uses PRN- OK to take day of surgery   • albuterol (PROVENTIL HFA,VENTOLIN HFA) 90 mcg/act inhaler Uses PRN- OK to take day of surgery   • clonazePAM (KlonoPIN) 1 mg tablet Take night before surgery   • fluticasone-umeclidinium-vilanterol (Trelegy Ellipta) 200-62.5-25 mcg/actuation AEPB inhaler Take day of surgery. • furosemide (LASIX) 20 mg tablet Hold day of surgery. • lamoTRIgine (LaMICtal) 200 MG tablet Take day of surgery. • levothyroxine 100 mcg tablet Take day of surgery. • montelukast (SINGULAIR) 10 mg tablet Take day of surgery. • pramipexole (MIRAPEX) 1 mg tablet Take night before surgery   • tamsulosin (FLOMAX) 0.4 mg Take day of surgery. Pt verbalizes understanding of the following:    - Bathing instructions, will use dial  - No lotions, powders, sprays, deodorant, jewelry  - No shaving within 24hrs    - DO NOT EAT OR DRINK ANYTHING after midnight on the evening before your procedure including coffee, tea, gum or hard candy.    - ONLY SIPS OF WATER with your medications are allowed on the morning of your procedure.   - Avoid OTC non-directed Vit/ Suppl/ Herbals 7 days prior to surgery to ensure no drug interactions with perioperative surgical/ anesthetic meds  - Avoid NSAIDs 3 days prior  - Avoid ASA containing products 5 days prior  - Continue statin medication up through and including the day of surgery  - Insulin Management: If on Insulin, advised to call PCP for explicit instructions (new Rx for Wegovy instructed not to start until after DOS)  - Bring a list of meds you take at home with your last dose noted  - Bring INHALER you take for breathing problems     - Arrange for someone to drive you home after the procedure & stay with you until the next morning  - Bring insurance cards & photo id    - Leave all valuables such as credit cards, money & jewelry at home    - Notify surgeon if you develop any cold symptoms, change in your health history or develop a rash/ open wounds     - Did the surgeon's office give you any other special instructions? No  - Did surgeon require any clearances?  No

## 2023-09-08 DIAGNOSIS — J45.20 MILD INTERMITTENT ASTHMA, UNSPECIFIED WHETHER COMPLICATED: ICD-10-CM

## 2023-09-08 RX ORDER — FLUTICASONE FUROATE, UMECLIDINIUM BROMIDE AND VILANTEROL TRIFENATATE 200; 62.5; 25 UG/1; UG/1; UG/1
1 POWDER RESPIRATORY (INHALATION) DAILY
Qty: 60 BLISTER | Refills: 2 | Status: SHIPPED | OUTPATIENT
Start: 2023-09-08 | End: 2023-12-07

## 2023-09-08 NOTE — PROGRESS NOTES
Assessment and Plan:  Mr. Jonna Chan is a 81 y.o. 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 He understands he may have a residual asymmetry following surgery but hopefully this will appear less noticeable. We also discussed the risk of ectropion of the lower lids. He understands he may have a tarsorrhaphy suture at the end of the case. Risks, benefits, alternatives and postoperative instructions and expectations discussed. Informed consent obtained. All the patient's questions were answered and he voiced understanding.        History of Present Illness:   Mr. Jonna Chan is a 81 y.o. 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 No changes since initial visit.        Review of Systems:  A 12 point ROS was performed and negative except per HPI.     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[address])     • Manic depression ([address])     • Sleep apnea              Past Surgical History:  Surgical History         Past Surgical History:   Procedure Laterality Date   • UMBILICAL HERNIA REPAIR                Social History:  Social History           Tobacco Use   • Smoking status: Never   • Smokeless tobacco: Never   Vaping Use   • Vaping Use: Never used   Substance Use Topics   • Alcohol use: Never   • Drug use: Never         Family History:  Family History         Family History   Problem Relation Age of Onset   • Hypertension Mother     • Depression Mother     • Depression Father     • Arthritis Father     • Heart attack Paternal Uncle 32   • Colon cancer Neg Hx     • Prostate cancer Neg Hx              Allergies:        Allergies   Allergen Reactions   • Simvastatin Shortness Of Breath   • Spiriva Respimat [Tiotropium Bromide Monohydrate] Rash         Medications:         Current Outpatient Medications on File Prior to Visit   Medication Sig Dispense Refill   • Diclofenac Sodium (VOLTAREN) 1 % Apply 2 g topically 4 (four) times a day 2 g 0   • enalapril (VASOTEC) 10 mg tablet Take 1 tablet (10 mg total) by mouth daily 30 tablet 0   • lamoTRIgine (LaMICtal) 200 MG tablet         • levothyroxine 100 mcg tablet         • meloxicam (Mobic) 15 mg tablet Take 1 tablet (15 mg total) by mouth daily 30 tablet 1   • modafinil (PROVIGIL) 200 MG tablet         • montelukast (SINGULAIR) 10 mg tablet         • pramipexole (MIRAPEX) 1 mg tablet Take 2 mg by mouth daily at bedtime       • tamsulosin (FLOMAX) 0.4 mg         • Trelegy Ellipta 200-62.5-25 MCG/INH AEPB inhaler Inhale 1 puff daily 60 blister 2      No current facility-administered medications on file prior to visit.            Physical Examination:  There were no vitals taken for this visit. Estimated body mass index is 38.11 kg/m² as calculated from the following:    Height as of an earlier encounter on 11/21/22: 5' 5" (1.651 m). Weight as of an earlier encounter on 11/21/22: 104 kg (229 lb). General: NAD, well appearing, AAOx3  HEENT: NCAT, EOMI, MMM, supple  Resp: Nonlabored  Heart: RRR  Abdomen: Soft, ND, NT  Extremities/MSK: no LE edema, no obvious deficits in ROM  Neuro: grossly intact with no obvious deficits  Skin: no obvious lesions or rashes     Eyes: bilateral upper dermatochalasis with blepharoptosis of left greater than right; bilateral lower tear trough deformity with herniation of fat pad x 3 bilaterally, negative vector, very thin skin, static periorbital rhytids        Rachna Aguilar, DO  Plastic and Reconstructive Surgery

## 2023-09-08 NOTE — PROGRESS NOTES
Assessment and Plan:  Mr. Ryan Fonseca is a 81 y.o. 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 He understands he may have a residual asymmetry following surgery but hopefully this will appear less noticeable. We also discussed the risk of ectropion of the lower lids. He understands he may have a tarsorrhaphy suture at the end of the case. Risks, benefits, alternatives and postoperative instructions and expectations discussed. Informed consent obtained. All the patient's questions were answered and he voiced understanding.        History of Present Illness:   Mr. Ryan Fonseca is a 81 y.o. 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 No changes since initial visit.        Review of Systems:  A 12 point ROS was performed and negative except per HPI.     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[address])     • Manic depression ([address])     • Sleep apnea              Past Surgical History:  Surgical History         Past Surgical History:   Procedure Laterality Date   • UMBILICAL HERNIA REPAIR                Social History:  Social History           Tobacco Use   • Smoking status: Never   • Smokeless tobacco: Never   Vaping Use   • Vaping Use: Never used   Substance Use Topics   • Alcohol use: Never   • Drug use: Never         Family History:  Family History         Family History   Problem Relation Age of Onset   • Hypertension Mother     • Depression Mother     • Depression Father     • Arthritis Father     • Heart attack Paternal Uncle 32   • Colon cancer Neg Hx     • Prostate cancer Neg Hx              Allergies:        Allergies   Allergen Reactions   • Simvastatin Shortness Of Breath   • Spiriva Respimat [Tiotropium Bromide Monohydrate] Rash         Medications:         Current Outpatient Medications on File Prior to Visit   Medication Sig Dispense Refill   • Diclofenac Sodium (VOLTAREN) 1 % Apply 2 g topically 4 (four) times a day 2 g 0   • enalapril (VASOTEC) 10 mg tablet Take 1 tablet (10 mg total) by mouth daily 30 tablet 0   • lamoTRIgine (LaMICtal) 200 MG tablet         • levothyroxine 100 mcg tablet         • meloxicam (Mobic) 15 mg tablet Take 1 tablet (15 mg total) by mouth daily 30 tablet 1   • modafinil (PROVIGIL) 200 MG tablet         • montelukast (SINGULAIR) 10 mg tablet         • pramipexole (MIRAPEX) 1 mg tablet Take 2 mg by mouth daily at bedtime       • tamsulosin (FLOMAX) 0.4 mg         • Trelegy Ellipta 200-62.5-25 MCG/INH AEPB inhaler Inhale 1 puff daily 60 blister 2      No current facility-administered medications on file prior to visit.            Physical Examination:  There were no vitals taken for this visit. Estimated body mass index is 38.11 kg/m² as calculated from the following:    Height as of an earlier encounter on 11/21/22: 5' 5" (1.651 m). Weight as of an earlier encounter on 11/21/22: 104 kg (229 lb). General: NAD, well appearing, AAOx3  HEENT: NCAT, EOMI, MMM, supple  Resp: Nonlabored  Heart: RRR  Abdomen: Soft, ND, NT  Extremities/MSK: no LE edema, no obvious deficits in ROM  Neuro: grossly intact with no obvious deficits  Skin: no obvious lesions or rashes     Eyes: bilateral upper dermatochalasis with blepharoptosis of left greater than right; bilateral lower tear trough deformity with herniation of fat pad x 3 bilaterally, negative vector, very thin skin, static periorbital rhytids        Rachna Aguilar, DO  Plastic and Reconstructive Surgery

## 2023-09-12 ENCOUNTER — TELEPHONE (OUTPATIENT)
Age: 73
End: 2023-09-12

## 2023-09-12 ENCOUNTER — APPOINTMENT (EMERGENCY)
Dept: RADIOLOGY | Facility: HOSPITAL | Age: 73
End: 2023-09-12
Payer: MEDICARE

## 2023-09-12 ENCOUNTER — ANESTHESIA EVENT (OUTPATIENT)
Dept: PERIOP | Facility: HOSPITAL | Age: 73
End: 2023-09-12
Payer: SELF-PAY

## 2023-09-12 ENCOUNTER — HOSPITAL ENCOUNTER (INPATIENT)
Facility: HOSPITAL | Age: 73
LOS: 1 days | Discharge: HOME | End: 2023-09-13
Attending: EMERGENCY MEDICINE | Admitting: STUDENT IN AN ORGANIZED HEALTH CARE EDUCATION/TRAINING PROGRAM
Payer: MEDICARE

## 2023-09-12 DIAGNOSIS — U07.1 COVID-19: Primary | ICD-10-CM

## 2023-09-12 DIAGNOSIS — R09.02 HYPOXIA: ICD-10-CM

## 2023-09-12 DIAGNOSIS — U07.1 COVID: ICD-10-CM

## 2023-09-12 DIAGNOSIS — R06.09 DYSPNEA ON EXERTION: ICD-10-CM

## 2023-09-12 LAB
ALBUMIN SERPL BCP-MCNC: 4.3 G/DL (ref 3.5–5)
ALP SERPL-CCNC: 108 U/L (ref 34–104)
ALT SERPL W P-5'-P-CCNC: 46 U/L (ref 7–52)
ANION GAP SERPL CALCULATED.3IONS-SCNC: 8 MMOL/L
AST SERPL W P-5'-P-CCNC: 41 U/L (ref 13–39)
BASOPHILS # BLD AUTO: 0.04 THOUSANDS/ÂΜL (ref 0–0.1)
BASOPHILS NFR BLD AUTO: 1 % (ref 0–1)
BILIRUB SERPL-MCNC: 0.31 MG/DL (ref 0.2–1)
BNP SERPL-MCNC: 36 PG/ML (ref 0–100)
BUN SERPL-MCNC: 15 MG/DL (ref 5–25)
CALCIUM SERPL-MCNC: 8.9 MG/DL (ref 8.4–10.2)
CARDIAC TROPONIN I PNL SERPL HS: 4 NG/L
CHLORIDE SERPL-SCNC: 97 MMOL/L (ref 96–108)
CO2 SERPL-SCNC: 28 MMOL/L (ref 21–32)
CREAT SERPL-MCNC: 1.24 MG/DL (ref 0.6–1.3)
EOSINOPHIL # BLD AUTO: 0.04 THOUSAND/ÂΜL (ref 0–0.61)
EOSINOPHIL NFR BLD AUTO: 1 % (ref 0–6)
ERYTHROCYTE [DISTWIDTH] IN BLOOD BY AUTOMATED COUNT: 13.9 % (ref 11.6–15.1)
FLUAV RNA RESP QL NAA+PROBE: NEGATIVE
FLUBV RNA RESP QL NAA+PROBE: NEGATIVE
GFR SERPL CREATININE-BSD FRML MDRD: 58 ML/MIN/1.73SQ M
GLUCOSE SERPL-MCNC: 104 MG/DL (ref 65–140)
HCT VFR BLD AUTO: 40.9 % (ref 36.5–49.3)
HGB BLD-MCNC: 13.5 G/DL (ref 12–17)
IMM GRANULOCYTES # BLD AUTO: 0.07 THOUSAND/UL (ref 0–0.2)
IMM GRANULOCYTES NFR BLD AUTO: 1 % (ref 0–2)
LYMPHOCYTES # BLD AUTO: 0.67 THOUSANDS/ÂΜL (ref 0.6–4.47)
LYMPHOCYTES NFR BLD AUTO: 9 % (ref 14–44)
MCH RBC QN AUTO: 30.6 PG (ref 26.8–34.3)
MCHC RBC AUTO-ENTMCNC: 33 G/DL (ref 31.4–37.4)
MCV RBC AUTO: 93 FL (ref 82–98)
MONOCYTES # BLD AUTO: 0.92 THOUSAND/ÂΜL (ref 0.17–1.22)
MONOCYTES NFR BLD AUTO: 13 % (ref 4–12)
NEUTROPHILS # BLD AUTO: 5.46 THOUSANDS/ÂΜL (ref 1.85–7.62)
NEUTS SEG NFR BLD AUTO: 75 % (ref 43–75)
NRBC BLD AUTO-RTO: 0 /100 WBCS
PLATELET # BLD AUTO: 189 THOUSANDS/UL (ref 149–390)
PMV BLD AUTO: 9.6 FL (ref 8.9–12.7)
POTASSIUM SERPL-SCNC: 3.7 MMOL/L (ref 3.5–5.3)
PROT SERPL-MCNC: 7.2 G/DL (ref 6.4–8.4)
RBC # BLD AUTO: 4.41 MILLION/UL (ref 3.88–5.62)
RSV RNA RESP QL NAA+PROBE: NEGATIVE
SARS-COV-2 RNA RESP QL NAA+PROBE: POSITIVE
SODIUM SERPL-SCNC: 133 MMOL/L (ref 135–147)
WBC # BLD AUTO: 7.2 THOUSAND/UL (ref 4.31–10.16)

## 2023-09-12 PROCEDURE — 71045 X-RAY EXAM CHEST 1 VIEW: CPT

## 2023-09-12 PROCEDURE — 99285 EMERGENCY DEPT VISIT HI MDM: CPT

## 2023-09-12 PROCEDURE — 83880 ASSAY OF NATRIURETIC PEPTIDE: CPT | Performed by: EMERGENCY MEDICINE

## 2023-09-12 PROCEDURE — 80053 COMPREHEN METABOLIC PANEL: CPT | Performed by: EMERGENCY MEDICINE

## 2023-09-12 PROCEDURE — 94640 AIRWAY INHALATION TREATMENT: CPT

## 2023-09-12 PROCEDURE — 84484 ASSAY OF TROPONIN QUANT: CPT | Performed by: EMERGENCY MEDICINE

## 2023-09-12 PROCEDURE — 99285 EMERGENCY DEPT VISIT HI MDM: CPT | Performed by: EMERGENCY MEDICINE

## 2023-09-12 PROCEDURE — 36415 COLL VENOUS BLD VENIPUNCTURE: CPT | Performed by: EMERGENCY MEDICINE

## 2023-09-12 PROCEDURE — 85025 COMPLETE CBC W/AUTO DIFF WBC: CPT | Performed by: EMERGENCY MEDICINE

## 2023-09-12 PROCEDURE — 94760 N-INVAS EAR/PLS OXIMETRY 1: CPT

## 2023-09-12 PROCEDURE — 0241U HB NFCT DS VIR RESP RNA 4 TRGT: CPT | Performed by: EMERGENCY MEDICINE

## 2023-09-12 PROCEDURE — 93005 ELECTROCARDIOGRAM TRACING: CPT

## 2023-09-12 PROCEDURE — XW033E5 INTRODUCTION OF REMDESIVIR ANTI-INFECTIVE INTO PERIPHERAL VEIN, PERCUTANEOUS APPROACH, NEW TECHNOLOGY GROUP 5: ICD-10-PCS | Performed by: INTERNAL MEDICINE

## 2023-09-12 RX ORDER — SODIUM CHLORIDE, SODIUM GLUCONATE, SODIUM ACETATE, POTASSIUM CHLORIDE, MAGNESIUM CHLORIDE, SODIUM PHOSPHATE, DIBASIC, AND POTASSIUM PHOSPHATE .53; .5; .37; .037; .03; .012; .00082 G/100ML; G/100ML; G/100ML; G/100ML; G/100ML; G/100ML; G/100ML
75 INJECTION, SOLUTION INTRAVENOUS CONTINUOUS
Status: DISCONTINUED | OUTPATIENT
Start: 2023-09-12 | End: 2023-09-13 | Stop reason: HOSPADM

## 2023-09-12 RX ORDER — CEFTRIAXONE 1 G/50ML
1000 INJECTION, SOLUTION INTRAVENOUS EVERY 24 HOURS
Status: DISCONTINUED | OUTPATIENT
Start: 2023-09-12 | End: 2023-09-13 | Stop reason: HOSPADM

## 2023-09-12 RX ORDER — LEVALBUTEROL INHALATION SOLUTION 1.25 MG/3ML
1.25 SOLUTION RESPIRATORY (INHALATION)
Status: DISCONTINUED | OUTPATIENT
Start: 2023-09-12 | End: 2023-09-12

## 2023-09-12 RX ORDER — FLUTICASONE FUROATE AND VILANTEROL 100; 25 UG/1; UG/1
1 POWDER RESPIRATORY (INHALATION) DAILY
Status: DISCONTINUED | OUTPATIENT
Start: 2023-09-13 | End: 2023-09-12

## 2023-09-12 RX ORDER — ENOXAPARIN SODIUM 100 MG/ML
40 INJECTION SUBCUTANEOUS DAILY
Status: DISCONTINUED | OUTPATIENT
Start: 2023-09-13 | End: 2023-09-13 | Stop reason: HOSPADM

## 2023-09-12 RX ORDER — TAMSULOSIN HYDROCHLORIDE 0.4 MG/1
0.8 CAPSULE ORAL
Status: DISCONTINUED | OUTPATIENT
Start: 2023-09-13 | End: 2023-09-13 | Stop reason: HOSPADM

## 2023-09-12 RX ORDER — FUROSEMIDE 20 MG/1
20 TABLET ORAL DAILY
Status: DISCONTINUED | OUTPATIENT
Start: 2023-09-13 | End: 2023-09-13 | Stop reason: HOSPADM

## 2023-09-12 RX ORDER — CEFTRIAXONE 1 G/50ML
1000 INJECTION, SOLUTION INTRAVENOUS EVERY 24 HOURS
Status: DISCONTINUED | OUTPATIENT
Start: 2023-09-12 | End: 2023-09-12

## 2023-09-12 RX ORDER — LAMOTRIGINE 100 MG/1
200 TABLET ORAL DAILY
Status: DISCONTINUED | OUTPATIENT
Start: 2023-09-13 | End: 2023-09-13 | Stop reason: HOSPADM

## 2023-09-12 RX ORDER — FLUTICASONE FUROATE AND VILANTEROL 100; 25 UG/1; UG/1
1 POWDER RESPIRATORY (INHALATION)
Status: DISCONTINUED | OUTPATIENT
Start: 2023-09-13 | End: 2023-09-13 | Stop reason: HOSPADM

## 2023-09-12 RX ORDER — MONTELUKAST SODIUM 10 MG/1
10 TABLET ORAL DAILY
Status: DISCONTINUED | OUTPATIENT
Start: 2023-09-13 | End: 2023-09-13 | Stop reason: HOSPADM

## 2023-09-12 RX ORDER — ALBUTEROL SULFATE 2.5 MG/3ML
2.5 SOLUTION RESPIRATORY (INHALATION) EVERY 6 HOURS PRN
Status: DISCONTINUED | OUTPATIENT
Start: 2023-09-12 | End: 2023-09-13 | Stop reason: HOSPADM

## 2023-09-12 RX ORDER — CLONAZEPAM 1 MG/1
1 TABLET ORAL
Status: DISCONTINUED | OUTPATIENT
Start: 2023-09-12 | End: 2023-09-13 | Stop reason: HOSPADM

## 2023-09-12 RX ORDER — DEXAMETHASONE SODIUM PHOSPHATE 10 MG/ML
6 INJECTION, SOLUTION INTRAMUSCULAR; INTRAVENOUS EVERY 24 HOURS
Status: DISCONTINUED | OUTPATIENT
Start: 2023-09-12 | End: 2023-09-13 | Stop reason: HOSPADM

## 2023-09-12 RX ORDER — ALBUTEROL SULFATE 2.5 MG/3ML
5 SOLUTION RESPIRATORY (INHALATION) ONCE
Status: COMPLETED | OUTPATIENT
Start: 2023-09-12 | End: 2023-09-12

## 2023-09-12 RX ORDER — LEVOTHYROXINE SODIUM 0.1 MG/1
100 TABLET ORAL DAILY
Status: DISCONTINUED | OUTPATIENT
Start: 2023-09-13 | End: 2023-09-13 | Stop reason: HOSPADM

## 2023-09-12 RX ORDER — ALBUTEROL SULFATE 90 UG/1
2 AEROSOL, METERED RESPIRATORY (INHALATION) EVERY 4 HOURS PRN
Status: DISCONTINUED | OUTPATIENT
Start: 2023-09-12 | End: 2023-09-13 | Stop reason: HOSPADM

## 2023-09-12 RX ORDER — PRAMIPEXOLE DIHYDROCHLORIDE 0.5 MG/1
2 TABLET ORAL
Status: DISCONTINUED | OUTPATIENT
Start: 2023-09-12 | End: 2023-09-13 | Stop reason: HOSPADM

## 2023-09-12 RX ADMIN — DEXAMETHASONE SODIUM PHOSPHATE 6 MG: 10 INJECTION, SOLUTION INTRAMUSCULAR; INTRAVENOUS at 22:33

## 2023-09-12 RX ADMIN — CEFTRIAXONE 1000 MG: 1 INJECTION, SOLUTION INTRAVENOUS at 23:46

## 2023-09-12 RX ADMIN — REMDESIVIR 200 MG: 100 INJECTION, POWDER, LYOPHILIZED, FOR SOLUTION INTRAVENOUS at 22:27

## 2023-09-12 RX ADMIN — SODIUM CHLORIDE, SODIUM GLUCONATE, SODIUM ACETATE, POTASSIUM CHLORIDE AND MAGNESIUM CHLORIDE 75 ML/HR: 526; 502; 368; 37; 30 INJECTION, SOLUTION INTRAVENOUS at 21:08

## 2023-09-12 RX ADMIN — ALBUTEROL SULFATE 5 MG: 2.5 SOLUTION RESPIRATORY (INHALATION) at 19:43

## 2023-09-12 RX ADMIN — SODIUM CHLORIDE, SODIUM GLUCONATE, SODIUM ACETATE, POTASSIUM CHLORIDE AND MAGNESIUM CHLORIDE 75 ML/HR: 526; 502; 368; 37; 30 INJECTION, SOLUTION INTRAVENOUS at 22:33

## 2023-09-12 RX ADMIN — CLONAZEPAM 1 MG: 1 TABLET ORAL at 22:35

## 2023-09-12 NOTE — TELEPHONE ENCOUNTER
Patient called to let us know he has Covid and he has surgery with Dr. Olivier Doe on Thursday. Transferred to [facility]. [facility]

## 2023-09-12 NOTE — ED PROVIDER NOTES
History  Chief Complaint   Patient presents with   • Shortness of Breath     Pt reports + covid test, SOB for the last 3 days, fever, coughing, nausea. 82-year-old male with history of asthma, depression who presents for evaluation of shortness of breath. Patient reports 3 days of symptoms. He took a home COVID test which was positive. He has had shortness of breath, productive cough, fevers, fatigue. He has had nausea and vomiting while riding in the car but denies any nausea or vomiting at home. He denies abdominal pain or diarrhea. He has occasional chest pain with coughing. Prior to Admission Medications   Prescriptions Last Dose Informant Patient Reported? Taking?    Insulin Pen Needle 32G X 4 MM MISC Not Taking  No No   Sig: Use in the morning   Patient not taking: Reported on 8/30/2023   Semaglutide-Weight Management (WEGOVY) 0.25 MG/0.5ML Not Taking  No No   Sig: Inject 0.5 mL (0.25 mg total) under the skin once a week   Patient not taking: Reported on 9/12/2023   albuterol (2.5 mg/3 mL) 0.083 % nebulizer solution 9/12/2023  No Yes   Sig: Take 3 mL (2.5 mg total) by nebulization every 6 (six) hours as needed for wheezing or shortness of breath   albuterol (PROVENTIL HFA,VENTOLIN HFA) 90 mcg/act inhaler Not Taking  Yes No   Sig: INHALE 2 PUFFS BY MOUTH EVERY 4 HOURS AS NEEDED FOR WHEEZING   Patient not taking: Reported on 9/12/2023   clonazePAM (KlonoPIN) 1 mg tablet 9/11/2023 Self Yes Yes   Sig: Take 1 mg by mouth daily at bedtime   fluticasone-umeclidinium-vilanterol (Trelegy Ellipta) 200-62.5-25 mcg/actuation AEPB inhaler 9/12/2023  No Yes   Sig: Inhale 1 puff daily   furosemide (LASIX) 20 mg tablet 9/12/2023  No Yes   Sig: Take 1 tablet (20 mg total) by mouth daily   lamoTRIgine (LaMICtal) 200 MG tablet 9/12/2023 Self Yes Yes   Sig: Take 200 mg by mouth daily   levothyroxine 100 mcg tablet 9/12/2023  No Yes   Sig: Take 1 tablet (100 mcg total) by mouth daily   montelukast (SINGULAIR) 10 mg tablet 9/12/2023  No Yes   Sig: Take 1 tablet (10 mg total) by mouth in the morning   pramipexole (MIRAPEX) 1 mg tablet 9/12/2023 Self Yes Yes   Sig: Take 2 mg by mouth daily at bedtime   tamsulosin (FLOMAX) 0.4 mg 9/12/2023  No Yes   Sig: TAKE 2 CAPSULES(0.8 MG) BY MOUTH IN THE MORNING      Facility-Administered Medications: None       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[address])    • Manic depression ([address])    • Right lower lobe pneumonia 01/12/2023   • Shortness of breath     MONTAÑO   • Sleep apnea        Past Surgical History:   Procedure Laterality Date   • COLONOSCOPY  12/13/2022   • COLONOSCOPY W/ BIOPSIES AND POLYPECTOMY  06/14/2023   • TN ARTHROPLASTY GLENOHUMERAL JOINT TOTAL SHOULDER Right 01/10/2023    Procedure: ARTHROPLASTY SHOULDER REVERSE WITH BICEPS TENODESIS;  Surgeon: Steven Mclean MD;  Location: [facility];  Service: Orthopedics   • UMBILICAL HERNIA REPAIR         Family History   Problem Relation Age of Onset   • Hypertension Mother    • Depression Mother    • Depression Father    • Arthritis Father    • Heart attack Paternal Uncle 32   • Colon cancer Neg Hx    • Prostate cancer Neg Hx      I have reviewed and agree with the history as documented. E-Cigarette/Vaping   • E-Cigarette Use Never User      E-Cigarette/Vaping Substances   • Nicotine No    • THC No    • CBD No    • Flavoring No    • Other No    • Unknown No      Social History     Tobacco Use   • Smoking status: Never   • Smokeless tobacco: Never   Vaping Use   • Vaping Use: Never used   Substance Use Topics   • Alcohol use: Never   • Drug use: Never       Review of Systems   Constitutional: Positive for fatigue and fever. HENT: Positive for congestion. Negative for sore throat.     Respiratory: Positive for cough and shortness of breath. Cardiovascular: Positive for chest pain. Gastrointestinal: Positive for nausea and vomiting. Negative for abdominal pain and diarrhea. Genitourinary: Negative for flank pain. Musculoskeletal: Negative for back pain and neck pain. Skin: Negative for rash. Neurological: Negative for syncope, weakness, numbness and headaches. All other systems reviewed and are negative. Physical Exam  Physical Exam  Vitals and nursing note reviewed. Constitutional:       General: He is not in acute distress. Appearance: He is not ill-appearing. HENT:      Head: Normocephalic and atraumatic. Nose: Nose normal.   Eyes:      Conjunctiva/sclera: Conjunctivae normal.   Cardiovascular:      Rate and Rhythm: Normal rate and regular rhythm. Heart sounds: No murmur heard. No friction rub. No gallop. Pulmonary:      Effort: Pulmonary effort is normal.      Breath sounds: Wheezing (scattered expiratory) present. No rhonchi or rales. Chest:      Chest wall: No tenderness. Abdominal:      General: There is no distension. Palpations: Abdomen is soft. Tenderness: There is no abdominal tenderness. Musculoskeletal:         General: Swelling (1+ pitting edema to bilateral lower extremities) present. No tenderness. Normal range of motion. Cervical back: Normal range of motion and neck supple. No tenderness. Comments: Midline L-spine tenderness without step-off or deformity. No midline C-spine tenderness. All joints are nontender with full complaints range of motion. Skin:     General: Skin is warm and dry. Findings: No rash. Comments: Skin tear to the right elbow. Neurological:      General: No focal deficit present. Mental Status: He is alert and oriented to person, place, and time.    Psychiatric:         Behavior: Behavior normal.         Vital Signs  ED Triage Vitals   Temperature Pulse Respirations Blood Pressure SpO2 09/12/23 1907 09/12/23 1907 09/12/23 1907 09/12/23 1908 09/12/23 1908   100.5 °F (38.1 °C) 94 20 154/75 96 %      Temp Source Heart Rate Source Patient Position - Orthostatic VS BP Location FiO2 (%)   09/12/23 1907 09/12/23 1907 09/12/23 1908 09/12/23 1908 --   Oral Monitor Sitting Left arm       Pain Score       09/12/23 2021       No Pain           Vitals:    09/12/23 1908 09/12/23 2103 09/12/23 2108 09/12/23 2224   BP: 154/75 149/69  142/82   Pulse:  89 88 80   Patient Position - Orthostatic VS: Sitting   Lying         Visual Acuity      ED Medications  Medications   clonazePAM (KlonoPIN) tablet 1 mg (1 mg Oral Given 9/12/23 2235)   furosemide (LASIX) tablet 20 mg (has no administration in time range)   lamoTRIgine (LaMICtal) tablet 200 mg (has no administration in time range)   levothyroxine tablet 100 mcg (has no administration in time range)   montelukast (SINGULAIR) tablet 10 mg (has no administration in time range)   pramipexole (MIRAPEX) tablet 2 mg (2 mg Oral Not Given 9/12/23 2235)   tamsulosin (FLOMAX) capsule 0.8 mg (has no administration in time range)   remdesivir (Veklury) 200 mg in sodium chloride 0.9 % 290 mL IVPB (200 mg Intravenous New Bag 9/12/23 2227)     Followed by   remdesivir Juris Fey) 100 mg in sodium chloride 0.9 % 270 mL IVPB (has no administration in time range)   dexamethasone (PF) (DECADRON) injection 6 mg (6 mg Intravenous Given 9/12/23 2233)   enoxaparin (LOVENOX) subcutaneous injection 40 mg (has no administration in time range)   multi-electrolyte (PLASMALYTE-A/ISOLYTE-S PH 7.4) IV solution (75 mL/hr Intravenous New Bag 9/12/23 2233)   albuterol (PROVENTIL HFA,VENTOLIN HFA) inhaler 2 puff (has no administration in time range)   albuterol inhalation solution 2.5 mg (has no administration in time range)   Fluticasone Furoate-Vilanterol 100-25 mcg/actuation 1 puff (has no administration in time range)   umeclidinium 62.5 mcg/actuation inhaler AEPB 1 puff (has no administration in time range)   cefTRIAXone (ROCEPHIN) IVPB (premix in dextrose) 1,000 mg 50 mL (has no administration in time range)   albuterol inhalation solution 5 mg (5 mg Nebulization Given 9/12/23 1943)       Diagnostic Studies  Results Reviewed     Procedure Component Value Units Date/Time    Platelet count [[id number]]     Lab Status: No result Specimen: Blood     FLU/RSV/COVID - if FLU/RSV clinically relevant [[id number]]  (Abnormal) Collected: 09/12/23 1929    Lab Status: Final result Specimen: Nares from Nasopharyngeal Swab Updated: 09/12/23 2032     SARS-CoV-2 Positive     INFLUENZA A PCR Negative     INFLUENZA B PCR Negative     RSV PCR Negative    Narrative:      FOR PEDIATRIC PATIENTS - copy/paste COVID Guidelines URL to browser: [email or web address]    SARS-CoV-2 assay is a Nucleic Acid Amplification assay intended for the  qualitative detection of nucleic acid from SARS-CoV-2 in nasopharyngeal  swabs. Results are for the presumptive identification of SARS-CoV-2 RNA. Positive results are indicative of infection with SARS-CoV-2, the virus  causing COVID-19, but do not rule out bacterial infection or co-infection  with other viruses. Laboratories within the [facility] and its  territories are required to report all positive results to the appropriate  public health authorities. Negative results do not preclude SARS-CoV-2  infection and should not be used as the sole basis for treatment or other  patient management decisions. Negative results must be combined with  clinical observations, patient history, and epidemiological information. This test has not been FDA cleared or approved. This test has been authorized by FDA under an Emergency Use Authorization  (EUA).  This test is only authorized for the duration of time the  declaration that circumstances exist justifying the authorization of the  emergency use of an in vitro diagnostic tests for detection of SARS-CoV-2  virus and/or diagnosis of COVID-19 infection under section 564(b)(1) of  the Act, 21 U. S.C. 216CLF-5(W)(3), unless the authorization is terminated  or revoked sooner. The test has been validated but independent review by FDA  and CLIA is pending. Test performed using [employer] GeneXpert: This RT-PCR assay targets N2,  a region unique to SARS-CoV-2. A conserved region in the E-gene was chosen  for pan-Sarbecovirus detection which includes SARS-CoV-2. According to CMS-2020-01-R, this platform meets the definition of high-throughput technology.     HS Troponin 0hr (reflex protocol) [[id number]]  (Normal) Collected: 09/12/23 1929    Lab Status: Final result Specimen: Blood from Arm, Right Updated: 09/12/23 2002     hs TnI 0hr 4 ng/L     B-Type Natriuretic Peptide(BNP) [[id number]]  (Normal) Collected: 09/12/23 1929    Lab Status: Final result Specimen: Blood from Arm, Right Updated: 09/12/23 2001     BNP 36 pg/mL     Comprehensive metabolic panel [[id number]]  (Abnormal) Collected: 09/12/23 1929    Lab Status: Final result Specimen: Blood from Arm, Right Updated: 09/12/23 1958     Sodium 133 mmol/L      Potassium 3.7 mmol/L      Chloride 97 mmol/L      CO2 28 mmol/L      ANION GAP 8 mmol/L      BUN 15 mg/dL      Creatinine 1.24 mg/dL      Glucose 104 mg/dL      Calcium 8.9 mg/dL      AST 41 U/L      ALT 46 U/L      Alkaline Phosphatase 108 U/L      Total Protein 7.2 g/dL      Albumin 4.3 g/dL      Total Bilirubin 0.31 mg/dL      eGFR 58 ml/min/1.73sq m     Narrative:      [place] guidelines for Chronic Kidney Disease (CKD):   •  Stage 1 with normal or high GFR (GFR > 90 mL/min/1.73 square meters)  •  Stage 2 Mild CKD (GFR = 60-89 mL/min/1.73 square meters)  •  Stage 3A Moderate CKD (GFR = 45-59 mL/min/1.73 square meters)  •  Stage 3B Moderate CKD (GFR = 30-44 mL/min/1.73 square meters)  •  Stage 4 Severe CKD (GFR = 15-29 mL/min/1.73 square meters)  •  Stage 5 End Stage CKD (GFR <15 mL/min/1.73 square meters)  Note: GFR calculation is accurate only with a steady state creatinine    CBC and differential [[id number]]  (Abnormal) Collected: 09/12/23 1929    Lab Status: Final result Specimen: Blood from Arm, Right Updated: 09/12/23 1940     WBC 7.20 Thousand/uL      RBC 4.41 Million/uL      Hemoglobin 13.5 g/dL      Hematocrit 40.9 %      MCV 93 fL      MCH 30.6 pg      MCHC 33.0 g/dL      RDW 13.9 %      MPV 9.6 fL      Platelets 513 Thousands/uL      nRBC 0 /100 WBCs      Neutrophils Relative 75 %      Immat GRANS % 1 %      Lymphocytes Relative 9 %      Monocytes Relative 13 %      Eosinophils Relative 1 %      Basophils Relative 1 %      Neutrophils Absolute 5.46 Thousands/µL      Immature Grans Absolute 0.07 Thousand/uL      Lymphocytes Absolute 0.67 Thousands/µL      Monocytes Absolute 0.92 Thousand/µL      Eosinophils Absolute 0.04 Thousand/µL      Basophils Absolute 0.04 Thousands/µL                  XR chest 1 view portable   ED Interpretation by Guru Doherty MD (09/12 2007)   No acute cardiopulmonary abnormality. Independently interpreted by me. Procedures  Procedures         ED Course  ED Course as of 09/12/23 2309 Tue Sep 12, 2023   1936 Procedure Note: EKG  Date/Time: 09/12/23 7:26 PM   Interpreted by: Sherry Zapien  Indications / Diagnosis: shortness of breath  ECG reviewed by me, the ED Provider: yes   The EKG demonstrates:  Rhythm: rate 83, normal sinus  Intervals: normal intervals  Axis: normal axis  QRS/Blocks: incomplete RBBB  ST Changes: No acute ST Changes, no STD/VISH.    1943 CBC and differential(!)   1946 Desaturation down to 81% during ambulatory trial.   2001 BNP: 36   2001 CBC and differential(!)   2004 hs TnI 0hr: 4   2033 FLU/RSV/COVID - if FLU/RSV clinically relevant(!)                                             Medical Decision Making  72-year-old male presenting for evaluation of shortness of breath. COVID-positive via test at home.   Fever on arrival of 100.5 Fahrenheit. Vital signs otherwise stable. Patient with an overall benign examination. COVID-19 test positive. Labs otherwise within normal limits. EKG unremarkable. Chest x-ray without any acute abnormalities. Patient noted to be hypoxic with ambulation. Discussed with Toy and admitted to their service. COVID-19: acute illness or injury  Dyspnea on exertion: acute illness or injury  Hypoxia: acute illness or injury  Amount and/or Complexity of Data Reviewed  Labs: ordered. Decision-making details documented in ED Course. Radiology: ordered and independent interpretation performed. ECG/medicine tests: ordered and independent interpretation performed. Decision-making details documented in ED Course. Risk  Prescription drug management. Decision regarding hospitalization. Disposition  Final diagnoses:   COVID-19   Hypoxia   Dyspnea on exertion     Time reflects when diagnosis was documented in both MDM as applicable and the Disposition within this note     Time User Action Codes Description Comment    9/12/2023  8:39 PM Elder Garb Add [U07.1] COVID-19     9/12/2023  8:39 PM Elder Garb Add [R09.02] Hypoxia     9/12/2023  8:39 PM Elder Garb Add [R06.09] Dyspnea on exertion       ED Disposition     ED Disposition   Admit    Condition   Stable    Date/Time   Tue Sep 12, 2023  8:39 PM    Comment   Case was discussed with TOY and the patient's admission status was agreed to be Admission Status: inpatient status to the service of Dr. Karoline Crowe . Follow-up Information    None         Current Discharge Medication List      CONTINUE these medications which have NOT CHANGED    Details   albuterol (2.5 mg/3 mL) 0.083 % nebulizer solution Take 3 mL (2.5 mg total) by nebulization every 6 (six) hours as needed for wheezing or shortness of breath  Qty: 360 mL, Refills: 3    Associated Diagnoses: Moderate persistent asthma without complication;  Respiratory insufficiency clonazePAM (KlonoPIN) 1 mg tablet Take 1 mg by mouth daily at bedtime      fluticasone-umeclidinium-vilanterol (Trelegy Ellipta) 200-62.5-25 mcg/actuation AEPB inhaler Inhale 1 puff daily  Qty: 60 blister, Refills: 2    Associated Diagnoses: Mild intermittent asthma, unspecified whether complicated      furosemide (LASIX) 20 mg tablet Take 1 tablet (20 mg total) by mouth daily  Qty: 30 tablet, Refills: 1    Associated Diagnoses: Bilateral leg edema      lamoTRIgine (LaMICtal) 200 MG tablet Take 200 mg by mouth daily      levothyroxine 100 mcg tablet Take 1 tablet (100 mcg total) by mouth daily  Qty: 90 tablet, Refills: 0    Associated Diagnoses: Hypothyroidism, unspecified type      montelukast (SINGULAIR) 10 mg tablet Take 1 tablet (10 mg total) by mouth in the morning  Qty: 90 tablet, Refills: 0    Associated Diagnoses: Moderate persistent asthma without complication; Respiratory insufficiency      pramipexole (MIRAPEX) 1 mg tablet Take 2 mg by mouth daily at bedtime      tamsulosin (FLOMAX) 0.4 mg TAKE 2 CAPSULES(0.8 MG) BY MOUTH IN THE MORNING  Qty: 180 capsule, Refills: 0    Associated Diagnoses: Benign prostatic hyperplasia, unspecified whether lower urinary tract symptoms present      albuterol (PROVENTIL HFA,VENTOLIN HFA) 90 mcg/act inhaler INHALE 2 PUFFS BY MOUTH EVERY 4 HOURS AS NEEDED FOR WHEEZING      Insulin Pen Needle 32G X 4 MM MISC Use in the morning  Qty: 100 each, Refills: 1    Associated Diagnoses: Class 2 severe obesity due to excess calories with serious comorbidity and body mass index (BMI) of 38.0 to 38.9 in adult ([facility])      Semaglutide-Weight Management (WEGOVY) 0.25 MG/0.5ML Inject 0.5 mL (0.25 mg total) under the skin once a week  Qty: 2 mL, Refills: 0    Associated Diagnoses: Class 2 severe obesity due to excess calories with serious comorbidity and body mass index (BMI) of 38.0 to 38.9 in adult [facility])             No discharge procedures on file.     PDMP Review       Value Time User PDMP Reviewed  Yes 4/12/2023  5:54 PM Geovanni Bates MD          ED Provider  Electronically Signed by           Ja Stack MD  09/12/23 [id number]

## 2023-09-13 VITALS
SYSTOLIC BLOOD PRESSURE: 157 MMHG | OXYGEN SATURATION: 98 % | BODY MASS INDEX: 36.41 KG/M2 | HEART RATE: 88 BPM | DIASTOLIC BLOOD PRESSURE: 76 MMHG | TEMPERATURE: 99.6 F | RESPIRATION RATE: 12 BRPM | WEIGHT: 232 LBS | HEIGHT: 67 IN

## 2023-09-13 LAB
ALBUMIN SERPL BCP-MCNC: 4.6 G/DL (ref 3.5–5)
ALP SERPL-CCNC: 109 U/L (ref 34–104)
ALT SERPL W P-5'-P-CCNC: 49 U/L (ref 7–52)
ANION GAP SERPL CALCULATED.3IONS-SCNC: 8 MMOL/L
AST SERPL W P-5'-P-CCNC: 42 U/L (ref 13–39)
BASOPHILS # BLD AUTO: 0.03 THOUSANDS/ÂΜL (ref 0–0.1)
BASOPHILS NFR BLD AUTO: 1 % (ref 0–1)
BILIRUB SERPL-MCNC: 0.26 MG/DL (ref 0.2–1)
BUN SERPL-MCNC: 14 MG/DL (ref 5–25)
CALCIUM SERPL-MCNC: 9.2 MG/DL (ref 8.4–10.2)
CHLORIDE SERPL-SCNC: 99 MMOL/L (ref 96–108)
CO2 SERPL-SCNC: 31 MMOL/L (ref 21–32)
CREAT SERPL-MCNC: 1.19 MG/DL (ref 0.6–1.3)
CRP SERPL QL: 65.1 MG/L
EOSINOPHIL # BLD AUTO: 0 THOUSAND/ÂΜL (ref 0–0.61)
EOSINOPHIL NFR BLD AUTO: 0 % (ref 0–6)
ERYTHROCYTE [DISTWIDTH] IN BLOOD BY AUTOMATED COUNT: 14 % (ref 11.6–15.1)
GFR SERPL CREATININE-BSD FRML MDRD: 61 ML/MIN/1.73SQ M
GLUCOSE SERPL-MCNC: 151 MG/DL (ref 65–140)
HCT VFR BLD AUTO: 43.2 % (ref 36.5–49.3)
HGB BLD-MCNC: 13.9 G/DL (ref 12–17)
IMM GRANULOCYTES # BLD AUTO: 0.06 THOUSAND/UL (ref 0–0.2)
IMM GRANULOCYTES NFR BLD AUTO: 1 % (ref 0–2)
LYMPHOCYTES # BLD AUTO: 0.48 THOUSANDS/ÂΜL (ref 0.6–4.47)
LYMPHOCYTES NFR BLD AUTO: 8 % (ref 14–44)
MCH RBC QN AUTO: 29.8 PG (ref 26.8–34.3)
MCHC RBC AUTO-ENTMCNC: 32.2 G/DL (ref 31.4–37.4)
MCV RBC AUTO: 93 FL (ref 82–98)
MONOCYTES # BLD AUTO: 0.25 THOUSAND/ÂΜL (ref 0.17–1.22)
MONOCYTES NFR BLD AUTO: 4 % (ref 4–12)
NEUTROPHILS # BLD AUTO: 5.19 THOUSANDS/ÂΜL (ref 1.85–7.62)
NEUTS SEG NFR BLD AUTO: 86 % (ref 43–75)
NRBC BLD AUTO-RTO: 0 /100 WBCS
PLATELET # BLD AUTO: 207 THOUSANDS/UL (ref 149–390)
PMV BLD AUTO: 10 FL (ref 8.9–12.7)
POTASSIUM SERPL-SCNC: 4.3 MMOL/L (ref 3.5–5.3)
PROCALCITONIN SERPL-MCNC: 0.15 NG/ML
PROT SERPL-MCNC: 7.6 G/DL (ref 6.4–8.4)
RBC # BLD AUTO: 4.67 MILLION/UL (ref 3.88–5.62)
SODIUM SERPL-SCNC: 138 MMOL/L (ref 135–147)
WBC # BLD AUTO: 6.01 THOUSAND/UL (ref 4.31–10.16)

## 2023-09-13 PROCEDURE — 84145 PROCALCITONIN (PCT): CPT | Performed by: STUDENT IN AN ORGANIZED HEALTH CARE EDUCATION/TRAINING PROGRAM

## 2023-09-13 PROCEDURE — 80053 COMPREHEN METABOLIC PANEL: CPT | Performed by: STUDENT IN AN ORGANIZED HEALTH CARE EDUCATION/TRAINING PROGRAM

## 2023-09-13 PROCEDURE — 99239 HOSP IP/OBS DSCHRG MGMT >30: CPT | Performed by: PHYSICIAN ASSISTANT

## 2023-09-13 PROCEDURE — 94760 N-INVAS EAR/PLS OXIMETRY 1: CPT

## 2023-09-13 PROCEDURE — 85025 COMPLETE CBC W/AUTO DIFF WBC: CPT | Performed by: STUDENT IN AN ORGANIZED HEALTH CARE EDUCATION/TRAINING PROGRAM

## 2023-09-13 PROCEDURE — 86140 C-REACTIVE PROTEIN: CPT | Performed by: STUDENT IN AN ORGANIZED HEALTH CARE EDUCATION/TRAINING PROGRAM

## 2023-09-13 PROCEDURE — 94640 AIRWAY INHALATION TREATMENT: CPT

## 2023-09-13 RX ORDER — BENZONATATE 100 MG/1
100 CAPSULE ORAL 3 TIMES DAILY PRN
Qty: 20 CAPSULE | Refills: 0 | Status: SHIPPED | OUTPATIENT
Start: 2023-09-13 | End: 2023-09-22 | Stop reason: SDUPTHER

## 2023-09-13 RX ADMIN — FUROSEMIDE 20 MG: 20 TABLET ORAL at 08:14

## 2023-09-13 RX ADMIN — TAMSULOSIN HYDROCHLORIDE 0.8 MG: 0.4 CAPSULE ORAL at 08:13

## 2023-09-13 RX ADMIN — ENOXAPARIN SODIUM 40 MG: 40 INJECTION SUBCUTANEOUS at 08:14

## 2023-09-13 RX ADMIN — LEVOTHYROXINE SODIUM 100 MCG: 100 TABLET ORAL at 08:14

## 2023-09-13 RX ADMIN — FLUTICASONE FUROATE AND VILANTEROL TRIFENATATE 1 PUFF: 100; 25 POWDER RESPIRATORY (INHALATION) at 08:06

## 2023-09-13 RX ADMIN — LAMOTRIGINE 200 MG: 100 TABLET ORAL at 08:13

## 2023-09-13 RX ADMIN — MONTELUKAST 10 MG: 10 TABLET, FILM COATED ORAL at 08:14

## 2023-09-13 RX ADMIN — UMECLIDINIUM 1 PUFF: 62.5 AEROSOL, POWDER ORAL at 08:06

## 2023-09-13 NOTE — RESPIRATORY THERAPY NOTE
Home Oxygen Qualifying Test     Patient name: Devon Phipps        : 1950   Date of Test:  2023  Diagnosis:    Home Oxygen Test:    **Medicare Guidelines require item(s) 1-5 on all ambulatory patients or 1 and 2 on non-ambulatory patients. 1. Baseline SPO2 on Room Air at rest 94 %     1. SPO2 during exertion on Room Air 91  %  2. Test performed during exertion activity. []  Supplemental Home Oxygen is indicated. [x]  Client does not qualify for home oxygen.     Respiratory Additional Notes-     Jairo Keller, RT

## 2023-09-13 NOTE — ASSESSMENT & PLAN NOTE
· History of eosinophilic pneumonia  · Presents with 3 days of dark brown productive cough, fever, malaise, dyspnea on exertion and nausea  · Positive COVID test at home   · Hemodynamically stable  · Apparently desatted to low 80s on exertion, but he is on room air at rest and did not require O2 at discharge   · Stable for discharge  · Supportive care   · Isolate 5 days from symptoms/positive home test   · Continue home inhalers/neb

## 2023-09-13 NOTE — RESPIRATORY THERAPY NOTE
RT Protocol Note  Dakota Barron 72 y.o. male MRN: [id number]  Unit/Bed#: ED OVR 20 Encounter: [id number]    Assessment    Active Problems: There are no active Hospital Problems. 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 Yes   Protocol Selection Respiratory   Language Barrier? No   Medical & Social History Reviewed? Yes   Diagnostic Studies Reviewed? Yes   Physical Assessment Performed? Yes   Respiratory Plan Home Bronchodilator Patient pathway   Respiratory Assessment   Assessment Type Assess only   General Appearance Alert; Awake   Respiratory Pattern Dyspnea with exertion;Dyspnea at rest   Chest Assessment Chest expansion symmetrical   Bilateral Breath Sounds Diminished   Cough Moist;Non-productive   Resp Comments Pt assessed for Respiratory Protocol. BS diminished, SPO2 97% on room air. Pt with pulmonart history, will continue with his home bronchodilator therapy substiting Breo and Incruse daily for his Trelegy that is not on our formulary. Additional Assessments   Pulse 88   Respirations 18   SpO2 97 %            Objective    Physical Exam:   Assessment Type: Assess only  General Appearance: Alert, Awake  Respiratory Pattern: Dyspnea with exertion, Dyspnea at rest  Chest Assessment: Chest expansion symmetrical  Bilateral Breath Sounds: Diminished  Cough: Moist, Non-productive    Vitals:  Blood pressure 149/69, pulse 88, temperature 100.5 °F (38.1 °C), temperature source Oral, resp.  rate 18, SpO2 97 %.          Imaging and other studies: I have personally reviewed pertinent reports. Plan    Respiratory Plan: Home Bronchodilator Patient pathway        Resp Comments: Pt assessed for Respiratory Protocol. BS diminished, SPO2 97% on room air. Pt with pulmonart history, will continue with his home bronchodilator therapy substiting Breo and Incruse daily for his Trelegy that is not on our formulary.

## 2023-09-13 NOTE — UTILIZATION REVIEW
Initial Clinical Review    Admission: Date/Time/Statement:   Admission Orders (From admission, onward)     Ordered        09/12/23 2040  INPATIENT ADMISSION  Once                      Orders Placed This Encounter   Procedures   • INPATIENT ADMISSION     Standing Status:   Standing     Number of Occurrences:   1     Order Specific Question:   Level of Care     Answer:   Med Surg [16]     Order Specific Question:   Estimated length of stay     Answer:   More than 2 Midnights     Order Specific Question:   Certification     Answer:   I certify that inpatient services are medically necessary for this patient for a duration of greater than two midnights. See H&P and MD Progress Notes for additional information about the patient's course of treatment. ED Arrival Information     Expected   -    Arrival   9/12/2023 18:48    Acuity   Urgent            Means of arrival   Walk-In    Escorted by   Spouse    Service   Hospitalist    Admission type   Emergency            Arrival complaint   SOB           Chief Complaint   Patient presents with   • Shortness of Breath     Pt reports + covid test, SOB for the last 3 days, fever, coughing, nausea. Initial Presentation: 72 y.o. male to ED from home w/ SOB x3 days . + brown sputum from cough w/ MONTAÑO, fever and malaise . PMHX pneumonia, mood disorder, DONALD. Tested + COVID . Desaturated to low 80 s on exertion . Admitted IP status w/ COVID . Plan for remdesivir and steroids , rocephin , maintain O2 sat >92 % , wbc and fever monitoring . BPH cont flomax . Asthma cont inhalers . HTN BP stable . 9/13 Resp Note   Baseline SPO2 on Room Air at rest 94 % . SPO2 during exertion on Room Air 91  % Test performed during exertion activity. Pt does not qualify for home O2 .      ED Triage Vitals   Temperature Pulse Respirations Blood Pressure SpO2   09/12/23 1907 09/12/23 1907 09/12/23 1907 09/12/23 1908 09/12/23 1908   100.5 °F (38.1 °C) 94 20 154/75 96 %      Temp Source Heart Rate Source Patient Position - Orthostatic VS BP Location FiO2 (%)   09/12/23 1907 09/12/23 1907 09/12/23 1908 09/12/23 1908 --   Oral Monitor Sitting Left arm       Pain Score       09/12/23 2021       No Pain          Wt Readings from Last 1 Encounters:   09/12/23 105 kg (232 lb)     Additional Vital Signs:   09/13/23 0750 99.6 °F (37.6 °C) 88 12 157/76 -- 98 % -- Lying   09/12/23 2224 98.2 °F (36.8 °C) 80 18 142/82 105 95 % None (Room air) Lying   09/12/23 2108 -- 88 18 -- -- 97 % None (Room air) --   09/12/23 2103 -- 89 19 149/69 -- 97 % None (Room air) --   09/12/23 1908 -- -- -- 154/75 -- 96 % None (Room air) Sitting       Pertinent Labs/Diagnostic Test Results:   XR chest 1 view portable   ED Interpretation by Guru Doherty MD (09/12 2007)   No acute cardiopulmonary abnormality. Independently interpreted by me. Final Result by Thomas Harris MD (09/13 0730)      No acute cardiopulmonary disease.                   Workstation performed: [id number]           Results from last 7 days   Lab Units 09/12/23 1929   SARS-COV-2  Positive*     Results from last 7 days   Lab Units 09/13/23 0447 09/12/23 1929   WBC Thousand/uL 6.01 7.20   HEMOGLOBIN g/dL 13.9 13.5   HEMATOCRIT % 43.2 40.9   PLATELETS Thousands/uL 207 189   NEUTROS ABS Thousands/µL 5.19 5.46         Results from last 7 days   Lab Units 09/13/23 0447 09/12/23 1929   SODIUM mmol/L 138 133*   POTASSIUM mmol/L 4.3 3.7   CHLORIDE mmol/L 99 97   CO2 mmol/L 31 28   ANION GAP mmol/L 8 8   BUN mg/dL 14 15   CREATININE mg/dL 1.19 1.24   EGFR ml/min/1.73sq m 61 58   CALCIUM mg/dL 9.2 8.9     Results from last 7 days   Lab Units 09/13/23 0447 09/12/23 1929   AST U/L 42* 41*   ALT U/L 49 46   ALK PHOS U/L 109* 108*   TOTAL PROTEIN g/dL 7.6 7.2   ALBUMIN g/dL 4.6 4.3   TOTAL BILIRUBIN mg/dL 0.26 0.31     Results from last 7 days   Lab Units 09/13/23  0447 09/12/23  1929   GLUCOSE RANDOM mg/dL 151* 104     Results from last 7 days   Lab Units 09/12/23 1929   HS TNI 0HR ng/L 4     Results from last 7 days   Lab Units 09/13/23  0447   PROCALCITONIN ng/ml 0.15     Results from last 7 days   Lab Units 09/12/23 1929   BNP pg/mL 36       Results from last 7 days   Lab Units 09/13/23 0447   CRP mg/L 65.1*     Results from last 7 days   Lab Units 09/12/23 1929   INFLUENZA A PCR  Negative   INFLUENZA B PCR  Negative   RSV PCR  Negative   ED Treatment:   Medication Administration from 09/12/2023 1848 to 09/12/2023 2159       Date/Time Order Dose Route Action     09/12/2023 1943 EDT albuterol inhalation solution 5 mg 5 mg Nebulization Given     09/12/2023 2108 EDT multi-electrolyte (PLASMALYTE-A/ISOLYTE-S PH 7.4) IV solution 75 mL/hr Intravenous New Bag        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 male  Admission Orders:  Scheduled Medications:  cefTRIAXone, 1,000 mg, Intravenous, Q24H  clonazePAM, 1 mg, Oral, HS  dexamethasone, 6 mg, Intravenous, Q24H  enoxaparin, 40 mg, Subcutaneous, Daily  Fluticasone Furoate-Vilanterol, 1 puff, Inhalation, Daily  furosemide, 20 mg, Oral, Daily  lamoTRIgine, 200 mg, Oral, Daily  levothyroxine, 100 mcg, Oral, Daily  montelukast, 10 mg, Oral, Daily  pramipexole, 2 mg, Oral, HS  remdesivir, 100 mg, Intravenous, Q24H  tamsulosin, 0.8 mg, Oral, Daily With Breakfast  umeclidinium, 1 puff, Inhalation, Daily      Continuous IV Infusions:  multi-electrolyte, 75 mL/hr, Intravenous, Continuous      PRN Meds:  albuterol, 2 puff, Inhalation, Q4H PRN  albuterol, 2.5 mg, Nebulization, Q6H PRN      Keep O2 sat > 90 %  Self proning   Reg diet   Up and OOB   Check pulse ox w/ ambulation   None    Network Utilization Review Department  ATTENTION: Please call with any questions or concerns to [phone] and carefully listen to the prompts so that you are directed to the right person. All voicemails are confidential.  Rae Pleitez all requests for admission clinical reviews, approved or denied determinations and any other requests to dedicated fax number below belonging to the campus where the patient is receiving treatment.  List of dedicated fax numbers for the Facilities:  [place] DENIALS (Administrative/Medical Necessity) [phone] [address] [facility] (Maternity/NICU/Pediatrics) [phone]   [address] [place] [address] Drive [phone]   [facility] [address] [facility] [phone]   [address] [facility] [address] [facility] [address] [address] [facility] [phone]   [address] [facility] [address] [facility] [address] [phone]

## 2023-09-13 NOTE — PLAN OF CARE
Problem: PAIN - ADULT  Goal: Verbalizes/displays adequate comfort level or baseline comfort level  Description: Interventions:  - Encourage patient to monitor pain and request assistance  - Assess pain using appropriate pain scale  - Administer analgesics based on type and severity of pain and evaluate response  - Implement non-pharmacological measures as appropriate and evaluate response  - Consider cultural and social influences on pain and pain management  - Notify physician/advanced practitioner if interventions unsuccessful or patient reports new pain  Outcome: Progressing     Problem: INFECTION - ADULT  Goal: Absence or prevention of progression during hospitalization  Description: INTERVENTIONS:  - Assess and monitor for signs and symptoms of infection  - Monitor lab/diagnostic results  - Monitor all insertion sites, i.e. indwelling lines, tubes, and drains  - Monitor endotracheal if appropriate and nasal secretions for changes in amount and color  - [place] appropriate cooling/warming therapies per order  - Administer medications as ordered  - Instruct and encourage patient and family to use good hand hygiene technique  - Identify and instruct in appropriate isolation precautions for identified infection/condition  Outcome: Progressing     Problem: SAFETY ADULT  Goal: Patient will remain free of falls  Description: INTERVENTIONS:  - Educate patient/family on patient safety including physical limitations  - Instruct patient to call for assistance with activity   - Consult OT/PT to assist with strengthening/mobility   - Keep Call bell within reach  - Keep bed low and locked with side rails adjusted as appropriate  - Keep care items and personal belongings within reach  - Initiate and maintain comfort rounds  - Make Fall Risk Sign visible to staff  - Apply yellow socks and bracelet for high fall risk patients  - Consider moving patient to room near nurses station  Outcome: Progressing

## 2023-09-13 NOTE — DISCHARGE SUMMARY
[address] [facility] [address]  Discharge- Nava Romero 1950, 72 y.o. male MRN: [id number]  Unit/Bed#: [facility] Encounter: [id number]  Primary Care Provider: Alayna Dawson DO   Date and time admitted to hospital: 9/12/2023  7:07 PM    * COVID  Assessment & Plan  · History of eosinophilic pneumonia  · Presents with 3 days of dark brown productive cough, fever, malaise, dyspnea on exertion and nausea  · Positive COVID test at home   · Hemodynamically stable  · Apparently desatted to low 80s on exertion, but he is on room air at rest and did not require O2 at discharge   · Stable for discharge  · Supportive care   · Isolate 5 days from symptoms/positive home test   · Continue home inhalers/neb    Asthma  Assessment & Plan  · No acute exacerbation   · Continue home inhalers     Essential hypertension  Assessment & Plan  · BP is stable  · Not on any anti-hypertensives     DONALD (obstructive sleep apnea)  Assessment & Plan  · CPAP    BPH (benign prostatic hyperplasia)  Assessment & Plan  Continue Flomax     Hypothyroidism  Assessment & Plan  Continue synthroid 100mcg qd      Medical Problems     Resolved Problems  Date Reviewed: 9/13/2023   None       Discharging Physician / Practitioner: Ti Biggs PA-C  PCP: Alayna Dawson DO  Admission Date:   Admission Orders (From admission, onward)     Ordered        09/12/23 2040  INPATIENT ADMISSION  Once                      Discharge Date: 09/13/23    Consultations During Hospital Stay:  · None    Procedures Performed:   · CXR    Significant Findings / Test Results:   · CXR: No acute pulmonary disease     Incidental Findings:   · None     Test Results Pending at Discharge (will require follow up): · None     Outpatient Tests Requested:  · None    Complications:  None    Reason for Admission: [address] [facility] Course: Nava Romero is a 72 y.o. male patient who originally presented to the hospital on 9/12/2023 due to cough/COVID.  CXR in the ER was negative. Patient apparently desatted in the 80s with exertion so he was admitted and started on Remdesivir, Decadron, and Rocephin. He remained on room air and his blood work was unremarkable for a bacterial infection. He was able to be discharged home in stable condition. The patient, initially admitted to the hospital as inpatient, was discharged earlier than expected given the following: patient's work up and treatment was completed in a timely manner and his symptoms improved to the point where he could be stable for discharge home prior to meeting the original 2 midnight expectation. .    Please see above list of diagnoses and related plan for additional information. Condition at Discharge: stable    Discharge Day Visit / Exam:   Subjective:  Patient without complaints besides for coughing. Denies fevers, chills, SOB. Would like to go home. Vitals: Blood Pressure: 157/76 (09/13/23 0750)  Pulse: 88 (09/13/23 0750)  Temperature: 99.6 °F (37.6 °C) (09/13/23 0750)  Temp Source: Oral (09/13/23 0750)  Respirations: 12 (09/13/23 0750)  Height: 5' 7" (170.2 cm) (09/12/23 2224)  Weight - Scale: 105 kg (232 lb) (09/12/23 2224)  SpO2: 98 % (09/13/23 0750)  Exam:   Physical Exam  Constitutional:       General: He is not in acute distress. Appearance: Normal appearance. He is overweight. He is not ill-appearing or diaphoretic. HENT:      Head: Normocephalic and atraumatic. Mouth/Throat:      Mouth: Mucous membranes are moist.   Eyes:      General: No scleral icterus. Pupils: Pupils are equal, round, and reactive to light. Cardiovascular:      Rate and Rhythm: Normal rate and regular rhythm. Pulses: Normal pulses. Heart sounds: Normal heart sounds, S1 normal and S2 normal. No murmur heard. No systolic murmur is present. No diastolic murmur is present. No gallop. No S3 or S4 sounds.    Pulmonary:      Effort: Pulmonary effort is normal. No accessory muscle usage or respiratory distress. Breath sounds: Normal breath sounds. No stridor. No decreased breath sounds, wheezing, rhonchi or rales. Chest:      Chest wall: No tenderness. Abdominal:      General: Bowel sounds are normal. There is no distension. Palpations: Abdomen is soft. Tenderness: There is no abdominal tenderness. There is no guarding. Musculoskeletal:      Right lower leg: No edema. Left lower leg: No edema. Skin:     General: Skin is warm and dry. Coloration: Skin is not jaundiced. Neurological:      General: No focal deficit present. Mental Status: He is alert. Mental status is at baseline. Motor: No tremor or seizure activity. Psychiatric:         Behavior: Behavior is cooperative. Discussion with Family: Attempted to update  (wife) via phone. Left voicemail. Discharge instructions/Information to patient and family:   See after visit summary for information provided to patient and family. Provisions for Follow-Up Care:  See after visit summary for information related to follow-up care and any pertinent home health orders. Disposition:   Home    Planned Readmission: None     Discharge Statement:  I spent 45 minutes discharging the patient. This time was spent on the day of discharge. I had direct contact with the patient on the day of discharge. Greater than 50% of the total time was spent examining patient, answering all patient questions, arranging and discussing plan of care with patient as well as directly providing post-discharge instructions. Additional time then spent on discharge activities. Discharge Medications:  See after visit summary for reconciled discharge medications provided to patient and/or family.       **Please Note: This note may have been constructed using a voice recognition system**

## 2023-09-13 NOTE — H&P
INTERNAL MEDICINE ADMISSION H&P     Name: Myra Medina   Age & Sex: 72 y.o. male   MRN: [id number]  Unit/Bed#: -01   Encounter: [id number]  Primary Care Provider: Osvaldo Patino DO    Code Status: Level 1 - Full Code  Admission Status: INPATIENT   Disposition: Patient requires Med/Surg      ASSESSMENT/PLAN     Principal Problem:    COVID  Active Problems:    Hypothyroidism    DONALD (obstructive sleep apnea)    Essential hypertension    Asthma    BPH (benign prostatic hyperplasia)      * COVID  Assessment & Plan  History of eosinophilic pneumonia  Presents with 3 days of dark brown productive cough, fever, malaise, dyspnea on exertion and nausea  Denies any chest pain, wheezing, abdominal pain, diarrhea  Denies any sick contacts    Hemodynamically stable  Desaturated to low 80s on exertion  We will treat this as a moderate COVID infection  Continue with remdesivir and steroids  We will do Rocephin due to the nature of productive cough  Maintain O2 above 92%  Respiratory protocol  WBC-fever curve  Monitor    BPH (benign prostatic hyperplasia)  Assessment & Plan  Continue Flomax     Asthma  Assessment & Plan  Continue home inhalers     Essential hypertension  Assessment & Plan  BP is stable  Not on any anti-hypertensives     DONALD (obstructive sleep apnea)  Assessment & Plan  Will order CPAP    Hypothyroidism  Assessment & Plan  Continue synthroid 100mcg qd      VTE Pharmacologic Prophylaxis: Enoxaparin (Lovenox)  VTE Mechanical Prophylaxis: sequential compression device    CHIEF COMPLAINT     Chief Complaint   Patient presents with   • Shortness of Breath     Pt reports + covid test, SOB for the last 3 days, fever, coughing, nausea. HISTORY OF PRESENT ILLNESS     Patient is a 70-year-old male with past medical history of eosinophilic pneumonia, mood disorder, DONALD who presents to the ED for shortness of breath. For the last 3 days endorses brown sputum cough associated with [place]O, fever and malaise. Denies any sick contacts. Denies any abdominal pain, diarrhea, vomiting. Denies any alcohol or smoking. Desaturated to 80s in the ED on exertion. /82 (BP Location: Left arm)   Pulse 80   Temp 98.2 °F (36.8 °C) (Oral)   Resp 18   Ht 5' 7" (1.702 m)   Wt 105 kg (232 lb)   SpO2 95%   BMI 36.34 kg/m²     WBC 7, Hb 13.5  Na 133, K 3.7 Cr 1.24  Trop 4, BNP 36  COVID +    Will admit for COVID pneumonia. REVIEW OF SYSTEMS     Review of Systems   Constitutional: Positive for fatigue. Negative for chills and unexpected weight change. HENT: Negative for congestion and drooling. Eyes: Negative for visual disturbance. Respiratory: Positive for cough and shortness of breath. Negative for wheezing. Cardiovascular: Positive for leg swelling. Negative for chest pain and palpitations. Gastrointestinal: Negative for abdominal distention, abdominal pain, nausea and vomiting. Endocrine: Negative for polydipsia and polyuria. Genitourinary: Negative for decreased urine volume and difficulty urinating. Neurological: Positive for weakness. OBJECTIVE     Vitals:    23 1908 23 2103 23 2108 23 2224   BP: 154/75 149/69  142/82   BP Location: Left arm Left arm  Left arm   Pulse:  89 88 80   Resp:  19 18 18   Temp:    98.2 °F (36.8 °C)   TempSrc:    Oral   SpO2: 96% 97% 97% 95%   Weight:    105 kg (232 lb)   Height:    5' 7" (1.702 m)      Temperature:   Temp (24hrs), Av.4 °F (37.4 °C), Min:98.2 °F (36.8 °C), Max:100.5 °F (38.1 °C)    Temperature: 98.2 °F (36.8 °C)  Intake & Output:  I/O     None        Weights:   IBW (Ideal Body Weight): 66.1 kg    Body mass index is 36.34 kg/m². Weight (last 2 days)     Date/Time Weight    23 2224 105 (232)        Physical Exam  Constitutional:       General: He is not in acute distress. Appearance: He is not ill-appearing, toxic-appearing or diaphoretic. Cardiovascular:      Rate and Rhythm: Normal rate and regular rhythm. Pulses: Normal pulses. Heart sounds: Normal heart sounds. No murmur heard. No friction rub. No gallop. Pulmonary:      Effort: Pulmonary effort is normal. No respiratory distress. Breath sounds: No wheezing or rales. Chest:      Chest wall: No tenderness. Abdominal:      General: Bowel sounds are normal.      Tenderness: There is no abdominal tenderness. There is no right CVA tenderness or left CVA tenderness. Hernia: No hernia is present. Musculoskeletal:      Right lower leg: No edema. Left lower leg: No edema. Neurological:      General: No focal deficit present. Mental Status: He is oriented to person, place, and time.    Psychiatric:         Mood and Affect: Mood normal.         Behavior: Behavior normal.       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[address])    • Manic depression ([address])    • Right lower lobe pneumonia 01/12/2023   • Shortness of breath     MONTAÑO   • Sleep apnea      PAST SURGICAL HISTORY     Past Surgical History:   Procedure Laterality Date   • COLONOSCOPY  12/13/2022   • COLONOSCOPY W/ BIOPSIES AND POLYPECTOMY  06/14/2023   • VT ARTHROPLASTY GLENOHUMERAL JOINT TOTAL SHOULDER Right 01/10/2023    Procedure: ARTHROPLASTY SHOULDER REVERSE WITH BICEPS TENODESIS;  Surgeon: Madelyn Vela MD;  Location: [facility];  Service: Orthopedics   • UMBILICAL HERNIA REPAIR       SOCIAL & FAMILY HISTORY     Social History     Substance and Sexual Activity   Alcohol Use Never     Substance and Sexual Activity   Alcohol Use Never        Substance and Sexual Activity   Drug Use Never     Social History     Tobacco Use   Smoking Status Never   Smokeless Tobacco Never     Family History   Problem Relation Age of Onset • Hypertension Mother    • Depression Mother    • Depression Father    • Arthritis Father    • Heart attack Paternal Uncle 32   • Colon cancer Neg Hx    • Prostate cancer Neg Hx      LABORATORY DATA     Labs: I have personally reviewed pertinent reports. Results from last 7 days   Lab Units 09/12/23 1929   WBC Thousand/uL 7.20   HEMOGLOBIN g/dL 13.5   HEMATOCRIT % 40.9   PLATELETS Thousands/uL 189   NEUTROS PCT % 75   MONOS PCT % 13*   EOS PCT % 1      Results from last 7 days   Lab Units 09/12/23 1929   POTASSIUM mmol/L 3.7   CHLORIDE mmol/L 97   CO2 mmol/L 28   BUN mg/dL 15   CREATININE mg/dL 1.24   CALCIUM mg/dL 8.9   ALK PHOS U/L 108*   ALT U/L 46   AST U/L 41*                          Micro:  No results found for: "BLOODCX", "[place]", "WOUNDCULT", "SPUTUMCULTUR"  IMAGING & DIAGNOSTIC TESTS     Imaging: I have personally reviewed pertinent reports. No results found. EKG, Pathology, and Other Studies: I have personally reviewed pertinent reports. ALLERGIES     Allergies   Allergen Reactions   • Crestor [Rosuvastatin] Other (See Comments)     Lost ability to walk and balance   • Simvastatin Shortness Of Breath     Per Cardio, thinks this was switch to a generic vs brand name    • Spiriva Respimat [Tiotropium Bromide Monohydrate] Rash     MEDICATIONS PRIOR TO ARRIVAL     Prior to Admission medications    Medication Sig Start Date End Date Taking?  Authorizing Provider   albuterol (2.5 mg/3 mL) 0.083 % nebulizer solution Take 3 mL (2.5 mg total) by nebulization every 6 (six) hours as needed for wheezing or shortness of breath 7/20/23   Nonnie Breaker, MD   albuterol (PROVENTIL HFA,VENTOLIN HFA) 90 mcg/act inhaler INHALE 2 PUFFS BY MOUTH EVERY 4 HOURS AS NEEDED FOR WHEEZING 7/20/23   Historical Provider, MD   clonazePAM (KlonoPIN) 1 mg tablet Take 1 mg by mouth daily at bedtime 4/6/23   Historical Provider, MD   fluticasone-umeclidinium-vilanterol (Trelegy Ellipta) 593-77.6-56 mcg/actuation AEPB inhaler Inhale 1 puff daily 9/8/23 12/7/23  Justin Weston MD   furosemide (LASIX) 20 mg tablet Take 1 tablet (20 mg total) by mouth daily 8/2/23   Dee Pineda DO   Insulin Pen Needle 32G X 4 MM MISC Use in the morning  Patient not taking: Reported on 8/30/2023 6/8/23   LOLLY Garcia   lamoTRIgine (LaMICtal) 200 MG tablet Take 200 mg by mouth daily 8/15/22   Historical Provider, MD   levothyroxine 100 mcg tablet Take 1 tablet (100 mcg total) by mouth daily 7/19/23   Latisha Vernon DO   montelukast (SINGULAIR) 10 mg tablet Take 1 tablet (10 mg total) by mouth in the morning 7/19/23   Latisha Vernon DO   pramipexole (MIRAPEX) 1 mg tablet Take 2 mg by mouth daily at bedtime 8/15/22   Historical Provider, MD   Semaglutide-Weight Management (WEGOVY) 0.25 MG/0.5ML Inject 0.5 mL (0.25 mg total) under the skin once a week 9/5/23 10/5/23  LOLLY Garcia   tamsulosin (FLOMAX) 0.4 mg TAKE 2 CAPSULES(0.8 MG) BY MOUTH IN THE MORNING 9/7/23   Latisha Vernon DO     MEDICATIONS ADMINISTERED IN LAST 24 HOURS     Medication Administration - last 24 hours from 09/11/2023 2319 to 09/12/2023 2319       Date/Time Order Dose Route Action Action by     09/12/2023 1943 EDT albuterol inhalation solution 5 mg 5 mg Nebulization Given Ifrah Mills RN     09/12/2023 2235 EDT clonazePAM (KlonoPIN) tablet 1 mg 1 mg Oral Given Ted Edmondson RN     09/12/2023 2235 EDT pramipexole (MIRAPEX) tablet 2 mg 2 mg Oral Not Given Ted Edmondson RN     09/12/2023 2227 EDT remdesivir Lyndol Luna) 200 mg in sodium chloride 0.9 % 290 mL IVPB 200 mg Intravenous New Bag Ted Edmondson RN     09/12/2023 2233 EDT dexamethasone (PF) (DECADRON) injection 6 mg 6 mg Intravenous Given Ted Edmondson, RN     09/12/2023 2233 EDT multi-electrolyte (PLASMALYTE-A/ISOLYTE-S PH 7.4) IV solution 75 mL/hr Intravenous New Bag Ted Edmondson, RN     09/12/2023 2108 EDT multi-electrolyte (PLASMALYTE-A/ISOLYTE-S PH 7.4) IV solution 75 mL/hr Intravenous New Bag Ariel MINOR Jarad Mansfield RN     09/12/2023 2121 EDT ipratropium (ATROVENT) 0.02 % inhalation solution 0.5 mg -- Nebulization Canceled Entry Lakesha Andrew RN     09/12/2023 2121 EDT levalbuterol Brandt Shea) inhalation solution 1.25 mg -- Nebulization Canceled Entry Lakesha Andrew RN        CURRENT MEDICATIONS     Current Facility-Administered Medications   Medication Dose Route Frequency Provider Last Rate   • albuterol  2 puff Inhalation Q4H PRN Osker Jenaro Hernandez MD     • albuterol  2.5 mg Nebulization Q6H PRN Maricel Hernandez MD     • cefTRIAXone  1,000 mg Intravenous Q24H Maricel Hernandez MD     • clonazePAM  1 mg Oral HS Maricel Hernandez MD     • dexamethasone  6 mg Intravenous Q24H Maricel Hernandez MD     • [START ON 9/13/2023] enoxaparin  40 mg Subcutaneous Daily Maricel Hernandez MD     • [START ON 9/13/2023] Fluticasone Furoate-Vilanterol  1 puff Inhalation Daily Maricel Hernandez MD     • [START ON 9/13/2023] furosemide  20 mg Oral Daily Maricel Hernandez MD     • [START ON 9/13/2023] lamoTRIgine  200 mg Oral Daily Maricel Hernandez MD     • [START ON 9/13/2023] levothyroxine  100 mcg Oral Daily Maricel Hernandez MD     • [START ON 9/13/2023] montelukast  10 mg Oral Daily Lauryn Goldstein MD     • multi-electrolyte  75 mL/hr Intravenous Continuous Maricel Hernandez MD 75 mL/hr (09/12/23 2233)   • pramipexole  2 mg Oral HS Maricel Hernandez MD     • [START ON 9/13/2023] remdesivir  100 mg Intravenous Q24H Maricel Hernandez MD     • [START ON 9/13/2023] tamsulosin  0.8 mg Oral Daily With Breakfast Maricel Hernandez MD     • [START ON 9/13/2023] umeclidinium  1 puff Inhalation Daily Maricle Hernandez MD       multi-electrolyte, 75 mL/hr, Last Rate: 75 mL/hr (09/12/23 2233)      albuterol, 2 puff, Q4H PRN  albuterol, 2.5 mg, Q6H PRN        Admission Time  I spent 1 hour admitting the patient.   This involved direct patient contact where I performed a full history and physical, reviewing previous records, and reviewing laboratory and other diagnostic studies. Portions of the record may have been created with voice recognition software. Occasional wrong word or "sound a like" substitutions may have occurred due to the inherent limitations of voice recognition software.   Read the chart carefully and recognize, using context, where substitutions have occurred.    ==  Artemio Hilario MD  [facility]

## 2023-09-13 NOTE — PLAN OF CARE
Problem: INFECTION - ADULT  Goal: Absence or prevention of progression during hospitalization  Description: INTERVENTIONS:  - Assess and monitor for signs and symptoms of infection  - Monitor lab/diagnostic results  - Monitor all insertion sites, i.e. indwelling lines, tubes, and drains  - Monitor endotracheal if appropriate and nasal secretions for changes in amount and color  - [place] appropriate cooling/warming therapies per order  - Administer medications as ordered  - Instruct and encourage patient and family to use good hand hygiene technique  - Identify and instruct in appropriate isolation precautions for identified infection/condition  Outcome: Not Progressing  Goal: Absence of fever/infection during neutropenic period  Description: INTERVENTIONS:  - Monitor WBC    Outcome: Not Progressing     Problem: DISCHARGE PLANNING  Goal: Discharge to home or other facility with appropriate resources  Description: INTERVENTIONS:  - Identify barriers to discharge w/patient and caregiver  - Arrange for needed discharge resources and transportation as appropriate  - Identify discharge learning needs (meds, wound care, etc.)  - Arrange for interpretive services to assist at discharge as needed  - Refer to Case Management Department for coordinating discharge planning if the patient needs post-hospital services based on physician/advanced practitioner order or complex needs related to functional status, cognitive ability, or social support system  Outcome: Not Progressing     Problem: Knowledge Deficit  Goal: Patient/family/caregiver demonstrates understanding of disease process, treatment plan, medications, and discharge instructions  Description: Complete learning assessment and assess knowledge base.   Interventions:  - Provide teaching at level of understanding  - Provide teaching via preferred learning methods  Outcome: Not Progressing

## 2023-09-14 ENCOUNTER — ANESTHESIA (OUTPATIENT)
Dept: PERIOP | Facility: HOSPITAL | Age: 73
End: 2023-09-14
Payer: SELF-PAY

## 2023-09-14 LAB
ATRIAL RATE: 83 BPM
P AXIS: 59 DEGREES
PR INTERVAL: 164 MS
QRS AXIS: 48 DEGREES
QRSD INTERVAL: 96 MS
QT INTERVAL: 358 MS
QTC INTERVAL: 420 MS
T WAVE AXIS: 57 DEGREES
VENTRICULAR RATE: 83 BPM

## 2023-09-14 PROCEDURE — 93010 ELECTROCARDIOGRAM REPORT: CPT | Performed by: INTERNAL MEDICINE

## 2023-09-14 NOTE — UTILIZATION REVIEW
NOTIFICATION OF ADMISSION DISCHARGE   This is a Notification of Discharge from [facility]. Please be advised that this patient has been discharge from our facility. Below you will find the admission and discharge date and time including the patient’s disposition. UTILIZATION REVIEW CONTACT:  Mike Winn  Utilization   Network Utilization Review Department  Phone: [phone] x carefully listen to the prompts. All voicemails are confidential.  Email: [email or web address]     ADMISSION INFORMATION  PRESENTATION DATE: 9/12/2023  7:07 PM  OBERVATION ADMISSION DATE:   INPATIENT ADMISSION DATE: 9/12/23  8:40 PM   DISCHARGE DATE: 9/13/2023  4:03 PM   DISPOSITION:Home/Self Care    IMPORTANT INFORMATION:  Send all requests for admission clinical reviews, approved or denied determinations and any other requests to dedicated fax number below belonging to the campus where the patient is receiving treatment.  List of dedicated fax numbers:  [place] DENIALS (Administrative/Medical Necessity) [phone] [address] [facility] (Maternity/NICU/Pediatrics) [phone]   [facility] [phone]   [facility] [phone] [address] [facility] [phone]   [address] [place] [address] [phone]   [facility] [phone]   [facility] [address] [place] [address] [phone]   [address] [place] [address] [phone] [address] [facility] [phone] [address] [facility] [address] [facility] [phone]

## 2023-09-18 ENCOUNTER — TRANSITIONAL CARE MANAGEMENT (OUTPATIENT)
Dept: FAMILY MEDICINE CLINIC | Facility: CLINIC | Age: 73
End: 2023-09-18

## 2023-09-21 ENCOUNTER — NURSE TRIAGE (OUTPATIENT)
Age: 73
End: 2023-09-21

## 2023-09-21 ENCOUNTER — TELEPHONE (OUTPATIENT)
Dept: FAMILY MEDICINE CLINIC | Facility: CLINIC | Age: 73
End: 2023-09-21

## 2023-09-21 NOTE — TELEPHONE ENCOUNTER
I left message for patient to call back. He was scheduled with Dr. Soy Antonio for a hospital follow up. He needs to be scheduled with Dr. Jing So.

## 2023-09-21 NOTE — TELEPHONE ENCOUNTER
Pt calling. He tested positive for Covid 09/12/2023. He was hospitalized 09/12-09/13. He states that overall he is better, but really not feeling himself yet. He is still testing positive. He is very fatigued. He has SOB with ambulation. He has been checking his SpO2 at home. Resting it is 95%, pt states this is baseline. With ambulation it is 90%. Per admission note, pt had a sat rate in the 80s on 09/12/2023. Pt also starting with left eye redness and discharge. He states that his cough is getting better, but still present. Tessalon Perles are helping, he needs refill as of today. F/u appt scheduled for tomorrow, but can meds for eye and RF of Tessalon Perles be sent today to pharmacy on record. Pt sounds overall like he does not feel well. Recommended rest, fluids, and f/u with provider tomorrow. Reason for Disposition  • HIGH RISK for severe COVID complications (e.g., weak immune system, age > 59 years, obesity with BMI > 22, pregnant, chronic lung disease or other chronic medical condition) (Exception: Already seen by PCP and no new or worsening symptoms.)    Answer Assessment - Initial Assessment Questions  1. COVID-19 DIAGNOSIS: "Who made your COVID-19 diagnosis?" "Was it confirmed by a positive lab test or self-test?" If not diagnosed by a doctor (or NP/PA), ask "Are there lots of cases (community spread) where you live?" Note: See public health department website, if unsure. 09/12/2023  2. COVID-19 EXPOSURE: "Was there any known exposure to COVID before the symptoms began?" CDC Definition of close contact: within 6 feet (2 meters) for a total of 15 minutes or more over a 24-hour period. Community spread  3. ONSET: "When did the COVID-19 symptoms start?"       09/12/2023  4. WORST SYMPTOM: "What is your worst symptom?" (e.g., cough, fever, shortness of breath, muscle aches)      Cough, SOB   5. COUGH: "Do you have a cough?" If Yes, ask: "How bad is the cough?"        cough  6. FEVER: "Do you have a fever?" If Yes, ask: "What is your temperature, how was it measured, and when did it start?"     no  7. RESPIRATORY STATUS: "Describe your breathing?" (e.g., shortness of breath, wheezing, unable to speak)       Sob with ambulation     8. BETTER-SAME-WORSE: "Are you getting better, staying the same or getting worse compared to yesterday?"  If getting worse, ask, "In what way?"      Improving   9. HIGH RISK DISEASE: "Do you have any chronic medical problems?" (e.g., asthma, heart or lung disease, weak immune system, obesity, etc.)      Yes, asthma   10. VACCINE: "Have you had the COVID-19 vaccine?" If Yes, ask: "Which one, how many shots, when did you get it?"        Yes   11. BOOSTER: "Have you received your COVID-19 booster?" If Yes, ask: "Which one and when did you get it?"      Yes   12. PREGNANCY: "Is there any chance you are pregnant?" "When was your last menstrual period?"        no  13. OTHER SYMPTOMS: "Do you have any other symptoms?"  (e.g., chills, fatigue, headache, loss of smell or taste, muscle pain, sore throat)        no  14.  O2 SATURATION MONITOR:  "Do you use an oxygen saturation monitor (pulse oximeter) at home?" If Yes, ask "What is your reading (oxygen level) today?" "What is your usual oxygen saturation reading?" (e.g., 95%)        Sittin%  Ambulation 90%    Protocols used: CORONAVIRUS (COVID-19) DIAGNOSED OR SUSPECTED-ADULT-[place]

## 2023-09-21 NOTE — TELEPHONE ENCOUNTER
----- Message from Alex Garza RN sent at 9/21/2023  9:43 AM EDT -----  Regarding: FW: Covid Followup  Contact: [phone]  Spoke with pt today, please see the triage note. He needs some med refills and thinks he may be starting with conjunctivitis in his left eye. F/u appt for covid recheck tomorrow, but he needs meds filled today.     ----- Message -----  From: Zuleika Muñiz  Sent: 9/21/2023   6:21 AM EDT  To: Primary [facility]  Subject: Covid Followup                                   Good morning. I was diagnosed with Covid and treated and released from the hospital on September 13th. I am feeling a little better but am still testing positive; am still very easily winded; and continue to have a cough producing significant phlegm. I also have redness in my left eye which started two days ago -- not sure if it is conjunctivitis or not. They prescribed Benzonatate 100 MG 3 times a day at the hospital -- I am going to run out of that TODAY and I am taking over-the-counter NyQuil severe cold & flu. Not sure what I should do next. Any guidance as to recommended next steps would be greatly appreciated.   Blossom Portillo [phone]

## 2023-09-21 NOTE — TELEPHONE ENCOUNTER
Matt Garcia needs refill of benzonatate that was prescribed in hospital and drops for his pink eye. Can we send these medications in? Confirmed apt for tomorrow.

## 2023-09-21 NOTE — TELEPHONE ENCOUNTER
Patient was contacted and Left another message. He needs to be rescheduled with his PCP. PLEASE TRANSFER PATIENT TO THE OFFICE WHEN HE CALLS.

## 2023-09-22 ENCOUNTER — OFFICE VISIT (OUTPATIENT)
Dept: FAMILY MEDICINE CLINIC | Facility: CLINIC | Age: 73
End: 2023-09-22
Payer: COMMERCIAL

## 2023-09-22 VITALS
DIASTOLIC BLOOD PRESSURE: 70 MMHG | HEART RATE: 76 BPM | OXYGEN SATURATION: 97 % | BODY MASS INDEX: 36.26 KG/M2 | HEIGHT: 67 IN | SYSTOLIC BLOOD PRESSURE: 122 MMHG | WEIGHT: 231 LBS | TEMPERATURE: 98.1 F

## 2023-09-22 DIAGNOSIS — H10.32 ACUTE BACTERIAL CONJUNCTIVITIS OF LEFT EYE: Primary | ICD-10-CM

## 2023-09-22 DIAGNOSIS — U07.1 COVID: ICD-10-CM

## 2023-09-22 PROCEDURE — 99213 OFFICE O/P EST LOW 20 MIN: CPT | Performed by: FAMILY MEDICINE

## 2023-09-22 RX ORDER — CIPROFLOXACIN HYDROCHLORIDE 3.5 MG/ML
1 SOLUTION/ DROPS TOPICAL 2 TIMES DAILY
Qty: 5 ML | Refills: 0 | Status: SHIPPED | OUTPATIENT
Start: 2023-09-22 | End: 2023-09-29

## 2023-09-22 RX ORDER — BENZONATATE 100 MG/1
100 CAPSULE ORAL 3 TIMES DAILY PRN
Qty: 60 CAPSULE | Refills: 0 | Status: SHIPPED | OUTPATIENT
Start: 2023-09-22

## 2023-09-22 NOTE — ASSESSMENT & PLAN NOTE
Patient started on a trial of topical antibiotic. Recommended ophthalmology evaluation if symptoms do not improve.

## 2023-09-22 NOTE — PROGRESS NOTES
Subjective:      Patient ID: Reanna Yost is a 72 y.o. male. HPI    Patient is here reporting symptoms of redness and discharge from the left eye. Symptoms started a few days ago. Patient was admitted with COVID, reports that his symptoms have improved now. He continues to experience mild cough. 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[address])    • Manic depression ([address])    • Right lower lobe pneumonia 01/12/2023   • Shortness of breath     [place]   • Sleep apnea        Family History   Problem Relation Age of Onset   • Hypertension Mother    • Depression Mother    • Depression Father    • Arthritis Father    • Heart attack Paternal Uncle 32   • Colon cancer Neg Hx    • Prostate cancer Neg Hx        Past Surgical History:   Procedure Laterality Date   • COLONOSCOPY  12/13/2022   • COLONOSCOPY W/ BIOPSIES AND POLYPECTOMY  06/14/2023   • FL ARTHROPLASTY GLENOHUMERAL JOINT TOTAL SHOULDER Right 01/10/2023    Procedure: ARTHROPLASTY SHOULDER REVERSE WITH BICEPS TENODESIS;  Surgeon: Nicole Toscano MD;  Location: [facility];  Service: Orthopedics   • UMBILICAL HERNIA REPAIR          reports that he has never smoked. He has never used smokeless tobacco. He reports that he does not drink alcohol and does not use drugs.       Current Outpatient Medications:   •  albuterol (2.5 mg/3 mL) 0.083 % nebulizer solution, Take 3 mL (2.5 mg total) by nebulization every 6 (six) hours as needed for wheezing or shortness of breath, Disp: 360 mL, Rfl: 3  •  albuterol (PROVENTIL HFA,VENTOLIN HFA) 90 mcg/act inhaler, , Disp: , Rfl:   •  benzonatate (TESSALON PERLES) 100 mg capsule, Take 1 capsule (100 mg total) by mouth 3 (three) times a day as needed for cough, Disp: 60 capsule, Rfl: 0  • ciprofloxacin (CILOXAN) 0.3 % ophthalmic solution, Administer 1 drop into the left eye 2 (two) times a day for 7 days, Disp: 5 mL, Rfl: 0  •  clonazePAM (KlonoPIN) 1 mg tablet, Take 1 mg by mouth daily at bedtime, Disp: , Rfl:   •  fluticasone-umeclidinium-vilanterol (Trelegy Ellipta) 200-62.5-25 mcg/actuation AEPB inhaler, Inhale 1 puff daily, Disp: 60 blister, Rfl: 2  •  furosemide (LASIX) 20 mg tablet, Take 1 tablet (20 mg total) by mouth daily, Disp: 30 tablet, Rfl: 1  •  Insulin Pen Needle 32G X 4 MM MISC, Use in the morning, Disp: 100 each, Rfl: 1  •  lamoTRIgine (LaMICtal) 200 MG tablet, Take 200 mg by mouth daily, Disp: , Rfl:   •  levothyroxine 100 mcg tablet, Take 1 tablet (100 mcg total) by mouth daily, Disp: 90 tablet, Rfl: 0  •  montelukast (SINGULAIR) 10 mg tablet, Take 1 tablet (10 mg total) by mouth in the morning, Disp: 90 tablet, Rfl: 0  •  pramipexole (MIRAPEX) 1 mg tablet, Take 2 mg by mouth daily at bedtime, Disp: , Rfl:   •  tamsulosin (FLOMAX) 0.4 mg, TAKE 2 CAPSULES(0.8 MG) BY MOUTH IN THE MORNING, Disp: 180 capsule, Rfl: 0  •  Semaglutide-Weight Management (WEGOVY) 0.25 MG/0.5ML, Inject 0.5 mL (0.25 mg total) under the skin once a week (Patient not taking: Reported on 9/12/2023), Disp: 2 mL, Rfl: 0    The following portions of the patient's history were reviewed and updated as appropriate: allergies, current medications, past family history, past medical history, past social history, past surgical history and problem list.    Review of Systems   Constitutional: Negative. Eyes: Positive for pain, redness and itching. Respiratory: Positive for cough. Objective:    /70   Pulse 76   Temp 98.1 °F (36.7 °C)   Ht 5' 7" (1.702 m)   Wt 105 kg (231 lb)   SpO2 97%   BMI 36.18 kg/m²      Physical Exam  Constitutional:       Appearance: Normal appearance. Eyes:      Pupils: Pupils are equal, round, and reactive to light. Comments: Left eye is congested. Neurological:      Mental Status: He is oriented to person, place, and time.            Recent Results (from the past 1008 hour(s))   CBC and differential    Collection Time: 08/30/23 11:52 AM   Result Value Ref Range    WBC 7.39 4.31 - 10.16 Thousand/uL    RBC 4.92 3.88 - 5.62 Million/uL    Hemoglobin 14.4 12.0 - 17.0 g/dL    Hematocrit 45.8 36.5 - 49.3 %    MCV 93 82 - 98 fL    MCH 29.3 26.8 - 34.3 pg    MCHC 31.4 31.4 - 37.4 g/dL    RDW 13.8 11.6 - 15.1 %    MPV 10.1 8.9 - 12.7 fL    Platelets 603 517 - 764 Thousands/uL    nRBC 0 /100 WBCs    Neutrophils Relative 68 43 - 75 %    Immat GRANS % 1 0 - 2 %    Lymphocytes Relative 20 14 - 44 %    Monocytes Relative 8 4 - 12 %    Eosinophils Relative 2 0 - 6 %    Basophils Relative 1 0 - 1 %    Neutrophils Absolute 5.09 1.85 - 7.62 Thousands/µL    Immature Grans Absolute 0.06 0.00 - 0.20 Thousand/uL    Lymphocytes Absolute 1.47 0.60 - 4.47 Thousands/µL    Monocytes Absolute 0.57 0.17 - 1.22 Thousand/µL    Eosinophils Absolute 0.13 0.00 - 0.61 Thousand/µL    Basophils Absolute 0.07 0.00 - 0.10 Thousands/µL   Basic metabolic panel    Collection Time: 08/30/23 11:52 AM   Result Value Ref Range    Sodium 138 135 - 147 mmol/L    Potassium 4.2 3.5 - 5.3 mmol/L    Chloride 99 96 - 108 mmol/L    CO2 30 21 - 32 mmol/L    ANION GAP 9 mmol/L    BUN 23 5 - 25 mg/dL    Creatinine 1.20 0.60 - 1.30 mg/dL    Glucose, Fasting 124 (H) 65 - 99 mg/dL    Calcium 9.8 8.4 - 10.2 mg/dL    eGFR 60 ml/min/1.73sq m   ECG 12 lead    Collection Time: 09/07/23 10:54 AM   Result Value Ref Range    Ventricular Rate 81 BPM    Atrial Rate 81 BPM    WA Interval 158 ms    QRSD Interval 94 ms    QT Interval 380 ms    QTC Interval 441 ms    P [place] 54 degrees    QRS Axis 33 degrees    T Wave Axis 56 degrees   ECG 12 lead    Collection Time: 09/12/23  7:26 PM   Result Value Ref Range    Ventricular Rate 83 BPM    Atrial Rate 83 BPM    WA Interval 164 ms    QRSD Interval 96 ms    QT Interval 358 ms    QTC Interval 420 ms    P [place] 59 degrees    QRS Axis 48 degrees    T Wave [place] 57 degrees   CBC and differential    Collection Time: 09/12/23  7:29 PM   Result Value Ref Range    WBC 7.20 4. 31 - 10.16 Thousand/uL    RBC 4.41 3.88 - 5.62 Million/uL    Hemoglobin 13.5 12.0 - 17.0 g/dL    Hematocrit 40.9 36.5 - 49.3 %    MCV 93 82 - 98 fL    MCH 30.6 26.8 - 34.3 pg    MCHC 33.0 31.4 - 37.4 g/dL    RDW 13.9 11.6 - 15.1 %    MPV 9.6 8.9 - 12.7 fL    Platelets 963 565 - 129 Thousands/uL    nRBC 0 /100 WBCs    Neutrophils Relative 75 43 - 75 %    Immat GRANS % 1 0 - 2 %    Lymphocytes Relative 9 (L) 14 - 44 %    Monocytes Relative 13 (H) 4 - 12 %    Eosinophils Relative 1 0 - 6 %    Basophils Relative 1 0 - 1 %    Neutrophils Absolute 5.46 1.85 - 7.62 Thousands/µL    Immature Grans Absolute 0.07 0.00 - 0.20 Thousand/uL    Lymphocytes Absolute 0.67 0.60 - 4.47 Thousands/µL    Monocytes Absolute 0.92 0.17 - 1.22 Thousand/µL    Eosinophils Absolute 0.04 0.00 - 0.61 Thousand/µL    Basophils Absolute 0.04 0.00 - 0.10 Thousands/µL   Comprehensive metabolic panel    Collection Time: 09/12/23  7:29 PM   Result Value Ref Range    Sodium 133 (L) 135 - 147 mmol/L    Potassium 3.7 3.5 - 5.3 mmol/L    Chloride 97 96 - 108 mmol/L    CO2 28 21 - 32 mmol/L    ANION GAP 8 mmol/L    BUN 15 5 - 25 mg/dL    Creatinine 1.24 0.60 - 1.30 mg/dL    Glucose 104 65 - 140 mg/dL    Calcium 8.9 8.4 - 10.2 mg/dL    AST 41 (H) 13 - 39 U/L    ALT 46 7 - 52 U/L    Alkaline Phosphatase 108 (H) 34 - 104 U/L    Total Protein 7.2 6.4 - 8.4 g/dL    Albumin 4.3 3.5 - 5.0 g/dL    Total Bilirubin 0.31 0.20 - 1.00 mg/dL    eGFR 58 ml/min/1.73sq m   HS Troponin 0hr (reflex protocol)    Collection Time: 09/12/23  7:29 PM   Result Value Ref Range    hs TnI 0hr 4 "Refer to ACS Flowchart"- see link ng/L   B-Type Natriuretic Peptide(BNP)    Collection Time: 09/12/23  7:29 PM   Result Value Ref Range    BNP 36 0 - 100 pg/mL   FLU/RSV/COVID - if FLU/RSV clinically relevant Collection Time: 09/12/23  7:29 PM    Specimen: Nasopharyngeal Swab; Nares   Result Value Ref Range    SARS-CoV-2 Positive (A) Negative    INFLUENZA A PCR Negative Negative    INFLUENZA B PCR Negative Negative    RSV PCR Negative Negative   Procalcitonin    Collection Time: 09/13/23  4:47 AM   Result Value Ref Range    Procalcitonin 0.15 <=0.25 ng/ml   C-reactive protein    Collection Time: 09/13/23  4:47 AM   Result Value Ref Range    CRP 65.1 (H) <3.0 mg/L   Comprehensive metabolic panel    Collection Time: 09/13/23  4:47 AM   Result Value Ref Range    Sodium 138 135 - 147 mmol/L    Potassium 4.3 3.5 - 5.3 mmol/L    Chloride 99 96 - 108 mmol/L    CO2 31 21 - 32 mmol/L    ANION GAP 8 mmol/L    BUN 14 5 - 25 mg/dL    Creatinine 1.19 0.60 - 1.30 mg/dL    Glucose 151 (H) 65 - 140 mg/dL    Calcium 9.2 8.4 - 10.2 mg/dL    AST 42 (H) 13 - 39 U/L    ALT 49 7 - 52 U/L    Alkaline Phosphatase 109 (H) 34 - 104 U/L    Total Protein 7.6 6.4 - 8.4 g/dL    Albumin 4.6 3.5 - 5.0 g/dL    Total Bilirubin 0.26 0.20 - 1.00 mg/dL    eGFR 61 ml/min/1.73sq m   CBC and differential    Collection Time: 09/13/23  4:47 AM   Result Value Ref Range    WBC 6.01 4.31 - 10.16 Thousand/uL    RBC 4.67 3.88 - 5.62 Million/uL    Hemoglobin 13.9 12.0 - 17.0 g/dL    Hematocrit 43.2 36.5 - 49.3 %    MCV 93 82 - 98 fL    MCH 29.8 26.8 - 34.3 pg    MCHC 32.2 31.4 - 37.4 g/dL    RDW 14.0 11.6 - 15.1 %    MPV 10.0 8.9 - 12.7 fL    Platelets 351 317 - 732 Thousands/uL    nRBC 0 /100 WBCs    Neutrophils Relative 86 (H) 43 - 75 %    Immat GRANS % 1 0 - 2 %    Lymphocytes Relative 8 (L) 14 - 44 %    Monocytes Relative 4 4 - 12 %    Eosinophils Relative 0 0 - 6 %    Basophils Relative 1 0 - 1 %    Neutrophils Absolute 5.19 1.85 - 7.62 Thousands/µL    Immature Grans Absolute 0.06 0.00 - 0.20 Thousand/uL    Lymphocytes Absolute 0.48 (L) 0.60 - 4.47 Thousands/µL    Monocytes Absolute 0.25 0.17 - 1.22 Thousand/µL    Eosinophils Absolute 0.00 0.00 - 0.61 Thousand/µL Basophils Absolute 0.03 0.00 - 0.10 Thousands/µL       Assessment/Plan:    No problem-specific Assessment & Plan notes found for this encounter. Problem List Items Addressed This Visit        Other    COVID     S/p hospital discharge from [facility]. Reports symptoms have improved. Continues to experience mild cough. Requesting prescription of Tessalon Perles which he did not receive at the pharmacy yet. Relevant Medications    benzonatate (TESSALON PERLES) 100 mg capsule    Acute bacterial conjunctivitis of left eye - Primary     Patient started on a trial of topical antibiotic. Recommended ophthalmology evaluation if symptoms do not improve.          Relevant Medications    ciprofloxacin (CILOXAN) 0.3 % ophthalmic solution

## 2023-09-22 NOTE — ASSESSMENT & PLAN NOTE
S/p hospital discharge from [facility]. Reports symptoms have improved. Continues to experience mild cough. Requesting prescription of Tessalon Perles which he did not receive at the pharmacy yet.

## 2023-09-26 ENCOUNTER — OFFICE VISIT (OUTPATIENT)
Dept: FAMILY MEDICINE CLINIC | Facility: CLINIC | Age: 73
End: 2023-09-26
Payer: COMMERCIAL

## 2023-09-26 VITALS
SYSTOLIC BLOOD PRESSURE: 118 MMHG | RESPIRATION RATE: 16 BRPM | WEIGHT: 231 LBS | HEIGHT: 67 IN | OXYGEN SATURATION: 96 % | DIASTOLIC BLOOD PRESSURE: 78 MMHG | HEART RATE: 91 BPM | BODY MASS INDEX: 36.26 KG/M2

## 2023-09-26 DIAGNOSIS — Z76.89 ENCOUNTER FOR SUPPORT AND COORDINATION OF TRANSITION OF CARE: Primary | ICD-10-CM

## 2023-09-26 DIAGNOSIS — U07.1 COVID-19: ICD-10-CM

## 2023-09-26 DIAGNOSIS — E78.00 ELEVATED LDL CHOLESTEROL LEVEL: ICD-10-CM

## 2023-09-26 PROCEDURE — 99495 TRANSJ CARE MGMT MOD F2F 14D: CPT | Performed by: FAMILY MEDICINE

## 2023-09-26 RX ORDER — SIMVASTATIN 20 MG
20 TABLET ORAL
Qty: 30 TABLET | Refills: 2 | Status: SHIPPED | OUTPATIENT
Start: 2023-09-26

## 2023-09-26 NOTE — PROGRESS NOTES
Assessment & Plan     1. Encounter for support and coordination of transition of care  - reviewed hospital records and imaging   - doing well - sating at 96% on RA   - had been isolating   - still with intermittent cough and using Tessalon Perles   - advised to get annual Flu vaccine in 2-3wks and new COVID vaccine in the next 2-3months - pt aware and agreeable    2. COVID-19    3. Elevated LDL cholesterol level  -     simvastatin (ZOCOR) 20 mg tablet; Take 1 tablet (20 mg total) by mouth daily at bedtime  - looked through his old records and noted that he has been able to tolerate Simvastatin 20mg QHS in the past and would like to restart on statin given his elevated LDL levels   - per Cardio = thought the reaction he had was with being given generic vs name brand   - eRx sent          Subjective     Transitional Care Management Review:   Ladantionette Villafuerte is a 72 y.o. male here for TCM follow up. During the TCM phone call patient stated:  TCM Call     Date and time call was made  9/21/2023  2:10 PM    Hospital care reviewed  Records reviewed    Patient was hospitialized at  [address]    Date of Admission  09/12/23    Date of discharge  09/13/23    Diagnosis  COVID    Disposition  Home    Were the patients medications reviewed and updated  Yes    Current Symptoms  None      TCM Call     Post hospital issues  None    Should patient be enrolled in anticoag monitoring? No    Scheduled for follow up?   Yes    Patients specialists  [facility]    Did you obtain your prescribed medications  Yes    Do you need help managing your prescriptions or medications  No    Is transportation to your appointment needed  No    I have advised the patient to call PCP with any new or worsening symptoms  Hung Gutierrez MA    Living Arrangements  Spouse or Significiant other    Support System  Partner    The type of support provided  Emotional; Financial; Physical    Do you have social support  Yes, as much as I need    Are you recieving any outpatient services  No    Are you recieving home care services  No    Are you using any community resources  No    Current waiver services  No    Have you fallen in the last 12 months  No    Interperter language line needed  No    Counseling  Patient        HPI   79yo M presents to the office for TCM   - pt was admitted to [facility] from 9/12-13/2023 for treatment of [address] [facility] Course: Karyn Law is a 72 y.o. male patient who originally presented to the hospital on 9/12/2023 due to cough/COVID. CXR in the ER was negative. Patient apparently desatted in the 80s with exertion so he was admitted and started on Remdesivir, Decadron, and Rocephin. He remained on room air and his blood work was unremarkable for a bacterial infection. He was able to be discharged home in stable condition. - doing well today   - still with cough but with limited mucus production - has enough Tessalon Perles at home  - has been using IS at home  - denies F/C/N/V/CP/palpitations/SOB/wheezing/abd pain/LE edema   - he also apparently looked through his old records and noted that he has been able to tolerate Simvastatin 20mg QHS in the past and would like to restart on statin given his elevated LDL levels       Review of Systems as per HPI     Objective     /78 (BP Location: Right arm, Patient Position: Sitting, Cuff Size: Large)   Pulse 91   Resp 16   Ht 5' 7" (1.702 m)   Wt 105 kg (231 lb)   SpO2 96%   BMI 36.18 kg/m²      Physical Exam  Vitals reviewed. Constitutional:       General: He is not in acute distress. Appearance: Normal appearance. He is not ill-appearing, toxic-appearing or diaphoretic. HENT:      Head: Normocephalic and atraumatic. Right Ear: External ear normal.      Left Ear: External ear normal.      Nose: Nose normal.   Eyes:      General: No scleral icterus. Right eye: No discharge. Left eye: No discharge.       Extraocular Movements: Extraocular movements intact. Conjunctiva/sclera: Conjunctivae normal.   Cardiovascular:      Rate and Rhythm: Normal rate and regular rhythm. Heart sounds: Normal heart sounds. Pulmonary:      Effort: Pulmonary effort is normal. No respiratory distress. Breath sounds: Normal breath sounds. No stridor. No wheezing, rhonchi or rales. Abdominal:      Palpations: Abdomen is soft. Musculoskeletal:         General: Normal range of motion. Cervical back: Normal range of motion. Right lower leg: No edema. Left lower leg: No edema. Skin:     General: Skin is warm. Neurological:      General: No focal deficit present. Mental Status: He is alert and oriented to person, place, and time.    Psychiatric:         Mood and Affect: Mood normal.         Behavior: Behavior normal.       Medications have been reviewed by provider in current encounter    Andrea Shelton DO

## 2023-10-12 RX ORDER — FOLIC ACID/MULTIVIT,IRON,MINER .4-18-35
1 TABLET,CHEWABLE ORAL DAILY
COMMUNITY

## 2023-10-12 RX ORDER — OMEGA-3-ACID ETHYL ESTERS 1 G/1
2 CAPSULE, LIQUID FILLED ORAL DAILY
COMMUNITY

## 2023-10-12 RX ORDER — CETIRIZINE HYDROCHLORIDE 5 MG/1
5 TABLET ORAL DAILY
COMMUNITY

## 2023-10-12 NOTE — PRE-PROCEDURE INSTRUCTIONS
Pre-Surgery Instructions:   Medication Instructions    albuterol (2.5 mg/3 mL) 0.083 % nebulizer solution Uses PRN- OK to take day of surgery    albuterol (PROVENTIL HFA,VENTOLIN HFA) 90 mcg/act inhaler Uses PRN- OK to take day of surgery    cetirizine (ZyrTEC) 5 MG tablet Take day of surgery. clonazePAM (KlonoPIN) 1 mg tablet Take night before surgery    fluticasone-umeclidinium-vilanterol (Trelegy Ellipta) 200-62.5-25 mcg/actuation AEPB inhaler Take day of surgery. furosemide (LASIX) 20 mg tablet Hold day of surgery. lamoTRIgine (LaMICtal) 200 MG tablet Take day of surgery. levothyroxine 100 mcg tablet Take day of surgery. montelukast (SINGULAIR) 10 mg tablet Take day of surgery. multivitamin-iron-minerals-folic acid (CENTRUM) chewable tablet Stop taking 7 days prior to surgery. omega-3-acid ethyl esters (LOVAZA) 1 g capsule Stop taking 7 days prior to surgery. pramipexole (MIRAPEX) 1 mg tablet Take night before surgery    simvastatin (ZOCOR) 20 mg tablet Take night before surgery    tamsulosin (FLOMAX) 0.4 mg Take day of surgery. [DISCONTINUED] fluticasone-umeclidinium-vilanterol (Trelegy Ellipta) 200-62.5-25 mcg/actuation AEPB inhaler See above    [DISCONTINUED] furosemide (LASIX) 20 mg tablet See above    [DISCONTINUED] levothyroxine 100 mcg tablet See above    [DISCONTINUED] montelukast (SINGULAIR) 10 mg tablet See above    [DISCONTINUED] tamsulosin (FLOMAX) 0.4 mg See above    Medication instructions for day surgery reviewed. Please use only a sip of water to take your instructed medications. Avoid all over the counter vitamins, supplements and NSAIDS for one week prior to surgery per anesthesia guidelines. Tylenol is ok to take as needed. You will receive a call one business day prior to surgery with an arrival time and hospital directions. If your surgery is scheduled on a Monday, the hospital will be calling you on the Friday prior to your surgery.  If you have not heard from anyone by 8pm, please call the hospital supervisor through the hospital  at [phone]. Josephine Meeks [phone]). Do not eat or drink anything after midnight the night before your surgery, including candy, mints, lifesavers, or chewing gum. Do not drink alcohol 24hrs before your surgery. Try not to smoke at least 24hrs before your surgery. Follow the pre surgery showering instructions as listed in the [facility] Surgical Experience Booklet” or otherwise provided by your surgeon's office. Do not shave the surgical area 24 hours before surgery. Do not apply any lotions, creams, including makeup, cologne, deodorant, or perfumes after showering on the day of your surgery. No contact lenses, eye make-up, or artificial eyelashes. Remove nail polish, including gel polish, and any artificial, gel, or acrylic nails if possible. Remove all jewelry including rings and body piercing jewelry. Wear causal clothing that is easy to take on and off. Consider your type of surgery. Keep any valuables, jewelry, piercings at home. Please bring any specially ordered equipment (sling, braces) if indicated. Arrange for a responsible person to drive you to and from the hospital on the day of your surgery. Visitor Guidelines discussed. Call the surgeon's office with any new illnesses, exposures, or additional questions prior to surgery. Please reference your [facility] Surgical Experience Booklet” for additional information to prepare for your upcoming surgery.

## 2023-10-16 DIAGNOSIS — H02.834 DERMATOCHALASIS OF BOTH UPPER EYELIDS: Primary | ICD-10-CM

## 2023-10-16 DIAGNOSIS — H02.831 DERMATOCHALASIS OF BOTH UPPER EYELIDS: Primary | ICD-10-CM

## 2023-10-22 DIAGNOSIS — J45.20 MILD INTERMITTENT ASTHMA, UNSPECIFIED WHETHER COMPLICATED: ICD-10-CM

## 2023-10-23 RX ORDER — FLUTICASONE FUROATE, UMECLIDINIUM BROMIDE AND VILANTEROL TRIFENATATE 200; 62.5; 25 UG/1; UG/1; UG/1
1 POWDER RESPIRATORY (INHALATION) DAILY
Qty: 60 BLISTER | Refills: 0 | Status: SHIPPED | OUTPATIENT
Start: 2023-10-23 | End: 2024-01-21

## 2023-11-17 ENCOUNTER — TELEPHONE (OUTPATIENT)
Dept: BARIATRICS | Facility: CLINIC | Age: 73
End: 2023-11-17

## 2023-11-17 RX ORDER — CLINDAMYCIN HYDROCHLORIDE 150 MG/1
150 CAPSULE ORAL EVERY 8 HOURS
COMMUNITY
Start: 2023-10-13

## 2023-11-17 NOTE — TELEPHONE ENCOUNTER
11/17/2023  patient called and wanted provider Yvrose EVANGELISTA  To call him regarding new medication he is going to be on starting Dec. Before he comes to his visit w/ Provider Yvrose EVANGELISTA

## 2023-11-20 ENCOUNTER — OFFICE VISIT (OUTPATIENT)
Dept: FAMILY MEDICINE CLINIC | Facility: CLINIC | Age: 73
End: 2023-11-20
Payer: COMMERCIAL

## 2023-11-20 ENCOUNTER — APPOINTMENT (OUTPATIENT)
Dept: LAB | Facility: CLINIC | Age: 73
End: 2023-11-20
Payer: COMMERCIAL

## 2023-11-20 VITALS
HEIGHT: 67 IN | OXYGEN SATURATION: 95 % | HEART RATE: 92 BPM | SYSTOLIC BLOOD PRESSURE: 138 MMHG | WEIGHT: 242 LBS | DIASTOLIC BLOOD PRESSURE: 72 MMHG | BODY MASS INDEX: 37.98 KG/M2

## 2023-11-20 DIAGNOSIS — H02.831 DERMATOCHALASIS OF BOTH UPPER EYELIDS: ICD-10-CM

## 2023-11-20 DIAGNOSIS — H02.834 DERMATOCHALASIS OF BOTH UPPER EYELIDS: ICD-10-CM

## 2023-11-20 DIAGNOSIS — E03.9 HYPOTHYROIDISM, UNSPECIFIED TYPE: ICD-10-CM

## 2023-11-20 DIAGNOSIS — R73.03 PREDIABETES: ICD-10-CM

## 2023-11-20 DIAGNOSIS — E78.1 HYPERTRIGLYCERIDEMIA: ICD-10-CM

## 2023-11-20 DIAGNOSIS — E03.9 HYPOTHYROIDISM, UNSPECIFIED TYPE: Primary | ICD-10-CM

## 2023-11-20 DIAGNOSIS — E78.6 LOW HDL (UNDER 40): ICD-10-CM

## 2023-11-20 DIAGNOSIS — R41.840 IMPAIRED CONCENTRATION: ICD-10-CM

## 2023-11-20 DIAGNOSIS — U09.9 COVID-19 LONG HAULER: Primary | ICD-10-CM

## 2023-11-20 DIAGNOSIS — R60.0 BILATERAL LEG EDEMA: ICD-10-CM

## 2023-11-20 LAB
ANION GAP SERPL CALCULATED.3IONS-SCNC: 6 MMOL/L
BASOPHILS # BLD AUTO: 0.06 THOUSANDS/ÂΜL (ref 0–0.1)
BASOPHILS NFR BLD AUTO: 1 % (ref 0–1)
BUN SERPL-MCNC: 21 MG/DL (ref 5–25)
CALCIUM SERPL-MCNC: 10.2 MG/DL (ref 8.4–10.2)
CHLORIDE SERPL-SCNC: 98 MMOL/L (ref 96–108)
CO2 SERPL-SCNC: 34 MMOL/L (ref 21–32)
CREAT SERPL-MCNC: 1.24 MG/DL (ref 0.6–1.3)
EOSINOPHIL # BLD AUTO: 0.37 THOUSAND/ÂΜL (ref 0–0.61)
EOSINOPHIL NFR BLD AUTO: 5 % (ref 0–6)
ERYTHROCYTE [DISTWIDTH] IN BLOOD BY AUTOMATED COUNT: 14.3 % (ref 11.6–15.1)
GFR SERPL CREATININE-BSD FRML MDRD: 58 ML/MIN/1.73SQ M
GLUCOSE P FAST SERPL-MCNC: 111 MG/DL (ref 65–99)
HCT VFR BLD AUTO: 46.7 % (ref 36.5–49.3)
HGB BLD-MCNC: 14.7 G/DL (ref 12–17)
IMM GRANULOCYTES # BLD AUTO: 0.07 THOUSAND/UL (ref 0–0.2)
IMM GRANULOCYTES NFR BLD AUTO: 1 % (ref 0–2)
LYMPHOCYTES # BLD AUTO: 1.88 THOUSANDS/ÂΜL (ref 0.6–4.47)
LYMPHOCYTES NFR BLD AUTO: 26 % (ref 14–44)
MCH RBC QN AUTO: 29.4 PG (ref 26.8–34.3)
MCHC RBC AUTO-ENTMCNC: 31.5 G/DL (ref 31.4–37.4)
MCV RBC AUTO: 93 FL (ref 82–98)
MONOCYTES # BLD AUTO: 0.61 THOUSAND/ÂΜL (ref 0.17–1.22)
MONOCYTES NFR BLD AUTO: 9 % (ref 4–12)
NEUTROPHILS # BLD AUTO: 4.12 THOUSANDS/ÂΜL (ref 1.85–7.62)
NEUTS SEG NFR BLD AUTO: 58 % (ref 43–75)
NRBC BLD AUTO-RTO: 0 /100 WBCS
PLATELET # BLD AUTO: 232 THOUSANDS/UL (ref 149–390)
PMV BLD AUTO: 9.7 FL (ref 8.9–12.7)
POTASSIUM SERPL-SCNC: 4.5 MMOL/L (ref 3.5–5.3)
RBC # BLD AUTO: 5 MILLION/UL (ref 3.88–5.62)
SODIUM SERPL-SCNC: 138 MMOL/L (ref 135–147)
WBC # BLD AUTO: 7.11 THOUSAND/UL (ref 4.31–10.16)

## 2023-11-20 PROCEDURE — 83036 HEMOGLOBIN GLYCOSYLATED A1C: CPT

## 2023-11-20 PROCEDURE — 85025 COMPLETE CBC W/AUTO DIFF WBC: CPT

## 2023-11-20 PROCEDURE — 99214 OFFICE O/P EST MOD 30 MIN: CPT | Performed by: FAMILY MEDICINE

## 2023-11-20 PROCEDURE — 84443 ASSAY THYROID STIM HORMONE: CPT

## 2023-11-20 PROCEDURE — 36415 COLL VENOUS BLD VENIPUNCTURE: CPT

## 2023-11-20 PROCEDURE — 80048 BASIC METABOLIC PNL TOTAL CA: CPT

## 2023-11-20 NOTE — PROGRESS NOTES
Patient Name: Ambar Pineda     : 1950     MRN: [id number]    Assessment/Plan:  Problem List Items Addressed This Visit          Endocrine    Hypothyroidism    Relevant Orders    TSH, 3rd generation with Free T4 reflex  - last checked TSH was in 3/2023 - within range  - cont Levothyroxine 100mcg QAM for now and will check labs - ?reason for impaired concentration      Other Visit Diagnoses       COVID-19 long hauler    -  Primary    Relevant Orders    Ambulatory Referral to Physical Therapy  - pt was d/w COVID 2023   - was admitted to [facility] from - and received Remdesivir, Decadron, and Rocephin   - was stable on RA and not d/c'd with O2  - (+) current s/s = fatigue, impaired concentration, depression, insomnia, joint/muscle ache and MONTAÑO after walking 10feet  - follows with Psych and his meds were adjusted   - strongly advised to f/u with Pulm and Cardio (overdue for f/u)   - will refer to PT   - per pt, was told by Rheum that there was nothing else they could do for him re: joint pain       Impaired concentration        Bilateral leg edema        Relevant Orders    Basic metabolic panel  - advised to increase Lasix to 40mg QD x1wk and repeat BMP at that time  - RTO in 1-2wks for close f/u - pt aware and agreeable           Discussion:     Labs recommended:  TSH    Diagnostic testing recommended:  Echocardiogram    Referrals recommended:  Pulmonary, Cardiology, Physical therapy and Psychiatry    Other management recommendations:     Dyspnea & cough   Discussed the need for deep breathing and breathing exercises. Recommend inhaled bronchodilator as prescribed. Follow-up with a pulmonologist is recommended      Known cardiac injury & orthostasis:   Recommended to undergo cardiac rehabilitation. Conservative recommendations: physical therapy. Fatigue, poor endurance, and functional status:   Encouraged adequate rest, proper hydration/nutrition, and good sleep hygiene.  Referral to physical therapy. Renal & hepatic:   Laboratory testing recommended per orders. Insomnia:   Patient was counseled on sleep hygiene, relaxation techniques, and stimulus control. Of note, pt already follows with Psych, Pulm and Cardio          I spent 40 minutes directly with the patient during this visit     Subjective:  COVID-19 Infection:  Date of symptom onset: 9/6/2023  Date of positive test: 9/8/2023    Initial symptoms with acute illness:  Initial symptoms included: cough and muscle aches. New or persistent symptoms:  Patient complains of: fatigue, poor endurance, depression, poor concentration, joint pain and muscle aches. Patient denies: shortness of breath, cough, altered taste, altered smell, memory problems, insomnia, diarrhea and nausea. Patient rates severity of current symptoms as moderate. Other symptoms: brain fog/"insufficency", joint pain, muscle pain, MONTA[place]O with walking 10feet. Inpatient treatment:  - Patient hospitalized?: Yes    - Dates of hospitalization: 9/12-13/2023    Admitted to the ICU?: No      Intubated?: No    - Treatments received: IV/PO steroids, Remdesivir and IV/PO antibiotics    Discharged on oxygen?: No      Remain on oxygen?: No      Discharged on anticoagulation?: No      Outpatient treatment:      Did patient receive monoclonal antibody therapy?: No    - Other therapies patient received: albuterol inhaler and inhaler with corticosteroid. Review of Systems   Constitutional:  Positive for fatigue. Respiratory:  Negative for cough and shortness of breath. Gastrointestinal:  Negative for diarrhea and nausea. Musculoskeletal:  Positive for arthralgias and myalgias. Psychiatric/Behavioral:  Positive for decreased concentration and depression. Negative for sleep disturbance.          Patient Active Problem List   Diagnosis    Osteoarthritis of right shoulder    Arthritis    Moderate persistent asthma without complication    Hypothyroidism    DONALD (obstructive sleep apnea)    Essential hypertension    Mixed hyperlipidemia    Polyarthralgia    Primary osteoarthritis, left shoulder    History of eosinophilic pneumonia (HCC)    Tremor    Orthopnea    Hip weakness    Blurry vision, bilateral    Insomnia    Dysphagia    Bipolar disorder (HCC)    Dizziness    RLQ abdominal pain    Asthma    BPH (benign prostatic hyperplasia)    Pre-diabetes    Myoclonus    Tremor of right hand    Class 2 obesity    Preoperative clearance    CAD in native artery    Chest tightness    Chronic pain in right shoulder    Disease characterized by destruction of skeletal muscle    Falls frequently    Rotator cuff arthropathy    Class 2 severe obesity due to excess calories with serious comorbidity and body mass index (BMI) of 38.0 to 38.9 in adult     Metabolic syndrome    S/P reverse total shoulder arthroplasty, right    Mild Respiratory insufficiency    Hyponatremia    Status post reverse total arthroplasty of right shoulder    Aftercare following left shoulder joint replacement surgery    Pre-operative cardiovascular examination    Bariatric surgery status    Asymptomatic PVCs    Muscle weakness    Closed nondisplaced fracture of body of right scapula    Balance problem    Pain in left axilla    Transaminitis    Abnormal CT scan    Fatty liver    COVID    Acute bacterial conjunctivitis of left eye     Social History     Tobacco Use    Smoking status: Never    Smokeless tobacco: Never   Vaping Use    Vaping Use: Never used   Substance Use Topics    Alcohol use: Yes     Comment: "very very occas"    Drug use: Not Currently      Objective:  /72   Pulse 92   Ht 5' 7" (1.702 m)   Wt 110 kg (242 lb)   SpO2 95%   BMI 37.90 kg/m²      Physical Exam  Vitals reviewed. Constitutional:       Appearance: Normal appearance. He is obese. HENT:      Head: Normocephalic and atraumatic.       Right Ear: External ear normal.      Left Ear: External ear normal.      Nose: Nose normal.   Eyes:      General: No scleral icterus. Right eye: No discharge. Left eye: No discharge. Extraocular Movements: Extraocular movements intact. Conjunctiva/sclera: Conjunctivae normal.   Cardiovascular:      Rate and Rhythm: Normal rate and regular rhythm. Pulmonary:      Effort: Pulmonary effort is normal. No respiratory distress. Breath sounds: Normal breath sounds. No stridor. No wheezing, rhonchi or rales. Abdominal:      Palpations: Abdomen is soft. Musculoskeletal:      Cervical back: Normal range of motion. Right lower leg: Edema present. Left lower leg: Edema present. Skin:     General: Skin is warm. Neurological:      General: No focal deficit present. Mental Status: He is alert and oriented to person, place, and time. Depression Screening Follow-up Plan: Patient's depression screening was positive with a PHQ-2 score of . Their PHQ-9 score was . Continue regular follow-up with their psychologist/therapist/psychiatrist who is managing their mental health condition(s).       Andrea Shelton DO

## 2023-11-21 ENCOUNTER — TELEMEDICINE (OUTPATIENT)
Dept: FAMILY MEDICINE CLINIC | Facility: CLINIC | Age: 73
End: 2023-11-21
Payer: COMMERCIAL

## 2023-11-21 ENCOUNTER — TELEPHONE (OUTPATIENT)
Dept: FAMILY MEDICINE CLINIC | Facility: CLINIC | Age: 73
End: 2023-11-21

## 2023-11-21 DIAGNOSIS — R79.89 ELEVATED TSH: Primary | ICD-10-CM

## 2023-11-21 DIAGNOSIS — E03.9 HYPOTHYROIDISM, UNSPECIFIED TYPE: ICD-10-CM

## 2023-11-21 DIAGNOSIS — E11.9 NEW ONSET TYPE 2 DIABETES MELLITUS (HCC): ICD-10-CM

## 2023-11-21 PROBLEM — H10.32 ACUTE BACTERIAL CONJUNCTIVITIS OF LEFT EYE: Status: RESOLVED | Noted: 2023-09-22 | Resolved: 2023-11-21

## 2023-11-21 LAB
EST. AVERAGE GLUCOSE BLD GHB EST-MCNC: 143 MG/DL
HBA1C MFR BLD: 6.6 %
TSH SERPL DL<=0.05 MIU/L-ACNC: 6.74 UIU/ML (ref 0.45–4.5)

## 2023-11-21 PROCEDURE — 99214 OFFICE O/P EST MOD 30 MIN: CPT | Performed by: FAMILY MEDICINE

## 2023-11-21 RX ORDER — LEVOTHYROXINE SODIUM 112 UG/1
112 TABLET ORAL DAILY
Qty: 60 TABLET | Refills: 0 | Status: SHIPPED | OUTPATIENT
Start: 2023-11-21

## 2023-11-21 NOTE — TELEPHONE ENCOUNTER
----- Message from Cindi Cui DO sent at 11/21/2023  8:59 AM EST -----  New dx of DM (A1c 6.6 <-- 6.0) -- caution with carbs and sugar intake, will discuss in detail with pt on 12/5/2023

## 2023-11-21 NOTE — PROGRESS NOTES
Virtual Regular Visit    Verification of patient location:  Patient is located at Home in the following state in which I hold an active license [place]    Assessment/Plan:  Problem List Items Addressed This Visit          Endocrine    Hypothyroidism     Other Visit Diagnoses       Elevated TSH    -  Primary  - currently taking Levothyroxine 100mcg QAM   - (+) sluggish, brain fog/impaired concentration, weight gain and worsening depression   - had labs drawn yesterday - noted to have elevated TSH 6.740 <-- 1.620 (3/28/2023)   - will increase to Levothyroxine 112mcg QAM and repeat labs in 5wks - pt aware and agreeable      New onset type 2 diabetes mellitus ([address])      - got labs done yesterday - noted to have elevated A1c = 6.6 <-- 6.0 (3/28/2023)   - caution with carbs and sugar intake - will refer to DM education   - has f/u appt scheduled in 2wks - pt aware and agreeable                Reason for visit is f/u of abnml labs      Encounter provider Abraham Love DO  Provider located at [address] [place] [phone]      Recent Visits  Date Type Provider Dept   11/20/23 Office Visit Abraham Love, [facility] recent visits within past 7 days and meeting all other requirements  Today's Visits  Date Type Provider Dept   11/21/23 Telemedicine Latisha Soriano DO [facility]   11/21/23 Telephone Festus Ahumada M Hengstenberger [facility]   Showing today's visits and meeting all other requirements  Future Appointments  No visits were found meeting these conditions. Showing future appointments within next 150 days and meeting all other requirements       The patient was identified by name and date of birth. Christiano Osborn was informed that this is a telemedicine visit and that the visit is being conducted through the [address] [place] [address] platform. He agrees to proceed. .  My office door was closed. No one else was in the room.   He acknowledged consent and understanding of privacy and security of the video platform. The patient has agreed to participate and understands they can discontinue the visit at any time. Patient is aware this is a billable service. Subjective  Reanna Stafford is a 72 y.o. male who presents virtually to discuss abnml labs.     HPI   - pt was seen in the office yesterday and evaluated for long COVID s/s   - (+) sluggish, brain fog/impaired concentration, weight gain and worsening depression   - does follow with Psych who has been appropriately adjusting his medications  - got labs done yesterday - noted to have the following abnormalities   - elevated A1c = 6.6 <-- 6.0 (3/28/2023)   - elevated TSH 6.740 <-- 1.620 (3/28/2023)       Past Medical History:   Diagnosis Date    ARIANE (acute kidney injury) ([address]) 01/13/2023    pt not aware of this    Arthritis     Asthma     Breathing difficulty 01/2023    shldr sx    Colon polyp     Dental crowns present     all teeth Veneers    Depression     Disease of thyroid gland     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[address])     History of COVID-19 09/09/2023    original surgery date 9/12/23 and rescheduled to 10/23/23    Hyperlipidemia     Hypertension 2012    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[address])     per pt does not have this now    Manic depression ([address])     Right lower lobe pneumonia 01/12/2023    Shortness of breath     MONTAÑO    Sleep apnea        Past Surgical History:   Procedure Laterality Date    COLONOSCOPY  12/13/2022    COLONOSCOPY W/ BIOPSIES AND POLYPECTOMY  06/14/2023    VT ARTHROPLASTY GLENOHUMERAL JOINT TOTAL SHOULDER Right 01/10/2023    Procedure: ARTHROPLASTY SHOULDER REVERSE WITH BICEPS TENODESIS;  Surgeon: Amy Mann MD;  Location: [facility];  Service: Orthopedics    UMBILICAL HERNIA REPAIR         Current Outpatient Medications   Medication Sig Dispense Refill    albuterol (2.5 mg/3 mL) 0.083 % nebulizer solution Take 3 mL (2.5 mg total) by nebulization every 6 (six) hours as needed for wheezing or shortness of breath 360 mL 3    albuterol (PROVENTIL HFA,VENTOLIN HFA) 90 mcg/act inhaler if needed      benzonatate (TESSALON PERLES) 100 mg capsule Take 1 capsule (100 mg total) by mouth 3 (three) times a day as needed for cough (Patient not taking: Reported on 11/20/2023) 60 capsule 0    cetirizine (ZyrTEC) 5 MG tablet Take 5 mg by mouth daily      clindamycin (CLEOCIN) 150 mg capsule Take 150 mg by mouth every 8 (eight) hours      clonazePAM (KlonoPIN) 1 mg tablet Take 1 mg by mouth daily at bedtime      fluticasone-umeclidinium-vilanterol (Trelegy Ellipta) 200-62.5-25 mcg/actuation AEPB inhaler Inhale 1 puff daily 60 blister 0    furosemide (LASIX) 20 mg tablet TAKE 1 TABLET(20 MG) BY MOUTH DAILY 90 tablet 1    Insulin Pen Needle 32G X 4 MM MISC Use in the morning 100 each 1    lamoTRIgine (LaMICtal) 200 MG tablet Take 200 mg by mouth daily      levothyroxine 112 mcg tablet Take 1 tablet (112 mcg total) by mouth daily 60 tablet 0    montelukast (SINGULAIR) 10 mg tablet TAKE 1 TABLET(10 MG) BY MOUTH IN THE MORNING 90 tablet 1    multivitamin-iron-minerals-folic acid (CENTRUM) chewable tablet Chew 1 tablet daily      omega-3-acid ethyl esters (LOVAZA) 1 g capsule Take 2 g by mouth daily "Fish oil"      pramipexole (MIRAPEX) 1 mg tablet Take 2 mg by mouth daily at bedtime      tamsulosin (FLOMAX) 0.4 mg TAKE 2 CAPSULES(0.8 MG) BY MOUTH IN THE MORNING 180 capsule 0     No current facility-administered medications for this visit. Allergies   Allergen Reactions    Crestor [Rosuvastatin] Other (See Comments)     Lost ability to walk and balance    Lipitor [Atorvastatin] Other (See Comments) and Dizziness     And very unsteady on feet    Spiriva Respimat [Tiotropium Bromide Monohydrate] Rash       Review of Systems as per HPI     Video Exam  There were no vitals filed for this visit.     Physical Exam   General: AAOx3, NAD  HEENT: NC/AT, EOMI, clear conjunctiva, nml external ear and nose   Cardio: deferred  Pulm: no acute respiratory distress, able to talk in complete sentences w/o getting short of breath   Abd: deferred   Psych: nml mood/affect/behavior     Visit Time  Total Visit Duration: 15mins

## 2023-11-28 DIAGNOSIS — J45.20 MILD INTERMITTENT ASTHMA, UNSPECIFIED WHETHER COMPLICATED: ICD-10-CM

## 2023-11-29 DIAGNOSIS — N40.0 BENIGN PROSTATIC HYPERPLASIA, UNSPECIFIED WHETHER LOWER URINARY TRACT SYMPTOMS PRESENT: ICD-10-CM

## 2023-11-29 RX ORDER — FLUTICASONE FUROATE, UMECLIDINIUM BROMIDE AND VILANTEROL TRIFENATATE 200; 62.5; 25 UG/1; UG/1; UG/1
1 POWDER RESPIRATORY (INHALATION) DAILY
Qty: 60 BLISTER | Refills: 5 | Status: SHIPPED | OUTPATIENT
Start: 2023-11-29

## 2023-11-29 RX ORDER — TAMSULOSIN HYDROCHLORIDE 0.4 MG/1
CAPSULE ORAL
Qty: 180 CAPSULE | Refills: 0 | Status: SHIPPED | OUTPATIENT
Start: 2023-11-29

## 2023-12-01 ENCOUNTER — APPOINTMENT (OUTPATIENT)
Dept: LAB | Facility: CLINIC | Age: 73
End: 2023-12-01
Payer: COMMERCIAL

## 2023-12-01 DIAGNOSIS — H02.831 DERMATOCHALASIS OF BOTH UPPER EYELIDS: ICD-10-CM

## 2023-12-01 DIAGNOSIS — R79.89 ELEVATED TSH: ICD-10-CM

## 2023-12-01 DIAGNOSIS — H02.834 DERMATOCHALASIS OF BOTH UPPER EYELIDS: ICD-10-CM

## 2023-12-01 DIAGNOSIS — R60.0 BILATERAL LEG EDEMA: ICD-10-CM

## 2023-12-01 LAB
ANION GAP SERPL CALCULATED.3IONS-SCNC: 10 MMOL/L
BUN SERPL-MCNC: 22 MG/DL (ref 5–25)
CALCIUM SERPL-MCNC: 9.9 MG/DL (ref 8.4–10.2)
CHLORIDE SERPL-SCNC: 97 MMOL/L (ref 96–108)
CHOLEST SERPL-MCNC: 219 MG/DL
CO2 SERPL-SCNC: 31 MMOL/L (ref 21–32)
CREAT SERPL-MCNC: 1.15 MG/DL (ref 0.6–1.3)
GFR SERPL CREATININE-BSD FRML MDRD: 63 ML/MIN/1.73SQ M
GLUCOSE P FAST SERPL-MCNC: 108 MG/DL (ref 65–99)
HDLC SERPL-MCNC: 46 MG/DL
LDLC SERPL CALC-MCNC: 136 MG/DL (ref 0–100)
NONHDLC SERPL-MCNC: 173 MG/DL
POTASSIUM SERPL-SCNC: 4 MMOL/L (ref 3.5–5.3)
SODIUM SERPL-SCNC: 138 MMOL/L (ref 135–147)
T4 FREE SERPL-MCNC: 1.07 NG/DL (ref 0.61–1.12)
TRIGL SERPL-MCNC: 183 MG/DL
TSH SERPL DL<=0.05 MIU/L-ACNC: 1.66 UIU/ML (ref 0.45–4.5)

## 2023-12-01 PROCEDURE — 80061 LIPID PANEL: CPT

## 2023-12-01 PROCEDURE — 36415 COLL VENOUS BLD VENIPUNCTURE: CPT

## 2023-12-01 PROCEDURE — 80048 BASIC METABOLIC PNL TOTAL CA: CPT

## 2023-12-01 PROCEDURE — 84443 ASSAY THYROID STIM HORMONE: CPT

## 2023-12-01 PROCEDURE — 84439 ASSAY OF FREE THYROXINE: CPT

## 2023-12-02 DIAGNOSIS — H02.831 DERMATOCHALASIS OF BOTH UPPER EYELIDS: Primary | ICD-10-CM

## 2023-12-02 DIAGNOSIS — H02.834 DERMATOCHALASIS OF BOTH UPPER EYELIDS: Primary | ICD-10-CM

## 2023-12-02 RX ORDER — OXYCODONE HYDROCHLORIDE 5 MG/1
5 TABLET ORAL EVERY 6 HOURS PRN
Qty: 10 TABLET | Refills: 0 | Status: SHIPPED | OUTPATIENT
Start: 2023-12-02

## 2023-12-02 RX ORDER — GABAPENTIN 300 MG/1
300 CAPSULE ORAL 3 TIMES DAILY
Qty: 15 CAPSULE | Refills: 0 | Status: SHIPPED | OUTPATIENT
Start: 2023-12-02 | End: 2023-12-07

## 2023-12-02 RX ORDER — IBUPROFEN 800 MG/1
800 TABLET ORAL EVERY 8 HOURS PRN
Qty: 15 TABLET | Refills: 0 | Status: SHIPPED | OUTPATIENT
Start: 2023-12-02

## 2023-12-02 RX ORDER — TRAMADOL HYDROCHLORIDE 50 MG/1
50 TABLET ORAL EVERY 6 HOURS PRN
Qty: 20 TABLET | Refills: 0 | Status: SHIPPED | OUTPATIENT
Start: 2023-12-02

## 2023-12-02 RX ORDER — SENNOSIDES 8.6 MG
650 CAPSULE ORAL EVERY 6 HOURS
Qty: 20 TABLET | Refills: 0 | Status: SHIPPED | OUTPATIENT
Start: 2023-12-02 | End: 2023-12-07

## 2023-12-04 ENCOUNTER — HOSPITAL ENCOUNTER (OUTPATIENT)
Facility: HOSPITAL | Age: 73
Setting detail: OUTPATIENT SURGERY
Discharge: HOME | End: 2023-12-04
Attending: STUDENT IN AN ORGANIZED HEALTH CARE EDUCATION/TRAINING PROGRAM | Admitting: STUDENT IN AN ORGANIZED HEALTH CARE EDUCATION/TRAINING PROGRAM
Payer: SELF-PAY

## 2023-12-04 VITALS
BODY MASS INDEX: 36.1 KG/M2 | SYSTOLIC BLOOD PRESSURE: 167 MMHG | WEIGHT: 230 LBS | TEMPERATURE: 97.2 F | HEART RATE: 94 BPM | HEIGHT: 67 IN | OXYGEN SATURATION: 93 % | DIASTOLIC BLOOD PRESSURE: 77 MMHG | RESPIRATION RATE: 20 BRPM

## 2023-12-04 PROCEDURE — 15823 BLEPHARP UPR EYELID XCSV SKN: CPT | Performed by: STUDENT IN AN ORGANIZED HEALTH CARE EDUCATION/TRAINING PROGRAM

## 2023-12-04 PROCEDURE — 99024 POSTOP FOLLOW-UP VISIT: CPT | Performed by: STUDENT IN AN ORGANIZED HEALTH CARE EDUCATION/TRAINING PROGRAM

## 2023-12-04 PROCEDURE — 15823 BLEPHARP UPR EYELID XCSV SKN: CPT | Performed by: PHYSICIAN ASSISTANT

## 2023-12-04 RX ORDER — ONDANSETRON 2 MG/ML
INJECTION INTRAMUSCULAR; INTRAVENOUS AS NEEDED
Status: DISCONTINUED | OUTPATIENT
Start: 2023-12-04 | End: 2023-12-04

## 2023-12-04 RX ORDER — OXYCODONE HYDROCHLORIDE 5 MG/1
5 TABLET ORAL ONCE AS NEEDED
Status: DISCONTINUED | OUTPATIENT
Start: 2023-12-04 | End: 2023-12-04 | Stop reason: HOSPADM

## 2023-12-04 RX ORDER — PROPOFOL 10 MG/ML
INJECTION, EMULSION INTRAVENOUS CONTINUOUS PRN
Status: DISCONTINUED | OUTPATIENT
Start: 2023-12-04 | End: 2023-12-04

## 2023-12-04 RX ORDER — BALANCED SALT SOLUTION 6.4; .75; .48; .3; 3.9; 1.7 MG/ML; MG/ML; MG/ML; MG/ML; MG/ML; MG/ML
SOLUTION OPHTHALMIC AS NEEDED
Status: DISCONTINUED | OUTPATIENT
Start: 2023-12-04 | End: 2023-12-04 | Stop reason: HOSPADM

## 2023-12-04 RX ORDER — PROPOFOL 10 MG/ML
INJECTION, EMULSION INTRAVENOUS AS NEEDED
Status: DISCONTINUED | OUTPATIENT
Start: 2023-12-04 | End: 2023-12-04

## 2023-12-04 RX ORDER — ACETAMINOPHEN 325 MG/1
975 TABLET ORAL ONCE
Status: COMPLETED | OUTPATIENT
Start: 2023-12-04 | End: 2023-12-04

## 2023-12-04 RX ORDER — DEXAMETHASONE SODIUM PHOSPHATE 10 MG/ML
INJECTION, SOLUTION INTRAMUSCULAR; INTRAVENOUS AS NEEDED
Status: DISCONTINUED | OUTPATIENT
Start: 2023-12-04 | End: 2023-12-04

## 2023-12-04 RX ORDER — CEFAZOLIN SODIUM 2 G/50ML
2000 SOLUTION INTRAVENOUS ONCE
Status: COMPLETED | OUTPATIENT
Start: 2023-12-04 | End: 2023-12-04

## 2023-12-04 RX ORDER — MIDAZOLAM HYDROCHLORIDE 2 MG/2ML
INJECTION, SOLUTION INTRAMUSCULAR; INTRAVENOUS AS NEEDED
Status: DISCONTINUED | OUTPATIENT
Start: 2023-12-04 | End: 2023-12-04

## 2023-12-04 RX ORDER — MAGNESIUM HYDROXIDE 1200 MG/15ML
LIQUID ORAL AS NEEDED
Status: DISCONTINUED | OUTPATIENT
Start: 2023-12-04 | End: 2023-12-04 | Stop reason: HOSPADM

## 2023-12-04 RX ORDER — LIDOCAINE HYDROCHLORIDE 10 MG/ML
INJECTION, SOLUTION EPIDURAL; INFILTRATION; INTRACAUDAL; PERINEURAL AS NEEDED
Status: DISCONTINUED | OUTPATIENT
Start: 2023-12-04 | End: 2023-12-04

## 2023-12-04 RX ORDER — SODIUM CHLORIDE, SODIUM LACTATE, POTASSIUM CHLORIDE, CALCIUM CHLORIDE 600; 310; 30; 20 MG/100ML; MG/100ML; MG/100ML; MG/100ML
INJECTION, SOLUTION INTRAVENOUS CONTINUOUS PRN
Status: DISCONTINUED | OUTPATIENT
Start: 2023-12-04 | End: 2023-12-04

## 2023-12-04 RX ORDER — GABAPENTIN 300 MG/1
300 CAPSULE ORAL ONCE
Status: COMPLETED | OUTPATIENT
Start: 2023-12-04 | End: 2023-12-04

## 2023-12-04 RX ORDER — MINERAL OIL AND PETROLATUM 150; 830 MG/G; MG/G
OINTMENT OPHTHALMIC AS NEEDED
Status: DISCONTINUED | OUTPATIENT
Start: 2023-12-04 | End: 2023-12-04 | Stop reason: HOSPADM

## 2023-12-04 RX ORDER — FENTANYL CITRATE 50 UG/ML
INJECTION, SOLUTION INTRAMUSCULAR; INTRAVENOUS AS NEEDED
Status: DISCONTINUED | OUTPATIENT
Start: 2023-12-04 | End: 2023-12-04

## 2023-12-04 RX ORDER — FENTANYL CITRATE/PF 50 MCG/ML
50 SYRINGE (ML) INJECTION
Status: DISCONTINUED | OUTPATIENT
Start: 2023-12-04 | End: 2023-12-04 | Stop reason: HOSPADM

## 2023-12-04 RX ORDER — LIDOCAINE HYDROCHLORIDE AND EPINEPHRINE 10; 10 MG/ML; UG/ML
INJECTION, SOLUTION INFILTRATION; PERINEURAL AS NEEDED
Status: DISCONTINUED | OUTPATIENT
Start: 2023-12-04 | End: 2023-12-04 | Stop reason: HOSPADM

## 2023-12-04 RX ADMIN — SODIUM CHLORIDE, SODIUM LACTATE, POTASSIUM CHLORIDE, AND CALCIUM CHLORIDE: .6; .31; .03; .02 INJECTION, SOLUTION INTRAVENOUS at 07:00

## 2023-12-04 RX ADMIN — ACETAMINOPHEN 975 MG: 325 TABLET ORAL at 06:23

## 2023-12-04 RX ADMIN — MIDAZOLAM 2 MG: 1 INJECTION INTRAMUSCULAR; INTRAVENOUS at 07:20

## 2023-12-04 RX ADMIN — FENTANYL CITRATE 50 MCG: 50 INJECTION, SOLUTION INTRAMUSCULAR; INTRAVENOUS at 07:23

## 2023-12-04 RX ADMIN — CEFAZOLIN SODIUM 2000 MG: 2 SOLUTION INTRAVENOUS at 07:12

## 2023-12-04 RX ADMIN — LIDOCAINE HYDROCHLORIDE 50 MG: 10 INJECTION, SOLUTION EPIDURAL; INFILTRATION; INTRACAUDAL; PERINEURAL at 07:25

## 2023-12-04 RX ADMIN — ONDANSETRON 4 MG: 2 INJECTION INTRAMUSCULAR; INTRAVENOUS at 09:19

## 2023-12-04 RX ADMIN — PROPOFOL 100 MCG/KG/MIN: 10 INJECTION, EMULSION INTRAVENOUS at 07:27

## 2023-12-04 RX ADMIN — PROPOFOL 200 MG: 10 INJECTION, EMULSION INTRAVENOUS at 07:25

## 2023-12-04 RX ADMIN — DEXAMETHASONE SODIUM PHOSPHATE 10 MG: 10 INJECTION INTRAMUSCULAR; INTRAVENOUS at 07:35

## 2023-12-04 RX ADMIN — FENTANYL CITRATE 50 MCG: 50 INJECTION, SOLUTION INTRAMUSCULAR; INTRAVENOUS at 08:04

## 2023-12-04 RX ADMIN — GABAPENTIN 300 MG: 300 CAPSULE ORAL at 06:23

## 2023-12-04 NOTE — ANESTHESIA PREPROCEDURE EVALUATION
Procedure:  BLEPHAROPLASTY UPPER (Bilateral: Eye)  BLEPHAROPLASTY LOWER (Bilateral: Eye)    Relevant Problems   CARDIO   (+) Asymptomatic PVCs   (+) CAD in native artery   (+) Essential hypertension   (+) Mixed hyperlipidemia   (+) Orthopnea      ENDO   (+) Hypothyroidism   (+) New onset type 2 diabetes mellitus (HCC)      GI/HEPATIC   (+) Dysphagia   (+) Fatty liver      /RENAL   (+) BPH (benign prostatic hyperplasia)      MUSCULOSKELETAL   (+) Arthritis   (+) Disease characterized by destruction of skeletal muscle   (+) Osteoarthritis of right shoulder   (+) Primary osteoarthritis, left shoulder      NEURO/PSYCH   (+) Chronic pain in right shoulder      PULMONARY   (+) Asthma   (+) Moderate persistent asthma without complication   (+) DONALD (obstructive sleep apnea)   (+) Orthopnea      Other   (+) Bariatric surgery status   (+) Bipolar disorder (HCC)   (+) Class 2 obesity   (+) Class 2 severe obesity due to excess calories with serious comorbidity and body mass index (BMI) of 38.0 to 38.9 in adult    (+) Pre-diabetes   (+) S/P reverse total shoulder arthroplasty, right        Physical Exam    Airway    Mallampati score: II  TM Distance: >3 FB  Neck ROM: full     Dental   No notable dental hx implants    Cardiovascular      Pulmonary      Other Findings        Anesthesia Plan  ASA Score- 3     Anesthesia Type- general with ASA Monitors. Additional Monitors:     Airway Plan: LMA. Plan Factors-Exercise tolerance (METS): >4 METS. Chart reviewed. Patient summary reviewed. Patient is not a current smoker. Patient did not smoke on day of surgery. Induction- intravenous. Postoperative Plan- Plan for postoperative opioid use. Informed Consent- Anesthetic plan and risks discussed with patient. I personally reviewed this patient with the CRNA. Discussed and agreed on the Anesthesia Plan with the CRNA. Arely Fernandez

## 2023-12-04 NOTE — ANESTHESIA PROCEDURE NOTES
Anesthesia Notable Event    Date/Time: 12/4/2023 11:48 AM    Performed by: Quan Benjamin MD  Authorized by: Quan Benjamin MD

## 2023-12-04 NOTE — ANESTHESIA POSTPROCEDURE EVALUATION
Post-Op Assessment Note    CV Status:  Stable  Pain Score: 0    Pain management: adequate       Mental Status:  Alert   Hydration Status:  Stable   PONV Controlled:  Controlled   Airway Patency:  Patent     Post Op Vitals Reviewed: Yes    No anethesia notable event occurred.     Staff: CRNA               BP (!) 180/114 (12/04/23 1006)    Temp 97.6 °F (36.4 °C) (12/04/23 1006)    Pulse (!) 112 (12/04/23 1006)   Resp (!) 24 (12/04/23 1006)    SpO2 93 % (12/04/23 1006)

## 2023-12-04 NOTE — INTERVAL H&P NOTE
H&P reviewed. After examining the patient I find no changes in the patients condition since the H&P had been written.     Vitals:    12/04/23 0614   BP: 147/81   Pulse: 88   Resp: 18   Temp: 98.1 °F (36.7 °C)   SpO2: 93%

## 2023-12-04 NOTE — TELEPHONE ENCOUNTER
Called and spoke w/pt and notified pt  He will schedule CT and make f/u appt w/Dr Patrick to go over results as this is not done over phone  He is using tylenol, ice and sling  Acute systolic congestive heart failure Acute respiratory failure with hypoxia

## 2023-12-04 NOTE — DISCHARGE INSTR - AVS FIRST PAGE
Surgery Date: 12/4/2023                Patient: Michael Rivera  Surgeon: Dr. Jace Rosen     Postoperative Instructions   Upper and Lower Blepharoplasty    Dressings:  [x] Non-absorbable sutures were used to close your incision   [x] Dab a small amount of the prescribed ointment along your upper incisions after bathing. [x] KEEP STERI STRIPS ON (WHITE STRIPS) TO LOWER EYELID   [] Other instructions:     Bathing:  [x] Shower 48 hours after surgery. Allow soap and water to gently run over the incision. No scrubbing. Gently pat dry and apply dressing as needed/instructed above. [x] No submerging incision in bathtub, pool, hot tub and/or lake. Activity:  [x] No heavy lifting (> 10lbs). [x] No strenuous exercise. [x] Walking is permitted and encouraged. [x] No driving until off pain medications. [x] Recommend sleeping with head of bed elevated. [x] No contact lenses or makeup surrounding the eyes until cleared at postoperative office appointment. Diet and Medication:  [x] Resume diet as tolerated. High protein diet is important for healing. Remain well hydrated and minimize sodium intake. [x] Resume preoperative medications. [x] Pain medications as prescribed. You may also begin to use ibuprofen 48 hours after surgery. [x] Apply ice to area as needed. Do not place ice directly on skin. Do not use heat. [] Other instructions: It is expected to have some bruising, swelling and mild oozing at the incision site and of the surrounding area. If there is more than you expect, an enlarging area or you suspect an infection, please call the office. Some patients may experience a low-grade fever after surgery. If it is above 100.4, please call the office. If you do not have a postoperative office appointment scheduled, please call the office today and let the staff know you are to be seen in 7 days. Please call [phone] with any questions, concerns or changes.

## 2023-12-04 NOTE — H&P
Assessment and Plan:  Mr. Bucky Negrete is a 81 y.o. 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 He understands he may have a residual asymmetry following surgery but hopefully this will appear less noticeable. We also discussed the risk of ectropion of the lower lids. He understands he may have a tarsorrhaphy suture at the end of the case. Risks, benefits, alternatives and postoperative instructions and expectations discussed. Informed consent obtained. All the patient's questions were answered and he voiced understanding. History of Present Illness:   Mr. Bucky Negrete is a 81 y.o. 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 No changes since initial visit. Review of Systems:  A 12 point ROS was performed and negative except per HPI. 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[address])      Manic depression ([address])      Sleep apnea              Past Surgical History:  Surgical History             Past Surgical History:   Procedure Laterality Date    UMBILICAL HERNIA REPAIR                Social History:  Social History              Tobacco Use    Smoking status: Never    Smokeless tobacco: Never   Vaping Use    Vaping Use: Never used   Substance Use Topics    Alcohol use: Never    Drug use: Never         Family History:  Family History             Family History   Problem Relation Age of Onset    Hypertension Mother      Depression Mother      Depression Father      Arthritis Father      Heart attack Paternal Uncle 32    Colon cancer Neg Hx      Prostate cancer Neg Hx              Allergies:           Allergies   Allergen Reactions    Simvastatin Shortness Of Breath    Spiriva Respimat [Tiotropium Bromide Monohydrate] Rash         Medications:              Current Outpatient Medications on File Prior to Visit   Medication Sig Dispense Refill    Diclofenac Sodium (VOLTAREN) 1 % Apply 2 g topically 4 (four) times a day 2 g 0    enalapril (VASOTEC) 10 mg tablet Take 1 tablet (10 mg total) by mouth daily 30 tablet 0    lamoTRIgine (LaMICtal) 200 MG tablet          levothyroxine 100 mcg tablet          meloxicam (Mobic) 15 mg tablet Take 1 tablet (15 mg total) by mouth daily 30 tablet 1    modafinil (PROVIGIL) 200 MG tablet          montelukast (SINGULAIR) 10 mg tablet          pramipexole (MIRAPEX) 1 mg tablet Take 2 mg by mouth daily at bedtime        tamsulosin (FLOMAX) 0.4 mg          Trelegy Ellipta 200-62.5-25 MCG/INH AEPB inhaler Inhale 1 puff daily 60 blister 2      No current facility-administered medications on file prior to visit. Physical Examination:  There were no vitals taken for this visit. Estimated body mass index is 38.11 kg/m² as calculated from the following:    Height as of an earlier encounter on 11/21/22: 5' 5" (1.651 m). Weight as of an earlier encounter on 11/21/22: 104 kg (229 lb).   General: NAD, well appearing, AAOx3  HEENT: NCAT, EOMI, MMM, supple  Resp: Nonlabored  Heart: RRR  Abdomen: Soft, ND, NT  Extremities/MSK: no LE edema, no obvious deficits in ROM  Neuro: grossly intact with no obvious deficits  Skin: no obvious lesions or rashes     Eyes: bilateral upper dermatochalasis with blepharoptosis of left greater than right; bilateral lower tear trough deformity with herniation of fat pad x 3 bilaterally, negative vector, very thin skin, static periorbital rhytids

## 2023-12-05 ENCOUNTER — OFFICE VISIT (OUTPATIENT)
Dept: FAMILY MEDICINE CLINIC | Facility: CLINIC | Age: 73
End: 2023-12-05
Payer: COMMERCIAL

## 2023-12-05 VITALS
DIASTOLIC BLOOD PRESSURE: 70 MMHG | OXYGEN SATURATION: 96 % | WEIGHT: 249 LBS | HEIGHT: 67 IN | SYSTOLIC BLOOD PRESSURE: 128 MMHG | BODY MASS INDEX: 39.08 KG/M2 | HEART RATE: 97 BPM

## 2023-12-05 DIAGNOSIS — E78.00 ELEVATED LDL CHOLESTEROL LEVEL: ICD-10-CM

## 2023-12-05 DIAGNOSIS — E11.9 NEW ONSET TYPE 2 DIABETES MELLITUS (HCC): Primary | ICD-10-CM

## 2023-12-05 DIAGNOSIS — Z98.890 S/P BILATERAL BLEPHAROPLASTY: ICD-10-CM

## 2023-12-05 DIAGNOSIS — R60.0 BILATERAL LEG EDEMA: ICD-10-CM

## 2023-12-05 DIAGNOSIS — Z78.9 STATIN INTOLERANCE: ICD-10-CM

## 2023-12-05 DIAGNOSIS — E66.01 OBESITY, MORBID (HCC): ICD-10-CM

## 2023-12-05 DIAGNOSIS — E78.5 HYPERLIPIDEMIA LDL GOAL <70: ICD-10-CM

## 2023-12-05 DIAGNOSIS — E03.9 HYPOTHYROIDISM, UNSPECIFIED TYPE: ICD-10-CM

## 2023-12-05 PROCEDURE — 99214 OFFICE O/P EST MOD 30 MIN: CPT | Performed by: FAMILY MEDICINE

## 2023-12-05 RX ORDER — FUROSEMIDE 40 MG/1
40 TABLET ORAL DAILY
Qty: 30 TABLET | Refills: 0 | Status: SHIPPED | OUTPATIENT
Start: 2023-12-05

## 2023-12-05 NOTE — OP NOTE
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 We discussed direct approach to the lower lids in an effort to help mitigate the midface descent, negative vector and postoperative complications. Operative Findings:  Bilateral upper and lower blepharoplasty with lower lid and midface resuspension    Complications:   None    Procedure and Technique:  The patient was seen preoperatively. The procedure, risks, benefits and alternatives were discussed. Informed consent was obtained. The patient was site marked preoperatively. The patient was brought to the operating room where she was positioned supine with all of her pressure points appropriately padded. Anesthesia commenced. A timeout was performed and verified. The patient was prepped and draped in usual sterile fashion. Corneal shields were placed. I first turned my attention to the right lower lid. An incision was made along the lid/cheek junction. This was dissected down to the orbital rim. The orbital retaining ligament was released. The midface was sutured to the orbital rim using 3-0 monocryl.   The fat pads were cauterized. The excess skin was excised. Hemostasis was ensured. The skin was closed using 5-0 monocryl and 5-0 chromic. The same procedure was performed on the left. I next turned my attention to the upper blepharoplasty. A caliper was used to confirm measurements and a pinch test was used to see a gentle up turn of the upper lash line. The upper eyelids were infiltrated with local anesthesia with care not to disrupt the markings. The right sided markings were incised and the excess skin was excised. Hemostasis was obtained. Next the septum was entered and the medial fat was encountered. A small amount of excess was removed using electrocautery. Hemostasis was ensured. Prior to closure, 5-0 PDS was used to perform a lateral cantopexy to suspend the lower tarsus. The orbicularis was then closed using 6-0 monocryl. Next the skin was closed using a combination of interrupted and running 6-0 prolene. The same procedure was performed on the left side. The corneal shields were removed and the eyes irrigated with BSS solution. The area was cleansed and ointment was applied liberally to the upper eyelids. Steristrips were placed to bolster the lower lid position. The instrument, sponge and needle count was correct an verified prior to completion of the case. The patient emerged from anesthesia and was transferred to the recovery room in stable condition. Patient Disposition:  PACU         SIGNATURE: Roger Sharp DO  DATE: December 5, 2023  TIME: 7:50 AM    No qualified plastic surgery resident was available for the case. The [facility] provided assistance with retraction and suturing.

## 2023-12-05 NOTE — PROGRESS NOTES
Assessment/Plan:   Diagnoses and all orders for this visit:    New onset type 2 diabetes mellitus ([address])  -     semaglutide, 0.25 or 0.5 mg/dose, (Ozempic, 0.25 or 0.5 MG/DOSE,) 2 mg/3 mL injection pen; 0.25 mg under the skin every 7 days for 4 doses (28 days), THEN 0.5 mg under the skin every 7 days  -     Cancel: IRIS Diabetic eye exam  -     Diabetic foot exam; Future  - new- onset DM - A1c = 6.6 (11/20/2023) <-- 6.0 (3/28/2023)   - following with Bariatrics - used to be on Wegovy but unable to get it, had a reaction with Saxenda (stomach discomfort), interested in trial of Ozempic - eRx sent to pharmacy on file   - nml DM foot exam as documented below  - will defer DM eye exam today as pt s/p upper blepharoplasty yesterday   - UTD with PVC20  - UTD with annual Flu vaccine   - referred to DM education   - caution with carb intake  - RTO in 1month for f/u - pt aware and agreeable   Obesity, morbid ([address])   S/p bilateral blepharoplasty    Hypothyroidism, unspecified type  - repeat TSH within range   - of note, Levothyroxine dose was increased prior to Thanksgiving to 112mcg QAM - cont current dose     Bilateral leg edema  -     furosemide (LASIX) 40 mg tablet; Take 1 tablet (40 mg total) by mouth daily  -     Basic metabolic panel; Future  - /70 in the office today   - cont Lasix 40mg QD for now   - advised f/u with Cardio - last OV was ~12months ago - advised to schedule     Elevated LDL cholesterol level  Hyperlipidemia LDL goal <70  Statin intolerance   -    - unable to tolerate statin and currently on Lovaza 2g QD   - advised to f/u with Cardio - pt aware and agreeable           Subjective:    Patient ID: Lady Mom is a 72 y.o. male.   HPI  79yo M presents to the office for f/u   - s/p upper blepharoplasty yesterday   - reviewed labs done 12/1/2023   - repeat TSH within range - of note, Levothyroxine dose was increased prior to Thanksgiving to 112mcg QAM   - new- onset DM - A1c = 6.6 - following with Bariatrics - used to be on Wegovy but unable to get it, had a reaction with Saxenda (stomach discomfort), interested in trial of Ozempic  - Lipids:  -- has been unable to tolerate statin   - has been steadily gaining weight - ran out of Lasix yesterday   - (+) swollen abdomen and b/l LE   - still getting winded and having trouble breathing         The following portions of the patient's history were reviewed and updated as appropriate: allergies, current medications, past family history, past medical history, past social history, past surgical history and problem list.    Review of Systems  as per HPI    Objective:  /70   Pulse 97   Ht 5' 7" (1.702 m)   Wt 113 kg (249 lb)   SpO2 96%   BMI 39.00 kg/m²    Physical Exam  Vitals reviewed. Constitutional:       General: He is not in acute distress. Appearance: Normal appearance. He is not ill-appearing, toxic-appearing or diaphoretic. HENT:      Head: Normocephalic and atraumatic. Right Ear: External ear normal.      Left Ear: External ear normal.      Nose: Nose normal.   Eyes:      Comments: s/p upper blepharoplasty yesterday    Cardiovascular:      Rate and Rhythm: Normal rate and regular rhythm. Pulses: no weak pulses          Dorsalis pedis pulses are 2+ on the right side and 2+ on the left side. Heart sounds: Normal heart sounds. Pulmonary:      Effort: Pulmonary effort is normal. No respiratory distress. Breath sounds: Normal breath sounds. No stridor. No wheezing, rhonchi or rales. Abdominal:      General: There is distension. Musculoskeletal:      Right lower leg: Edema present. Left lower leg: Edema present. Feet:      Right foot:      Skin integrity: No ulcer, skin breakdown, erythema, warmth, callus or dry skin. Left foot:      Skin integrity: No ulcer, skin breakdown, erythema, warmth, callus or dry skin. Skin:     General: Skin is warm.    Neurological:      General: No focal deficit present. Mental Status: He is alert and oriented to person, place, and time. Psychiatric:         Mood and Affect: Mood normal.         Behavior: Behavior normal.         BMI Counseling: Body mass index is 39 kg/m². The BMI is above normal. Nutrition recommendations include 3-5 servings of fruits/vegetables daily. Exercise recommendations include exercising 3-5 times per week. Patient's shoes and socks removed. Right Foot/Ankle   Right Foot Inspection  Skin Exam: skin normal and skin intact. No dry skin, no warmth, no callus, no erythema, no maceration, no abnormal color, no pre-ulcer, no ulcer and no callus. Toe Exam: ROM and strength within normal limits and swelling. Vascular  The right DP pulse is 2+. Left Foot/Ankle  Left Foot Inspection  Skin Exam: skin normal and skin intact. No dry skin, no warmth, no erythema, no maceration, normal color, no pre-ulcer, no ulcer and no callus. Toe Exam: ROM and strength within normal limits and swelling. Vascular  The left DP pulse is 2+.      Assign Risk Category  No deformity present  No loss of protective sensation  No weak pulses  Risk: 0

## 2023-12-06 NOTE — QUICK NOTE
Was contacted by Cherelle Fulton RN,  on 12/5/23 that the patient had concerns about a possible notable anesthesia event listed in his MyChart. Called patient on 12/6/23 @ 09:50 to report no notable adverse events during his surgery while under anesthesia on 12/4/23. Patient had seen the below note in his MyChart on 12/04/23 at 11:48:    Anesthesia Notable Event     Date/Time: 12/4/2023 11:48 AM     Performed by: Lynn Dewitt MD  Authorized by: Lynn Dewitt MD     I reassured him that no event had occurred that was concerning to myself or my staff under anesthesia. I reconfirmed in EPIC that I had clicked off "no" anesthesia events. If an event had occurred it would have been listed in the above note. I agreed with the patient that the word "No" should be indicated in the note rather than just being blank if nothing had occurred. I will speak with our programming department to try to have this fixed so no confusion occurs going forward.

## 2023-12-07 ENCOUNTER — TELEPHONE (OUTPATIENT)
Dept: OTHER | Facility: OTHER | Age: 73
End: 2023-12-07

## 2023-12-07 NOTE — TELEPHONE ENCOUNTER
Ozempic, 0.25 or 0.5 MG/DOSE PA Approved, pt and pharmacy made aware.   Valid: 12-7-2023 to 12-7-2024

## 2023-12-07 NOTE — TELEPHONE ENCOUNTER
Ozempic, 0.25 or 0.5 MG/DOSE PA Submitted with clinical documentation via surescripts, waiting on determination.

## 2023-12-07 NOTE — TELEPHONE ENCOUNTER
Marissa from [facility] called, stated that Ozempic is required prior authorization. This can be done through Cover My Meds or fax: [phone]. Ph: [phone].

## 2023-12-08 ENCOUNTER — TELEPHONE (OUTPATIENT)
Age: 73
End: 2023-12-08

## 2023-12-08 DIAGNOSIS — E11.9 NEW ONSET TYPE 2 DIABETES MELLITUS (HCC): ICD-10-CM

## 2023-12-08 NOTE — TELEPHONE ENCOUNTER
Order is getting resent to [facility] b/c [facility] does not have med afterall. When he was notified [facility] had the med, [facility] order was cancelled via telephone. Pt called [facility] back and they are holding his order until it's approved. Reason for call:   [x] Refill   [] Prior Auth  [] Other:     Office:   [x] PCP/Provider - Ruby Burden / Anayeli Silveira  [] Specialty/Provider -     Medication: Ozempic    Dose/Frequency: 2mg/3mL UAD    Quantity: 9 mL    Pharmacy: [facility] [id number] [facility]     Does the patient have enough for 3 days?    [] Yes   [x] No - Send as HP to POD

## 2023-12-08 NOTE — TELEPHONE ENCOUNTER
Reason for call:   [x] Refill   [] Prior Auth  [x] Other: Change in pharmacy     Office:   [] PCP/Provider -   [x] Specialty/Provider -     Medication: Ozempic     Dose/Frequency: 2mg/3ml - UAD     Quantity: 9ml     [facility]     Does the patient have enough for 3 days?    [] Yes   [x] No - Send as HP to POD      CHANGE IN PHARMACY

## 2023-12-08 NOTE — TELEPHONE ENCOUNTER
Patient's wife call to get more information about her  medication.      No action need it , Information was provide

## 2023-12-11 ENCOUNTER — OFFICE VISIT (OUTPATIENT)
Dept: PLASTIC SURGERY | Facility: CLINIC | Age: 73
End: 2023-12-11

## 2023-12-11 DIAGNOSIS — Z41.1 ENCOUNTER FOR COSMETIC SURGERY: ICD-10-CM

## 2023-12-11 DIAGNOSIS — H02.831 DERMATOCHALASIS OF BOTH UPPER EYELIDS: Primary | ICD-10-CM

## 2023-12-11 DIAGNOSIS — H02.834 DERMATOCHALASIS OF BOTH UPPER EYELIDS: Primary | ICD-10-CM

## 2023-12-11 PROCEDURE — 99024 POSTOP FOLLOW-UP VISIT: CPT | Performed by: PHYSICIAN ASSISTANT

## 2023-12-12 NOTE — PROGRESS NOTES
Pulmonary Follow Up Note   Elio Etienne 72 y.o. male MRN: [id number]  12/13/2023      Assessment/Plan: Moderate persistent asthma  No wheezing today. Continue Trelegy Ellipta daily and albuterol as needed. History of eosinophilic pneumonia  Diagnosed 12 years ago, treated with prednisone  No recurrence for the past 5 years. Severe DONALD  Sleep study from 12/02/22 with AHI of 45 consistent with severe DONALD. Also evidence of oxygen desaturation to 82% during sleep study and PLMs. Patient has not been using the CPAP lately. Will defer compliance report for the next visit. Shortness of breath  Likely secondary to volume overload. Will order BNP. If elevated will repeat TTE. Obesity   Follows with weight management. History of Present Illness   HPI:  Elio Etienne is a 72 y.o. male with a PMHx of Moderate persistent asthma, eosinophilic pneumonia, DONALD, HTN, Hypothyroidism, HLD, who comes to the office for a follow up visit regarding moderate persistent asthma and severe DONALD. Patient was last seen in the office on 04/26/2023. At the time, patient was advised to continue Trelegy for asthma and autoCPAP was ordered for treatment of DONALD. Patient was seen in the ED on 07/03 at [facility] for chest pain and shortness of breath. Per report, EKG and troponins were normal. Patient received Lasix with symptomatic imrpovement. CT chest was ordered and report states "evidence of tracheomalacia" but no other abnormalities. Patient continued to experience shortness of breath and contacted pulmonary on 07/19 and a nebulizer was prescribed. He was then admitted at the [facility] from 9/12 to 9/13 due to COVID 19 infection. Today, patient reports shortness of breath on minimal exertion. He reports bilateral lower extremity edema as well as lower abdominal swelling. He is on Lasix 40 mg daily which has helped the fluid overload but he thinks he may need a larger dose.  He states his symptoms suddenly worsened approximately 1 week ago. He denies any recent travels or sick contacts. Denies fever and chills. He continues to not be compliant with CPAP. He reports he has not been able to exercise due to the shortness of breath. He continues to take trelegy and requires albuterol several times with modest improvement. Pulmonary history: Patient is a lifelong nonsmoker. Denies vaping. He was diagnosed with asthma since early childhood. Diagnosed with eosinophilic pneumonia 12 years ago, treated with steroids. Denies any recurrence for the past 5 years. 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 Denies any occupational exposures. No pets. Denies exposure to wild animals. Review of Systems   Constitutional:  Negative for appetite change and fever. HENT:  Positive for rhinorrhea. Negative for ear pain, postnasal drip, sneezing, sore throat and trouble swallowing. Respiratory:  Positive for shortness of breath. Cardiovascular:  Negative for chest pain. Musculoskeletal:  Positive for myalgias. Neurological:  Negative for headaches.            Answers submitted by the patient for this visit:  Pulmonology Questionnaire (Submitted on 12/12/2023)  Chief Complaint: Primary symptoms  Do you have chest tightness?: Yes  Do you have difficulty breathing?: Yes  Chronicity: recurrent  When did you first notice your symptoms?: 1 to 4 weeks ago  How often do your symptoms occur?: constantly  Since you first noticed this problem, how has it changed?: waxing and waning  Do you have shortness of breath that occurs with effort or exertion?: Yes  Do you have ear congestion?: No  Do you have heartburn?: No  Do you have fatigue?: Yes  Do you have nasal congestion?: Yes  Do you have shortness of breath when lying flat?: Yes  Do you have shortness of breath when you wake up?: Yes  Do you have sweats?: No  Have you experienced weight loss?: No  Which of the following makes your symptoms worse?: any activity, climbing stairs, minimal activity  Which of the following makes your symptoms better?: nothing      Historical Information   Past Medical History:   Diagnosis Date    ARIANE (acute kidney injury) ([address]) 01/13/2023    pt not aware of this    Arthritis     Asthma     Breathing difficulty 01/2023    shldr sx    Colon polyp     Dental crowns present     all teeth Veneers    Depression     Disease of thyroid gland     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[address])     History of COVID-19 09/09/2023    original surgery date 9/12/23 and rescheduled to 10/23/23    Hyperlipidemia     Hypertension 2012    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[address])     per pt does not have this now    Manic depression ([address])     Right lower lobe pneumonia 01/12/2023    Shortness of breath     [place]O    Sleep apnea      Past Surgical History:   Procedure Laterality Date    COLONOSCOPY  12/13/2022    COLONOSCOPY W/ BIOPSIES AND POLYPECTOMY  06/14/2023    JOINT REPLACEMENT  1/10 /2023    Right Shoulder    TN ARTHROPLASTY GLENOHUMERAL JOINT TOTAL SHOULDER Right 01/10/2023    Procedure: ARTHROPLASTY SHOULDER REVERSE WITH BICEPS TENODESIS;  Surgeon: Chaparrita Davies MD;  Location:  MAIN OR;  Service: Orthopedics    TN BLEPHAROPLASTY LOWER EYELID Bilateral 12/04/2023    Procedure: BLEPHAROPLASTY LOWER;  Surgeon: Susie Ortiz DO;  Location: [facility] MAIN OR;  Service: Plastics    TN BLEPHAROPLASTY UPPER EYELID W/EXCESSIVE SKIN Bilateral 12/04/2023    Procedure: BLEPHAROPLASTY UPPER;  Surgeon: Susie Ortiz DO;  Location: [facility] MAIN OR;  Service: Plastics    UMBILICAL HERNIA REPAIR       Family History   Problem Relation Age of Onset    Hypertension Mother     Depression Mother     Depression Father     Arthritis Father     Heart attack Paternal Uncle 32    Colon cancer Neg Hx     Prostate cancer Neg Hx          Meds/Allergies     Current Outpatient Medications:     albuterol (2.5 mg/3 mL) 0.083 % nebulizer solution, Take 3 mL (2.5 mg total) by nebulization every 6 (six) hours as needed for wheezing or shortness of breath, Disp: 360 mL, Rfl: 3    albuterol (PROVENTIL HFA,VENTOLIN HFA) 90 mcg/act inhaler, if needed, Disp: , Rfl:     cetirizine (ZyrTEC) 5 MG tablet, Take 5 mg by mouth daily, Disp: , Rfl:     clonazePAM (KlonoPIN) 1 mg tablet, Take 1 mg by mouth daily at bedtime, Disp: , Rfl:     fluticasone-umeclidinium-vilanterol (Trelegy Ellipta) 200-62.5-25 mcg/actuation AEPB inhaler, Inhale 1 puff daily, Disp: 60 blister, Rfl: 5    furosemide (LASIX) 40 mg tablet, Take 1 tablet (40 mg total) by mouth daily, Disp: 30 tablet, Rfl: 0    gabapentin (Neurontin) 300 mg capsule, Take 1 capsule (300 mg total) by mouth 3 (three) times a day for 5 days, Disp: 15 capsule, Rfl: 0    ibuprofen (MOTRIN) 800 mg tablet, Take 1 tablet (800 mg total) by mouth every 8 (eight) hours as needed for mild pain (DO NOT BEGIN UNTIL 48 HOURS AFTER SURGERY), Disp: 15 tablet, Rfl: 0    lamoTRIgine (LaMICtal) 200 MG tablet, Take 200 mg by mouth daily, Disp: , Rfl:     levothyroxine 112 mcg tablet, Take 1 tablet (112 mcg total) by mouth daily, Disp: 60 tablet, Rfl: 0    montelukast (SINGULAIR) 10 mg tablet, TAKE 1 TABLET(10 MG) BY MOUTH IN THE MORNING, Disp: 90 tablet, Rfl: 1    multivitamin-iron-minerals-folic acid (CENTRUM) chewable tablet, Chew 1 tablet daily, Disp: , Rfl:     omega-3-acid ethyl esters (LOVAZA) 1 g capsule, Take 2 g by mouth daily "Fish oil", Disp: , Rfl:     oxyCODONE (Roxicodone) 5 immediate release tablet, Take 1 tablet (5 mg total) by mouth every 6 (six) hours as needed for moderate pain Max Daily Amount: 20 mg, Disp: 10 tablet, Rfl: 0    pramipexole (MIRAPEX) 1 mg tablet, Take 2 mg by mouth daily at bedtime, Disp: , Rfl:     semaglutide, 0.25 or 0.5 mg/dose, (Ozempic, 0.25 or 0.5 MG/DOSE,) 2 mg/3 mL injection pen, 0.25 mg under the skin every 7 days for 4 doses (28 days), THEN 0.5 mg under the skin every 7 days, Disp: 9 mL, Rfl: 0    tamsulosin (FLOMAX) 0.4 mg, TAKE 2 CAPSULES(0.8 MG) BY MOUTH IN THE MORNING, Disp: 180 capsule, Rfl: 0    traMADol (Ultram) 50 mg tablet, Take 1 tablet (50 mg total) by mouth every 6 (six) hours as needed for moderate pain, Disp: 20 tablet, Rfl: 0  Allergies   Allergen Reactions    Crestor [Rosuvastatin] Other (See Comments)     Lost ability to walk and balance    Lipitor [Atorvastatin] Other (See Comments) and Dizziness     And very unsteady on feet    Spiriva Respimat [Tiotropium Bromide Monohydrate] Rash       Vitals: Blood pressure 122/70, pulse 95, temperature (!) 96 °F (35.6 °C), temperature source Tympanic, resp. rate 18, height 5' 7" (1.702 m), weight 110 kg (241 lb 14.4 oz), SpO2 96%. Body mass index is 37.89 kg/m². Oxygen Therapy  SpO2: 96 %  Oxygen Therapy: None (Room air)      Physical Exam  Physical Exam  Vitals reviewed. Constitutional:       General: He is not in acute distress. Appearance: He is not ill-appearing or toxic-appearing. HENT:      Head: Normocephalic. Eyes:      General: No scleral icterus. Pupils: Pupils are equal, round, and reactive to light. Cardiovascular:      Rate and Rhythm: Normal rate and regular rhythm. Heart sounds: No murmur heard. Pulmonary:      Effort: Pulmonary effort is normal. No respiratory distress. Breath sounds: Rales present. No wheezing or rhonchi. Abdominal:      General: There is distension. Palpations: Abdomen is soft. Tenderness: There is no abdominal tenderness. There is no guarding. Musculoskeletal:      Right lower leg: Edema present. Left lower leg: Edema present. Skin:     General: Skin is warm. Capillary Refill: Capillary refill takes less than 2 seconds. Neurological:      General: No focal deficit present. Mental Status: He is alert and oriented to person, place, and time. Mental status is at baseline. Cranial Nerves: No cranial nerve deficit. Psychiatric:         Mood and Affect: Mood normal.             Labs:  I have personally reviewed pertinent lab results. Lab Results   Component Value Date    WBC 7.11 11/20/2023    HGB 14.7 11/20/2023    HCT 46.7 11/20/2023    MCV 93 11/20/2023     11/20/2023     Lab Results   Component Value Date    CALCIUM 9.9 12/01/2023    K 4.0 12/01/2023    CO2 31 12/01/2023    CL 97 12/01/2023    BUN 22 12/01/2023    CREATININE 1.15 12/01/2023     No results found for: "IGE"  Lab Results   Component Value Date    ALT 49 09/13/2023    AST 42 (H) 09/13/2023    ALKPHOS 109 (H) 09/13/2023       Imaging and other studies: I have personally reviewed pertinent reports. CTA PE study 04/11/2023: No consolidation or pulmonary edema. No evidence of pneumothorax or pleural effusions. Pulmonary function testing 02/09/2023:   FEV1/FVC ratio: 79%  FVC 2.78 L  75% predicted  FEV1 2.21 L  78% predicted  No bronchodilator response  Normal lung volumes. Normal DLCO 85%        EKG, Pathology, and Other Studies: I have personally reviewed pertinent reports. TTE 04/12/2023: LVEF 65%. Normal diastolic function. Mild MR. Trace TR. No pericardial effusion.     Jerrod Vides MD  Pulmonary and Critical Care Fellowship, PGY-5  [facility]

## 2023-12-13 ENCOUNTER — APPOINTMENT (OUTPATIENT)
Dept: LAB | Facility: CLINIC | Age: 73
End: 2023-12-13
Payer: COMMERCIAL

## 2023-12-13 ENCOUNTER — OFFICE VISIT (OUTPATIENT)
Dept: PULMONOLOGY | Facility: CLINIC | Age: 73
End: 2023-12-13
Payer: COMMERCIAL

## 2023-12-13 VITALS
WEIGHT: 241.9 LBS | HEIGHT: 67 IN | SYSTOLIC BLOOD PRESSURE: 122 MMHG | DIASTOLIC BLOOD PRESSURE: 70 MMHG | RESPIRATION RATE: 18 BRPM | BODY MASS INDEX: 37.97 KG/M2 | HEART RATE: 95 BPM | OXYGEN SATURATION: 96 % | TEMPERATURE: 96 F

## 2023-12-13 DIAGNOSIS — R60.0 BILATERAL LEG EDEMA: ICD-10-CM

## 2023-12-13 DIAGNOSIS — E87.70 HYPERVOLEMIA, UNSPECIFIED HYPERVOLEMIA TYPE: ICD-10-CM

## 2023-12-13 DIAGNOSIS — J82.81 EOSINOPHILIC PNEUMONIA (HCC): ICD-10-CM

## 2023-12-13 DIAGNOSIS — G47.33 OSA (OBSTRUCTIVE SLEEP APNEA): ICD-10-CM

## 2023-12-13 DIAGNOSIS — J45.40 MODERATE PERSISTENT ASTHMA WITHOUT COMPLICATION: Primary | ICD-10-CM

## 2023-12-13 DIAGNOSIS — R06.02 SHORTNESS OF BREATH: ICD-10-CM

## 2023-12-13 LAB — BNP SERPL-MCNC: 40 PG/ML (ref 0–100)

## 2023-12-13 PROCEDURE — 94618 PULMONARY STRESS TESTING: CPT

## 2023-12-13 PROCEDURE — 83880 ASSAY OF NATRIURETIC PEPTIDE: CPT

## 2023-12-13 PROCEDURE — 99214 OFFICE O/P EST MOD 30 MIN: CPT | Performed by: INTERNAL MEDICINE

## 2023-12-13 PROCEDURE — 36415 COLL VENOUS BLD VENIPUNCTURE: CPT

## 2023-12-13 NOTE — PROGRESS NOTES
Assessment and Plan:  Michael Rivera 72 y.o. male s/p bilateral upper and lower blepharoplasty 12/4/23    - healing well. Swelling and bruising much improved  - sutures removed without issue  - encouraged scar massage and moisturizing  - continue ice as needed  - return in 1 week for incision check    Subjective:  Doing well. Has been taking medications as prescribed. Swelling much improved. Has had minimal pain.      Objective:  NAD, AAOx3  Incisions C/D/I, sutures removed without difficulty  Mild swelling to bilateral lower eyelids, minimal bruising    Richard Tomlinson PA-C  Plastic and Reconstructive Surgery

## 2023-12-14 DIAGNOSIS — R60.0 BILATERAL LEG EDEMA: ICD-10-CM

## 2023-12-14 PROBLEM — Z47.1 AFTERCARE FOLLOWING LEFT SHOULDER JOINT REPLACEMENT SURGERY: Status: RESOLVED | Noted: 2023-01-23 | Resolved: 2023-12-14

## 2023-12-14 PROBLEM — R25.1 TREMOR: Status: RESOLVED | Noted: 2022-10-12 | Resolved: 2023-12-14

## 2023-12-14 PROBLEM — Z96.611 STATUS POST REVERSE TOTAL ARTHROPLASTY OF RIGHT SHOULDER: Status: RESOLVED | Noted: 2023-01-23 | Resolved: 2023-12-14

## 2023-12-14 PROBLEM — J45.909 ASTHMA: Status: RESOLVED | Noted: 2022-10-13 | Resolved: 2023-12-14

## 2023-12-14 PROBLEM — M79.622 PAIN IN LEFT AXILLA: Status: RESOLVED | Noted: 2023-04-11 | Resolved: 2023-12-14

## 2023-12-14 PROBLEM — S42.114A CLOSED NONDISPLACED FRACTURE OF BODY OF RIGHT SCAPULA: Status: RESOLVED | Noted: 2023-03-02 | Resolved: 2023-12-14

## 2023-12-14 PROBLEM — M19.011 OSTEOARTHRITIS OF RIGHT SHOULDER: Status: RESOLVED | Noted: 2022-09-14 | Resolved: 2023-12-14

## 2023-12-14 PROBLEM — R42 DIZZINESS: Status: RESOLVED | Noted: 2022-10-13 | Resolved: 2023-12-14

## 2023-12-14 PROBLEM — R06.89 RESPIRATORY INSUFFICIENCY: Status: RESOLVED | Noted: 2023-01-12 | Resolved: 2023-12-14

## 2023-12-14 PROBLEM — Z96.612 AFTERCARE FOLLOWING LEFT SHOULDER JOINT REPLACEMENT SURGERY: Status: RESOLVED | Noted: 2023-01-23 | Resolved: 2023-12-14

## 2023-12-14 PROBLEM — R26.89 BALANCE PROBLEM: Status: RESOLVED | Noted: 2023-04-11 | Resolved: 2023-12-14

## 2023-12-14 PROBLEM — U07.1 COVID: Status: RESOLVED | Noted: 2023-09-12 | Resolved: 2023-12-14

## 2023-12-14 PROBLEM — R29.898 HIP WEAKNESS: Status: RESOLVED | Noted: 2022-10-13 | Resolved: 2023-12-14

## 2023-12-14 PROBLEM — G89.29 CHRONIC PAIN IN RIGHT SHOULDER: Status: RESOLVED | Noted: 2020-01-01 | Resolved: 2023-12-14

## 2023-12-14 PROBLEM — G25.3 MYOCLONUS: Status: RESOLVED | Noted: 2022-10-13 | Resolved: 2023-12-14

## 2023-12-14 PROBLEM — M25.511 CHRONIC PAIN IN RIGHT SHOULDER: Status: RESOLVED | Noted: 2020-01-01 | Resolved: 2023-12-14

## 2023-12-14 PROBLEM — Z01.818 PREOPERATIVE CLEARANCE: Status: RESOLVED | Noted: 2022-11-09 | Resolved: 2023-12-14

## 2023-12-14 RX ORDER — FUROSEMIDE 40 MG/1
40 TABLET ORAL DAILY
Qty: 30 TABLET | Refills: 0 | Status: SHIPPED | OUTPATIENT
Start: 2023-12-14

## 2023-12-20 ENCOUNTER — OFFICE VISIT (OUTPATIENT)
Dept: PLASTIC SURGERY | Facility: CLINIC | Age: 73
End: 2023-12-20

## 2023-12-20 DIAGNOSIS — H02.834 DERMATOCHALASIS OF BOTH UPPER EYELIDS: Primary | ICD-10-CM

## 2023-12-20 DIAGNOSIS — H02.831 DERMATOCHALASIS OF BOTH UPPER EYELIDS: Primary | ICD-10-CM

## 2023-12-20 PROCEDURE — 99024 POSTOP FOLLOW-UP VISIT: CPT | Performed by: PHYSICIAN ASSISTANT

## 2023-12-20 NOTE — PROGRESS NOTES
Assessment and Plan:  Roverto Milton 73 y.o. male s/p bilateral upper and lower blepharoplasty 12/4/23     - healing well. Continues with moderate swelling to lower eyelids  - again demonstrated massage. Gave packets of aquaphor to use on incisions  - ice as needed  - return in 4-6 weeks    Subjective:  Doing well. No pain. Continues with moderate lower eyelid swelling.      Objective:  NAD, AAOx3  Incisions C/D/I, small eschar on lower eyelid incision, no ectropion      Mariaa Jimenez PA-C  Plastic and Reconstructive Surgery

## 2023-12-22 ENCOUNTER — AMB VIDEO VISIT (OUTPATIENT)
Dept: OTHER | Facility: HOSPITAL | Age: 73
End: 2023-12-22
Payer: COMMERCIAL

## 2023-12-22 VITALS
OXYGEN SATURATION: 97 % | DIASTOLIC BLOOD PRESSURE: 77 MMHG | SYSTOLIC BLOOD PRESSURE: 140 MMHG | TEMPERATURE: 97 F | HEART RATE: 93 BPM | RESPIRATION RATE: 12 BRPM

## 2023-12-22 DIAGNOSIS — R10.31 ABDOMINAL PAIN, RLQ: Primary | ICD-10-CM

## 2023-12-22 DIAGNOSIS — M54.50 RIGHT LUMBAR PAIN: ICD-10-CM

## 2023-12-22 DIAGNOSIS — R14.0 ABDOMINAL BLOATING: ICD-10-CM

## 2023-12-22 PROBLEM — R73.03 PRE-DIABETES: Status: RESOLVED | Noted: 2022-10-13 | Resolved: 2023-12-22

## 2023-12-22 PROBLEM — E66.812 CLASS 2 OBESITY: Status: RESOLVED | Noted: 2022-11-07 | Resolved: 2023-12-22

## 2023-12-22 PROBLEM — R29.6 FALLS FREQUENTLY: Status: RESOLVED | Noted: 2021-03-16 | Resolved: 2023-12-22

## 2023-12-22 PROBLEM — I10 ESSENTIAL HYPERTENSION: Status: RESOLVED | Noted: 2022-09-14 | Resolved: 2023-12-22

## 2023-12-22 PROBLEM — R25.1 TREMOR OF RIGHT HAND: Status: RESOLVED | Noted: 2022-10-13 | Resolved: 2023-12-22

## 2023-12-22 PROBLEM — R79.89 ELEVATED TSH: Status: RESOLVED | Noted: 2023-11-21 | Resolved: 2023-12-22

## 2023-12-22 PROBLEM — E66.9 CLASS 2 OBESITY: Status: RESOLVED | Noted: 2022-11-07 | Resolved: 2023-12-22

## 2023-12-22 PROBLEM — Z01.810 PRE-OPERATIVE CARDIOVASCULAR EXAMINATION: Status: RESOLVED | Noted: 2023-01-23 | Resolved: 2023-12-22

## 2023-12-22 PROCEDURE — 99212 OFFICE O/P EST SF 10 MIN: CPT | Performed by: PHYSICIAN ASSISTANT

## 2023-12-22 NOTE — PATIENT INSTRUCTIONS
Discussed broad differential with patient including but not limited to spinal cord compression, appendicitis, UTI including pyelonephritis, kidney stone. I also considered hernia or bowel obstruction, but patient did have BM today, no vomiting. Patient is out of the area. Advised ED for evaluation. He wants to try drinking water and cranberry juice to see if that will improve his symptoms. Advised worsening pain, fever or anything else concerning, go to ER for evaluation.    [facility] Phone number is [phone] if you need assistance or have further questions

## 2023-12-22 NOTE — PROGRESS NOTES
Required Documentation:  Encounter provider Shannon D Severino, PA-C    Provider located at [facility]  VIRTUAL CARE   [address] [facility] [id number]    Identify all parties in room with patient during virtual visit:  No one else    The patient was identified by name and date of birth. Roverto Milton was informed that this is a telemedicine visit and that the visit is being conducted through the [facility] [employer] platform. He agrees to proceed..  My office door was closed. No one else was in the room.  He acknowledged consent and understanding of privacy and security of the video platform. The patient has agreed to participate and understands they can discontinue the visit at any time.    Verification of patient location:    Patient is located at home in the following state in which I hold an active license [place]    Patient is aware this is a billable service.     Reason for visit is No chief complaint on file.       Subjective  HPI   Pt reports sx of UTI. Reports extreme lower abd bloating without increased BLE edema, pain at base of spine and R lower abdomen with walking x 10 days. Difficulty urinating, urgency, frequency, feeling of incomplete emptying. Endorses R paralumbar pain stabbing pain which is intermittent, worse with walking. Denies fevers, blood in urine, foul smelling urine, tingling or weakness in legs. No injury or heavy lifting. No numbness or tingling of anus or penis. No incontinence. Hx umbilical hernia repair. Last BM small this morning, last normal one was 2 days ago.    Past Medical History:   Diagnosis Date    ARIANE (acute kidney injury) (HCC) 01/13/2023    pt not aware of this    Arthritis     Asthma     Breathing difficulty 01/2023    shldr sx    Colon polyp     Dental crowns present     all teeth Veneers    Depression     Disease of thyroid gland     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HCC)     Essential hypertension     History of COVID-19 09/09/2023     original surgery date 9/12/23 and rescheduled to 10/23/23    Hyperlipidemia     Hypertension 2012    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HCC)     per pt does not have this now    Manic depression (HCC)     Right lower lobe pneumonia 01/12/2023    Shortness of breath     MONTAÑO    Sleep apnea        Past Surgical History:   Procedure Laterality Date    COLONOSCOPY  12/13/2022    COLONOSCOPY W/ BIOPSIES AND POLYPECTOMY  06/14/2023    JOINT REPLACEMENT  1/10 /2023    Right Shoulder    FL ARTHROPLASTY GLENOHUMERAL JOINT TOTAL SHOULDER Right 01/10/2023    Procedure: ARTHROPLASTY SHOULDER REVERSE WITH BICEPS TENODESIS;  Surgeon: Lele Patrick MD;  Location:  MAIN OR;  Service: Orthopedics    FL BLEPHAROPLASTY LOWER EYELID Bilateral 12/04/2023    Procedure: BLEPHAROPLASTY LOWER;  Surgeon: Rachna Agiular DO;  Location:  MAIN OR;  Service: Plastics    FL BLEPHAROPLASTY UPPER EYELID W/EXCESSIVE SKIN Bilateral 12/04/2023    Procedure: BLEPHAROPLASTY UPPER;  Surgeon: Rachna Aguilar DO;  Location:  MAIN OR;  Service: Plastics    UMBILICAL HERNIA REPAIR          Allergies   Allergen Reactions    Crestor [Rosuvastatin] Other (See Comments)     Lost ability to walk and balance    Lipitor [Atorvastatin] Other (See Comments) and Dizziness     And very unsteady on feet    Spiriva Respimat [Tiotropium Bromide Monohydrate] Rash       Review of Systems   Constitutional:  Negative for fever.   HENT:  Negative for nosebleeds.    Eyes:  Negative for redness.   Respiratory:  Negative for shortness of breath.    Cardiovascular:  Negative for chest pain.   Gastrointestinal:  Positive for abdominal distention. Negative for blood in stool.   Genitourinary:  Positive for decreased urine volume, difficulty urinating, flank pain, frequency and urgency. Negative for dysuria and hematuria.   Musculoskeletal:  Positive for back pain. Negative for gait problem.   Skin:  Negative for rash.   Neurological:  Negative for seizures.    Psychiatric/Behavioral:  Negative for behavioral problems.        Video Exam    Vitals:    12/22/23 1823   BP: 140/77   Pulse: 93   Resp: 12   Temp: (!) 97 °F (36.1 °C)   SpO2: 97%       Physical Exam  Constitutional:       General: He is not in acute distress.     Appearance: Normal appearance. He is morbidly obese. He is not toxic-appearing.   HENT:      Head: Normocephalic and atraumatic.      Nose: No rhinorrhea.      Mouth/Throat:      Mouth: Mucous membranes are moist.   Eyes:      Conjunctiva/sclera: Conjunctivae normal.      Comments: Healing blepharoplasty incisions b/l, aquaphor overlying   Cardiovascular:      Rate and Rhythm: Normal rate.   Pulmonary:      Effort: Pulmonary effort is normal. No respiratory distress.      Breath sounds: No wheezing (no gross audible wheeze through computer).   Abdominal:       Musculoskeletal:      Cervical back: Normal range of motion.        Back:    Skin:     Findings: No rash (on face or neck).   Neurological:      Mental Status: He is alert.      Cranial Nerves: No dysarthria or facial asymmetry.   Psychiatric:         Mood and Affect: Mood normal.         Behavior: Behavior normal.         Discussed broad differential with patient including but not limited to spinal cord compression, appendicitis, UTI including pyelo, kidney stone. I also considered hernia or obstruction, but patient did have BM today, no vomiting. Patient is out of the area. Advised ED for evaluation. He wants to try drinking water and cranberry juice to see if that will improve his sx. Advised worsening pain, fever or anything else concerning, go to ER for evaluation.  Visit Time  Total Visit Duration: 19 minutes    Assessment/Plan:    Diagnoses and all orders for this visit:    Abdominal pain, RLQ    Abdominal bloating    Right lumbar pain        Patient Instructions   Discussed broad differential with patient including but not limited to spinal cord compression, appendicitis, UTI including  pyelonephritis, kidney stone. I also considered hernia or bowel obstruction, but patient did have BM today, no vomiting. Patient is out of the area. Advised ED for evaluation. He wants to try drinking water and cranberry juice to see if that will improve his symptoms. Advised worsening pain, fever or anything else concerning, go to ER for evaluation.    [facility] Phone number is [phone] if you need assistance or have further questions

## 2023-12-22 NOTE — [facility] EVISITS
Visit Summary for TEO PIMENTEL - Gender: Male - Date of Birth: 1950  Date: [id number] - Duration: 19 minutes  Patient: TEO PIMENTEL  Provider: Shannon Severino PA-C    Patient Contact Information  Address  [address] [place] [address]; [place] [address]  [phone]    Visit Topics    Triage Questions   What is your current physical address in the event of a medical emergency? Answer []  Are you allergic to any medications? Answer []  Are you now or could you be pregnant? Answer []  Do you have any immune system compromise or chronic lung   disease? Answer []  Do you have any vulnerable family members in the home (infant, pregnant, cancer, elderly)? Answer []     Conversation Transcripts  [0A][0A] [Notification] You are connected with Shannon Severino PA-C, Urgent Care Specialist.[0A][Notification] TEO PIMENTEL is located in [place].[0A][Notification] TEO PIMENTEL has shared health history...[0A]    Diagnosis  Right lower quadrant pain  Abdominal distension (gaseous)  Low back pain, unspecified    Procedures  Value: [id number] Code: CPT-4 UNLISTED E&M SERVICE    Medications Prescribed    No prescriptions ordered    Electronically signed by: Severino PA-C, Shannon(NPI [id number])

## 2023-12-27 ENCOUNTER — APPOINTMENT (OUTPATIENT)
Dept: PHYSICAL THERAPY | Facility: REHABILITATION | Age: 73
End: 2023-12-27

## 2023-12-29 DIAGNOSIS — E66.01 CLASS 2 SEVERE OBESITY DUE TO EXCESS CALORIES WITH SERIOUS COMORBIDITY AND BODY MASS INDEX (BMI) OF 38.0 TO 38.9 IN ADULT: ICD-10-CM

## 2023-12-29 RX ORDER — PEN NEEDLE, DIABETIC 32GX 5/32"
NEEDLE, DISPOSABLE MISCELLANEOUS
Qty: 100 EACH | Refills: 0 | Status: SHIPPED | OUTPATIENT
Start: 2023-12-29

## 2024-01-08 DIAGNOSIS — R60.0 BILATERAL LEG EDEMA: ICD-10-CM

## 2024-01-08 RX ORDER — FUROSEMIDE 40 MG/1
40 TABLET ORAL DAILY
Qty: 90 TABLET | Refills: 1 | Status: SHIPPED | OUTPATIENT
Start: 2024-01-08

## 2024-01-24 DIAGNOSIS — J45.40 MODERATE PERSISTENT ASTHMA WITHOUT COMPLICATION: Primary | ICD-10-CM

## 2024-01-26 RX ORDER — ALBUTEROL SULFATE 90 UG/1
1 AEROSOL, METERED RESPIRATORY (INHALATION) EVERY 6 HOURS PRN
Qty: 18 G | Refills: 6 | Status: SHIPPED | OUTPATIENT
Start: 2024-01-26

## 2024-02-05 ENCOUNTER — OFFICE VISIT (OUTPATIENT)
Dept: DIABETES SERVICES | Facility: CLINIC | Age: 74
End: 2024-02-05
Payer: COMMERCIAL

## 2024-02-05 VITALS — WEIGHT: 240.2 LBS | BODY MASS INDEX: 37.62 KG/M2

## 2024-02-05 DIAGNOSIS — E11.9 NEW ONSET TYPE 2 DIABETES MELLITUS (HCC): Primary | ICD-10-CM

## 2024-02-05 PROCEDURE — 97802 MEDICAL NUTRITION INDIV IN: CPT

## 2024-02-05 NOTE — PATIENT INSTRUCTIONS
Eat 3 regular meals ~4-5 hours apart with 1-2 snacks per day as needed.  Keep carbohydrate intake consistent. Aim for 45-60 grams of carbohydrate per meal and 0-15 grams of carbohydrate per snack.

## 2024-02-05 NOTE — PROGRESS NOTES
Assessment/Plan:     Class 2 severe obesity due to excess calories with serious comorbidity and body mass index (BMI) of 38.0 to 38.9 in adult (HCC)  - Patient is pursuing Conservative Program and follow up visits with medical weight management provider  - Initial weight loss goal of 5-10% weight loss for improved health  - Labs reviewed from 12/2023  -Patient lifestyle habits were reviewed and patient was given support to continue to work on his nutrition habits.  Discussed with patient different meal options and patient would like to pursue Nutrisystem as he does not have to prepare meals and he likes the food.  Encourage patient to consider a calorie bracket of 6159-1356 calories per day versus 1000, as I feel like this is too low.  Educated patient that weight loss and lifestyle changes involve 80% nutrition, and when he is able to lose some weight he will be able to be more active which should help his weight loss.  Patient will continue to follow with PCP for Ozempic management at this time and will hopefully advance to the dose of 1 mg to see if this helps more with his hunger and satiety.  To be successful on the medication patient will likely need to continue to work on nutrition changes.  Patient was informed he can return to medical weight management at any time for guidance and support.    Goals:  Calorie goal of 1100 to 1300 garland/day  Do not skip meals.  Food log via julio or provided paper log (julio options include www.NUMBER26.com, sparkpeople.com, loseit.com, calorieking.com, [employer]")  No sugary beverages.   At least 64oz of water daily.  Increase physical activity by 10 minutes daily. Gradually increase physical activity to a goal of 5 days per week for 30 minutes of MODERATE intensity PLUS 2 days per week of FULL BODY resistance training          Bill was seen today for follow-up.    Diagnoses and all orders for this visit:    Class 2 severe obesity due to excess calories with serious  comorbidity and body mass index (BMI) of 38.0 to 38.9 in adult     Obesity, Class II, BMI 35-39.9        Total time spent reviewing chart, interviewing patient, examining patient, discussing plan, answering all questions, and documentin minutes with >50% face-to-face time with the patient.    Follow up in approximately  as needed  with Non-Surgical Physician/Advanced Practitioner.    Subjective:   Chief Complaint   Patient presents with    Follow-up     Pt is here today for MWM f/u.        Patient ID: Roverto Milton  is a 73 y.o. male with excess weight/obesity here to pursue weight management.  Patient is pursuing Conservative Program.   Most recent notes and records were reviewed.    HPI    Wt Readings from Last 20 Encounters:   24 108 kg (239 lb)   24 109 kg (240 lb 3.2 oz)   23 110 kg (241 lb 14.4 oz)   23 113 kg (249 lb)   23 104 kg (230 lb)   23 110 kg (242 lb)   23 105 kg (231 lb)   23 105 kg (231 lb)   23 105 kg (232 lb)   23 104 kg (230 lb)   23 104 kg (229 lb)   23 103 kg (227 lb)   23 103 kg (226 lb)   23 103 kg (226 lb 3.2 oz)   23 103 kg (226 lb)   23 103 kg (226 lb)   23 103 kg (226 lb 11.2 oz)   23 106 kg (233 lb)   05/10/23 105 kg (230 lb 6.4 oz)   23 104 kg (229 lb)       Patient presents today to medical weight management office for follow up.  Patient has continued to struggled with his weight. He has been eating the Yvrose Reuben meals but finds they are not good and preferred Nutrisystem. He may go back to this plan. He has also met with a diabetic nutritionist yesterday as he was recently diagnosed as diabetic. He was started on Ozempic 6 weeks ago through his PCP and has not noticed any improvement in his hunger. He has been on the 0.5mg dose for 2 weeks and is meeting back with provider this week to discuss increasing the dose.  Patient had been on Wegovy in the past and  "noticed improvement right away. Patient is hopeful Ozempic will help soon.  Patient is not able to exercise due to hip pain.     Weight loss medication and dose: Ozempic 0.5mg  Started weight and date: 230.4 lbs in 5/2023  Current weight: 239 lbs (229 lbs last OV)  Difference: +10 lbs  Goal: 170 lbs    Starting BMI: 38.6 in 11/2022  Current BMI: 39.47    Waist Measurements:  11/2022: 53 in       *starting nutrisystem this week*  B- skips  L- chicken, steak, hamburger, salads, string beans  S- peanuts, cheese doodles, yogurt  D- chicken, steak, hamburger, salads, string beans      Hydration- crystal light with water  Alcohol- no  Exercise- limited      The following portions of the patient's history were reviewed and updated as appropriate: allergies, current medications, past family history, past medical history, past social history, past surgical history, and problem list.    Family History   Problem Relation Age of Onset    Hypertension Mother     Depression Mother     Depression Father     Arthritis Father     Heart attack Paternal Uncle 31    Colon cancer Neg Hx     Prostate cancer Neg Hx         Review of Systems   Constitutional:  Positive for fatigue.   HENT:  Negative for sore throat.    Respiratory:  Negative for cough and shortness of breath.    Cardiovascular:  Negative for chest pain, palpitations and leg swelling.   Gastrointestinal:  Negative for abdominal pain, constipation, diarrhea and nausea.   Genitourinary:  Negative for dysuria.   Musculoskeletal:  Positive for arthralgias. Negative for back pain.   Skin:  Negative for rash.   Neurological:  Negative for headaches.   Psychiatric/Behavioral:  Negative for dysphoric mood. The patient is not nervous/anxious.        Objective:  /80 (BP Location: Left arm, Patient Position: Sitting, Cuff Size: Large)   Pulse 87   Ht 5' 5.25\" (1.657 m)   Wt 108 kg (239 lb)   BMI 39.47 kg/m²     Physical Exam  Vitals and nursing note reviewed. "   Constitutional:       Appearance: Normal appearance. He is obese.   HENT:      Head: Normocephalic.   Pulmonary:      Effort: Pulmonary effort is normal.   Neurological:      General: No focal deficit present.      Mental Status: He is alert and oriented to person, place, and time.   Psychiatric:         Mood and Affect: Mood normal.         Behavior: Behavior normal.         Thought Content: Thought content normal.         Judgment: Judgment normal.            Labs   Most recent labs reviewed   Lab Results   Component Value Date    SODIUM 138 12/01/2023    K 4.0 12/01/2023    CL 97 12/01/2023    CO2 31 12/01/2023    AGAP 10 12/01/2023    BUN 22 12/01/2023    CREATININE 1.15 12/01/2023    GLUC 151 (H) 09/13/2023    GLUF 108 (H) 12/01/2023    CALCIUM 9.9 12/01/2023    AST 42 (H) 09/13/2023    ALT 49 09/13/2023    ALKPHOS 109 (H) 09/13/2023    TP 7.6 09/13/2023    TBILI 0.26 09/13/2023    EGFR 63 12/01/2023     Lab Results   Component Value Date    HGBA1C 6.6 (H) 11/20/2023     Lab Results   Component Value Date    JQN3FKMJVJQR 1.655 12/01/2023     Lab Results   Component Value Date    CHOLESTEROL 219 (H) 12/01/2023     Lab Results   Component Value Date    HDL 46 12/01/2023     Lab Results   Component Value Date    TRIG 183 (H) 12/01/2023     Lab Results   Component Value Date    LDLCALC 136 (H) 12/01/2023

## 2024-02-05 NOTE — PROGRESS NOTES
Medical Nutrition Therapy    Assessment    Visit Type: Initial visit  Chief complaint/Medical Diagnosis/reason for visit: E11.9 (ICD-10-CM) - New onset type 2 diabetes mellitus (HCC)     HPI Roverto Milton was seen today unaccompanied regarding recent diagnosis of type 2 diabetes. Denies family history of diabetes. Patient is currently taking 0.5 mg of Ozempic weekly. Last HbA1c was 6.6% in November 2023. Conducted dietary recall. See below for details. Maurice provided a vague dietary recall. He reports of no regular meal pattern. He is recently retired. Does not cook much. Either orders in or has pre-prepared or prepackaged meals. Recently signed up for 6-week Yvrose Alexis meal plan, but does not care for the food. Problems identified in food recall include inconsistent carbohydrate intake and minimal intake of nutrient-dense foods. Explained basic pathophysiology of diabetes and impact of diet on blood glucose levels. Together we discussed what foods contain carbohydrates, reading a food label, timing of meals and snacks, serving sizes, the role of fiber, the importance of consistent carbohydrate intake in the appropriate amounts. Used the portion booklet to teach Maurice more about food groups and basic carbohydrate counting. Encouraged 3 regular meals ~4-5 hours apart with 1-2 snacks per day as needed. Discussed keeping carbohydrate intake consistent. Goal is 45-60 grams of carbohydrate per meal and 0-15 grams of carbohydrate per snack. No regular physical activity at present. Physical activity is limited due to reported arthritis, hip and ankle pain and respiratory problems. Discussed possibility of adding some low-impact, seated exercises such as a stationary bike. Maurice was open to this. Physical activity encouraged as able in addition to diet changes above. Maurice demonstrated good understanding and will call with any questions or if he would like to schedule a follow-up visit.    Ht Readings from Last 1 Encounters:  "  12/13/23 5' 7\" (1.702 m)     Wt Readings from Last 3 Encounters:   02/05/24 109 kg (240 lb 3.2 oz)   12/13/23 110 kg (241 lb 14.4 oz)   12/05/23 113 kg (249 lb)     Weight Change: Yes, patient reports of a 10 pound weight loss while on wegovy for 1 month. Gained it back once wegovy was discontinued per patient. Currently on Ozempic for a few weeks. Has not noticed any changes to weight/appetite so far on Ozempic. Expressed desire to lose weight.    Barriers to Learning: no barriers    Do you follow any special diet presently?: No  Who shops: patient  Who cooks: patient - however, has not been cooking much    Food Log: Completed via the method of usual daily food recall. Vague dietary recall provided. Bill reports of no regular meal pattern.    Breakfast: skips breakfast most days  Morning Snack: none - not a big snacker  Lunch: may Uber eats chinese food or italian food  Afternoon Snack: none - not a big snacker  Dinner: pre-made frozen meals or Yvrose Reuben meal - plans on discontinuing these as he does not care for them  Evening Snack: none - not a big snacker  Beverages: water, diet soda, flavored water, diet cranberrry juice  Eating out/Take out: often  Exercise: no regular physical activity at present - physical activity is limited due to reported arthritis, hip and ankle pain and respiratory problems    Estimated calorie needs: 1,650 kcals/day   Carbohydrate: 45-60 g/meal, 0-15 g/snack       Fat: 5 servings/day      Protein: 7 ounces/day    Nutrition Diagnosis:  Inconsistent carbohydrate intake related to food and nutrition related knowledge deficit concerning appropriate amount and timing of carbohydrate intake as evidenced by estimated carbohydrate intake that is different from recommended types or ingested on an irregular basis.    Intervention: increased fiber intake, label reading, behavior modification strategies, carbohydrate counting, meal timing, meal planning, and monitoring portion control "     Treatment Goals: Patient understands education and recommendations, Patient will consume 3 meals a day, Patient will monitor portion control, Patient will count carbohydrates, and Patient will exercise    Monitoring and evaluation:    Term code indicator  FH 4.4 Mealtime Behavior Criteria: Eat 3 regular meals ~4-5 hours apart with 1-2 snacks per day as needed.  Term code indicator  FH 1.6.3 Carbohydrate Intake Criteria: Keep carbohydrate intake consistent. Aim for 45-60 grams of carbohydrate per meal and 0-15 grams of carbohydrate per snack.    Materials Provided: Portion booklet    Patient’s Response to Instruction:  Comprehension: good  Motivation: good  Expected Compliance: good    Start- Stop: 7:33-8:18  Total Minutes: 45 Minutes  Group or Individual Instruction: MNT-I  Other: Latisha Vernon DO    Thank you for coming to the [facility] for education today. Please feel free to call with any questions or concerns.    Beverley Gibbs, MS, RD, LDN  [address]  [address]  [place] [phone]

## 2024-02-06 ENCOUNTER — OFFICE VISIT (OUTPATIENT)
Dept: BARIATRICS | Facility: CLINIC | Age: 74
End: 2024-02-06
Payer: COMMERCIAL

## 2024-02-06 ENCOUNTER — APPOINTMENT (OUTPATIENT)
Dept: LAB | Facility: CLINIC | Age: 74
End: 2024-02-06
Payer: COMMERCIAL

## 2024-02-06 VITALS
WEIGHT: 239 LBS | BODY MASS INDEX: 39.82 KG/M2 | DIASTOLIC BLOOD PRESSURE: 80 MMHG | HEART RATE: 87 BPM | SYSTOLIC BLOOD PRESSURE: 132 MMHG | HEIGHT: 65 IN

## 2024-02-06 DIAGNOSIS — E66.01 CLASS 2 SEVERE OBESITY DUE TO EXCESS CALORIES WITH SERIOUS COMORBIDITY AND BODY MASS INDEX (BMI) OF 38.0 TO 38.9 IN ADULT: Primary | ICD-10-CM

## 2024-02-06 DIAGNOSIS — E66.9 OBESITY, CLASS II, BMI 35-39.9: ICD-10-CM

## 2024-02-06 LAB
ANION GAP SERPL CALCULATED.3IONS-SCNC: 10 MMOL/L
BUN SERPL-MCNC: 16 MG/DL (ref 5–25)
CALCIUM SERPL-MCNC: 9.7 MG/DL (ref 8.4–10.2)
CHLORIDE SERPL-SCNC: 95 MMOL/L (ref 96–108)
CO2 SERPL-SCNC: 33 MMOL/L (ref 21–32)
CREAT SERPL-MCNC: 1.17 MG/DL (ref 0.6–1.3)
GFR SERPL CREATININE-BSD FRML MDRD: 62 ML/MIN/1.73SQ M
GLUCOSE P FAST SERPL-MCNC: 108 MG/DL (ref 65–99)
POTASSIUM SERPL-SCNC: 3.8 MMOL/L (ref 3.5–5.3)
SODIUM SERPL-SCNC: 138 MMOL/L (ref 135–147)

## 2024-02-06 PROCEDURE — 99214 OFFICE O/P EST MOD 30 MIN: CPT | Performed by: NURSE PRACTITIONER

## 2024-02-06 PROCEDURE — 80048 BASIC METABOLIC PNL TOTAL CA: CPT

## 2024-02-06 NOTE — ASSESSMENT & PLAN NOTE
- Patient is pursuing Conservative Program and follow up visits with medical weight management provider  - Initial weight loss goal of 5-10% weight loss for improved health  - Labs reviewed from 12/2023  -Patient lifestyle habits were reviewed and patient was given support to continue to work on his nutrition habits.  Discussed with patient different meal options and patient would like to pursue Nutrisystem as he does not have to prepare meals and he likes the food.  Encourage patient to consider a calorie bracket of 3028-7284 calories per day versus 1000, as I feel like this is too low.  Educated patient that weight loss and lifestyle changes involve 80% nutrition, and when he is able to lose some weight he will be able to be more active which should help his weight loss.  Patient will continue to follow with PCP for Ozempic management at this time and will hopefully advance to the dose of 1 mg to see if this helps more with his hunger and satiety.  To be successful on the medication patient will likely need to continue to work on nutrition changes.  Patient was informed he can return to medical weight management at any time for guidance and support.    Goals:  Calorie goal of 1100 to 1300 garland/day  Do not skip meals.  Food log via julio or provided paper log (julio options include www.[employer].com, sparkpeople.com, loseit.com, calorieking.com, [employer])  No sugary beverages.   At least 64oz of water daily.  Increase physical activity by 10 minutes daily. Gradually increase physical activity to a goal of 5 days per week for 30 minutes of MODERATE intensity PLUS 2 days per week of FULL BODY resistance training

## 2024-02-07 ENCOUNTER — OFFICE VISIT (OUTPATIENT)
Dept: PLASTIC SURGERY | Facility: CLINIC | Age: 74
End: 2024-02-07

## 2024-02-07 ENCOUNTER — RA CDI HCC (OUTPATIENT)
Dept: OTHER | Facility: HOSPITAL | Age: 74
End: 2024-02-07

## 2024-02-07 DIAGNOSIS — E03.9 HYPOTHYROIDISM, UNSPECIFIED TYPE: ICD-10-CM

## 2024-02-07 DIAGNOSIS — Z98.890 S/P BILATERAL BLEPHAROPLASTY: Primary | ICD-10-CM

## 2024-02-07 NOTE — PROGRESS NOTES
Assessment/Plan:     Patient is a 73-year-old male who is status post bilateral upper and lower blepharoplasty with Dr. Aguilar on 12/4/24. Please see HPI.     The patient returns to the office today for scar check.  He is extremely pleased with his cosmetic and functional results.  No questions or concerns today.  Dr. Aguilar was also present during the visit today.  The patient will return to the office in approximately 1 year postop for any scar check or sooner with any questions or concerns.     Diagnoses and all orders for this visit:    S/p bilateral blepharoplasty          Subjective:     Patient ID: Roverto Milton is a 73 y.o. male.    HPI    Patient reports that he has been doing well.  He states that his visual acuity has greatly improved.  He is also pleased with his aesthetic results.  No issues or concerns.    Review of Systems    See HPI    Objective:     Physical Exam      All incisions are completely healed with minimal scarring.  Patient has excellent cosmesis.  Very minor degree of redundant tissue located on the right lower eyelid.  Please see photos in media.

## 2024-02-08 RX ORDER — LEVOTHYROXINE SODIUM 112 UG/1
112 TABLET ORAL DAILY
Qty: 90 TABLET | Refills: 1 | Status: SHIPPED | OUTPATIENT
Start: 2024-02-08

## 2024-02-12 ENCOUNTER — TELEPHONE (OUTPATIENT)
Dept: FAMILY MEDICINE CLINIC | Facility: CLINIC | Age: 74
End: 2024-02-12

## 2024-02-15 ENCOUNTER — PATIENT MESSAGE (OUTPATIENT)
Dept: FAMILY MEDICINE CLINIC | Facility: CLINIC | Age: 74
End: 2024-02-15

## 2024-02-15 NOTE — PROGRESS NOTES
Progress Note - Cardiology Office  [facility]    Roverto Milton 73 y.o. male MRN: [id number]  : 1950  Encounter: [id number]      ASSESSMENT:   ASCVD  Moderate coronary artery calcification on CT scan at [facility] on 2023    Asymptomatic PVCs    Mixed hyperlipidemia  2023: , , HDL 46, AST 42/39, ALT 49  On Lovaza 2 g daily  Severe myopathy with statins, Crestor and Lipitor    Obesity, BMI 39.47    Hepatic steatosis    Essential hypertension  BP today is 118/70 mmHg  On Lasix 40 mg daily    Untreated DONALD    Dyspnea with mild exertion  Moderate persistent asthma  History of aspiration pneumonia and respiratory failure following right shoulder arthroplasty    Stress test, 2023  Negative for ischemia at 100% MPHR    TTE, 2023:  EF 65%, mild LVH, mild MR    24-hour Holter monitor, 04/10/2023:  Sinus rhythm, average heart rate 75 bpm  15 PACs  65 PVCs  Symptoms of dizziness, shortness of breath, intense pain and arm pain, heavy breathing correlated with artifacts as patient's lead had been off since 8 PM and were not reapplied      RECOMMENDATIONS:  Increase Lovaza to 2 g twice a day  Coronary calcium score.  If elevated, will recommend PCSK9 inhibitor  Advised on low-cholesterol diet  Regular cardiovascular exercise for 20 to 30 minutes daily  Weight loss strategies      Please call [phone] if any questions.    HPI :     Roverto Milton is a 73 y.o. year old male who came for establishing care with new cardiologist.  Patient was previously seen by Dr. Wilson in 2023.  He has ASCVD with moderate coronary artery calcification on a CAT scan done in 2023.  He has mixed hyperlipidemia and his LDL is 136 and triglycerides are 183.  He is on 2 g of Lovaza which I am going to increase to 4 g daily  He has severe allergy/myopathy with Crestor and Lipitor.  Therefore he is not on any statin.  Will do coronary calcium score, and if  "elevated will recommend PCSK9 inhibitor    REVIEW OF SYSTEMS:  Review of Systems   Respiratory:  Positive for shortness of breath (Dyspnea on mild exertion).    Musculoskeletal:  Positive for gait problem (Mainly due to dyspnea).   All other systems reviewed and are negative.        Historical Information   Past Medical History:   Diagnosis Date    ARIANE (acute kidney injury) (HCC) 01/13/2023    pt not aware of this    Arthritis     Asthma     Breathing difficulty 01/2023    shldr sx    Colon polyp     Dental crowns present     all teeth Veneers    Depression     Disease of thyroid gland     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HCC)     Essential hypertension     History of COVID-19 09/09/2023    original surgery date 9/12/23 and rescheduled to 10/23/23    Hyperlipidemia     Hypertension 2012    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HCC)     per pt does not have this now    Manic depression (HCC)     Right lower lobe pneumonia 01/12/2023    Shortness of breath     MONTAÑO    Sleep apnea      Past Surgical History:   Procedure Laterality Date    COLONOSCOPY  12/13/2022    COLONOSCOPY W/ BIOPSIES AND POLYPECTOMY  06/14/2023    JOINT REPLACEMENT  1/10 /2023    Right Shoulder    MA ARTHROPLASTY GLENOHUMERAL JOINT TOTAL SHOULDER Right 01/10/2023    Procedure: ARTHROPLASTY SHOULDER REVERSE WITH BICEPS TENODESIS;  Surgeon: Lele Patrick MD;  Location:  MAIN OR;  Service: Orthopedics    MA BLEPHAROPLASTY LOWER EYELID Bilateral 12/04/2023    Procedure: BLEPHAROPLASTY LOWER;  Surgeon: Rachna Aguilar DO;  Location:  MAIN OR;  Service: Plastics    MA BLEPHAROPLASTY UPPER EYELID W/EXCESSIVE SKIN Bilateral 12/04/2023    Procedure: BLEPHAROPLASTY UPPER;  Surgeon: Rachna Aguilar DO;  Location:  MAIN OR;  Service: Plastics    UMBILICAL HERNIA REPAIR       Social History     Substance and Sexual Activity   Alcohol Use Not Currently    Comment: \"very very occas\"     Social History     Substance and Sexual Activity   Drug Use Never " "    Social History     Tobacco Use   Smoking Status Never   Smokeless Tobacco Never     Family History:   Family History   Problem Relation Age of Onset    Hypertension Mother     Depression Mother     Depression Father     Arthritis Father     Heart attack Paternal Uncle 31    Colon cancer Neg Hx     Prostate cancer Neg Hx        Meds/Allergies     Allergies   Allergen Reactions    Crestor [Rosuvastatin] Other (See Comments)     Lost ability to walk and balance    Lipitor [Atorvastatin] Other (See Comments) and Dizziness     And very unsteady on feet    Spiriva Respimat [Tiotropium Bromide Monohydrate] Rash       Current Outpatient Medications:     albuterol (2.5 mg/3 mL) 0.083 % nebulizer solution, Take 3 mL (2.5 mg total) by nebulization every 6 (six) hours as needed for wheezing or shortness of breath, Disp: 360 mL, Rfl: 3    albuterol (PROVENTIL HFA,VENTOLIN HFA) 90 mcg/act inhaler, Inhale 1 puff every 6 (six) hours as needed for wheezing or shortness of breath, Disp: 18 g, Rfl: 6    cetirizine (ZyrTEC) 5 MG tablet, Take 5 mg by mouth daily, Disp: , Rfl:     clonazePAM (KlonoPIN) 1 mg tablet, Take 1 mg by mouth daily at bedtime, Disp: , Rfl:     fluticasone-umeclidinium-vilanterol (Trelegy Ellipta) 200-62.5-25 mcg/actuation AEPB inhaler, Inhale 1 puff daily, Disp: 60 blister, Rfl: 5    furosemide (LASIX) 40 mg tablet, Take 1 tablet (40 mg total) by mouth daily, Disp: 90 tablet, Rfl: 1    lamoTRIgine (LaMICtal) 200 MG tablet, Take 200 mg by mouth daily, Disp: , Rfl:     levothyroxine 112 mcg tablet, Take 1 tablet (112 mcg total) by mouth daily, Disp: 90 tablet, Rfl: 1    montelukast (SINGULAIR) 10 mg tablet, TAKE 1 TABLET(10 MG) BY MOUTH IN THE MORNING, Disp: 90 tablet, Rfl: 1    multivitamin-iron-minerals-folic acid (CENTRUM) chewable tablet, Chew 1 tablet daily, Disp: , Rfl:     omega-3-acid ethyl esters (LOVAZA) 1 g capsule, Take 2 capsules (2 g total) by mouth 2 (two) times a day \"Fish oil\", Disp: 180 " "capsule, Rfl: 4    pramipexole (MIRAPEX) 1 mg tablet, Take 2 mg by mouth daily at bedtime, Disp: , Rfl:     semaglutide, 0.25 or 0.5 mg/dose, (Ozempic, 0.25 or 0.5 MG/DOSE,) 2 mg/3 mL injection pen, 0.25 mg under the skin every 7 days for 4 doses (28 days), THEN 0.5 mg under the skin every 7 days, Disp: 9 mL, Rfl: 0    tamsulosin (FLOMAX) 0.4 mg, TAKE 2 CAPSULES(0.8 MG) BY MOUTH IN THE MORNING, Disp: 180 capsule, Rfl: 0    Insulin Pen Needle (BD Pen Needle Jaquelin 2nd Gen) 32G X 4 MM MISC, USE EVERY MORNING, Disp: 100 each, Rfl: 0    Vitals: Blood pressure 118/70, pulse 89, height 5' 5.25\" (1.657 m), weight 108 kg (239 lb), SpO2 97%.    Body mass index is 39.47 kg/m².  Vitals:    02/16/24 0846   Weight: 108 kg (239 lb)     BP Readings from Last 3 Encounters:   02/16/24 118/70   02/06/24 132/80   12/22/23 140/77       Physical Exam:  Physical Exam    Neurologic:  Alert & oriented x 3, no new focal deficits, Not in any acute distress,  Constitutional: Obese  Eyes:  Pupil equal and reacting to light, conjunctiva normal,   HENT:  Atraumatic, oropharynx moist, Neck- normal range of motion, no tenderness,  Neck supple, No JVP, No LNP   Respiratory:  Bilateral air entry, mostly clear to auscultation  Cardiovascular: S1-S2 regular with a I/VI systolic murmur   GI:  Soft, nondistended, normal bowel sounds, nontender, no hepatosplenomegaly appreciated.  Musculoskeletal: no tenderness, no deformities.   Skin:  Well hydrated, no rash   Lymphatic:  No lymphadenopathy noted   Extremities:  No significant pitting edema       Diagnostic Studies Review Cardio:      EKG: Normal sinus rhythm, heart rate 89/min    Cardiac testing:     Results for orders placed during the hospital encounter of 04/11/23    Echo complete w/ contrast if indicated    Interpretation Summary    Left Ventricle: Left ventricular cavity size is normal. Wall thickness is mildly increased. There is mild concentric hypertrophy. The left ventricular ejection fraction " "is 65%. Systolic function is normal. Wall motion is normal. Diastolic function is normal.    Right Ventricle: Right ventricular cavity size is normal. Systolic function is normal.    Mitral Valve: There is mild regurgitation.      Results for orders placed during the hospital encounter of 04/10/23    Holter monitor    Interpretation Summary  PT NAME: Roverto Milton  : 1950  AGE: 72 y.o.  GENDER: male  MRN: [id number]  PROCEDURE: Holter monitor - 24 hour      INDICATIONS:  24 hour Holter monitor was performed  for PVCs.    PROCEDURE:    Average heart rate was 75 BPM, minimum heart rate was at 58 BPM; and maximum heart rate was at 103 BPM.    Ventricular ectopic activity consisted of 65 beats, which comprises 0.1%of total beats. Most of the PVCs were singles    Supraventricular ectopic activity consisted of 15 beats, which comprises 0.0% of total beats.  Most of the PACs were singles.    The patient's rhythm included 7 minutes of bradycardia.  The slowest single episode of bradycardia occurred at 10:29 AM, lasting 17 seconds, with a minimum heart rate 58 BPM.    The patient's rhythm included 2 minutes and 7 seconds of tachycardia. The fastest single episode of tachycardia occurred at 4:10 PM, lasting 58 seconds, with maximum HR of 103 BPM.    Patient's diary included symptoms of dizziness, shortness of breath, intense pain and arm pain, heavy breathing.  Corresponding events on monitor was difficult artifact as patient's lead has been off since 8 PM and was not reapplied.    Impression  1. 24 hour holter tracing report shows studies of very poor quality.  Lead came off at 8 PM and was never applied back.  Patient has lot of artifacts.  Patient did mention multiple symptoms but corresponding with those symptoms times there was artifact and no significant interference can be made from those strips.  If clinically needed study should be repeated..  2. Rhythm was sinus throughout monitoring period.        \"This " "note was completed in part utilizing [email or web address] fluency direct voice recognition software.   Grammatical errors, random word insertion, spelling mistakes, and incomplete sentences may be an occasional consequence of the system secondary to software limitations, ambient noise and hardware issues.    Please read the chart carefully and recognize, using context, where substitutions have occurred.  If you have any questions or concerns about the context, text or information contained within the body of this dictation, please contact myself, the provider, for further clarification.\"      Imaging:  Chest X-Ray:   XR chest 2 views    Result Date: 4/11/2023  Impression No acute cardiopulmonary disease. Workstation performed: [id number]       CT-scan of the chest:     No CTA results available for this patient.  Lab Review   Lab Results   Component Value Date    WBC 7.11 11/20/2023    HGB 14.7 11/20/2023    HCT 46.7 11/20/2023    MCV 93 11/20/2023    RDW 14.3 11/20/2023     11/20/2023     BMP:  Lab Results   Component Value Date    SODIUM 138 02/06/2024    K 3.8 02/06/2024    CL 95 (L) 02/06/2024    CO2 33 (H) 02/06/2024    BUN 16 02/06/2024    CREATININE 1.17 02/06/2024    GLUC 151 (H) 09/13/2023    GLUF 108 (H) 02/06/2024    CALCIUM 9.7 02/06/2024    EGFR 62 02/06/2024     LFT:  Lab Results   Component Value Date    AST 42 (H) 09/13/2023    ALT 49 09/13/2023    ALKPHOS 109 (H) 09/13/2023    TP 7.6 09/13/2023    ALB 4.6 09/13/2023      No components found for: \"TSH3\"  Lab Results   Component Value Date    PGO0UKOGXLDN 1.655 12/01/2023     Lab Results   Component Value Date    HGBA1C 6.6 (H) 11/20/2023     Lipid Profile:   Lab Results   Component Value Date    CHOLESTEROL 219 (H) 12/01/2023    HDL 46 12/01/2023    LDLCALC 136 (H) 12/01/2023    TRIG 183 (H) 12/01/2023     Lab Results   Component Value Date    CHOLESTEROL 219 (H) 12/01/2023    CHOLESTEROL 240 (H) 05/31/2023     Lab Results   Component Value Date    CKTOTAL " "188 10/13/2022    CKMB 5.0 10/13/2022    CKMBINDEX 2.7 (H) 10/13/2022     Lab Results   Component Value Date    NTBNP 42 10/13/2022      Recent Results (from the past 672 hour(s))   Basic metabolic panel    Collection Time: 02/06/24 10:54 AM   Result Value Ref Range    Sodium 138 135 - 147 mmol/L    Potassium 3.8 3.5 - 5.3 mmol/L    Chloride 95 (L) 96 - 108 mmol/L    CO2 33 (H) 21 - 32 mmol/L    ANION GAP 10 mmol/L    BUN 16 5 - 25 mg/dL    Creatinine 1.17 0.60 - 1.30 mg/dL    Glucose, Fasting 108 (H) 65 - 99 mg/dL    Calcium 9.7 8.4 - 10.2 mg/dL    eGFR 62 ml/min/1.73sq m             Dr. Brenda Kirkpatrick MD, FACC      \"This note has been constructed using a voice recognition system.Therefore there may be syntax, spelling, and/or grammatical errors. Please call if you have any questions. \"  "

## 2024-02-16 ENCOUNTER — OFFICE VISIT (OUTPATIENT)
Dept: CARDIOLOGY CLINIC | Facility: CLINIC | Age: 74
End: 2024-02-16
Payer: COMMERCIAL

## 2024-02-16 ENCOUNTER — TELEPHONE (OUTPATIENT)
Age: 74
End: 2024-02-16

## 2024-02-16 VITALS
DIASTOLIC BLOOD PRESSURE: 70 MMHG | HEART RATE: 89 BPM | HEIGHT: 65 IN | BODY MASS INDEX: 39.82 KG/M2 | WEIGHT: 239 LBS | OXYGEN SATURATION: 97 % | SYSTOLIC BLOOD PRESSURE: 118 MMHG

## 2024-02-16 DIAGNOSIS — I49.3 ASYMPTOMATIC PVCS: Primary | ICD-10-CM

## 2024-02-16 DIAGNOSIS — R06.02 SHORTNESS OF BREATH: ICD-10-CM

## 2024-02-16 DIAGNOSIS — I25.10 CAD IN NATIVE ARTERY: ICD-10-CM

## 2024-02-16 DIAGNOSIS — I25.10 ASCVD (ARTERIOSCLEROTIC CARDIOVASCULAR DISEASE): ICD-10-CM

## 2024-02-16 DIAGNOSIS — E78.2 MIXED HYPERLIPIDEMIA: ICD-10-CM

## 2024-02-16 PROCEDURE — 99215 OFFICE O/P EST HI 40 MIN: CPT | Performed by: INTERNAL MEDICINE

## 2024-02-16 PROCEDURE — 93000 ELECTROCARDIOGRAM COMPLETE: CPT | Performed by: INTERNAL MEDICINE

## 2024-02-16 RX ORDER — OMEGA-3-ACID ETHYL ESTERS 1 G/1
2 CAPSULE, LIQUID FILLED ORAL 2 TIMES DAILY
Qty: 180 CAPSULE | Refills: 4 | Status: SHIPPED | OUTPATIENT
Start: 2024-02-16

## 2024-02-16 NOTE — TELEPHONE ENCOUNTER
Regarding: scrotum pain  ----- Message from Dona Ray sent at 2/16/2024  7:29 AM EST -----  Maurice Milton   to [facility] (supporting Latisha Vernon DO)         2/15/24  6:03 PM  I have an appointment with Dr. Vernon next Thursday and this might be something that can wait until then to discuss.  I have been experiencing moderate pain in my left scrotum/left testicle for about a week and I have no recollection of any physical contact that could have caused it.   What would be the appropriate action to take?  If it is to see a specialist, is this something that Dr. Vernon could expedite or should I go to an non-ER  Urgent Care facility for evaluation?   Thanks very much for your assistance with t

## 2024-02-21 ENCOUNTER — NURSE TRIAGE (OUTPATIENT)
Age: 74
End: 2024-02-21

## 2024-02-21 NOTE — TELEPHONE ENCOUNTER
"Reason for Disposition  • Information only question and nurse able to answer    Answer Assessment - Initial Assessment Questions  1. REASON FOR CALL or QUESTION: \"What is your reason for calling today?\" or \"How can I best help you?\" or \"What question do you have that I can help answer?\"      Spoke with patient . Was wondering if any tests or referral may occur at tomorrows appointment. He currently lives in [facility], soon to be moving to [place], and was hoping he could consolidate any testing or referral while down for his appt tomorrow or maybe even when back down on March 1st. Aware until examined by Dr Vernon , we wouldn't know what would be recommended ahead of time.  Patient states the left scrotal pain is mild at a 3 at it's worst and low at a 1 most of the time, denies yue swelling .    Protocols used: Information Only Call - No Triage-ADULT-[place]    "

## 2024-02-21 NOTE — TELEPHONE ENCOUNTER
Regarding: scotum pain getting worse  ----- Message from Ya Morgan sent at 2/21/2024  9:13 AM EST -----  Moderate Pain in Left Scrotum  (Newest Message First)  February 21, 2024  Maurice Milton   to [facility] (supporting Latisha Vernon DO)         2/21/24  8:46 AM  I am continuing to have the pain described above and it is getting progressively worse. I do have an appointment with Dr. Vernon tomorrow morning and I can wait until then to see her BUT I come from [facility] and it would help me to know what might happen in terms of having the problem reviewed by another doctor. (I also am coming down on March 1st for an unrelated test)  I can probably explain this better on a phone call than I can in writing.  Thanks for your assistance.  Maurice [phone]       Please call to triage patient again.  Please review previous message regarding his increasing pain.  Thank You

## 2024-02-22 ENCOUNTER — OFFICE VISIT (OUTPATIENT)
Dept: FAMILY MEDICINE CLINIC | Facility: CLINIC | Age: 74
End: 2024-02-22
Payer: COMMERCIAL

## 2024-02-22 ENCOUNTER — HOSPITAL ENCOUNTER (OUTPATIENT)
Dept: ULTRASOUND IMAGING | Facility: HOSPITAL | Age: 74
End: 2024-02-22
Payer: COMMERCIAL

## 2024-02-22 VITALS
DIASTOLIC BLOOD PRESSURE: 66 MMHG | OXYGEN SATURATION: 95 % | BODY MASS INDEX: 39.82 KG/M2 | WEIGHT: 239 LBS | SYSTOLIC BLOOD PRESSURE: 120 MMHG | HEART RATE: 87 BPM | RESPIRATION RATE: 16 BRPM | HEIGHT: 65 IN

## 2024-02-22 DIAGNOSIS — E66.01 OBESITY, MORBID (HCC): ICD-10-CM

## 2024-02-22 DIAGNOSIS — Z78.9 STATIN INTOLERANCE: ICD-10-CM

## 2024-02-22 DIAGNOSIS — J96.01 ACUTE RESPIRATORY FAILURE WITH HYPOXIA (HCC): ICD-10-CM

## 2024-02-22 DIAGNOSIS — R60.0 BILATERAL LEG EDEMA: ICD-10-CM

## 2024-02-22 DIAGNOSIS — J82.81 EOSINOPHILIC PNEUMONIA (HCC): ICD-10-CM

## 2024-02-22 DIAGNOSIS — E03.9 HYPOTHYROIDISM, UNSPECIFIED TYPE: ICD-10-CM

## 2024-02-22 DIAGNOSIS — F31.0 BIPOLAR AFFECTIVE DISORDER, CURRENT EPISODE HYPOMANIC (HCC): ICD-10-CM

## 2024-02-22 DIAGNOSIS — J45.50 SEVERE PERSISTENT ASTHMA NOT DEPENDENT ON SYSTEMIC STEROIDS: ICD-10-CM

## 2024-02-22 DIAGNOSIS — E78.00 ELEVATED LDL CHOLESTEROL LEVEL: ICD-10-CM

## 2024-02-22 DIAGNOSIS — N50.82 SCROTAL PAIN: ICD-10-CM

## 2024-02-22 DIAGNOSIS — E78.5 HYPERLIPIDEMIA LDL GOAL <70: ICD-10-CM

## 2024-02-22 DIAGNOSIS — N50.82 SCROTAL PAIN: Primary | ICD-10-CM

## 2024-02-22 DIAGNOSIS — E11.9 NEW ONSET TYPE 2 DIABETES MELLITUS (HCC): ICD-10-CM

## 2024-02-22 LAB
LEFT EYE DIABETIC RETINOPATHY: ABNORMAL
LEFT EYE IMAGE QUALITY: ABNORMAL
LEFT EYE MACULAR EDEMA: ABNORMAL
LEFT EYE OTHER RETINOPATHY: ABNORMAL
RIGHT EYE DIABETIC RETINOPATHY: ABNORMAL
RIGHT EYE IMAGE QUALITY: ABNORMAL
RIGHT EYE MACULAR EDEMA: ABNORMAL
RIGHT EYE OTHER RETINOPATHY: ABNORMAL
SEVERITY (EYE EXAM): ABNORMAL

## 2024-02-22 PROCEDURE — 76870 US EXAM SCROTUM: CPT

## 2024-02-22 PROCEDURE — 99214 OFFICE O/P EST MOD 30 MIN: CPT | Performed by: FAMILY MEDICINE

## 2024-02-22 RX ORDER — TORSEMIDE 5 MG/1
5 TABLET ORAL DAILY
Qty: 30 TABLET | Refills: 1 | Status: SHIPPED | OUTPATIENT
Start: 2024-02-22

## 2024-02-22 NOTE — PROGRESS NOTES
"Assessment/Plan:   Diagnoses and all orders for this visit:    Scrotal pain  -     US scrotum and groin area; Future  -     Ambulatory Referral to Urology; Future  - started 10days ago - does not recall any inciting factors  - pain with ambulating but not when sitting or laying down   - (+) L-sided swelling and TTP on exam   - will check STAT US and referred to Uro     New onset type 2 diabetes mellitus (HCC)  -     IRIS Diabetic eye exam  -     Hemoglobin A1C; Future  -     semaglutide, 1 mg/dose, (Ozempic) 4 mg/3 mL injection pen; Inject 0.75 mL (1 mg total) under the skin once a week  - new- onset DM - A1c = 6.6 (11/20/2023) <-- 6.0 (3/28/2023)   - following with Bariatrics - used to be on Wegovy but unable to get it, had a reaction with Saxenda (stomach discomfort)  - was started on Ozempic at last OV on 12/5/2024 - due for dose adjustment   - UTD with DM foot exam (done in office on 12/5/2024)   - IRIS eye exam done in office today  - UTD with PVC20  - UTD with annual Flu vaccine  - did see DM education but didn't find it very useful    - caution with carb intake  - repeat A1c in 3months   Obesity, morbid (HCC)    Hypothyroidism, unspecified type  - repeat TSH within range   - of note, Levothyroxine dose was increased prior to Thanksgiving to 112mcg QAM - cont current dose     Elevated LDL cholesterol level  - Lipids:    - has been unable to tolerate statin   - did see Cardio on 2/16/2024 - \"Increase Lovaza to 2 g twice a day.  Coronary calcium score.  If elevated, will recommend PCSK9 inhibitor.  Advised on low-cholesterol diet.  Regular cardiovascular exercise for 20 to 30 minutes daily.  Weight loss strategies\"  Hyperlipidemia LDL goal <70  Statin intolerance    Bilateral leg edema  -     torsemide (DEMADEX) 5 MG tablet; Take 1 tablet (5 mg total) by mouth daily  -     Basic metabolic panel; Future  - BP in the office 120/66  - (+) persistent b/l LE edema   - will change from Lasix to Torsemide 5mg QD " "  - repeat BMP in 1wk   - RTO in 2wks for f/u and annual exam - pt aware and agreeable     Severe persistent asthma not dependent on systemic steroids    Acute respiratory failure with hypoxia (HCC)    Bipolar affective disorder, current episode hypomanic (HCC)    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HCC)          Subjective:    Patient ID: Roverto Milton is a 73 y.o. male.  HPI  74yo M presents to the office for f/u  1) DM  - reviewed labs done 12/1/2023   - A1c = 6.6 - following with Bariatrics - used to be on Wegovy but unable to get it - was started on loading dose of Ozempic and tolerating it well - due for dose adjustment   - Lipids:  -- has been unable to tolerate statin   - did see Cardio on 2/16/2024 - \"Increase Lovaza to 2 g twice a day.  Coronary calcium score.  If elevated, will recommend PCSK9 inhibitor.  Advised on low-cholesterol diet.  Regular cardiovascular exercise for 20 to 30 minutes daily.  Weight loss strategies\"  - did see DM education but didn't find it very useful   2) L-sided scrotal pain   - started 10days ago - does not recall any inciting factors  - in the past 2days - painful to urinate (\"1/10 on pain scale\")   - not more swollen than R-side   - now limited to testicular pain   - pain with ambulating but not when sitting or laying down   - not a tugging pain   - denies F/C/N/V/CP/palpitations/SOB/wheezing  3) b/l LE edema   - currently on Lasix 40mg QD and with persistent b/l LE edema          The following portions of the patient's history were reviewed and updated as appropriate: allergies, current medications, past family history, past medical history, past social history, past surgical history and problem list.    Review of Systems  as per HPI    Objective:  /66   Pulse 87   Resp 16   Ht 5' 5.25\" (1.657 m)   Wt 108 kg (239 lb)   SpO2 95%   BMI 39.47 kg/m²    Physical Exam  Vitals reviewed.   Constitutional:       General: He is not in acute distress.     Appearance: Normal " appearance. He is not ill-appearing, toxic-appearing or diaphoretic.   HENT:      Head: Normocephalic and atraumatic.      Right Ear: External ear normal.      Left Ear: External ear normal.      Nose: Nose normal.   Eyes:      General: No scleral icterus.        Right eye: No discharge.         Left eye: No discharge.      Extraocular Movements: Extraocular movements intact.      Conjunctiva/sclera: Conjunctivae normal.   Cardiovascular:      Rate and Rhythm: Normal rate and regular rhythm.      Heart sounds: Normal heart sounds.   Pulmonary:      Effort: Pulmonary effort is normal. No respiratory distress.      Breath sounds: Normal breath sounds. No stridor. No wheezing, rhonchi or rales.   Genitourinary:     Comments: (+) L-sided scrotal swelling and tenderness to palpation   Musculoskeletal:      Right lower leg: Edema present.      Left lower leg: Edema present.   Neurological:      General: No focal deficit present.      Mental Status: He is alert and oriented to person, place, and time.   Psychiatric:         Mood and Affect: Mood normal.         Behavior: Behavior normal.         BMI Counseling: Body mass index is 39.47 kg/m². The BMI is above normal. Nutrition recommendations include reducing portion sizes and decreasing overall calorie intake. Exercise recommendations include exercising 3-5 times per week and strength training exercises.

## 2024-02-23 ENCOUNTER — TELEPHONE (OUTPATIENT)
Age: 74
End: 2024-02-23

## 2024-02-23 NOTE — TELEPHONE ENCOUNTER
Spoke to patient and he received his ultrasound results from PCP. We have him scheduled with MD for March 27th. He is still having a testicular pain to pain level of 3 that is steady on the left side. He denies any difficulty walking and no abdominal pain and no pain that radiates down his legs. Denies any dysuria, negative hematuria and no blood in semen. He denies any nausea, vomiting, fevers, chills or sweats. I encouraged him to contact PCP for expedited pain management due to our scheduling availability and he prefers MD only and there are no sooner appointments unless there are cancellations. He was given care advice to utilize scrotal support and warm compresses and tighter boxer briefs. Reviewed ER precautions. Please advise of any further recommendations.

## 2024-02-23 NOTE — TELEPHONE ENCOUNTER
Patient is currently having testicle pain and discomfort. His PCP sent him for a US on 2/22. Then referred him to Urology for follow up    Tentatively scheduled for first available with Gali. That is who he would like to see.     Maurice  [phone]

## 2024-02-27 DIAGNOSIS — N40.0 BENIGN PROSTATIC HYPERPLASIA, UNSPECIFIED WHETHER LOWER URINARY TRACT SYMPTOMS PRESENT: ICD-10-CM

## 2024-02-27 RX ORDER — TAMSULOSIN HYDROCHLORIDE 0.4 MG/1
CAPSULE ORAL
Qty: 180 CAPSULE | Refills: 1 | Status: SHIPPED | OUTPATIENT
Start: 2024-02-27

## 2024-03-25 ENCOUNTER — APPOINTMENT (OUTPATIENT)
Dept: LAB | Facility: AMBULARY SURGERY CENTER | Age: 74
End: 2024-03-25
Payer: COMMERCIAL

## 2024-03-25 ENCOUNTER — HOSPITAL ENCOUNTER (OUTPATIENT)
Dept: CT IMAGING | Facility: HOSPITAL | Age: 74
Discharge: HOME | End: 2024-03-25
Payer: COMMERCIAL

## 2024-03-25 DIAGNOSIS — E11.9 NEW ONSET TYPE 2 DIABETES MELLITUS (HCC): ICD-10-CM

## 2024-03-25 DIAGNOSIS — I25.10 ASCVD (ARTERIOSCLEROTIC CARDIOVASCULAR DISEASE): ICD-10-CM

## 2024-03-25 DIAGNOSIS — E78.2 MIXED HYPERLIPIDEMIA: ICD-10-CM

## 2024-03-25 DIAGNOSIS — R60.0 BILATERAL LEG EDEMA: ICD-10-CM

## 2024-03-25 LAB
ANION GAP SERPL CALCULATED.3IONS-SCNC: 9 MMOL/L (ref 4–13)
BUN SERPL-MCNC: 20 MG/DL (ref 5–25)
CALCIUM SERPL-MCNC: 9 MG/DL (ref 8.4–10.2)
CHLORIDE SERPL-SCNC: 102 MMOL/L (ref 96–108)
CO2 SERPL-SCNC: 31 MMOL/L (ref 21–32)
CREAT SERPL-MCNC: 1.22 MG/DL (ref 0.6–1.3)
EST. AVERAGE GLUCOSE BLD GHB EST-MCNC: 123 MG/DL
GFR SERPL CREATININE-BSD FRML MDRD: 59 ML/MIN/1.73SQ M
GLUCOSE P FAST SERPL-MCNC: 92 MG/DL (ref 65–99)
HBA1C MFR BLD: 5.9 %
POTASSIUM SERPL-SCNC: 4.5 MMOL/L (ref 3.5–5.3)
SODIUM SERPL-SCNC: 142 MMOL/L (ref 135–147)

## 2024-03-25 PROCEDURE — 83036 HEMOGLOBIN GLYCOSYLATED A1C: CPT

## 2024-03-25 PROCEDURE — 80048 BASIC METABOLIC PNL TOTAL CA: CPT

## 2024-03-25 PROCEDURE — G1004 CDSM NDSC: HCPCS

## 2024-03-25 PROCEDURE — 75571 CT HRT W/O DYE W/CA TEST: CPT

## 2024-03-25 PROCEDURE — 36415 COLL VENOUS BLD VENIPUNCTURE: CPT

## 2024-03-26 ENCOUNTER — OFFICE VISIT (OUTPATIENT)
Dept: UROLOGY | Facility: CLINIC | Age: 74
End: 2024-03-26
Payer: COMMERCIAL

## 2024-03-26 ENCOUNTER — OFFICE VISIT (OUTPATIENT)
Dept: FAMILY MEDICINE CLINIC | Facility: CLINIC | Age: 74
End: 2024-03-26
Payer: COMMERCIAL

## 2024-03-26 VITALS
OXYGEN SATURATION: 94 % | HEART RATE: 86 BPM | DIASTOLIC BLOOD PRESSURE: 78 MMHG | SYSTOLIC BLOOD PRESSURE: 128 MMHG | HEIGHT: 63 IN | BODY MASS INDEX: 41.64 KG/M2 | WEIGHT: 235 LBS

## 2024-03-26 VITALS
HEIGHT: 65 IN | WEIGHT: 235 LBS | SYSTOLIC BLOOD PRESSURE: 130 MMHG | OXYGEN SATURATION: 98 % | BODY MASS INDEX: 39.15 KG/M2 | HEART RATE: 87 BPM | DIASTOLIC BLOOD PRESSURE: 78 MMHG

## 2024-03-26 DIAGNOSIS — Z78.9 STATIN INTOLERANCE: ICD-10-CM

## 2024-03-26 DIAGNOSIS — Z00.00 ANNUAL PHYSICAL EXAM: Primary | ICD-10-CM

## 2024-03-26 DIAGNOSIS — N50.82 SCROTAL PAIN: ICD-10-CM

## 2024-03-26 DIAGNOSIS — K63.5 POLYP OF COLON, UNSPECIFIED PART OF COLON, UNSPECIFIED TYPE: ICD-10-CM

## 2024-03-26 DIAGNOSIS — E78.00 ELEVATED LDL CHOLESTEROL LEVEL: ICD-10-CM

## 2024-03-26 DIAGNOSIS — M26.622 ARTHRALGIA OF LEFT TEMPOROMANDIBULAR JOINT: ICD-10-CM

## 2024-03-26 DIAGNOSIS — Z12.11 COLON CANCER SCREENING: ICD-10-CM

## 2024-03-26 DIAGNOSIS — M25.50 POLYARTHRALGIA: ICD-10-CM

## 2024-03-26 DIAGNOSIS — Z12.5 PROSTATE CANCER SCREENING: ICD-10-CM

## 2024-03-26 DIAGNOSIS — E11.9 NEW ONSET TYPE 2 DIABETES MELLITUS (HCC): ICD-10-CM

## 2024-03-26 DIAGNOSIS — J34.2 NASAL SEPTAL DEVIATION: ICD-10-CM

## 2024-03-26 DIAGNOSIS — E03.9 HYPOTHYROIDISM, UNSPECIFIED TYPE: ICD-10-CM

## 2024-03-26 DIAGNOSIS — N40.0 BENIGN PROSTATIC HYPERPLASIA, UNSPECIFIED WHETHER LOWER URINARY TRACT SYMPTOMS PRESENT: ICD-10-CM

## 2024-03-26 PROCEDURE — 99397 PER PM REEVAL EST PAT 65+ YR: CPT | Performed by: FAMILY MEDICINE

## 2024-03-26 PROCEDURE — 99203 OFFICE O/P NEW LOW 30 MIN: CPT | Performed by: UROLOGY

## 2024-03-26 PROCEDURE — 99214 OFFICE O/P EST MOD 30 MIN: CPT | Performed by: FAMILY MEDICINE

## 2024-03-26 NOTE — PROGRESS NOTES
Assessment/Plan:   Diagnoses and all orders for this visit:      Benign prostatic hyperplasia, unspecified whether lower urinary tract symptoms present  Scrotal pain  - saw Uro earlier today - scrotal cyst - Recommend conservative management for his intermittent testicular pain secondary to an epididymal head cyst.  Discussed NSAIDs, acetaminophen, cool compress as needed    Polyp of colon, unspecified part of colon, unspecified type  - last Cscope 6/14/2023 - polyps - 2yr recall (2025)     Polyarthralgia  -     CBC and differential; Future  - did see Rheum - advised PT, CSI as needed   - pt interested in AquaTherapy     Nasal septal deviation  -     Ambulatory Referral to Otolaryngology; Future  - tripped on steps Sunday night onto face - specially on nose - does note swelling and slight deviation     New onset type 2 diabetes mellitus (HCC)  -     semaglutide, 2 mg/dose, (Ozempic) 8 mg/ mL injection pen; Inject 0.75 mL (2 mg total) under the skin every 7 days  -     Comprehensive metabolic panel; Future  -     Hemoglobin A1C; Future  -     Albumin / creatinine urine ratio; Future  - A1c = 5.9 <-- 6.6   - following with Bariatrics - used to be on Wegovy but unable to get it, had a reaction with Saxenda (stomach discomfort)  - tolerating Ozempic - new Rx sent   - nml DM foot exam done in office on 12/5/2023  - UTD with PVC20  - UTD with annual Flu vaccine   - referred to DM education   - caution with carb intake  - RTO in 3month, with repeat labs, for f/u - pt aware and agreeable     Hypothyroidism, unspecified type  -     TSH, 3rd generation with Free T4 reflex; Future  - Levothyroxine dose was increased prior to Thanksgiving to 112mcg QAM - cont current dose     Elevated LDL cholesterol level  -     Lipid panel; Future  -    - unable to tolerate statin and currently on Lovaza 2g QD   - advised to f/u with Cardio - pt aware and agreeable   Statin intolerance    Arthralgia of left temporomandibular joint  -      Ambulatory Referral to Physical Therapy; Future          Subjective:    Patient ID: Roverto Milton is a 73 y.o. male.  Roverto Milton is a 73 y.o. male who presents to the office for his annual exam and f/u   1) Annual   - Specialists: Cardio, Bariatrics, Ortho, Pulm, Neuro, Sleep Med, Psych   - PMHx: Depression, BPH, HL, HTN, asthma, DONALD (on CPAP but issues with mask fitting and not using), seasonal allergies, hypothyroidism, BMI 42.3, colon polyps   - allergies: Spiriva - rash, statins - SOB  - Meds: see med rec   - PSHx: umbilical hernia repair, b/l cataract surgery, R-shoulder surgery, blepharoplasty  - FHx: M (HTN, Depression), F (Arthritis, Depression), PU (MI at 34yo), S (CVA - at 78yo), denies FHx of colon/prostate ca   - Immunizations: UTD with COVID IMMs and Booster x2, Tdap within the past 10yrs (no records), UTD with PCV20, UTD with annual Flu vaccine   - Hm: last Cscope 6/14/2023 - polyps - 2yr recall, interested in Prostate Ca screening   - diet/exercise: not exercising, diet: follows with Bariatrics  - social: denies tob/illicits/EtOH  - last vision: follows with Optho annually   - last dental: goes Q6months   - reviewed labs done 3/25/2024  2) General   - was seen in the ER in [place] on 3/17/2024 for eval of chest pain - neg w/u   - did get Calcium scoring done yesterday - results pending   - saw Uro earlier today - scrotal cyst - Recommend conservative management for his intermittent testicular pain secondary to an epididymal head cyst.  Discussed NSAIDs, acetaminophen, cool compress as needed  - (+) possible L-sided TMJ -- an audible pop when opened his mouth a few days ago, denies pain with eating   - tripped on steps Sunday night onto face - specially on nose - does note swelling and slight deviation   - denies F/C/N/V/palpitations/wheezing/abd pain  - (+) polyarthralgia         The following portions of the patient's history were reviewed and updated as appropriate: allergies, current medications,  "past family history, past medical history, past social history, past surgical history and problem list.    Review of Systems  as per HPI    Objective:  /78   Pulse 86   Ht 5' 2.5\" (1.588 m)   Wt 107 kg (235 lb)   SpO2 94%   BMI 42.30 kg/m²    Physical Exam  Vitals reviewed.   Constitutional:       General: He is not in acute distress.     Appearance: Normal appearance. He is obese. He is not ill-appearing, toxic-appearing or diaphoretic.   HENT:      Head: Normocephalic and atraumatic.      Right Ear: External ear normal.      Left Ear: External ear normal.      Nose: No congestion or rhinorrhea.      Comments: Nose = (+) swollen and deviated      Mouth/Throat:      Comments: No clicking appreciated   Eyes:      General: No scleral icterus.        Right eye: No discharge.         Left eye: No discharge.      Extraocular Movements: Extraocular movements intact.      Conjunctiva/sclera: Conjunctivae normal.   Cardiovascular:      Rate and Rhythm: Normal rate and regular rhythm.      Heart sounds: Normal heart sounds.   Pulmonary:      Effort: Pulmonary effort is normal. No respiratory distress.      Breath sounds: Normal breath sounds. No stridor. No wheezing, rhonchi or rales.   Abdominal:      Palpations: Abdomen is soft.   Musculoskeletal:         General: Normal range of motion.      Cervical back: Normal range of motion.      Right lower leg: No edema.      Left lower leg: No edema.   Skin:     General: Skin is warm.   Neurological:      General: No focal deficit present.      Mental Status: He is alert and oriented to person, place, and time.   Psychiatric:         Behavior: Behavior normal.         BMI Counseling: Body mass index is 42.3 kg/m². The BMI is above normal. Nutrition recommendations include 3-5 servings of fruits/vegetables daily. Exercise recommendations include exercising 3-5 times per week.    Depression Screening Follow-up Plan: Patient's depression screening was positive with a PHQ-2 " score of . Their PHQ-9 score was . Continue regular follow-up with their psychologist/therapist/psychiatrist who is managing their mental health condition(s).

## 2024-03-26 NOTE — PROGRESS NOTES
Referring Physician: Latisha Vernon DO  A copy of this note was sent to the referring physician.       Diagnoses and all orders for this visit:    Scrotal pain  -     Ambulatory Referral to Urology            Assessment and plan:       1.  Orchialgia  -Small left epididymal head cyst present on physical exam  -Present negative for malignancy or any concerning findings    2.  BPH doing well on medical therapy    Recommend conservative management for his intermittent testicular pain secondary to an epididymal head cyst.  Discussed NSAIDs, acetaminophen, cool compress as needed    Patient will follow-up in as-needed basis.  Certainly happy to reevaluate should the need ever arise      Darren Talbert MD      Chief Complaint     Consult for testicular pain      History of Present Illness     Roverto Milton is a 73 y.o. male referred in consultation for testicular pain.    Detailed Urologic History     - please refer to HPI    Review of Systems     Review of Systems   Constitutional: Negative for activity change and fatigue.   HENT: Negative for congestion.    Eyes: Negative for visual disturbance.   Respiratory: Negative for shortness of breath and wheezing.    Cardiovascular: Negative for chest pain and leg swelling.   Gastrointestinal: Negative for abdominal pain.   Endocrine: Negative for polyuria.   Genitourinary: Negative for dysuria, flank pain, hematuria and urgency.   Musculoskeletal: Negative for back pain.   Allergic/Immunologic: Negative for immunocompromised state.   Neurological: Negative for dizziness and numbness.   Psychiatric/Behavioral: Negative for dysphoric mood.   All other systems reviewed and are negative.    AUA SYMPTOM SCORE      Flowsheet Row Most Recent Value   AUA SYMPTOM SCORE    How often have you had a sensation of not emptying your bladder completely after you finished urinating? 0 (P)    How often have you had to urinate again less than two hours after you finished urinating? 4 (P)     How often have you found you stopped and started again several times when you urinate? 2 (P)    How often have you found it difficult to postpone urination? 5 (P)    How often have you had a weak urinary stream? 4 (P)    How often have you had to push or strain to begin urination? 0 (P)    How many times did you most typically get up to urinate from the time you went to bed at night until the time you got up in the morning? 5 (P)    Quality of Life: If you were to spend the rest of your life with your urinary condition just the way it is now, how would you feel about that? 4 (P)    AUA SYMPTOM SCORE 20 (P)               Allergies     Allergies   Allergen Reactions    Crestor [Rosuvastatin] Other (See Comments)     Lost ability to walk and balance    Lipitor [Atorvastatin] Other (See Comments) and Dizziness     And very unsteady on feet    Spiriva Respimat [Tiotropium Bromide Monohydrate] Rash       Physical Exam       Physical Exam  Constitutional:       General: He is not in acute distress.     Appearance: He is well-developed.   HENT:      Head: Normocephalic and atraumatic.   Cardiovascular:      Comments: Negative lower extremity edema  Pulmonary:      Effort: Pulmonary effort is normal.      Breath sounds: Normal breath sounds.   Abdominal:      Palpations: Abdomen is soft.   Musculoskeletal:         General: Normal range of motion.      Cervical back: Normal range of motion.   Skin:     General: Skin is warm.   Neurological:      Mental Status: He is alert and oriented to person, place, and time.   Psychiatric:         Behavior: Behavior normal.           Vital Signs  There were no vitals filed for this visit.      Current Medications       Current Outpatient Medications:     albuterol (2.5 mg/3 mL) 0.083 % nebulizer solution, Take 3 mL (2.5 mg total) by nebulization every 6 (six) hours as needed for wheezing or shortness of breath, Disp: 360 mL, Rfl: 3    albuterol (PROVENTIL HFA,VENTOLIN HFA) 90 mcg/act  "inhaler, Inhale 1 puff every 6 (six) hours as needed for wheezing or shortness of breath, Disp: 18 g, Rfl: 6    cetirizine (ZyrTEC) 5 MG tablet, Take 5 mg by mouth daily, Disp: , Rfl:     clonazePAM (KlonoPIN) 1 mg tablet, Take 1 mg by mouth daily at bedtime, Disp: , Rfl:     fluticasone-umeclidinium-vilanterol (Trelegy Ellipta) 200-62.5-25 mcg/actuation AEPB inhaler, Inhale 1 puff daily, Disp: 60 blister, Rfl: 5    lamoTRIgine (LaMICtal) 200 MG tablet, Take 200 mg by mouth daily, Disp: , Rfl:     levothyroxine 112 mcg tablet, Take 1 tablet (112 mcg total) by mouth daily, Disp: 90 tablet, Rfl: 1    montelukast (SINGULAIR) 10 mg tablet, TAKE 1 TABLET(10 MG) BY MOUTH IN THE MORNING, Disp: 90 tablet, Rfl: 1    multivitamin-iron-minerals-folic acid (CENTRUM) chewable tablet, Chew 1 tablet daily, Disp: , Rfl:     omega-3-acid ethyl esters (LOVAZA) 1 g capsule, Take 2 capsules (2 g total) by mouth 2 (two) times a day \"Fish oil\", Disp: 180 capsule, Rfl: 4    pramipexole (MIRAPEX) 1 mg tablet, Take 2 mg by mouth daily at bedtime, Disp: , Rfl:     semaglutide, 1 mg/dose, (Ozempic) 4 mg/3 mL injection pen, Inject 0.75 mL (1 mg total) under the skin once a week, Disp: 9 mL, Rfl: 1    tamsulosin (FLOMAX) 0.4 mg, TAKE 2 CAPSULES(0.8 MG) BY MOUTH IN THE MORNING, Disp: 180 capsule, Rfl: 1    torsemide (DEMADEX) 5 MG tablet, Take 1 tablet (5 mg total) by mouth daily, Disp: 30 tablet, Rfl: 1      Active Problems     Patient Active Problem List   Diagnosis    Arthritis    Moderate persistent asthma without complication    Hypothyroidism    DONALD (obstructive sleep apnea)    Mixed hyperlipidemia    Polyarthralgia    Primary osteoarthritis, left shoulder    History of eosinophilic pneumonia (HCC)    Shortness of breath    Blurry vision, bilateral    Insomnia    Dysphagia    Bipolar disorder (HCC)    RLQ abdominal pain    BPH (benign prostatic hyperplasia)    CAD in native artery    Chest tightness    Disease characterized by destruction " of skeletal muscle    Rotator cuff arthropathy    Class 2 severe obesity due to excess calories with serious comorbidity and body mass index (BMI) of 38.0 to 38.9 in adult     Metabolic syndrome    S/P reverse total shoulder arthroplasty, right    Hyponatremia    Bariatric surgery status    Asymptomatic PVCs    Muscle weakness    Transaminitis    Abnormal CT scan    Fatty liver    New onset type 2 diabetes mellitus (HCC)    S/p bilateral blepharoplasty    Bilateral leg edema    Severe persistent asthma not dependent on systemic steroids    Acute respiratory failure with hypoxia (HCC)         Past Medical History     Past Medical History:   Diagnosis Date    ARIANE (acute kidney injury) (HCC) 01/13/2023    pt not aware of this    Arthritis     Asthma     Breathing difficulty 01/2023    shldr sx    Colon polyp     Dental crowns present     all teeth Veneers    Depression     Disease of thyroid gland     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HCC)     Essential hypertension     History of COVID-19 09/09/2023    original surgery date 9/12/23 and rescheduled to 10/23/23    Hyperlipidemia     Hypertension 2012    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HCC)     per pt does not have this now    Manic depression (HCC)     Right lower lobe pneumonia 01/12/2023    Shortness of breath     MONTAÑO    Sleep apnea          Surgical History     Past Surgical History:   Procedure Laterality Date    COLONOSCOPY  12/13/2022    COLONOSCOPY W/ BIOPSIES AND POLYPECTOMY  06/14/2023    JOINT REPLACEMENT  1/10 /2023    Right Shoulder    ND ARTHROPLASTY GLENOHUMERAL JOINT TOTAL SHOULDER Right 01/10/2023    Procedure: ARTHROPLASTY SHOULDER REVERSE WITH BICEPS TENODESIS;  Surgeon: Lele Patrick MD;  Location:  MAIN OR;  Service: Orthopedics    ND BLEPHAROPLASTY LOWER EYELID Bilateral 12/04/2023    Procedure: BLEPHAROPLASTY LOWER;  Surgeon: Rachna Aguilar DO;  Location:  MAIN OR;  Service: Plastics    ND BLEPHAROPLASTY UPPER EYELID W/EXCESSIVE SKIN Bilateral  12/04/2023    Procedure: BLEPHAROPLASTY UPPER;  Surgeon: Rachna Aguilar DO;  Location:  MAIN OR;  Service: Plastics    UMBILICAL HERNIA REPAIR           Family History     Family History   Problem Relation Age of Onset    Hypertension Mother     Depression Mother     Depression Father     Arthritis Father     Heart attack Paternal Uncle 31    Colon cancer Neg Hx     Prostate cancer Neg Hx          Social History     Social History     Social History     Tobacco Use   Smoking Status Never   Smokeless Tobacco Never         Pertinent Lab Values     Lab Results   Component Value Date    CREATININE 1.22 03/25/2024       Lab Results   Component Value Date    PSA 1.6 09/14/2022       @RESULTRCNT(1H])@      Pertinent Imaging     Procedure: PORTABLE XR CHEST 1 VIEW    Result Date: 3/17/2024  Narrative: Indication: Dyspnea. Comparison: July 3, 2023 FINDINGS/    Impression: An A.P. portable view reveals no focal opacity or effusion. No pneumothorax. The cardiomediastinal silhouette is stable.     Procedure: US scrotum and testicles    Result Date: 2/22/2024  Narrative: SCROTAL ULTRASOUND INDICATION: N50.82: Scrotal pain. COMPARISON: CT abdomen pelvis 4/11/2023. TECHNIQUE: Ultrasound the scrotal contents was performed with a high frequency linear transducer utilizing volumetric sweep imaging as well as standard still image techniques. Imaging performed in longitudinal and transverse orientation. Color and spectral Doppler evaluation also performed bilaterally. FINDINGS: TESTES: Testes are symmetric and normal in size. RIGHT testis = 2.1 x 1.7 x 2.4 cm. Volume 4.4 mL Normal contour with homogeneous smooth echotexture. No intratesticular mass lesion or calcifications. LEFT testis = 2.1 x 1.4 x 2.2 cm. Volume 3.5 mL Normal contour with homogeneous smooth echotexture. No intratesticular mass lesion or calcifications. Doppler flow within both testes is present and symmetric. EPIDIDYMIDES: Normal Size. Doppler ultrasound  "demonstrates normal blood flow. No epididymal lesions. HYDROCELE: Small bilateral hydroceles. VARICOCELE: None present. SCROTUM: Scrotal thickness and appearance within normal limits. No evidence for extratesticular mass or hernia demonstrated.     Impression: Small bilateral hydroceles without other findings. Workstation performed: [id number]         Portions of the record may have been created with voice recognition software.  Occasional wrong word or \"sound a like\" substitutions may have occurred due to the inherent limitations of voice recognition software.  In addition some of the content generated from this outpatient encounter includes information designed for patient education and/or communication back to the referring provider.  Read the chart carefully and recognize, using context, where substitutions have occurred.    "

## 2024-03-26 NOTE — PROGRESS NOTES
Assessment/Plan:   Diagnoses and all orders for this visit:    Annual physical exam  - reviewed PMHx, PSHx, meds, allergies, FHx, Soc Hx  - due for screening labs - script given   - UTD with COVID IMMs and Boosters   - UTD with Tdap (within the past 10yrs) - no records   - UTD with PCV20   - UTD with annual Flu vaccine   - encouraged to exercise - follows with Bariatrics for weight loss    - last Cscope 6/14/2023 - polyps - 2yr recall (2025)   - interested in Prostate Ca screening - script given   - UTD with Optho and Dental   - RTO in 1yr for annual exam - pt aware and agreeable     Prostate cancer screening  -     PSA, Total Screen; Future  Benign prostatic hyperplasia, unspecified whether lower urinary tract symptoms present  Scrotal pain  - saw Uro earlier today - scrotal cyst - Recommend conservative management for his intermittent testicular pain secondary to an epididymal head cyst.  Discussed NSAIDs, acetaminophen, cool compress as needed    Colon cancer screening  - last Cscope 6/14/2023 - polyps - 2yr recall (2025)   Polyp of colon, unspecified part of colon, unspecified type    Polyarthralgia  -     CBC and differential; Future  - did see Rheum - advised PT, CSI as needed   - pt interested in AquaTherapy     Nasal septal deviation  -     Ambulatory Referral to Otolaryngology; Future  - tripped on steps Sunday night onto face - specially on nose - does note swelling and slight deviation     New onset type 2 diabetes mellitus (HCC)  -     semaglutide, 2 mg/dose, (Ozempic) 8 mg/ mL injection pen; Inject 0.75 mL (2 mg total) under the skin every 7 days  -     Comprehensive metabolic panel; Future  -     Hemoglobin A1C; Future  -     Albumin / creatinine urine ratio; Future  - A1c = 5.9 <-- 6.6   - following with Bariatrics - used to be on Wegovy but unable to get it, had a reaction with Saxenda (stomach discomfort)  - tolerating Ozempic - new Rx sent   - nm DM foot exam done in office on 12/5/2023  - UTD  with PVC20  - UTD with annual Flu vaccine   - referred to DM education   - caution with carb intake  - RTO in 3month, with repeat labs, for f/u - pt aware and agreeable     Hypothyroidism, unspecified type  -     TSH, 3rd generation with Free T4 reflex; Future  - Levothyroxine dose was increased prior to Thanksgiving to 112mcg QAM - cont current dose     Elevated LDL cholesterol level  -     Lipid panel; Future  -    - unable to tolerate statin and currently on Lovaza 2g QD   - advised to f/u with Cardio - pt aware and agreeable   Statin intolerance    Arthralgia of left temporomandibular joint  -     Ambulatory Referral to Physical Therapy; Future          Subjective:    Patient ID: Roverto Milton is a 73 y.o. male.  Roverto Milton is a 73 y.o. male who presents to the office for his annual exam and f/u   1) Annual   - Specialists: Cardio, Bariatrics, Ortho, Pulm, Neuro, Sleep Med, Psych   - PMHx: Depression, BPH, HL, HTN, asthma, DONALD (on CPAP but issues with mask fitting and not using), seasonal allergies, hypothyroidism, BMI 42.3, colon polyps   - allergies: Spiriva - rash, statins - SOB  - Meds: see med rec   - PSHx: umbilical hernia repair, b/l cataract surgery, R-shoulder surgery, blepharoplasty  - FHx: M (HTN, Depression), F (Arthritis, Depression), PU (MI at 34yo), S (CVA - at 80yo), denies FHx of colon/prostate ca   - Immunizations: UTD with COVID IMMs and Booster x2, Tdap within the past 10yrs (no records), UTD with PCV20, UTD with annual Flu vaccine   - Hm: last Cscope 6/14/2023 - polyps - 2yr recall, interested in Prostate Ca screening   - diet/exercise: not exercising, diet: follows with Bariatrics  - social: denies tob/illicits/EtOH  - last vision: follows with Optho annually   - last dental: goes Q6months   - reviewed labs done 3/25/2024  2) General   - was seen in the ER in [place] on 3/17/2024 for eval of chest pain - neg w/u   - did get Calcium scoring done yesterday - results pending   - saw Uro  "earlier today - scrotal cyst - Recommend conservative management for his intermittent testicular pain secondary to an epididymal head cyst.  Discussed NSAIDs, acetaminophen, cool compress as needed  - (+) possible L-sided TMJ -- an audible pop when opened his mouth a few days ago, denies pain with eating   - tripped on steps Sunday night onto face - specially on nose - does note swelling and slight deviation   - denies F/C/N/V/palpitations/wheezing/abd pain  - (+) polyarthralgia         The following portions of the patient's history were reviewed and updated as appropriate: allergies, current medications, past family history, past medical history, past social history, past surgical history and problem list.    Review of Systems  as per HPI    Objective:  /78   Pulse 86   Ht 5' 2.5\" (1.588 m)   Wt 107 kg (235 lb)   SpO2 94%   BMI 42.30 kg/m²    Physical Exam  Vitals reviewed.   Constitutional:       General: He is not in acute distress.     Appearance: Normal appearance. He is obese. He is not ill-appearing, toxic-appearing or diaphoretic.   HENT:      Head: Normocephalic and atraumatic.      Right Ear: External ear normal.      Left Ear: External ear normal.      Nose: No congestion or rhinorrhea.      Comments: Nose = (+) swollen and deviated      Mouth/Throat:      Comments: No clicking appreciated   Eyes:      General: No scleral icterus.        Right eye: No discharge.         Left eye: No discharge.      Extraocular Movements: Extraocular movements intact.      Conjunctiva/sclera: Conjunctivae normal.   Cardiovascular:      Rate and Rhythm: Normal rate and regular rhythm.      Heart sounds: Normal heart sounds.   Pulmonary:      Effort: Pulmonary effort is normal. No respiratory distress.      Breath sounds: Normal breath sounds. No stridor. No wheezing, rhonchi or rales.   Abdominal:      Palpations: Abdomen is soft.   Musculoskeletal:         General: Normal range of motion.      Cervical back: " Normal range of motion.      Right lower leg: No edema.      Left lower leg: No edema.   Skin:     General: Skin is warm.   Neurological:      General: No focal deficit present.      Mental Status: He is alert and oriented to person, place, and time.   Psychiatric:         Behavior: Behavior normal.         BMI Counseling: Body mass index is 42.3 kg/m². The BMI is above normal. Nutrition recommendations include 3-5 servings of fruits/vegetables daily. Exercise recommendations include exercising 3-5 times per week.    Depression Screening Follow-up Plan: Patient's depression screening was positive with a PHQ-2 score of . Their PHQ-9 score was . Continue regular follow-up with their psychologist/therapist/psychiatrist who is managing their mental health condition(s).

## 2024-03-26 NOTE — PATIENT INSTRUCTIONS
Temporomandibular Disorder   WHAT YOU NEED TO KNOW:   What is temporomandibular disorder?  Temporomandibular disorder is a condition that causes pain in your jaw. The disorder affects the joint between your temporal bone and your mandible (jawbone). The muscles and nerves around the joint are also affected.        What causes temporomandibular disorder?   Dislocation of the cartilage disc in the joint    Deformities of the jaw    Inflammation, infection, arthritis, muscle problems, or tumors in the jaw area     Injury to or fracture of the jawbone    Muscle strain from chewing or teeth clenching or grinding    What are the signs and symptoms of temporomandibular disorder?   Popping or grating sound when you open or close your mouth    Headache or pain in your jaw, ear, neck, or face    Pain or swelling of the jaw muscles    Tingling or numbness in the jaw or face    Trouble opening or closing your mouth, or your jaw locks    How is temporomandibular disorder diagnosed?  Your healthcare provider will examine your jaw, face, and neck. Your provider will ask about your health conditions or injuries. You may also need the following tests:  X-rays:  You may need to have x-rays of your skull, jaw, or teeth.    Arthrogram:  This is an x-ray that uses contrast liquid to help the pictures show up better. Tell the healthcare provider if you have ever had an allergic reaction to contrast liquid.    MRI:  This scan uses powerful magnets and a computer to take pictures of your jaw. You may be given contrast liquid to help the pictures show up better. Tell the healthcare provider if you have ever had an allergic reaction to contrast liquid. Do not enter the MRI room with anything metal. Metal can cause serious injury. Tell the healthcare provider if you have any metal in or on your body.    Bone scan:  A bone scan is used to check your bones for disease or damage. You will get a radioactive liquid, called a tracer, through a vein  in your arm. The tracer collects in your bones. Pictures will then be taken to look for problems. Examples of bone problems include fractures (breaks) and infection.    How is temporomandibular disorder treated?   Medicines:      NSAIDs:  These medicines decrease swelling and pain. You can buy NSAIDs without a doctor's order. Ask your healthcare provider which medicine is right for you, and how much to take. Take as directed. NSAIDs can cause stomach bleeding or kidney problems if not taken correctly.    Botulinum toxin:  This may be injected into the muscles of your jaw to decrease pain.    Steroid medicine:  These may be injected into the joint to decrease pain and swelling.    Muscle relaxers  help decrease pain and muscle spasms.    Surgery:  You may need surgery to fix your teeth, jawbone, or the joint.    What are the risks of temporomandibular disorder?  You may bleed or get an infection if you have surgery. If left untreated, your condition may get worse. You may have trouble breathing, eating, drinking, talking, or opening your mouth. If not treated early, temporomandibular disorder may lead to permanent injury, such as nerve damage, deformity, or paralysis.  How can I manage my symptoms?   Eat soft foods:  Your healthcare provider may suggest that you eat only soft foods for several days. A dietitian may work with you to find foods that are easier to bite, chew, or swallow. Examples are soup, applesauce, cottage cheese, pudding, yogurt, and soft fruits.    Use jaw supporting devices:  Splints may be used to support your jaw or keep it from moving. You may need to wear a mouth guard to keep you from clenching or grinding your teeth while you are sleeping.    Use ice and heat:  Ice helps decrease swelling and pain. Ice may also help prevent tissue damage. Use an ice pack, or put crushed ice in a plastic bag. Cover it with a towel and place it on your jaw for 15 to 20 minutes every hour or as directed. After  the first 24 to 48 hours, use heat to decrease pain, swelling, and muscle spasms. Apply heat on the area for 20 to 30 minutes every 2 hours for as many days as directed.    Go to physical therapy:  A physical therapist teaches you exercises to help improve movement and strength, and to decrease pain in your jaw. A speech therapist may help you with swallowing and speech exercises.    When should I contact my healthcare provider?   You have a fever.    Your splint or mouth guard is loose.    You have questions or concerns about your condition or care.    When should I seek immediate care or call 911?   You have nausea, are vomiting, or cannot keep liquids down.    You have pain that does not go away even after you take your pain medicine.    You have problems breathing, talking, drinking, eating, or swallowing.    Your splint or mouth guard gets damaged or broken.    CARE AGREEMENT:   You have the right to help plan your care. Learn about your health condition and how it may be treated. Discuss treatment options with your healthcare providers to decide what care you want to receive. You always have the right to refuse treatment. The above information is an  only. It is not intended as medical advice for individual conditions or treatments. Talk to your doctor, nurse or pharmacist before following any medical regimen to see if it is safe and effective for you.  © Copyright [employer] 2023 Information is for End User's use only and may not be sold, redistributed or otherwise used for commercial purposes.    Wellness Visit for Adults   AMBULATORY CARE:   A wellness visit  is when you see your healthcare provider to get screened for health problems. Your healthcare provider will also give you advice on how to stay healthy. Write down your questions so you remember to ask them. Ask your healthcare provider how often you should have a wellness visit.  What happens at a wellness visit:  Your healthcare provider  will ask about your health, and your family history of health problems. This includes high blood pressure, heart disease, and cancer. He or she will ask if you have symptoms that concern you, if you smoke, and about your mood. You may also be asked about your intake of medicines, supplements, food, and alcohol. Any of the following may be done:  Your weight  will be checked. Your height may also be checked so your body mass index (BMI) can be calculated. Your BMI shows if you are at a healthy weight.    Your blood pressure  and heart rate will be checked. Your temperature may also be checked.    Blood and urine tests  may be done. Blood tests may be done to check your cholesterol levels. Abnormal cholesterol levels increase your risk for heart disease and stroke. You may also need a blood or urine test to check for diabetes if you are at increased risk. Urine tests may be done to look for signs of an infection or kidney disease.    A physical exam  includes checking your heartbeat and lungs with a stethoscope. Your healthcare provider may also check your skin to look for sun damage.    Screening tests  may be recommended. A screening test is done to check for diseases that may not cause symptoms. The screening tests you may need depend on your age, gender, family history, and lifestyle habits. For example, colorectal screening may be recommended if you are 50 years old or older.    Screening tests you need if you are a woman:   A Pap smear  is used to screen for cervical cancer. Pap smears are usually done every 3 to 5 years depending on your age. You may need them more often if you have had abnormal Pap smear test results in the past. Ask your healthcare provider how often you should have a Pap smear.    A mammogram  is an x-ray of your breasts to screen for breast cancer. Experts recommend mammograms every 2 years starting at age 50 years. You may need a mammogram at age 49 years or younger if you have an  increased risk for breast cancer. Talk to your healthcare provider about when you should start having mammograms and how often you need them.    Vaccines you may need:   Get an influenza vaccine  every year. The influenza vaccine protects you from the flu. Several types of viruses cause the flu. The viruses change over time, so new vaccines are made each year.    Get a tetanus-diphtheria (Td) booster vaccine  every 10 years. This vaccine protects you against tetanus and diphtheria. Tetanus is a severe infection that may cause painful muscle spasms and lockjaw. Diphtheria is a severe bacterial infection that causes a thick covering in the back of your mouth and throat.    Get a human papillomavirus (HPV) vaccine  if you are female and aged 19 to 26 or male 19 to 21 and never received it. This vaccine protects you from HPV infection. HPV is the most common infection spread by sexual contact. HPV may also cause vaginal, penile, and anal cancers.    Get a pneumococcal vaccine  if you are aged 65 years or older. The pneumococcal vaccine is an injection given to protect you from pneumococcal disease. Pneumococcal disease is an infection caused by pneumococcal bacteria. The infection may cause pneumonia, meningitis, or an ear infection.    Get a shingles vaccine  if you are 60 or older, even if you have had shingles before. The shingles vaccine is an injection to protect you from the varicella-zoster virus. This is the same virus that causes chickenpox. Shingles is a painful rash that develops in people who had chickenpox or have been exposed to the virus.    How to eat healthy:  My Plate is a model for planning healthy meals. It shows the types and amounts of foods that should go on your plate. Fruits and vegetables make up about half of your plate, and grains and protein make up the other half. A serving of dairy is included on the side of your plate. The amount of calories and serving sizes you need depends on your age,  gender, weight, and height. Examples of healthy foods are listed below:  Eat a variety of vegetables  such as dark green, red, and orange vegetables. You can also include canned vegetables low in sodium (salt) and frozen vegetables without added butter or sauces.    Eat a variety of fresh fruits , canned fruit in 100% juice, frozen fruit, and dried fruit.    Include whole grains.  At least half of the grains you eat should be whole grains. Examples include whole-wheat bread, wheat pasta, brown rice, and whole-grain cereals such as oatmeal.    Eat a variety of protein foods such as seafood (fish and shellfish), lean meat, and poultry without skin (turkey and chicken). Examples of lean meats include pork leg, shoulder, or tenderloin, and beef round, sirloin, tenderloin, and extra lean ground beef. Other protein foods include eggs and egg substitutes, beans, peas, soy products, nuts, and seeds.    Choose low-fat dairy products such as skim or 1% milk or low-fat yogurt, cheese, and cottage cheese.    Limit unhealthy fats  such as butter, hard margarine, and shortening.       Exercise:  Exercise at least 30 minutes per day on most days of the week. Some examples of exercise include walking, biking, dancing, and swimming. You can also fit in more physical activity by taking the stairs instead of the elevator or parking farther away from stores. Include muscle strengthening activities 2 days each week. Regular exercise provides many health benefits. It helps you manage your weight, and decreases your risk for type 2 diabetes, heart disease, stroke, and high blood pressure. Exercise can also help improve your mood. Ask your healthcare provider about the best exercise plan for you.       General health and safety guidelines:   Do not smoke.  Nicotine and other chemicals in cigarettes and cigars can cause lung damage. Ask your healthcare provider for information if you currently smoke and need help to quit. E-cigarettes or  smokeless tobacco still contain nicotine. Talk to your healthcare provider before you use these products.    Limit alcohol.  A drink of alcohol is 12 ounces of beer, 5 ounces of wine, or 1½ ounces of liquor.    Lose weight, if needed.  Being overweight increases your risk of certain health conditions. These include heart disease, high blood pressure, type 2 diabetes, and certain types of cancer.    Protect your skin.  Do not sunbathe or use tanning beds. Use sunscreen with a SPF 15 or higher. Apply sunscreen at least 15 minutes before you go outside. Reapply sunscreen every 2 hours. Wear protective clothing, hats, and sunglasses when you are outside.    Drive safely.  Always wear your seatbelt. Make sure everyone in your car wears a seatbelt. A seatbelt can save your life if you are in an accident. Do not use your cell phone when you are driving. This could distract you and cause an accident. Pull over if you need to make a call or send a text message.    Practice safe sex.  Use latex condoms if are sexually active and have more than one partner. Your healthcare provider may recommend screening tests for sexually transmitted infections (STIs).    Wear helmets, lifejackets, and protective gear.  Always wear a helmet when you ride a bike or motorcycle, go skiing, or play sports that could cause a head injury. Wear protective equipment when you play sports. Wear a lifejacket when you are on a boat or doing water sports.    © Copyright [employer] 2023 Information is for End User's use only and may not be sold, redistributed or otherwise used for commercial purposes.  The above information is an  only. It is not intended as medical advice for individual conditions or treatments. Talk to your doctor, nurse or pharmacist before following any medical regimen to see if it is safe and effective for you.

## 2024-03-28 DIAGNOSIS — J45.40 MODERATE PERSISTENT ASTHMA WITHOUT COMPLICATION: ICD-10-CM

## 2024-03-28 DIAGNOSIS — R06.89 RESPIRATORY INSUFFICIENCY: ICD-10-CM

## 2024-03-28 RX ORDER — MONTELUKAST SODIUM 10 MG/1
10 TABLET ORAL
Qty: 90 TABLET | Refills: 1 | Status: SHIPPED | OUTPATIENT
Start: 2024-03-28

## 2024-04-01 ENCOUNTER — TELEPHONE (OUTPATIENT)
Dept: CARDIOLOGY CLINIC | Facility: CLINIC | Age: 74
End: 2024-04-01

## 2024-04-01 DIAGNOSIS — Z88.8 ALLERGY TO STATIN MEDICATION: ICD-10-CM

## 2024-04-01 DIAGNOSIS — R93.1 HIGH CORONARY ARTERY CALCIUM SCORE: Primary | ICD-10-CM

## 2024-04-01 DIAGNOSIS — E78.2 MIXED HYPERLIPIDEMIA: ICD-10-CM

## 2024-04-01 NOTE — TELEPHONE ENCOUNTER
Pt called back and is aware of results. Going to mail him the leqvio start form for him to sign. Pt is agreeable to start

## 2024-04-01 NOTE — TELEPHONE ENCOUNTER
----- Message from Brenda Kirkpatrick MD sent at 4/1/2024  7:52 AM EDT -----  Please inform patient that his coronary calcium score is very high.  Since he is allergic to statins, I am recommending Leqvio which is given in the form of injections if his insurance company approves  ----- Message -----  From: Interface, Radiology Results In  Sent: 3/30/2024   4:39 PM EDT  To: Brenda Kirkpatrick MD

## 2024-04-20 DIAGNOSIS — R60.0 BILATERAL LEG EDEMA: ICD-10-CM

## 2024-04-22 RX ORDER — TORSEMIDE 5 MG/1
5 TABLET ORAL DAILY
Qty: 30 TABLET | Refills: 5 | Status: SHIPPED | OUTPATIENT
Start: 2024-04-22

## 2024-06-11 DIAGNOSIS — E11.9 NEW ONSET TYPE 2 DIABETES MELLITUS (HCC): Primary | ICD-10-CM

## 2024-06-18 ENCOUNTER — TELEPHONE (OUTPATIENT)
Age: 74
End: 2024-06-18

## 2024-06-18 NOTE — TELEPHONE ENCOUNTER
Caller: Patient     Doctor: Phil     Reason for call: Diabetic / NEW PATIENT can we do a forceon for patient he has a referral in his chart he states he has a callus and blister on his left foot     Please advise     Call back#: [phone]

## 2024-07-01 ENCOUNTER — APPOINTMENT (OUTPATIENT)
Dept: LAB | Facility: CLINIC | Age: 74
End: 2024-07-01
Payer: COMMERCIAL

## 2024-07-01 DIAGNOSIS — E78.00 ELEVATED LDL CHOLESTEROL LEVEL: ICD-10-CM

## 2024-07-01 DIAGNOSIS — E03.9 HYPOTHYROIDISM, UNSPECIFIED TYPE: ICD-10-CM

## 2024-07-01 DIAGNOSIS — Z12.5 PROSTATE CANCER SCREENING: ICD-10-CM

## 2024-07-01 DIAGNOSIS — M25.50 POLYARTHRALGIA: ICD-10-CM

## 2024-07-01 DIAGNOSIS — E11.9 NEW ONSET TYPE 2 DIABETES MELLITUS (HCC): ICD-10-CM

## 2024-07-01 LAB
ALBUMIN SERPL BCG-MCNC: 4 G/DL (ref 3.5–5)
ALP SERPL-CCNC: 93 U/L (ref 34–104)
ALT SERPL W P-5'-P-CCNC: 63 U/L (ref 7–52)
ANION GAP SERPL CALCULATED.3IONS-SCNC: 11 MMOL/L (ref 4–13)
AST SERPL W P-5'-P-CCNC: 49 U/L (ref 13–39)
BASOPHILS # BLD AUTO: 0.05 THOUSANDS/ÂΜL (ref 0–0.1)
BASOPHILS NFR BLD AUTO: 1 % (ref 0–1)
BILIRUB SERPL-MCNC: 0.44 MG/DL (ref 0.2–1)
BUN SERPL-MCNC: 15 MG/DL (ref 5–25)
CALCIUM SERPL-MCNC: 9.3 MG/DL (ref 8.4–10.2)
CHLORIDE SERPL-SCNC: 101 MMOL/L (ref 96–108)
CHOLEST SERPL-MCNC: 170 MG/DL
CO2 SERPL-SCNC: 29 MMOL/L (ref 21–32)
CREAT SERPL-MCNC: 1.07 MG/DL (ref 0.6–1.3)
CREAT UR-MCNC: 79.2 MG/DL
EOSINOPHIL # BLD AUTO: 0.22 THOUSAND/ÂΜL (ref 0–0.61)
EOSINOPHIL NFR BLD AUTO: 3 % (ref 0–6)
ERYTHROCYTE [DISTWIDTH] IN BLOOD BY AUTOMATED COUNT: 14.3 % (ref 11.6–15.1)
EST. AVERAGE GLUCOSE BLD GHB EST-MCNC: 120 MG/DL
GFR SERPL CREATININE-BSD FRML MDRD: 69 ML/MIN/1.73SQ M
GLUCOSE P FAST SERPL-MCNC: 92 MG/DL (ref 65–99)
HBA1C MFR BLD: 5.8 %
HCT VFR BLD AUTO: 44.8 % (ref 36.5–49.3)
HDLC SERPL-MCNC: 35 MG/DL
HGB BLD-MCNC: 14.4 G/DL (ref 12–17)
IMM GRANULOCYTES # BLD AUTO: 0.04 THOUSAND/UL (ref 0–0.2)
IMM GRANULOCYTES NFR BLD AUTO: 1 % (ref 0–2)
LDLC SERPL CALC-MCNC: 111 MG/DL (ref 0–100)
LYMPHOCYTES # BLD AUTO: 1.77 THOUSANDS/ÂΜL (ref 0.6–4.47)
LYMPHOCYTES NFR BLD AUTO: 25 % (ref 14–44)
MCH RBC QN AUTO: 30.1 PG (ref 26.8–34.3)
MCHC RBC AUTO-ENTMCNC: 32.1 G/DL (ref 31.4–37.4)
MCV RBC AUTO: 94 FL (ref 82–98)
MICROALBUMIN UR-MCNC: <7 MG/L
MONOCYTES # BLD AUTO: 0.59 THOUSAND/ÂΜL (ref 0.17–1.22)
MONOCYTES NFR BLD AUTO: 8 % (ref 4–12)
NEUTROPHILS # BLD AUTO: 4.44 THOUSANDS/ÂΜL (ref 1.85–7.62)
NEUTS SEG NFR BLD AUTO: 62 % (ref 43–75)
NONHDLC SERPL-MCNC: 135 MG/DL
NRBC BLD AUTO-RTO: 0 /100 WBCS
PLATELET # BLD AUTO: 233 THOUSANDS/UL (ref 149–390)
PMV BLD AUTO: 10.2 FL (ref 8.9–12.7)
POTASSIUM SERPL-SCNC: 4.3 MMOL/L (ref 3.5–5.3)
PROT SERPL-MCNC: 6.7 G/DL (ref 6.4–8.4)
PSA SERPL-MCNC: 1.07 NG/ML (ref 0–4)
RBC # BLD AUTO: 4.79 MILLION/UL (ref 3.88–5.62)
SODIUM SERPL-SCNC: 141 MMOL/L (ref 135–147)
TRIGL SERPL-MCNC: 122 MG/DL
TSH SERPL DL<=0.05 MIU/L-ACNC: 1.72 UIU/ML (ref 0.45–4.5)
WBC # BLD AUTO: 7.11 THOUSAND/UL (ref 4.31–10.16)

## 2024-07-01 PROCEDURE — 85025 COMPLETE CBC W/AUTO DIFF WBC: CPT

## 2024-07-01 PROCEDURE — 80053 COMPREHEN METABOLIC PANEL: CPT

## 2024-07-01 PROCEDURE — G0103 PSA SCREENING: HCPCS

## 2024-07-01 PROCEDURE — 83036 HEMOGLOBIN GLYCOSYLATED A1C: CPT

## 2024-07-01 PROCEDURE — 82043 UR ALBUMIN QUANTITATIVE: CPT

## 2024-07-01 PROCEDURE — 82570 ASSAY OF URINE CREATININE: CPT

## 2024-07-01 PROCEDURE — 36415 COLL VENOUS BLD VENIPUNCTURE: CPT

## 2024-07-01 PROCEDURE — 84443 ASSAY THYROID STIM HORMONE: CPT

## 2024-07-01 PROCEDURE — 80061 LIPID PANEL: CPT

## 2024-07-02 ENCOUNTER — OFFICE VISIT (OUTPATIENT)
Dept: FAMILY MEDICINE CLINIC | Facility: CLINIC | Age: 74
End: 2024-07-02
Payer: COMMERCIAL

## 2024-07-02 VITALS
HEIGHT: 65 IN | OXYGEN SATURATION: 95 % | HEART RATE: 79 BPM | WEIGHT: 223 LBS | BODY MASS INDEX: 37.15 KG/M2 | SYSTOLIC BLOOD PRESSURE: 120 MMHG | DIASTOLIC BLOOD PRESSURE: 62 MMHG

## 2024-07-02 DIAGNOSIS — L85.3 DRY SKIN: ICD-10-CM

## 2024-07-02 DIAGNOSIS — M25.50 POLYARTHRALGIA: ICD-10-CM

## 2024-07-02 DIAGNOSIS — R74.01 TRANSAMINITIS: ICD-10-CM

## 2024-07-02 DIAGNOSIS — E11.9 NEW ONSET TYPE 2 DIABETES MELLITUS (HCC): Primary | ICD-10-CM

## 2024-07-02 DIAGNOSIS — M54.9 SPINAL PAIN: ICD-10-CM

## 2024-07-02 DIAGNOSIS — R06.02 SOB (SHORTNESS OF BREATH): ICD-10-CM

## 2024-07-02 DIAGNOSIS — E78.00 ELEVATED LDL CHOLESTEROL LEVEL: ICD-10-CM

## 2024-07-02 DIAGNOSIS — Z78.9 STATIN INTOLERANCE: ICD-10-CM

## 2024-07-02 DIAGNOSIS — K76.0 FATTY LIVER: ICD-10-CM

## 2024-07-02 PROCEDURE — 99214 OFFICE O/P EST MOD 30 MIN: CPT | Performed by: FAMILY MEDICINE

## 2024-07-02 NOTE — PROGRESS NOTES
"Assessment/Plan:   Diagnoses and all orders for this visit:    New onset type 2 diabetes mellitus (HCC)  - A1c = 5.8 <-- 5.9 <-- 6.6   - weight in the office = 223lbs (has lost 12lbs since last OV in 3/2024)   - cont with weight loss efforts - currently on max dose of Ozempic     Elevated LDL cholesterol level  - LDL = 111 <-- 136 <-- 168   - statin intolerance   - follows with Cardio and was started on Leqvio - pt has not yet started medication - strongly advised to do so   - pt due for f/u with Cardio in 8/2024 - advised to schedule f/u appt - pt aware and agreeable   Statin intolerance    Transaminitis  -     Comprehensive metabolic panel; Future  - AST 49   - ALT 63   - (+) h/o \"Enlarged fatty liver\" as noted on RUQ US done 4/11/2023   - weight in the office = 223lbs (has lost 12lbs since last OV in 3/2024)   - cont with weight loss efforts - currently on max dose of Ozempic   - could benefit from / - pt would like to think about this   - RTO in 3months with repeat labs for f/u - pt aware and agreeable   Fatty liver  -     Comprehensive metabolic panel; Future    SOB (shortness of breath)  - benign pulm exam in the office today   - could be 2/2 worsening cardiac disease   - pt due for f/u with Cardio in 8/2024 - advised to schedule f/u appt - pt aware and agreeable     Spinal pain  -     Ambulatory referral to Spine & Pain Management; Future  Polyarthralgia  - could benefit from AquaTherapy     Dry skin   - advised lukewarm shower and to apply Vaseline to affect region to lock moisture in           Subjective:    Patient ID: Roverto Milton is a 73 y.o. male.  HPI  72yo M presents to the office for f/u   - weight in the office = 223lbs (has lost 12lbs since last OV in 3/2024)   - reviewed labs done 7/1/12024 = (+) elevated LFTs in h/o fatty liver  - pain in base of spine - makes it painful to walk - has been using BioFreeze   - (+) overall body pain which is worse in summer   - (+) " "\"breathing problems\" - lungs are clear, stamina diminished   - due for Cardio appt in 8/2024   - (+) dry, itchy skin on back of neck - has been applying OTC hydrocortisone w/o relief       The following portions of the patient's history were reviewed and updated as appropriate: allergies, current medications, past family history, past medical history, past social history, past surgical history and problem list.    Review of Systems  as per HPI    Objective:  /62   Pulse 79   Ht 5' 4.5\" (1.638 m)   Wt 101 kg (223 lb)   SpO2 95%   BMI 37.69 kg/m²    Physical Exam  Vitals reviewed.   Constitutional:       General: He is not in acute distress.     Appearance: Normal appearance. He is not ill-appearing, toxic-appearing or diaphoretic.   HENT:      Head: Normocephalic and atraumatic.      Right Ear: External ear normal.      Left Ear: External ear normal.      Nose: Nose normal.   Eyes:      General: No scleral icterus.        Right eye: No discharge.         Left eye: No discharge.      Extraocular Movements: Extraocular movements intact.      Conjunctiva/sclera: Conjunctivae normal.   Cardiovascular:      Rate and Rhythm: Normal rate and regular rhythm.      Heart sounds: Normal heart sounds.   Pulmonary:      Effort: Pulmonary effort is normal. No respiratory distress.      Breath sounds: Normal breath sounds. No stridor. No wheezing, rhonchi or rales.   Abdominal:      Palpations: Abdomen is soft.   Musculoskeletal:         General: Tenderness present.      Cervical back: Normal range of motion.   Skin:     General: Skin is warm.   Neurological:      General: No focal deficit present.      Mental Status: He is alert and oriented to person, place, and time.   Psychiatric:         Mood and Affect: Mood normal.         Behavior: Behavior normal.         "

## 2024-07-12 DIAGNOSIS — J45.20 MILD INTERMITTENT ASTHMA, UNSPECIFIED WHETHER COMPLICATED: ICD-10-CM

## 2024-07-15 RX ORDER — FLUTICASONE FUROATE, UMECLIDINIUM BROMIDE AND VILANTEROL TRIFENATATE 200; 62.5; 25 UG/1; UG/1; UG/1
1 POWDER RESPIRATORY (INHALATION) DAILY
Qty: 60 BLISTER | Refills: 5 | Status: SHIPPED | OUTPATIENT
Start: 2024-07-15

## 2024-07-16 NOTE — INTERVAL H&P NOTE
H&P reviewed  After examining the patient I find no changes in the patients condition since the H&P had been written      Vitals:    01/10/23 0749   BP: 148/91   Pulse: 78   Resp: 18   Temp: (!) 96 5 °F (35 8 °C)   SpO2: 94% REFILL PASSED PER [facility] PROTOCOLS    Requested Prescriptions   Pending Prescriptions Disp Refills    LEVOTHYROXINE 88 MCG Oral Tab [Pharmacy Med Name: LEVOTHYROXINE 0.088MG (88MCG) TAB] 90 tablet 0     Sig: TAKE 1 TABLET(88 MCG) BY MOUTH DAILY       Thyroid Medication Protocol Passed - 7/11/2024  4:34 PM        Passed - TSH in past 12 months        Passed - Last TSH value is normal     Lab Results   Component Value Date    TSH 1.150 05/15/2024    TSHT4 1.56 10/18/2018                 Passed - In person appointment or virtual visit in the past 12 mos or appointment in next 3 mos     Recent Outpatient Visits              1 month ago Encounter for wellness examination    [address] Estefania Sheridan MD    Office Visit    10 months ago High cholesterol    [address]    Office Visit    1 year ago Encounter for annual health examination    [address] Estefania Sheridan MD    Office Visit    1 year ago Acute non-recurrent frontal sinusitis    [address]-C    Office Visit    1 year ago Essential hypertension    [address] Estefania Sheridan MD    Office Visit          Future Appointments         Provider Department Appt Notes    In 3 months Estefania Geronimo MD [address] 6mth follow up                      AMLODIPINE 5 MG Oral Tab [Pharmacy Med Name: AMLODIPINE BESYLATE 5MG TABLETS] 90 tablet 1     Sig: TAKE 1 TABLET(5 MG) BY MOUTH DAILY       Hypertension Medications Protocol Passed - 7/11/2024  4:34 PM        Passed - CMP or BMP in past 12 months        Passed - Last BP reading less than 140/90     BP Readings from Last 1 Encounters:   05/23/24 126/68               Passed - In person appointment or virtual visit in the  past 12 mos or appointment in next 3 mos     Recent Outpatient Visits              1 month ago [address]counter for wellness examination    [address]    Office Visit    10 months ago High cholesterol    [address]    Office Visit    1 year ago Encounter for annual health examination    [address]    Office Visit    1 year ago Acute non-recurrent frontal sinusitis    [address]-C    Office Visit    1 year ago Essential hypertension    [address]    Office Visit          Future Appointments         Provider Department Appt Notes    In 3 months Estefania Geronimo MD [address]                    Passed - EGFRCR or GFRNAA > 50     GFR Evaluation  EGFRCR: 90 , resulted on 5/15/2024            OMEPRAZOLE 20 MG Oral Capsule Delayed Release [Pharmacy Med Name: OMEPRAZOLE 20MG CAPSULES] 90 capsule 1     Sig: TAKE 1 CAPSULE BY MOUTH EVERY DAY       Gastrointestional Medication Protocol Passed - 7/11/2024  4:34 PM        Passed - In person appointment or virtual visit in the past 12 mos or appointment in next 3 mos     Recent Outpatient Visits              1 month ago Encounter for wellness examination    [facility], [address] [place], [place] [address]Cynthia Tina, MD    Office Visit    10 months ago High cholesterol    [address]    Office Visit    1 year ago Encounter for annual health examination    [address]    Office Visit    1 year ago Acute non-recurrent frontal sinusitis    [address]  Patty Hill PA-C    Office Visit    1 year ago Essential hypertension    [facility], [address] [place] [address] Estefania Sheridan MD    Office Visit          Future Appointments         Provider Department Appt Notes    In 3 months Estefania eGronimo MD [facility], [address] [place] [address] 6m follow up                         Future Appointments         Provider Department Appt Notes    In 3 months Estefania Geronimo MD [facility], [address] [place] [address] 6m follow up          Recent Outpatient Visits              1 month ago Encounter for wellness examination    [facility], [address] [place] [address], Estefania Sheridan MD    Office Visit    10 months ago High cholesterol    [address] Estefania Sheridan MD    Office Visit    1 year ago Encounter for annual health examination    [facility], [address] [place] [address], Estefania Sheridan MD    Office Visit    1 year ago Acute non-recurrent frontal sinusitis    [facility], [address] [place] [address], Patty Hill PA-C    Office Visit    1 year ago Essential hypertension    [facility], [address] [place] [address], Estefania Sheridan MD    Office Visit

## 2024-08-01 DIAGNOSIS — F31.0 BIPOLAR AFFECTIVE DISORDER, CURRENT EPISODE HYPOMANIC (HCC): Primary | ICD-10-CM

## 2024-08-01 RX ORDER — PRAMIPEXOLE DIHYDROCHLORIDE 1 MG/1
2 TABLET ORAL
Qty: 60 TABLET | Refills: 0 | Status: SHIPPED | OUTPATIENT
Start: 2024-08-01 | End: 2024-08-31

## 2024-08-01 RX ORDER — LAMOTRIGINE 200 MG/1
200 TABLET ORAL DAILY
Qty: 30 TABLET | Refills: 0 | Status: SHIPPED | OUTPATIENT
Start: 2024-08-01 | End: 2024-08-31

## 2024-08-07 DIAGNOSIS — E03.9 HYPOTHYROIDISM, UNSPECIFIED TYPE: ICD-10-CM

## 2024-08-07 RX ORDER — LEVOTHYROXINE SODIUM 112 UG/1
TABLET ORAL
Qty: 100 TABLET | Refills: 1 | Status: SHIPPED | OUTPATIENT
Start: 2024-08-07

## 2024-08-09 DIAGNOSIS — E11.9 NEW ONSET TYPE 2 DIABETES MELLITUS (HCC): ICD-10-CM

## 2024-08-09 RX ORDER — SEMAGLUTIDE 2.68 MG/ML
INJECTION, SOLUTION SUBCUTANEOUS
Qty: 9 ML | Refills: 1 | Status: SHIPPED | OUTPATIENT
Start: 2024-08-09 | End: 2024-08-14 | Stop reason: SDUPTHER

## 2024-08-14 DIAGNOSIS — E11.9 NEW ONSET TYPE 2 DIABETES MELLITUS (HCC): ICD-10-CM

## 2024-08-16 ENCOUNTER — TELEPHONE (OUTPATIENT)
Age: 74
End: 2024-08-16

## 2024-08-20 DIAGNOSIS — R60.0 BILATERAL LEG EDEMA: ICD-10-CM

## 2024-08-20 RX ORDER — TORSEMIDE 5 MG/1
5 TABLET ORAL DAILY
Qty: 30 TABLET | Refills: 5 | Status: SHIPPED | OUTPATIENT
Start: 2024-08-20

## 2024-08-21 ENCOUNTER — HOSPITAL ENCOUNTER (OUTPATIENT)
Dept: RADIOLOGY | Facility: HOSPITAL | Age: 74
Discharge: HOME | End: 2024-08-21
Attending: PAIN MEDICINE
Payer: COMMERCIAL

## 2024-08-21 ENCOUNTER — CONSULT (OUTPATIENT)
Dept: PAIN MEDICINE | Facility: CLINIC | Age: 74
End: 2024-08-21
Payer: COMMERCIAL

## 2024-08-21 VITALS
SYSTOLIC BLOOD PRESSURE: 114 MMHG | DIASTOLIC BLOOD PRESSURE: 70 MMHG | HEIGHT: 64 IN | WEIGHT: 223 LBS | BODY MASS INDEX: 38.07 KG/M2

## 2024-08-21 DIAGNOSIS — M47.816 LUMBAR SPONDYLOSIS: Primary | ICD-10-CM

## 2024-08-21 DIAGNOSIS — M47.816 LUMBAR SPONDYLOSIS: ICD-10-CM

## 2024-08-21 DIAGNOSIS — M48.062 SPINAL STENOSIS OF LUMBAR REGION WITH NEUROGENIC CLAUDICATION: ICD-10-CM

## 2024-08-21 DIAGNOSIS — M54.9 SPINAL PAIN: ICD-10-CM

## 2024-08-21 PROCEDURE — 99243 OFF/OP CNSLTJ NEW/EST LOW 30: CPT | Performed by: PAIN MEDICINE

## 2024-08-21 PROCEDURE — 72110 X-RAY EXAM L-2 SPINE 4/>VWS: CPT

## 2024-08-21 RX ORDER — GABAPENTIN 300 MG/1
CAPSULE ORAL
Qty: 90 CAPSULE | Refills: 0 | Status: SHIPPED | OUTPATIENT
Start: 2024-08-21

## 2024-08-21 NOTE — PROGRESS NOTES
Assessment  1. Lumbar spondylosis  -     XR spine lumbar minimum 4 views non injury; Future; Expected date: 08/21/2024  -     diclofenac sodium (VOLTAREN) 50 mg EC tablet; Take 1 tablet (50 mg total) by mouth 2 (two) times a day  -     gabapentin (NEURONTIN) 300 mg capsule; Take 1 tablet at bedtime for 3 days, then 1 tablet twice daily for 3 days, then 1 tablet 3 times daily  -     Ambulatory Referral to [facility]; Future  2. Spinal pain  -     Ambulatory referral to Spine & Pain Management  3. Spinal stenosis of lumbar region with neurogenic claudication  -     MRI lumbar spine wo contrast; Future; Expected date: 08/21/2024  -     Ambulatory Referral to Comprehensive [facility] PT; Future        Maurice is a pleasant 74-year-old male with history of 6-month history of low back and bilateral leg pain worse in the lower back compared to his legs increased pain with walking improved with sitting and increased pain with lying flat pain with tossing and turning does have pain on palpation lumbar facets may be underlying his current pain symptoms with facet arthropathy.  Will obtain an x-ray of his lumbar spine will obtain MRI of his lumbar spine due to chronicity of his pain symptoms we will start him on physical therapy he is on 6 weeks of home exercise program in the past 6 months to 3 times a week 30 minutes a day without improvement in his lower back pain focus on range of motion and strengthening. Will refer to PT        My impressions and treatment recommendations were discussed in detail with the patient who verbalized understanding and had no further questions.  Discharge instructions were provided. I personally saw and examined the patient and I agree with the above discussed plan of care.    Plan      New Medications Ordered This Visit   Medications   • diclofenac sodium (VOLTAREN) 50 mg EC tablet     Sig: Take 1 tablet (50 mg total) by mouth 2 (two) times a day     Dispense:  60 tablet     Refill:  1    • gabapentin (NEURONTIN) 300 mg capsule     Sig: Take 1 tablet at bedtime for 3 days, then 1 tablet twice daily for 3 days, then 1 tablet 3 times daily     Dispense:  90 capsule     Refill:  0         Orders Placed This Encounter   Procedures   • XR spine lumbar minimum 4 views non injury     Standing Status:   Future     Standing Expiration Date:   8/21/2028     Scheduling Instructions:      Bring along any outside films relating to this procedure.           Order Specific Question:   Reason for Exam:     Answer:   low back pain   • MRI lumbar spine wo contrast     Standing Status:   Future     Standing Expiration Date:   8/21/2028     Scheduling Instructions:      There is no preparation for this test. Please leave your jewelry and valuables at home, wedding rings are the exception. All patients will be required to change into a hospital gown and pants.  Street clothes are not permitted in the MRI.  Magnetic nail polish must be removed prior to arrival for your test. Please bring your insurance cards, a form of photo ID and a list of your medications with you. Arrive 15 minutes prior to your appointment time in order to register. Please bring any prior CT or MRI studies of this area that were not performed at a [facility].            To schedule this appointment, please contact Central Scheduling at [phone].            Prior to your appointment, please make sure you complete the MRI Screening Form when you e-Check in for your appointment. This will be available starting 7 days before your appointment in [facility]. You may receive an e-mail with an activation code if you do not have a [facility] account. If you do not have access to a device, we will complete your screening at your appointment.     Order Specific Question:   What is the patient's sedation requirement?     Answer:   No Sedation     Order Specific Question:   Does the patient have metallic implants, such as a pacemaker or shunt?      Answer:   No     Order Specific Question:   Does this procedure require the 3T MRI?     Answer:   No     Order Specific Question:   [facility]     Answer:   Immediate     Order Specific Question:   Is order priority selected as STAT?     Answer:   No     Order Specific Question:   Reason for Exam     Answer:   Lumbar radicular pain   • Ambulatory Referral to Comprehensive [facility] PT     Standing Status:   Future     Standing Expiration Date:   8/21/2025     Referral Priority:   Routine     Referral Type:   Physical Therapy     Referral Reason:   Specialty Services Required     Requested Specialty:   Physical Therapy     Number of Visits Requested:   1     Expiration Date:   8/21/2025         History of Present Illness    Roverto Milton is a 74 y.o. male with relevant PMH of recently diagnosed diabetes restless leg syndrome and history of fatty liver presenting to [facility] for chief complaint of low back pain with radiation to bilateral hips worse with walking improved with sitting and also pain at night when sleeping pain also with tossing and turning more on the right-hand side pain can increase to an 8 out of 10 with half a block of walking currently affecting quality of life he has been doing home exercise program for his low back focus on range of motion strengthening 3 times a week for the past 6 weeks to 30 minutes a day without improvement in his back pain he has not done a formal course of physical therapy denies bowel bladder incontinence no allergies to contrast dyes is not on blood thinners.  Reports joint aches throughout the body but also in his lower back      I have personally reviewed and/or updated the patient's past medical history, past surgical history, family history, social history, current medications, allergies, and vital signs today.     Review of Systems   Musculoskeletal:  Positive for arthralgias, back pain and gait problem.   All other systems  reviewed and are negative.      Patient Active Problem List   Diagnosis   • Arthritis   • Moderate persistent asthma without complication   • Hypothyroidism   • DONALD (obstructive sleep apnea)   • Mixed hyperlipidemia   • Polyarthralgia   • Primary osteoarthritis, left shoulder   • History of eosinophilic pneumonia (HCC)   • Shortness of breath   • Blurry vision, bilateral   • Insomnia   • Dysphagia   • Bipolar disorder (HCC)   • RLQ abdominal pain   • BPH (benign prostatic hyperplasia)   • CAD in native artery   • Chest tightness   • Disease characterized by destruction of skeletal muscle   • Rotator cuff arthropathy   • Class 2 severe obesity due to excess calories with serious comorbidity and body mass index (BMI) of 38.0 to 38.9 in adult (HCC)   • Metabolic syndrome   • S/P reverse total shoulder arthroplasty, right   • Hyponatremia   • Bariatric surgery status   • Asymptomatic PVCs   • Muscle weakness   • Transaminitis   • Abnormal CT scan   • Fatty liver   • New onset type 2 diabetes mellitus (HCC)   • S/p bilateral blepharoplasty   • Bilateral leg edema   • Severe persistent asthma not dependent on systemic steroids   • Acute respiratory failure with hypoxia ([facility])   • Polyp of colon   • Elevated LDL cholesterol level   • Statin intolerance       Past Medical History:   Diagnosis Date   • ARIANE (acute kidney injury) (HCC) 01/13/2023    pt not aware of this   • Arthritis    • Asthma    • Breathing difficulty 01/2023    shldr sx   • Colon polyp    • Dental crowns present     all teeth Veneers   • Depression    • Disease of thyroid gland    • 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HCC)    • Essential hypertension    • History of COVID-19 09/09/2023    original surgery date 9/12/23 and rescheduled to 10/23/23   • Hyperlipidemia    • Hypertension 2012   • 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HCC)     per pt does not have this now   • Manic depression (HCC)    • Right lower lobe pneumonia 01/12/2023   • Shortness of breath     MONTAÑO   • Sleep apnea   "      Past Surgical History:   Procedure Laterality Date   • COLONOSCOPY  12/13/2022   • COLONOSCOPY W/ BIOPSIES AND POLYPECTOMY  06/14/2023   • JOINT REPLACEMENT  1/10 /2023    Right Shoulder   • NM ARTHROPLASTY GLENOHUMERAL JOINT TOTAL SHOULDER Right 01/10/2023    Procedure: ARTHROPLASTY SHOULDER REVERSE WITH BICEPS TENODESIS;  Surgeon: Lele Patrick MD;  Location:  MAIN OR;  Service: Orthopedics   • NM BLEPHAROPLASTY LOWER EYELID Bilateral 12/04/2023    Procedure: BLEPHAROPLASTY LOWER;  Surgeon: Rachna Aguilar DO;  Location:  MAIN OR;  Service: Plastics   • NM BLEPHAROPLASTY UPPER EYELID W/EXCESSIVE SKIN Bilateral 12/04/2023    Procedure: BLEPHAROPLASTY UPPER;  Surgeon: Rachna Aguilar DO;  Location:  MAIN OR;  Service: Plastics   • UMBILICAL HERNIA REPAIR         Family History   Problem Relation Age of Onset   • Hypertension Mother    • Depression Mother    • Depression Father    • Arthritis Father    • Heart attack Paternal Uncle 31   • Colon cancer Neg Hx    • Prostate cancer Neg Hx        Social History     Occupational History   • Not on file   Tobacco Use   • Smoking status: Never   • Smokeless tobacco: Never   Vaping Use   • Vaping status: Never Used   Substance and Sexual Activity   • Alcohol use: Not Currently     Comment: \"very very occas\"   • Drug use: Never   • Sexual activity: Not Currently     Partners: Female     Birth control/protection: None     Comment: defer       Current Outpatient Medications on File Prior to Visit   Medication Sig   • albuterol (2.5 mg/3 mL) 0.083 % nebulizer solution Take 3 mL (2.5 mg total) by nebulization every 6 (six) hours as needed for wheezing or shortness of breath   • albuterol (PROVENTIL HFA,VENTOLIN HFA) 90 mcg/act inhaler Inhale 1 puff every 6 (six) hours as needed for wheezing or shortness of breath   • cetirizine (ZyrTEC) 5 MG tablet Take 5 mg by mouth daily   • clonazePAM (KlonoPIN) 1 mg tablet Take 1 mg by mouth daily at " "bedtime   • fluticasone-umeclidinium-vilanterol (Trelegy Ellipta) 200-62.5-25 mcg/actuation AEPB inhaler Inhale 1 puff daily   • lamoTRIgine (LaMICtal) 200 MG tablet Take 1 tablet (200 mg total) by mouth daily   • levothyroxine 112 mcg tablet TAKE 1 TABLET(112 MCG) BY MOUTH DAILY   • montelukast (SINGULAIR) 10 mg tablet Take 1 tablet (10 mg total) by mouth daily at bedtime   • multivitamin-iron-minerals-folic acid (CENTRUM) chewable tablet Chew 1 tablet daily   • omega-3-acid ethyl esters (LOVAZA) 1 g capsule Take 2 capsules (2 g total) by mouth 2 (two) times a day \"Fish oil\"   • pramipexole (MIRAPEX) 1 mg tablet Take 2 tablets (2 mg total) by mouth daily at bedtime   • semaglutide, 2 mg/dose, (Ozempic, 2 MG/DOSE,) 8 mg/ mL injection pen Inject 0.75 mL (2 mg total) under the skin every 7 days   • tamsulosin (FLOMAX) 0.4 mg TAKE 2 CAPSULES(0.8 MG) BY MOUTH IN THE MORNING   • torsemide (DEMADEX) 5 MG tablet Take 1 tablet (5 mg total) by mouth daily     No current facility-administered medications on file prior to visit.       Allergies   Allergen Reactions   • Crestor [Rosuvastatin] Other (See Comments)     Lost ability to walk and balance   • Lipitor [Atorvastatin] Other (See Comments) and Dizziness     And very unsteady on feet   • Spiriva Respimat [Tiotropium Bromide Monohydrate] Rash         Physical Exam    /70   Ht 5' 4\" (1.626 m)   Wt 101 kg (223 lb)   BMI 38.28 kg/m²           MSK:    Lumbar Spine:  No masses or atrophy,    Range of motion - Decreased extension/flexion  Palpation - Tenderness to palpation in the lumbar parapsinals   PSIS tenderness no  Rigoberto's/LEXX no  Gaenslen's no  SLR neg       Strength Right Left   Hip flexion L1,2 5 5   Knee extension L3 5 5   Ankle dorsiflexion L4 5 5   Great toe extension L5 5 5   Ankle Plantarflexion S1 5 5       Sensory Exam:  intact to light touch bilateral LE       Reflexes:     Right Left   Biceps 2+ 2+   Triceps 2+ 2+   Brachioradialis 2+ 2+   Patellar " 2+ 2+   Achilles 2+ 2+   Babinski neg neg        Gait flexed gait                Imaging

## 2024-08-25 DIAGNOSIS — N40.0 BENIGN PROSTATIC HYPERPLASIA, UNSPECIFIED WHETHER LOWER URINARY TRACT SYMPTOMS PRESENT: ICD-10-CM

## 2024-08-26 RX ORDER — TAMSULOSIN HYDROCHLORIDE 0.4 MG/1
CAPSULE ORAL
Qty: 180 CAPSULE | Refills: 1 | Status: SHIPPED | OUTPATIENT
Start: 2024-08-26

## 2024-08-28 ENCOUNTER — TELEPHONE (OUTPATIENT)
Dept: PAIN MEDICINE | Facility: CLINIC | Age: 74
End: 2024-08-28

## 2024-08-28 NOTE — TELEPHONE ENCOUNTER
----- Message from Lex Dyer MD sent at 8/28/2024  8:34 AM EDT -----  Severe degenerative disc space narrowing at at L1-L2, L2-L3, L4-L5, and L5-S1.

## 2024-09-06 ENCOUNTER — CLINICAL SUPPORT (OUTPATIENT)
Dept: CARDIOLOGY CLINIC | Facility: CLINIC | Age: 74
End: 2024-09-06
Payer: COMMERCIAL

## 2024-09-06 VITALS — WEIGHT: 214 LBS | HEIGHT: 64 IN | BODY MASS INDEX: 36.54 KG/M2

## 2024-09-06 DIAGNOSIS — I25.10 CAD IN NATIVE ARTERY: ICD-10-CM

## 2024-09-06 DIAGNOSIS — I25.10 ASCVD (ARTERIOSCLEROTIC CARDIOVASCULAR DISEASE): ICD-10-CM

## 2024-09-06 DIAGNOSIS — E78.2 MIXED HYPERLIPIDEMIA: Primary | ICD-10-CM

## 2024-09-06 PROCEDURE — 99211 OFF/OP EST MAY X REQ PHY/QHP: CPT

## 2024-09-09 ENCOUNTER — HOSPITAL ENCOUNTER (OUTPATIENT)
Dept: MRI IMAGING | Facility: HOSPITAL | Age: 74
Discharge: HOME | End: 2024-09-09
Attending: PAIN MEDICINE
Payer: COMMERCIAL

## 2024-09-09 DIAGNOSIS — M48.062 SPINAL STENOSIS OF LUMBAR REGION WITH NEUROGENIC CLAUDICATION: ICD-10-CM

## 2024-09-09 PROCEDURE — 72148 MRI LUMBAR SPINE W/O DYE: CPT

## 2024-09-10 ENCOUNTER — TELEPHONE (OUTPATIENT)
Dept: PAIN MEDICINE | Facility: CLINIC | Age: 74
End: 2024-09-10

## 2024-09-11 ENCOUNTER — PATIENT MESSAGE (OUTPATIENT)
Dept: OBGYN CLINIC | Facility: OTHER | Age: 74
End: 2024-09-11

## 2024-09-12 NOTE — PATIENT COMMUNICATION
Called patient and left office number for him to make an appt to get evaluated for his right shoulder pain.

## 2024-09-25 ENCOUNTER — EVALUATION (OUTPATIENT)
Dept: PHYSICAL THERAPY | Facility: CLINIC | Age: 74
End: 2024-09-25
Payer: COMMERCIAL

## 2024-09-25 VITALS
OXYGEN SATURATION: 96 % | TEMPERATURE: 96.6 F | HEART RATE: 62 BPM | SYSTOLIC BLOOD PRESSURE: 138 MMHG | DIASTOLIC BLOOD PRESSURE: 80 MMHG

## 2024-09-25 DIAGNOSIS — M47.816 LUMBAR SPONDYLOSIS: ICD-10-CM

## 2024-09-25 DIAGNOSIS — M48.062 SPINAL STENOSIS OF LUMBAR REGION WITH NEUROGENIC CLAUDICATION: ICD-10-CM

## 2024-09-25 PROCEDURE — 97162 PT EVAL MOD COMPLEX 30 MIN: CPT

## 2024-09-25 PROCEDURE — 97110 THERAPEUTIC EXERCISES: CPT

## 2024-09-25 PROCEDURE — 97530 THERAPEUTIC ACTIVITIES: CPT

## 2024-09-25 NOTE — PROGRESS NOTES
PT Evaluation     Today's date: 2024  Patient name: Roverto Milton  : 1950  MRN: [id number]  Referring provider: Lex Dyer*  Dx:   Encounter Diagnosis     ICD-10-CM    1. Lumbar spondylosis  M47.816 Ambulatory Referral to Comprehensive Sp[facility] PT      2. Spinal stenosis of lumbar region with neurogenic claudication  M48.062 Ambulatory Referral to Comprehensive Spine PT                     Assessment  Impairments: abnormal muscle firing, abnormal or restricted ROM, activity intolerance, impaired physical strength, lacks appropriate home exercise program, pain with function, poor posture  and poor body mechanics    Assessment details: Roverto Milton is a pleasant 74 y.o. male who presents to the comp spine program via a referral for pain management with:   Lumbar spondylosis  Spinal stenosis of lumbar region with neurogenic claudication    Problem List:  1) Lumbar hypomobility   2) Adverse neural tension     Comparable signs:  1) walking  2) Standing    he has lumbar hypomobility and increased neural tension with decreased hip mobility and decreased hip and core strength resulting in worry over not knowing what's wrong and fear of not being able to keep active. These impairments listed above are preventing the patient from participating in functional activity. No further referral appears necessary at this time based upon examination results, and is negative for any red flags. At this time patient has followed up with pain management and has been on gabapentin to control pain. He has had xrays and MRI ordered. Based off start back screen he is considered moderate risk. At this time he does not require comprehensive spine services and will benefit from PT Prognosis is good given HEP compliance and attendance to physical therapy 1x a week due to transportation.  Positive prognostic indicators include positive attitude toward recovery and good understanding of diagnosis and treatment plan options.   Negative prognostic indicators include chronicity of symptoms, depression, hypertension, and obesity..  Patent will benefit from skilled physical therapy at this time to address deficits to improve overall function and return to PLOF. Patient verbalized understanding of POC, HEP, and return demonstrated HEP. All questions were answered to patients satisfaction.     Please contact me if you have any questions or recommendations. Thank you for the referral and the opportunity to share in Roverto Milton's care.            Understanding of Dx/Px/POC: good     Prognosis: good    Goals        Plan  Patient would benefit from: skilled physical therapy  Planned modality interventions: cryotherapy, thermotherapy: hydrocollator packs and TENS    Planned therapy interventions: home exercise program, graded exercise, functional ROM exercises, flexibility, body mechanics training, postural training, patient education, therapeutic activities, therapeutic exercise, manual therapy, joint mobilization, neuromuscular re-education, motor coordination training, graded activity, stretching and strengthening    Frequency: 2x week  Duration in weeks: 8  Treatment plan discussed with: patient and PCP        Subjective Evaluation    History of Present Illness  Mechanism of injury: Patient presents to comprehensive spine with low back pain that started about 2 months ago. He reports numbness and tingling and pain from the waist down. He has followed up with pain management and has gotten an MRI, and follows up with Dr. SAAVEDRA tomorrow.He reports that his biggest issue is walking and has difficulty going further than 2 blocks. He is currently taking medications for pain. He reports bilateral tpain especially in hips. Denies recent bowl and bladder changes, and numbness and tingling     Difficulty: walking, painful to lie down, sleep,   Patient Goals  Patient goals for therapy: decreased pain and independence with ADLs/IADLs  Patient goal: pain  relief  Pain  Current pain ratin  At worst pain ratin (affiliated with motion)  Location: low back and into hips  Quality: dull ache, tight, sharp and throbbing  Relieving factors: medications  Aggravating factors: stair climbing, walking and standing  Progression: worsening          Objective     Concurrent Complaints  Positive for disturbed sleep. Negative for night pain, bladder dysfunction, bowel dysfunction and saddle (S4) numbness    Postural Observations  Seated posture: fair  Standing posture: fair  Correction of posture: makes symptoms better    Additional Postural Observation Details  Gait: External rotation with swing through noted. Decreased stance time on left leg, lack of trunk rotation with ambulation     Neurological Testing     Sensation     Lumbar   Left   Intact: light touch    Right   Intact: light touch    Reflexes   Left   Patellar (L4): trace (1+)  Achilles (S1): trace (1+)  Clonus sign: negative    Right   Patellar (L4): trace (1+)  Achilles (S1): trace (1+)  Clonus sign: negative    Comments   Left Patellar (L4): w/ jendrasik  Left Achilles (S1): w/ jendrasik  Right Patellar (L4): w/ jendrasik  Right Achilles (S1): w/ jendrasik    Active Range of Motion   Cervical/Thoracic Spine       Thoracic    Extension:  Restriction level: moderate  Left rotation:  Restriction level: minimal  Right rotation:  Restriction level: minimal    Lumbar   Flexion:  Restriction level: minimal  Extension:  Restriction level: moderate  Left lateral flexion:  Restriction level: moderate  Right lateral flexion:  Restriction level: moderate  Left rotation:  Restriction level: moderate  Right rotation:  Restriction level: moderate    Joint Play     Hypomobile: T11, T12, L1, L2, L3, L4, L5 and S1     Pain: T10, T11, T12, L1, L2, L3, L4, L5 and S1   Mechanical Assessment    Cervical      Thoracic      Lumbar    Standing flexion: repeated movements   Pain level: increased  Standing extension: repeated  movements  Pain location: centralized  Pain intensity: better  Pain level: increased  Lying extension: repeated movements  Pain location: centralized  Pain intensity: better  Pain level: decreased    Strength/Myotome Testing     Lumbar   Left   Heel walk: normal  Toe walk: normal    Right   Heel walk: normal  Toe walk: normal    Left Hip   Planes of Motion   Flexion: 4-  Extension: 4-  Abduction: 4-  External rotation: 4-  Internal rotation: 4-    Right Hip   Planes of Motion   Flexion: 4-  Extension: 4-  Abduction: 4-  External rotation: 4-  Internal rotation: 4-    Left Knee   Flexion: 5  Extension: 5    Right Knee   Flexion: 5  Extension: 5    Left Ankle/Foot   Dorsiflexion: 5  Plantar flexion: 5  Great toe extension: 5    Right Ankle/Foot   Dorsiflexion: 5  Plantar flexion: 5  Great toe extension: 5    Muscle Activation   Patient able to activate left transverse abdominals, left multifidus, right transverse abdominals and right multifidus.     Tests     Lumbar     Left   Positive slump test.   Negative crossed SLR and passive SLR.     Right   Positive slump test.   Negative crossed SLR and passive SLR.     Left Hip   Positive LEXX and FADIR.     Right Hip   Positive LEXX and FADIR.     General Comments:    Lower quarter screen   Hips: unremarkable  Knees: unremarkable  Foot/ankle: unremarkable               POC Expires Auth Status Start Date Expiration Date PT Visit Limit    10/25  9/25  BOMN   Date 9/25       Used 1       Remaining           Diagnosis:  Low back pain   Precautions:  Hx of Depression   Comparable signs 1) Walking  2) Squatting    Primary Impairments: 1) lumbar hypomobility   2) adverse neural tension   Patient Goals Feel better   Manual Therapy 9/25        PPU w/ OP         PA glides         T/s mobs         Hip mobs         Re-evaluation          Exercise Diary          Therapeutic Exercise         Bike/TM for ROM         PPU         Hip sags/standing ext         T/s extension                                     Neuromuscular Re-education         Multif NMR         Bridges                                                                Therapeutic Activities         Education  POC, diagnosis, expecations                                            Modalities

## 2024-09-26 ENCOUNTER — OFFICE VISIT (OUTPATIENT)
Dept: PAIN MEDICINE | Facility: CLINIC | Age: 74
End: 2024-09-26
Payer: COMMERCIAL

## 2024-09-26 VITALS
BODY MASS INDEX: 35.85 KG/M2 | HEIGHT: 64 IN | HEART RATE: 76 BPM | DIASTOLIC BLOOD PRESSURE: 76 MMHG | WEIGHT: 210 LBS | SYSTOLIC BLOOD PRESSURE: 120 MMHG

## 2024-09-26 DIAGNOSIS — M54.16 LUMBAR RADICULOPATHY: ICD-10-CM

## 2024-09-26 DIAGNOSIS — M47.816 LUMBAR SPONDYLOSIS: Primary | ICD-10-CM

## 2024-09-26 DIAGNOSIS — M48.061 LUMBAR FORAMINAL STENOSIS: ICD-10-CM

## 2024-09-26 PROCEDURE — 99214 OFFICE O/P EST MOD 30 MIN: CPT | Performed by: PAIN MEDICINE

## 2024-09-26 NOTE — PROGRESS NOTES
Assessment  1. Lumbar spondylosis  2. Lumbar radiculopathy  3. Lumbar foraminal stenosis      Has primarily axial pain in nature with tenderness palpation of bilateral facets will recommend bilateral L3-4-5 diagnostic medial branch block as is failed 6 weeks of therapy and last 6 months.  If successful after 2 diagnostic medial branch blocks we will proceed with nerve ablation he also has some foraminal stenosis which is severe but he denies any significant radicular pain at this time    The risks of the procedure were discussed in detail.  These risks include infection, increased pain, paralysis, bleeding.  Patient understands the risks and is willing to pursue with the procedure        My impressions and treatment recommendations were discussed in detail with the patient who verbalized understanding and had no further questions.  Discharge instructions were provided. I personally saw and examined the patient and I agree with the above discussed plan of care.    Plan      No orders of the defined types were placed in this encounter.        No orders of the defined types were placed in this encounter.        History of Present Illness  Low up 9/26/2024  Bill comes for follow-up regarding his low back pain pain is axial in nature with increased pain with tossing turning in bed and flexion extension of his lumbar spine he completed his MRI of his lumbar spine which I reviewed with him today his pain is currently 7 out of 10 in his lower back      Consult  Roverto Milton is a 74 y.o. male with relevant PMH of recently diagnosed diabetes restless leg syndrome and history of fatty liver presenting to [facility] spine and pain for chief complaint of low back pain with radiation to bilateral hips worse with walking improved with sitting and also pain at night when sleeping pain also with tossing and turning more on the right-hand side pain can increase to an 8 out of 10 with half a block of walking currently affecting  quality of life he has been doing home exercise program for his low back focus on range of motion strengthening 3 times a week for the past 6 weeks to 30 minutes a day without improvement in his back pain he has not done a formal course of physical therapy denies bowel bladder incontinence no allergies to contrast dyes is not on blood thinners.  Reports joint aches throughout the body but also in his lower back      I have personally reviewed and/or updated the patient's past medical history, past surgical history, family history, social history, current medications, allergies, and vital signs today.     Review of Systems   Musculoskeletal:  Positive for arthralgias, back pain and gait problem.   All other systems reviewed and are negative.      Patient Active Problem List   Diagnosis    Arthritis    Moderate persistent asthma without complication    Hypothyroidism    DONALD (obstructive sleep apnea)    Mixed hyperlipidemia    Polyarthralgia    Primary osteoarthritis, left shoulder    History of eosinophilic pneumonia (HCC)    Shortness of breath    Blurry vision, bilateral    Insomnia    Dysphagia    Bipolar disorder (HCC)    RLQ abdominal pain    BPH (benign prostatic hyperplasia)    CAD in native artery    Chest tightness    Disease characterized by destruction of skeletal muscle    Rotator cuff arthropathy    Class 2 severe obesity due to excess calories with serious comorbidity and body mass index (BMI) of 38.0 to 38.9 in adult (HCC)    Metabolic syndrome    S/P reverse total shoulder arthroplasty, right    Hyponatremia    Bariatric surgery status    Asymptomatic PVCs    Muscle weakness    Transaminitis    Abnormal CT scan    Fatty liver    New onset type 2 diabetes mellitus (HCC)    S/p bilateral blepharoplasty    Bilateral leg edema    Severe persistent asthma not dependent on systemic steroids    Acute respiratory failure with hypoxia (HCC)    Polyp of colon    Elevated LDL cholesterol level    Statin  intolerance       Past Medical History:   Diagnosis Date    ARIANE (acute kidney injury) (HCC) 01/13/2023    pt not aware of this    Arthritis     Asthma     Breathing difficulty 01/2023    shldr sx    Colon polyp     Dental crowns present     all teeth Veneers    Depression     Disease of thyroid gland     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HCC)     Essential hypertension     History of COVID-19 09/09/2023    original surgery date 9/12/23 and rescheduled to 10/23/23    Hyperlipidemia     Hypertension 2012    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HCC)     per pt does not have this now    Manic depression (HCC)     Right lower lobe pneumonia 01/12/2023    Shortness of breath     MONTAÑO    Sleep apnea        Past Surgical History:   Procedure Laterality Date    COLONOSCOPY  12/13/2022    COLONOSCOPY W/ BIOPSIES AND POLYPECTOMY  06/14/2023    JOINT REPLACEMENT  1/10 /2023    Right Shoulder    [place] ARTHROPLASTY GLENOHUMERAL JOINT TOTAL SHOULDER Right 01/10/2023    Procedure: ARTHROPLASTY SHOULDER REVERSE WITH BICEPS TENODESIS;  Surgeon: Lele Patrick MD;  Location:  MAIN OR;  Service: Orthopedics    [place] BLEPHAROPLASTY LOWER EYELID Bilateral 12/04/2023    Procedure: BLEPHAROPLASTY LOWER;  Surgeon: Rachna Aguilar DO;  Location:  MAIN OR;  Service: Plastics    [place] BLEPHAROPLASTY UPPER EYELID W/EXCESSIVE SKIN Bilateral 12/04/2023    Procedure: BLEPHAROPLASTY UPPER;  Surgeon: Rachna Aguilar DO;  Location:  MAIN OR;  Service: Plastics    UMBILICAL HERNIA REPAIR         Family History   Problem Relation Age of Onset    Hypertension Mother     Depression Mother     Depression Father     Arthritis Father     Heart attack Paternal Uncle 31    Colon cancer Neg Hx     Prostate cancer Neg Hx        Social History     Occupational History    Not on file   Tobacco Use    Smoking status: Never    Smokeless tobacco: Never   Vaping Use    Vaping status: Never Used   Substance and Sexual Activity    Alcohol use: Not Currently     Comment:  "\"very very occas\"    Drug use: Never    Sexual activity: Not Currently     Partners: Female     Birth control/protection: None     Comment: defer       Current Outpatient Medications on File Prior to Visit   Medication Sig    albuterol (2.5 mg/3 mL) 0.083 % nebulizer solution Take 3 mL (2.5 mg total) by nebulization every 6 (six) hours as needed for wheezing or shortness of breath    albuterol (PROVENTIL HFA,VENTOLIN HFA) 90 mcg/act inhaler Inhale 1 puff every 6 (six) hours as needed for wheezing or shortness of breath    cetirizine (ZyrTEC) 5 MG tablet Take 5 mg by mouth daily    clonazePAM (KlonoPIN) 1 mg tablet Take 1 mg by mouth daily at bedtime    diclofenac sodium (VOLTAREN) 50 mg EC tablet Take 1 tablet (50 mg total) by mouth 2 (two) times a day    fluticasone-umeclidinium-vilanterol (Trelegy Ellipta) 200-62.5-25 mcg/actuation AEPB inhaler Inhale 1 puff daily    gabapentin (NEURONTIN) 300 mg capsule Take 1 tablet at bedtime for 3 days, then 1 tablet twice daily for 3 days, then 1 tablet 3 times daily    lamoTRIgine (LaMICtal) 200 MG tablet Take 1 tablet (200 mg total) by mouth daily    levothyroxine 112 mcg tablet TAKE 1 TABLET(112 MCG) BY MOUTH DAILY    montelukast (SINGULAIR) 10 mg tablet Take 1 tablet (10 mg total) by mouth daily at bedtime    multivitamin-iron-minerals-folic acid (CENTRUM) chewable tablet Chew 1 tablet daily    omega-3-acid ethyl esters (LOVAZA) 1 g capsule Take 2 capsules (2 g total) by mouth 2 (two) times a day \"Fish oil\"    pramipexole (MIRAPEX) 1 mg tablet Take 2 tablets (2 mg total) by mouth daily at bedtime    semaglutide, 2 mg/dose, (Ozempic, 2 MG/DOSE,) 8 mg/ mL injection pen Inject 0.75 mL (2 mg total) under the skin every 7 days    tamsulosin (FLOMAX) 0.4 mg TAKE 2 CAPSULES(0.8 MG) BY MOUTH IN THE MORNING    torsemide (DEMADEX) 5 MG tablet Take 1 tablet (5 mg total) by mouth daily     No current facility-administered medications on file prior to visit.       Allergies   Allergen " "Reactions    Crestor [Rosuvastatin] Other (See Comments)     Lost ability to walk and balance    Lipitor [Atorvastatin] Other (See Comments) and Dizziness     And very unsteady on feet    Spiriva Respimat [Tiotropium Bromide Monohydrate] Rash         Physical Exam    /76 (BP Location: Right arm, Patient Position: Sitting, Cuff Size: Standard)   Pulse 76   Ht 5' 4\" (1.626 m)   Wt 95.3 kg (210 lb)   BMI 36.05 kg/m²           MSK:    Lumbar Spine:  No masses or atrophy,    Range of motion - Decreased extension/flexion  Palpation - Tenderness to palpation in the lumbar parapsinals   PSIS tenderness no  Rigoberto's/LEXX no  Gaenslen's no  SLR neg       Strength Right Left   Hip flexion L1,2 5 5   Knee extension L3 5 5   Ankle dorsiflexion L4 5 5   Great toe extension L5 5 5   Ankle Plantarflexion S1 5 5       Sensory Exam:  intact to light touch bilateral LE       Reflexes:     Right Left   Biceps 2+ 2+   Triceps 2+ 2+   Brachioradialis 2+ 2+   Patellar 2+ 2+   Achilles 2+ 2+   Babinski neg neg        Gait flexed gait                Imaging    MRI L spine  9/24  IMPRESSION:     Bilateral L5 pars defects results in grade 1 spondylolisthesis resulting in severe right foraminal narrowing with compression of the foraminal right L4 nerve root. Correlate for right L4 radiculopathy. Degenerative changes at L1-2 result in moderate left   foraminal narrowing. Mild multilevel central and foraminal narrowing elsewhere as described.       "

## 2024-09-30 ENCOUNTER — APPOINTMENT (OUTPATIENT)
Dept: RADIOLOGY | Facility: OTHER | Age: 74
End: 2024-09-30
Payer: COMMERCIAL

## 2024-09-30 ENCOUNTER — OFFICE VISIT (OUTPATIENT)
Dept: PHYSICAL THERAPY | Facility: CLINIC | Age: 74
End: 2024-09-30
Payer: COMMERCIAL

## 2024-09-30 ENCOUNTER — OFFICE VISIT (OUTPATIENT)
Dept: OBGYN CLINIC | Facility: OTHER | Age: 74
End: 2024-09-30
Payer: COMMERCIAL

## 2024-09-30 ENCOUNTER — APPOINTMENT (OUTPATIENT)
Dept: LAB | Facility: CLINIC | Age: 74
End: 2024-09-30
Payer: COMMERCIAL

## 2024-09-30 VITALS
BODY MASS INDEX: 36.19 KG/M2 | WEIGHT: 212 LBS | DIASTOLIC BLOOD PRESSURE: 74 MMHG | HEIGHT: 64 IN | SYSTOLIC BLOOD PRESSURE: 115 MMHG | HEART RATE: 83 BPM

## 2024-09-30 DIAGNOSIS — R74.01 TRANSAMINITIS: ICD-10-CM

## 2024-09-30 DIAGNOSIS — M48.062 SPINAL STENOSIS OF LUMBAR REGION WITH NEUROGENIC CLAUDICATION: ICD-10-CM

## 2024-09-30 DIAGNOSIS — M25.511 ACUTE PAIN OF RIGHT SHOULDER: Primary | ICD-10-CM

## 2024-09-30 DIAGNOSIS — M47.816 LUMBAR SPONDYLOSIS: Primary | ICD-10-CM

## 2024-09-30 DIAGNOSIS — K76.0 FATTY LIVER: ICD-10-CM

## 2024-09-30 DIAGNOSIS — M25.511 ACUTE PAIN OF RIGHT SHOULDER: ICD-10-CM

## 2024-09-30 DIAGNOSIS — Z96.611 S/P REVERSE TOTAL SHOULDER ARTHROPLASTY, RIGHT: ICD-10-CM

## 2024-09-30 DIAGNOSIS — M19.012 PRIMARY OSTEOARTHRITIS, LEFT SHOULDER: ICD-10-CM

## 2024-09-30 LAB
ALBUMIN SERPL BCG-MCNC: 4.5 G/DL (ref 3.5–5)
ALP SERPL-CCNC: 93 U/L (ref 34–104)
ALT SERPL W P-5'-P-CCNC: 63 U/L (ref 7–52)
ANION GAP SERPL CALCULATED.3IONS-SCNC: 6 MMOL/L (ref 4–13)
AST SERPL W P-5'-P-CCNC: 52 U/L (ref 13–39)
BILIRUB SERPL-MCNC: 0.41 MG/DL (ref 0.2–1)
BUN SERPL-MCNC: 16 MG/DL (ref 5–25)
CALCIUM SERPL-MCNC: 9.4 MG/DL (ref 8.4–10.2)
CHLORIDE SERPL-SCNC: 96 MMOL/L (ref 96–108)
CO2 SERPL-SCNC: 35 MMOL/L (ref 21–32)
CREAT SERPL-MCNC: 1.14 MG/DL (ref 0.6–1.3)
GFR SERPL CREATININE-BSD FRML MDRD: 63 ML/MIN/1.73SQ M
GLUCOSE P FAST SERPL-MCNC: 93 MG/DL (ref 65–99)
POTASSIUM SERPL-SCNC: 4.3 MMOL/L (ref 3.5–5.3)
PROT SERPL-MCNC: 7.3 G/DL (ref 6.4–8.4)
SODIUM SERPL-SCNC: 137 MMOL/L (ref 135–147)

## 2024-09-30 PROCEDURE — 20610 DRAIN/INJ JOINT/BURSA W/O US: CPT | Performed by: ORTHOPAEDIC SURGERY

## 2024-09-30 PROCEDURE — 97112 NEUROMUSCULAR REEDUCATION: CPT

## 2024-09-30 PROCEDURE — 97140 MANUAL THERAPY 1/> REGIONS: CPT

## 2024-09-30 PROCEDURE — 80053 COMPREHEN METABOLIC PANEL: CPT

## 2024-09-30 PROCEDURE — 73030 X-RAY EXAM OF SHOULDER: CPT

## 2024-09-30 PROCEDURE — 99214 OFFICE O/P EST MOD 30 MIN: CPT | Performed by: ORTHOPAEDIC SURGERY

## 2024-09-30 PROCEDURE — 36415 COLL VENOUS BLD VENIPUNCTURE: CPT

## 2024-09-30 PROCEDURE — 97110 THERAPEUTIC EXERCISES: CPT

## 2024-09-30 RX ORDER — BUPIVACAINE HYDROCHLORIDE 2.5 MG/ML
2 INJECTION, SOLUTION INFILTRATION; PERINEURAL
Status: COMPLETED | OUTPATIENT
Start: 2024-09-30 | End: 2024-09-30

## 2024-09-30 RX ORDER — BETAMETHASONE SODIUM PHOSPHATE AND BETAMETHASONE ACETATE 3; 3 MG/ML; MG/ML
6 INJECTION, SUSPENSION INTRA-ARTICULAR; INTRALESIONAL; INTRAMUSCULAR; SOFT TISSUE
Status: COMPLETED | OUTPATIENT
Start: 2024-09-30 | End: 2024-09-30

## 2024-09-30 RX ADMIN — BUPIVACAINE HYDROCHLORIDE 2 ML: 2.5 INJECTION, SOLUTION INFILTRATION; PERINEURAL at 10:00

## 2024-09-30 RX ADMIN — BETAMETHASONE SODIUM PHOSPHATE AND BETAMETHASONE ACETATE 6 MG: 3; 3 INJECTION, SUSPENSION INTRA-ARTICULAR; INTRALESIONAL; INTRAMUSCULAR; SOFT TISSUE at 10:00

## 2024-09-30 NOTE — PROGRESS NOTES
"  Assessment  Diagnoses and all orders for this visit:    Acute pain of right shoulder    Primary osteoarthritis, left shoulder    S/P reverse total shoulder arthroplasty, right      Discussion and Plan:    Discussed with the patient that right shoulder x-rays are unchanged no evidence of fracture or dislocation.   The patient has an examination and x-rays consistent with left shoulder osteoarthritis.  I have discussed with the patient the pathophysiology of this diagnosis and reviewed how the examination correlates with this diagnosis.  Surgical vs conservative treatment options were discussed at length and after discussing these treatment options, the patient elected for a CS injection today.  Injection can be repeated in 4-6 mos if needed.  Explained the definitive treatment would be total shoulder arthoplasty.        Subjective:   Patient ID: Roverto Milton is a 74 y.o. male      HPI  Patient presents today for follow up regarding bilateral shoulder pain.  Patient has known left shoulder OA.  He is s/p Right reverse TSA with biceps tenodesis 1/10/23 followed by a scapular body fracture.  Patient reports he was doing well until last month when he  fell landing on the right arm.  Patient would like to discussed left reverse TSA.    The following portions of the patient's history were reviewed and updated as appropriate: allergies, current medications, past family history, past medical history, past social history, past surgical history and problem list.        Objective:  /74   Pulse 83   Ht 5' 4\" (1.626 m)   Wt 96.2 kg (212 lb)   BMI 36.39 kg/m²       Right Shoulder Exam     Range of Motion   External rotation:  40   Forward flexion:  120   Internal rotation 0 degrees:  Sacrum     Other   Erythema: absent  Sensation: normal  Pulse: present    Comments:    Well healed incision       Left Shoulder Exam     Range of Motion   External rotation:  40   Forward flexion:  160   Internal rotation 0 degrees:  " Sacrum     Other   Erythema: absent  Sensation: normal  Pulse: present             Physical Exam  Vitals reviewed.   Constitutional:       Appearance: He is well-developed.   HENT:      Head: Normocephalic.   Eyes:      Pupils: Pupils are equal, round, and reactive to light.   Pulmonary:      Effort: Pulmonary effort is normal.   Abdominal:      General: Abdomen is flat. There is no distension.   Skin:     General: Skin is warm and dry.       Large joint arthrocentesis: L glenohumeral  Universal Protocol:  procedure performed by consultantConsent: Verbal consent obtained.  Consent given by: patient  Patient understanding: patient states understanding of the procedure being performed  Site marked: the operative site was marked  Patient identity confirmed: verbally with patient  Supporting Documentation  Indications: pain   Procedure Details  Location: shoulder - L glenohumeral  Needle size: 22 G  Ultrasound guidance: no  Approach: posterior  Medications administered: 2 mL bupivacaine 0.25 %; 6 mg betamethasone acetate-betamethasone sodium phosphate 6 (3-3) mg/mL    Patient tolerance: patient tolerated the procedure well with no immediate complications  Dressing:  Sterile dressing applied         I have personally reviewed pertinent films in PACS and my interpretation is as follows.    Right shoulder x-rays demonstrates prothesis remains in acceptable alignment and position, no sign of loosening, no fracture or dislocation   Left shoulder x-rays demonstrates moderates degenerative changes with inferior osteophyte.     Scribe Attestation      I,:  Rhonda Alva MA am acting as a scribe while in the presence of the attending physician.:       I,:  Lele Patrick MD personally performed the services described in this documentation    as scribed in my presence.:

## 2024-09-30 NOTE — PROGRESS NOTES
"Daily Note     Today's date: 2024  Patient name: Roverto Milton  : 1950  MRN: [id number]  Referring provider: Lex Dyer*  Dx:   Encounter Diagnosis     ICD-10-CM    1. Lumbar spondylosis  M47.816       2. Spinal stenosis of lumbar region with neurogenic claudication  M48.062                      Subjective: Patient reports he hurts almost everywhere.       Objective: See treatment diary below      Assessment: Tolerated treatment well. Patient demonstrated fatigue post treatment, exhibited good technique with therapeutic exercises, and would benefit from continued PT Patient had minimal tolerance to exercise today with increase in discomfort in low back. Verbal cueing needed for throughout for gentle mobility and gentle end range. He is limited with most exercises due to his shoulder pain.       Plan: Continue per plan of care.  Progress treatment as tolerated.         POC Expires Auth Status Start Date Expiration Date PT Visit Limit    10/25  9/25  BOMN   Date        Used 1       Remaining           Diagnosis:  Low back pain   Precautions:  Hx of Depression   Comparable signs 1) Walking  2) Squatting    Primary Impairments: 1) lumbar hypomobility   2) adverse neural tension   Patient Goals Feel better   Manual Therapy        PPU w/ OP         PA glides         T/s mobs         Hip mobs         Re-evaluation          Exercise Diary          Therapeutic Exercise         Bike/TM for ROM  5'       PPU         Hip sags/standing ext         T/s extension  5\"x20       Nerve glides  20x       LTR  5'', 10x       Bent knee fall outs  5'', 20x       Neuromuscular Re-education         Multif NMR  Pelvic tilt 2x10       Bridges   2x10       TrA sets    2x10                                                    Therapeutic Activities         Education  POC, diagnosis, expecations                                            Modalities                              "

## 2024-10-03 DIAGNOSIS — F31.0 BIPOLAR AFFECTIVE DISORDER, CURRENT EPISODE HYPOMANIC (HCC): ICD-10-CM

## 2024-10-03 RX ORDER — PRAMIPEXOLE DIHYDROCHLORIDE 1 MG/1
2 TABLET ORAL
Qty: 180 TABLET | Refills: 1 | Status: SHIPPED | OUTPATIENT
Start: 2024-10-03 | End: 2025-04-01

## 2024-10-03 RX ORDER — LAMOTRIGINE 200 MG/1
200 TABLET ORAL DAILY
Qty: 90 TABLET | Refills: 1 | Status: SHIPPED | OUTPATIENT
Start: 2024-10-03 | End: 2025-04-01

## 2024-10-21 ENCOUNTER — HOSPITAL ENCOUNTER (OUTPATIENT)
Dept: RADIOLOGY | Facility: HOSPITAL | Age: 74
Discharge: HOME | End: 2024-10-21
Attending: PAIN MEDICINE
Payer: COMMERCIAL

## 2024-10-21 VITALS
TEMPERATURE: 96.5 F | HEART RATE: 70 BPM | RESPIRATION RATE: 18 BRPM | SYSTOLIC BLOOD PRESSURE: 140 MMHG | DIASTOLIC BLOOD PRESSURE: 70 MMHG | OXYGEN SATURATION: 95 %

## 2024-10-21 DIAGNOSIS — M47.816 LUMBAR SPONDYLOSIS: ICD-10-CM

## 2024-10-21 RX ORDER — BUPIVACAINE HCL/PF 2.5 MG/ML
3 VIAL (ML) INJECTION ONCE
Status: COMPLETED | OUTPATIENT
Start: 2024-10-21 | End: 2024-10-21

## 2024-10-21 RX ADMIN — BUPIVACAINE HYDROCHLORIDE 3 ML: 2.5 INJECTION, SOLUTION EPIDURAL; INFILTRATION; INTRACAUDAL at 13:41

## 2024-10-21 NOTE — DISCHARGE INSTRUCTIONS

## 2024-10-22 DIAGNOSIS — M47.816 LUMBAR SPONDYLOSIS: Primary | ICD-10-CM

## 2024-10-22 NOTE — PROGRESS NOTES
Status post bilateral L3-4-5 medial branch block #1 with excellent relief 80% reduction for 6 hours we will proceed with medial branch #2.    The risks of the procedure were discussed in detail.  These risks include infection, increased pain, paralysis, bleeding.  Patient understands the risks and is willing to pursue with the procedure

## 2024-10-29 ENCOUNTER — OFFICE VISIT (OUTPATIENT)
Dept: FAMILY MEDICINE CLINIC | Facility: CLINIC | Age: 74
End: 2024-10-29

## 2024-10-29 VITALS
HEART RATE: 86 BPM | HEIGHT: 64 IN | SYSTOLIC BLOOD PRESSURE: 120 MMHG | OXYGEN SATURATION: 95 % | WEIGHT: 208 LBS | DIASTOLIC BLOOD PRESSURE: 60 MMHG | BODY MASS INDEX: 35.51 KG/M2

## 2024-10-29 DIAGNOSIS — E78.00 ELEVATED LDL CHOLESTEROL LEVEL: ICD-10-CM

## 2024-10-29 DIAGNOSIS — N40.0 BENIGN PROSTATIC HYPERPLASIA, UNSPECIFIED WHETHER LOWER URINARY TRACT SYMPTOMS PRESENT: ICD-10-CM

## 2024-10-29 DIAGNOSIS — K76.0 FATTY LIVER: ICD-10-CM

## 2024-10-29 DIAGNOSIS — E03.9 HYPOTHYROIDISM, UNSPECIFIED TYPE: ICD-10-CM

## 2024-10-29 DIAGNOSIS — R74.01 TRANSAMINITIS: ICD-10-CM

## 2024-10-29 DIAGNOSIS — E11.9 NEW ONSET TYPE 2 DIABETES MELLITUS (HCC): Primary | ICD-10-CM

## 2024-10-29 DIAGNOSIS — Z78.9 STATIN INTOLERANCE: ICD-10-CM

## 2024-10-29 NOTE — PROGRESS NOTES
"Assessment/Plan:   Diagnoses and all orders for this visit:    New onset type 2 diabetes mellitus (HCC)  -     Hemoglobin A1C; Future  -     Comprehensive metabolic panel; Future  -     Albumin / creatinine urine ratio; Future  - last A1c = 5.8 <-- 5.9 <-- 6.6   - UTD with PCV20   - UTD with annual Flu vaccine  - UTD with RSV   - UTD with COVID Booster   - weight in the office = 208lbs (has lost an additional 15lbs since last OV in 7/2024)   - cont with weight loss efforts - currently on max dose of Ozempic   - RTO in 6months, with labs, for f/u and annual exam - pt aware and agreeable     Elevated LDL cholesterol level  - LDL = 111 <-- 136 <-- 168   - statin intolerance   - follows with Cardio (had OV earlier today) and on Leqvio   - has script for repeat lipid panel in 3months - if cholesterol not at goal t/c adding Zetia   - The 10-year ASCVD risk score (Constantino DK, et al., 2019) is: 39.5%    Values used to calculate the score:      Age: 74 years      Sex: Male      Is Non- : No      Diabetic: Yes      Tobacco smoker: No      Systolic Blood Pressure: 120 mmHg      Is BP treated: Yes      HDL Cholesterol: 35 mg/dL      Total Cholesterol: 170 mg/dL  Statin intolerance    Transaminitis  Fatty liver  (+) elevated LFTs   - (+) h/o \"Enlarged fatty liver\" as noted on RUQ US done 4/11/2023   - weight in the office = 208lbs (has lost an additional 15lbs since last OV in 7/2024)   - cont with weight loss efforts - currently on max dose of Ozempic     Hypothyroidism, unspecified type  -     TSH, 3rd generation with Free T4 reflex; Future    Benign prostatic hyperplasia, unspecified whether lower urinary tract symptoms present  -     PSA, Total Screen; Future          Subjective:    Patient ID: Roverto Milton is a 74 y.o. male.  HPI  75yo M presents to the office for f/u   - weight in the office = 208lbs (has lost an additional 15lbs since last OV in 7/2024)   - reviewed CMP done 9/30/2024 = (+) " "elevated LFTs in h/o fatty liver  - saw Cardio earlier today   - breathing is \"excellent\" - no resp tightness at all   - UTD with annual Flu vaccine, RSV and COVID Booster   - UTD with PCV20 (2022)   - denies F/C/N/V/CP/palpitations/SOB/wheezing/abd pain/LE edema         The following portions of the patient's history were reviewed and updated as appropriate: allergies, current medications, past family history, past medical history, past social history, past surgical history and problem list.    Review of Systems  as per HPI    Objective:  /60   Pulse 86   Ht 5' 4\" (1.626 m)   Wt 94.3 kg (208 lb)   SpO2 95%   BMI 35.70 kg/m²    Physical Exam  Vitals reviewed.   Constitutional:       General: He is not in acute distress.     Appearance: Normal appearance. He is obese. He is not ill-appearing, toxic-appearing or diaphoretic.   HENT:      Head: Normocephalic and atraumatic.      Right Ear: External ear normal.      Left Ear: External ear normal.      Nose: Nose normal.   Eyes:      General: No scleral icterus.        Right eye: No discharge.         Left eye: No discharge.      Extraocular Movements: Extraocular movements intact.      Conjunctiva/sclera: Conjunctivae normal.   Cardiovascular:      Rate and Rhythm: Normal rate and regular rhythm.      Heart sounds: Normal heart sounds.   Pulmonary:      Effort: Pulmonary effort is normal. No respiratory distress.      Breath sounds: Normal breath sounds. No wheezing.   Abdominal:      Palpations: Abdomen is soft.   Musculoskeletal:         General: Normal range of motion.      Cervical back: Normal range of motion.      Right lower leg: No edema.      Left lower leg: No edema.   Skin:     General: Skin is warm.   Neurological:      General: No focal deficit present.      Mental Status: He is alert and oriented to person, place, and time.   Psychiatric:         Mood and Affect: Mood normal.         Behavior: Behavior normal.         "

## 2024-11-12 ENCOUNTER — HOSPITAL ENCOUNTER (OUTPATIENT)
Dept: RADIOLOGY | Facility: HOSPITAL | Age: 74
Discharge: HOME | End: 2024-11-12
Attending: PAIN MEDICINE
Payer: COMMERCIAL

## 2024-11-12 VITALS
RESPIRATION RATE: 18 BRPM | DIASTOLIC BLOOD PRESSURE: 79 MMHG | OXYGEN SATURATION: 97 % | SYSTOLIC BLOOD PRESSURE: 126 MMHG | HEART RATE: 65 BPM | TEMPERATURE: 96.8 F

## 2024-11-12 DIAGNOSIS — M47.816 LUMBAR SPONDYLOSIS: ICD-10-CM

## 2024-11-12 PROCEDURE — 64493 INJ PARAVERT F JNT L/S 1 LEV: CPT | Performed by: PAIN MEDICINE

## 2024-11-12 PROCEDURE — 64494 INJ PARAVERT F JNT L/S 2 LEV: CPT | Performed by: PAIN MEDICINE

## 2024-11-12 RX ORDER — BUPIVACAINE HYDROCHLORIDE 5 MG/ML
3 INJECTION, SOLUTION EPIDURAL; INTRACAUDAL ONCE
Status: COMPLETED | OUTPATIENT
Start: 2024-11-12 | End: 2024-11-12

## 2024-11-12 RX ADMIN — BUPIVACAINE HYDROCHLORIDE 3 ML: 5 INJECTION, SOLUTION EPIDURAL; INTRACAUDAL; PERINEURAL at 09:29

## 2024-11-12 NOTE — H&P
History of Present Illness: The patient is a 74 y.o. male who presents with complaints of low back pain, here for MBB # 2 Bilateral L345/    Past Medical History:   Diagnosis Date    ARIANE (acute kidney injury) (HCC) 01/13/2023    pt not aware of this    Arthritis     Asthma     Breathing difficulty 01/2023    shldr sx    Colon polyp     Dental crowns present     all teeth Veneers    Depression     Disease of thyroid gland     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HCC)     Essential hypertension     History of COVID-19 09/09/2023    original surgery date 9/12/23 and rescheduled to 10/23/23    Hyperlipidemia     Hypertension 2012    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HCC)     per pt does not have this now    Manic depression (HCC)     Right lower lobe pneumonia 01/12/2023    Shortness of breath     MONTAÑO    Sleep apnea        Past Surgical History:   Procedure Laterality Date    COLONOSCOPY  12/13/2022    COLONOSCOPY W/ BIOPSIES AND POLYPECTOMY  06/14/2023    JOINT REPLACEMENT  1/10 /2023    Right Shoulder    [place] ARTHROPLASTY GLENOHUMERAL JOINT TOTAL SHOULDER Right 01/10/2023    Procedure: ARTHROPLASTY SHOULDER REVERSE WITH BICEPS TENODESIS;  Surgeon: Lele Patrick MD;  Location:  MAIN OR;  Service: Orthopedics    [place] BLEPHAROPLASTY LOWER EYELID Bilateral 12/04/2023    Procedure: BLEPHAROPLASTY LOWER;  Surgeon: Rachna Aguilar DO;  Location:  MAIN OR;  Service: Plastics    [place] BLEPHAROPLASTY UPPER EYELID W/EXCESSIVE SKIN Bilateral 12/04/2023    Procedure: BLEPHAROPLASTY UPPER;  Surgeon: Rachna Aguilar DO;  Location:  MAIN OR;  Service: Plastics    UMBILICAL HERNIA REPAIR           Current Outpatient Medications:     albuterol (PROVENTIL HFA,VENTOLIN HFA) 90 mcg/act inhaler, Inhale 1 puff every 6 (six) hours as needed for wheezing or shortness of breath, Disp: 18 g, Rfl: 6    cetirizine (ZyrTEC) 5 MG tablet, Take 5 mg by mouth daily, Disp: , Rfl:     diclofenac sodium (VOLTAREN) 50 mg EC tablet, Take 1 tablet (50 mg  "total) by mouth 2 (two) times a day, Disp: 60 tablet, Rfl: 1    fluticasone-umeclidinium-vilanterol (Trelegy Ellipta) 200-62.5-25 mcg/actuation AEPB inhaler, Inhale 1 puff daily, Disp: 60 blister, Rfl: 5    gabapentin (NEURONTIN) 300 mg capsule, Take 1 tablet at bedtime for 3 days, then 1 tablet twice daily for 3 days, then 1 tablet 3 times daily, Disp: 90 capsule, Rfl: 0    lamoTRIgine (LaMICtal) 200 MG tablet, Take 1 tablet (200 mg total) by mouth daily, Disp: 90 tablet, Rfl: 1    levothyroxine 112 mcg tablet, TAKE 1 TABLET(112 MCG) BY MOUTH DAILY, Disp: 100 tablet, Rfl: 1    montelukast (SINGULAIR) 10 mg tablet, Take 1 tablet (10 mg total) by mouth daily at bedtime, Disp: 90 tablet, Rfl: 1    multivitamin-iron-minerals-folic acid (CENTRUM) chewable tablet, Chew 1 tablet daily, Disp: , Rfl:     omega-3-acid ethyl esters (LOVAZA) 1 g capsule, Take 2 capsules (2 g total) by mouth 2 (two) times a day \"Fish oil\", Disp: 180 capsule, Rfl: 4    pramipexole (MIRAPEX) 1 mg tablet, Take 2 tablets (2 mg total) by mouth daily at bedtime, Disp: 180 tablet, Rfl: 1    semaglutide, 2 mg/dose, (Ozempic, 2 MG/DOSE,) 8 mg/ mL injection pen, Inject 0.75 mL (2 mg total) under the skin every 7 days, Disp: 9 mL, Rfl: 1    tamsulosin (FLOMAX) 0.4 mg, TAKE 2 CAPSULES(0.8 MG) BY MOUTH IN THE MORNING, Disp: 180 capsule, Rfl: 1    torsemide (DEMADEX) 5 MG tablet, Take 1 tablet (5 mg total) by mouth daily, Disp: 30 tablet, Rfl: 5    Allergies   Allergen Reactions    Crestor [Rosuvastatin] Other (See Comments)     Lost ability to walk and balance    Lipitor [Atorvastatin] Other (See Comments) and Dizziness     And very unsteady on feet    Spiriva Respimat [Tiotropium Bromide Monohydrate] Rash       Physical Exam:   Vitals:    11/12/24 0908   BP: 120/71   Pulse: 63   Resp: 18   Temp: (!) 96.8 °F (36 °C)   SpO2: 100%     General: Awake, Alert, Oriented x 3, Mood and affect appropriate  Respiratory: Respirations even and " unlabored  Cardiovascular: Peripheral pulses intact; no edema  Musculoskeletal Exam:TTP lumbar facets    ASA Score: 3    Patient/Chart Verification  Patient ID Verified: Verbal  ID Band Applied: No  Consents Confirmed: Procedural  H&P( within 30 days) Verified: Yes  Interval H&P(within 24 hr) Complete (required for Outpatients and Surgery Admit only): Yes  Allergies Reviewed: Yes  Anticoag/NSAID held?: NA  Currently on antibiotics?: No    Assessment:   1. Lumbar spondylosis        Plan: bilateral L345 MBB # 2      The risks of the procedure were discussed in detail.  These risks include infection, increased pain, paralysis, bleeding.  Patient understands the risks and is willing to pursue with the procedure

## 2024-11-12 NOTE — DISCHARGE INSTR - LAB

## 2024-11-13 ENCOUNTER — TELEPHONE (OUTPATIENT)
Age: 74
End: 2024-11-13

## 2024-11-13 DIAGNOSIS — M47.816 LUMBAR SPONDYLOSIS: Primary | ICD-10-CM

## 2024-11-13 NOTE — TELEPHONE ENCOUNTER
Caller: Maurice    Doctor: AR    Reason for call: pt would like to discuss scheduling an ablation     Call back#: [phone]

## 2024-11-13 NOTE — PROGRESS NOTES
80% releif after two nerve blocks will proceed with nerve ablation    The risks of the procedure were discussed in detail.  These risks include infection, increased pain, paralysis, bleeding.  Patient understands the risks and is willing to pursue with the procedure

## 2024-11-19 NOTE — TELEPHONE ENCOUNTER
Caller: patient    Doctor: AR    Reason for call: calling to schedule procedure, would like an appt for December due to insurance benefit    Please advise    Call back#:

## 2024-12-13 ENCOUNTER — TELEPHONE (OUTPATIENT)
Dept: OBGYN CLINIC | Facility: MEDICAL CENTER | Age: 74
End: 2024-12-13

## 2024-12-13 NOTE — TELEPHONE ENCOUNTER
Pt cancelled his RFA's. States he is having some ins issues and will call us back to reschedule. He is lokking into maybe like April. He also has lost about 50 pds and states his back is doing a little better

## 2024-12-18 ENCOUNTER — CLINICAL SUPPORT (OUTPATIENT)
Dept: CARDIOLOGY CLINIC | Facility: CLINIC | Age: 74
End: 2024-12-18
Payer: COMMERCIAL

## 2024-12-18 DIAGNOSIS — E78.2 MIXED HYPERLIPIDEMIA: Primary | ICD-10-CM

## 2024-12-18 DIAGNOSIS — I25.10 CAD IN NATIVE ARTERY: ICD-10-CM

## 2024-12-18 DIAGNOSIS — Z78.9 STATIN INTOLERANCE: ICD-10-CM

## 2024-12-18 DIAGNOSIS — E78.00 ELEVATED LDL CHOLESTEROL LEVEL: ICD-10-CM

## 2024-12-18 PROCEDURE — 99211 OFF/OP EST MAY X REQ PHY/QHP: CPT

## 2024-12-18 NOTE — PROGRESS NOTES
Pt came in for 6 month leqvio-tolerated injection, no questions or concerns at this time.    [id number]  [id number]  EXP 5/31/27

## 2025-01-10 DIAGNOSIS — E11.9 NEW ONSET TYPE 2 DIABETES MELLITUS (HCC): ICD-10-CM

## 2025-01-11 DIAGNOSIS — R60.0 BILATERAL LEG EDEMA: ICD-10-CM

## 2025-01-13 RX ORDER — TORSEMIDE 5 MG/1
5 TABLET ORAL DAILY
Qty: 90 TABLET | Refills: 1 | Status: SHIPPED | OUTPATIENT
Start: 2025-01-13

## 2025-01-15 DIAGNOSIS — J45.20 MILD INTERMITTENT ASTHMA, UNSPECIFIED WHETHER COMPLICATED: ICD-10-CM

## 2025-01-15 RX ORDER — FLUTICASONE FUROATE, UMECLIDINIUM BROMIDE AND VILANTEROL TRIFENATATE 200; 62.5; 25 UG/1; UG/1; UG/1
1 POWDER RESPIRATORY (INHALATION) DAILY
Qty: 60 BLISTER | Refills: 5 | Status: SHIPPED | OUTPATIENT
Start: 2025-01-15

## 2025-01-27 ENCOUNTER — TELEPHONE (OUTPATIENT)
Age: 75
End: 2025-01-27

## 2025-01-27 NOTE — TELEPHONE ENCOUNTER
"Behavioral Health Outpatient Intake Questions    Referred By   : Self Referral     Please advise interviewee that they need to answer all questions truthfully to allow for best care, and any misrepresentations of information may affect their ability to be seen at this clinic   => Was this discussed? Yes     If Minor Child (under age 18)    Who is/are the legal guardian(s) of the child?     Is there a custody agreement? No     If \"YES\"- Custody orders must be obtained prior to scheduling the first appointment  In addition, Consent to Treatment must be signed by all legal guardians prior to scheduling the first appointment    If \"NO\"- Consent to Treatment must be signed by all legal guardians prior to scheduling the first appointment    Behavioral Health Outpatient Intake History -     Presenting Problem (in patient's own words):       Depression.     Are there any communication barriers for this patient?     No                                               If yes, please describe barriers:  NONE   If there is a unique situation, please refer to Rob Peralta/Sonam Gutierrez for final determination.    Are you taking any psychiatric medications? Yes     If \"YES\" -What are they Lamictal     If \"YES\" -Who prescribes? PCP     Has the Patient previously received outpatient Talk Therapy or Medication Management from [facility]  No        If \"YES\"- When, Where and with Whom?         If \"NO\" -Has Patient received these services elsewhere?       If \"YES\" -When, Where, and with Whom?    Has the Patient abused alcohol or other substances in the last 6 months ? No  No concerns of substance abuse are reported.     If \"YES\" -What substance, How much, How often?     If illegal substance: Refer to [facility] (for RAMIREZ) or [facility].   If Alcohol in excess of 10 drinks per week:  Refer to [facility] (for RAMIREZ) or [facility]    Legal History-     Is this treatment court ordered? No   If \"yes \"send to :  Talk Therapy : " "Send to Rob Peralta for final determination   Med Management: Send to Dr. Cifuentes for final determination     Has the Patient been convicted of a felony?  No   If \"Yes\" send to -When, What?  Talk Therapy: Send to Rob Peralta for final determination   Med Management: Send to Dr. Cifuentes for final determination     ACCEPTED as a patient Yes  If \"Yes\" Appointment Date: 2/26/2025-     Referred Elsewhere? No  If “Yes” - (Where? Ex: [facility], [facility], [facility], [facility], etc.)       Name of Insurance Co:[employer]   Insurance ID#[id number]  Insurance Phone #  If ins is primary or secondary?Primary   If patient is a minor, parents information such as Name, D.O.B of guarantor.  "

## 2025-02-03 ENCOUNTER — TELEPHONE (OUTPATIENT)
Dept: PSYCHIATRY | Facility: CLINIC | Age: 75
End: 2025-02-03

## 2025-02-03 NOTE — TELEPHONE ENCOUNTER
Forms sent via [employer].    Patient is aware that forms should be completed via [employer] prior to appt.

## 2025-02-18 DIAGNOSIS — E11.9 NEW ONSET TYPE 2 DIABETES MELLITUS (HCC): ICD-10-CM

## 2025-02-20 ENCOUNTER — TELEPHONE (OUTPATIENT)
Age: 75
End: 2025-02-20

## 2025-02-20 NOTE — TELEPHONE ENCOUNTER
PA for OZEMPIC 2 MG SUBMITTED to [facility]     via    []CMM-KEY:   [x]Surescripts-Case ID # [id number]   []Availity-Auth ID # NDC #   []Faxed to plan   []Other website   []Phone call Case ID #     []PA sent as URGENT    All office notes, labs and other pertaining documents and studies sent. Clinical questions answered. Awaiting determination from insurance company.     Turnaround time for your insurance to make a decision on your Prior Authorization can take 7-21 business days.

## 2025-02-24 ENCOUNTER — TELEPHONE (OUTPATIENT)
Dept: PSYCHIATRY | Facility: CLINIC | Age: 75
End: 2025-02-24

## 2025-02-24 NOTE — TELEPHONE ENCOUNTER
PA for ozempic 2 mg /0.5 ml APPROVED     Date(s) approved February 1, 2025 to February 20, 2026     Case #[id number]     Patient advised by    ALREADY DISPENSED TO PATIENT     []MyChart Message  []Phone call   []LMOM  []L/M to call office as no active Communication consent on file  []Unable to leave detailed message as VM not approved on Communication consent       Pharmacy advised by    Fax  []Phone call    Specialty Pharmacy         Approval letter scanned into Media No

## 2025-02-24 NOTE — TELEPHONE ENCOUNTER
Called patient due to RTE with Medicare stating that BC/BS is primary and patient states that BC/BS is no longer effective as of January. Writer stated that patient needed to clarify the situation with medicare.  Patient will handle it.

## 2025-02-25 ENCOUNTER — TELEMEDICINE (OUTPATIENT)
Dept: PSYCHIATRY | Facility: CLINIC | Age: 75
End: 2025-02-25
Payer: MEDICARE

## 2025-02-25 DIAGNOSIS — F31.9 BIPOLAR 1 DISORDER (HCC): Primary | ICD-10-CM

## 2025-02-25 PROCEDURE — 90792 PSYCH DIAG EVAL W/MED SRVCS: CPT | Performed by: STUDENT IN AN ORGANIZED HEALTH CARE EDUCATION/TRAINING PROGRAM

## 2025-02-25 RX ORDER — TRAZODONE HYDROCHLORIDE 50 MG/1
50 TABLET ORAL
Qty: 30 TABLET | Refills: 1 | Status: SHIPPED | OUTPATIENT
Start: 2025-02-25

## 2025-02-25 RX ORDER — PRAMIPEXOLE DIHYDROCHLORIDE 1 MG/1
2 TABLET ORAL
Qty: 180 TABLET | Refills: 1 | Status: SHIPPED | OUTPATIENT
Start: 2025-02-25 | End: 2025-08-24

## 2025-02-25 RX ORDER — LAMOTRIGINE 200 MG/1
200 TABLET ORAL DAILY
Qty: 90 TABLET | Refills: 1 | Status: SHIPPED | OUTPATIENT
Start: 2025-02-25 | End: 2025-08-24

## 2025-02-25 NOTE — PSYCH
PSYCHIATRIC EVALUATION     [facility]    Virtual Visit    Patient is located at Home in the following state in which I hold an active license [place].    Provider located in [place].    The patient was identified by name and date of birth. Roverto Milton was informed that this is a telemedicine visit and that the visit is being conducted through the Epic Embedded platform. He agrees to proceed. My office door was closed. No one else was in the room. He acknowledged consent and understanding of privacy and security of the video platform. The patient has agreed to participate and understands they can discontinue the visit at any time. Patient is aware this is a billable service.     Name and Date of Birth:  Roverto Milton 74 y.o. 1950 MRN: [id number]    Insurance: Payor: MEDICARE / Plan: MEDICARE A AND B / Product Type: Medicare A & B Fee for Service /      Date of Visit: February 25, 2025    Reason for visit:   Chief Complaint   Patient presents with    Depression    Establish Care       Assessment & Plan  Bipolar 1 disorder (HCC)  This patient presents to establish care for bipolar disorder.  He has a history of bipolar disorder, with history of prolonged manic and depressed episodes in the past.  He has been in treatment for many years and has been on his current regimen of Lamictal and Mirapex for the past 5 years.  The patient was also had a history of undergoing cycles of ECT treatments every 6 to 9 years when depressive symptoms began to recur.  He currently endorses good mood and feels that he is doing well on his current medication regimen.  He denies any SI, HI, or AVH and is able to appropriately plan for safety during session today.  He does not demonstrate an imminent danger to his safety or to the safety of others.  He has had issues with sleep after his recent move, though does not demonstrate any signs or symptoms of mike during session.   We will initiate trazodone to assist with sleep.    Continue Lamictal 200 mg daily.  Continue Mirapex 2 mg nightly.  Start trazodone 50 mg nightly.    We will continue to monitor for recurrence of depressive symptoms or changes in mood stability.  We will monitor for need for initiation of ECT.  Most recent ECT series was in 2018.    Orders:    lamoTRIgine (LaMICtal) 200 MG tablet; Take 1 tablet (200 mg total) by mouth daily    pramipexole (MIRAPEX) 1 mg tablet; Take 2 tablets (2 mg total) by mouth daily at bedtime    traZODone (DESYREL) 50 mg tablet; Take 1 tablet (50 mg total) by mouth daily at bedtime         Treatment Recommendations/Precautions:    Continue Lamictal and Mirapex as above, will start Trazodone for sleep.  Aware of 24 hour and weekend coverage for urgent situations accessed by calling [facility] main practice number  I am scheduling this patient out for greater than 3 months: No    Medications Risks/Benefits:      Risks, Benefits And Possible Side Effects Of Medications:    Risks, benefits, and possible side effects of medications explained to Roverto and he verbalizes understanding and agreement for treatment.    Controlled Medication Discussion:     Not applicable    HPI     Roverto is a 74 y.o. male with a history of Bipolar Disorder who presents for psychiatric evaluation due to  need to establish psychiatric care .    The patient presents as pleasant and cooperative throughout encounter.  He states that he is presenting to establish care after his previous psychiatrist had retired.  He notes that he has a long history of bipolar disorder.  He reports that he was first hospitalized in 1981 and has had several hospitalizations since, though has had good overall stability for many years with medication management and has had cycles of ECT treatments roughly every 6 to 9 years.  The patient reports that he had initially presented with manic symptoms that have progressed  into severe depression.  He describes his manic symptoms as severe elevations in energy with decreased need for sleep, sleeping only 1 hour per night at times, and increased goal-directed activity and impulsivity.  He denies any significant agitation or irritability during his manic episodes.  He reports that manic episodes have lasted for months at a time.  He claims that during his depressed episodes, he experiences low mood, low energy, loss of interest, excessive sleep, feelings of hopelessness, and suicidal thoughts.  He reports that he attempted to jump out of his window once in the past, though was stopped by his wife.  He denies any history of nonsuicidal self-injury or history of thoughts to harm others.  The patient reports that he has not had symptoms of severe depression since the year 2000.  He notes that he has done well over time with adjustments in his medication management.  He notes that he has been tried on numerous medications including Zoloft, Prozac, Lexapro, Wellbutrin, Depakote, Seroquel, Abilify, Klonopin.  However, he reports that he was most recently tried on a combination of Lamictal and Mirapex and states that for the past 5 years he has been doing very well on this regimen.    The patient reports that he started ECT treatments in 1987 and has undergone several cycles of ECT treatments since then, roughly every 6 to 9 years.  He states that his most recent ECT series was in 2018.  The patient reports that he typically knows when he needs to undergo a new series of ECT treatments as the depression symptoms start to reemerge.    The patient reports that his mood is currently good and he feels that he is doing quite well.  He denies feelings of depressed mood, decreased interest, poor appetite, hopelessness.  He denies any suicidal thoughts or thoughts to harm others.  He denies any current signs or symptoms of mike.  He denies any history of excessive worry or anxiety.  He denies any  auditory or visual hallucinations.  He denies any paranoia and does not demonstrate any delusional thought content during assessment.    The patient reports that he has a good support system.  He currently lives with his wife in [place] after having moved from [place] recently.  He is retired and previously worked in retail management.  He states that he has no biological children but did have a stepson that had passed away 8 years ago due to cardiac disease.  He reports that he has grandchildren and great grandchildren.  He has had some increased stress due to the move and has been struggling more with sleep as a result.  He denies access to any firearms.  He denies any SI, HI, or AVH and is future oriented.  He is able to appropriately plan for safety during session today.  As the patient has been doing well on his current regimen, he prefers to continue with this, though does request assistance with sleep.  He reports that he has tried trazodone before and does not know if this had helped, though is willing to do a new trial of this.    HPI ROS Appetite Changes and Sleep:     He reports decreased sleep, normal appetite, normal energy level    Current Rating Scores:     Current PHQ-9   PHQ-2/9 Depression Screening    Little interest or pleasure in doing things: 0 - not at all  Feeling down, depressed, or hopeless: 0 - not at all  Trouble falling or staying asleep, or sleeping too much: 2 - more than half the days  Feeling tired or having little energy: 0 - not at all  Poor appetite or overeatin - not at all  Feeling bad about yourself - or that you are a failure or have let yourself or your family down: 0 - not at all  Trouble concentrating on things, such as reading the newspaper or watching television: 0 - not at all  Moving or speaking so slowly that other people could have noticed. Or the opposite - being so fidgety or restless that you have been moving around a lot more than usual: 0 - not at  all  Thoughts that you would be better off dead, or of hurting yourself in some way: 0 - not at all  PHQ-9 Score: 2  PHQ-9 Interpretation: No or Minimal depression       Current VISHAL-7 is   VISHAL-7 Flowsheet Screening      Flowsheet Row Most Recent Value   Over the last two weeks, how often have you been bothered by the following problems?     Feeling nervous, anxious, or on edge 0   Not being able to stop or control worrying 0   Worrying too much about different things 0   Trouble relaxing  0   Being so restless that it's hard to sit still 0   Becoming easily annoyed or irritable  0   Feeling afraid as if something awful might happen 0   How difficult have these problems made it for you to do your work, take care of things at home, or get along with other people?  Not difficult at all   VISHAL Score  0        .    Psychiatric Review Of Systems:    Sleep changes: decreased  Appetite changes: no  Weight changes: no  Energy/anergy: no  Interest/pleasure/anhedonia: no  Somatic symptoms: no  Anxiety/panic: no  Shivani: past manic episodes, no current signs or symptoms of shivani  Guilty/hopeless: no  Self injurious behavior/risky behavior: no  Suicidal ideation: no  Homicidal ideation: no  Auditory hallucinations: no  Visual hallucinations: no  Other hallucinations: no  Delusional thinking: no  Eating disorder history: no  Obsessive/compulsive symptoms: no    Review Of Systems:    Mood euthymic   Behavior appropriate, cooperative, and calm   Thought Content normal   General sleep disturbances   Personality normal   Other Psych Symptoms normal   Constitutional negative   ENT negative   Cardiovascular negative   Respiratory negative   Gastrointestinal negative   Genitourinary negative   Musculoskeletal negative   Integumentary negative   Neurological negative   Endocrine negative   Other Symptoms none, all other systems are negative       Past Psychiatric History:     Past Inpatient Psychiatric Treatment:   Has been admitted  "multiple times, primarily for depressive symptoms but one time due to manic symptoms.  Past Outpatient Psychiatric Treatment:    Was seeing Dr. Jim Sanders for 30 years, who is now retired.  Past Suicide Attempts:  Attempted to jump out of window once but was stopped by wife.  Past Violent Behavior: no  Past Psychiatric Medication Trials: Prozac, Zoloft, Paxil, Lexapro, Effexor, Wellbutrin, Trazodone, Depakote, Lamictal, Lithium, Abilify, and Seroquel    Traumatic History:     Abuse: no history of physical or sexual abuse  Other Traumatic Events: none     Family Psychiatric History:     Patient reports that his mother and father have a history of depression. He denies any known family history of substance abuse. He notes that both of his grandfathers had committed suicide.    Family History   Problem Relation Age of Onset    Hypertension Mother     Depression Mother     Depression Father     Arthritis Father     Heart attack Paternal Uncle 31    Colon cancer Neg Hx     Prostate cancer Neg Hx        Substance Use History:    Alcohol use: occasional, social use  Recreational drug use:   Cocaine:  denies use  Heroin:  denies use  Marijuana:  denies current use, used in college  Other drugs: denies use   Longest clean time: not applicable  History of Inpatient/Outpatient rehabilitation program: no  Smoking history: denies use    Social History     Substance and Sexual Activity   Alcohol Use Not Currently    Comment: \"very very occas\"     Social History     Substance and Sexual Activity   Drug Use Never       Social History:    Education: bachelor's degree in political science  Learning Disabilities: none  Marital History:   Children:  no biological children, had a stepson who passed away 8 years ago due to cardiac disease  Living Arrangement: lives in home with wife  Occupational History: retired, previously worked in retail as a manager  Functioning Relationships: good support system  Legal History: " "none   History: None    Social History     Socioeconomic History    Marital status: /Civil Union     Spouse name: Not on file    Number of children: Not on file    Years of education: Not on file    Highest education level: Not on file   Occupational History    Not on file   Tobacco Use    Smoking status: Never    Smokeless tobacco: Never   Vaping Use    Vaping status: Never Used   Substance and Sexual Activity    Alcohol use: Not Currently     Comment: \"very very occas\"    Drug use: Never    Sexual activity: Not Currently     Partners: Female     Birth control/protection: None     Comment: defer   Other Topics Concern    Not on file   Social History Narrative    Not on file     Social Drivers of Health     Financial Resource Strain: Low Risk  (2/22/2023)    Overall Financial Resource Strain (CARDIA)     Difficulty of Paying Living Expenses: Not hard at all   Food Insecurity: No Food Insecurity (3/19/2024)    Nursing - Inadequate Food Risk Classification     Worried About Running Out of Food in the Last Year: Never true     Ran Out of Food in the Last Year: Never true     Ran Out of Food in the Last Year: Not on file   Transportation Needs: No Transportation Needs (3/19/2024)    PRAPARE - Transportation     Lack of Transportation (Medical): No     Lack of Transportation (Non-Medical): No   Physical Activity: Not on file   Stress: Not on file   Social Connections: Not on file   Intimate Partner Violence: Not on file   Housing Stability: Low Risk  (3/19/2024)    Housing Stability Vital Sign     Unable to Pay for Housing in the Last Year: No     Number of Times Moved in the Last Year: 1     Homeless in the Last Year: No       Past Medical History:    Past Medical History:   Diagnosis Date    ARIANE (acute kidney injury) (HCC) 01/13/2023    pt not aware of this    Arthritis     Asthma     Breathing difficulty 01/2023    shldr sx    Colon polyp     Dental crowns present     all teeth Veneers    Depression     " Disease of thyroid gland     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HCC)     Essential hypertension 9/14/2022    History of COVID-19 09/09/2023    original surgery date 9/12/23 and rescheduled to 10/23/23    Hyperlipidemia     Hypertension 2012    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HCC)     per pt does not have this now    Manic depression (HCC)     Right lower lobe pneumonia 01/12/2023    Shortness of breath     [place]O    Sleep apnea         Past Surgical History:   Procedure Laterality Date    COLONOSCOPY  12/13/2022    COLONOSCOPY W/ BIOPSIES AND POLYPECTOMY  06/14/2023    JOINT REPLACEMENT  1/10 /2023    Right Shoulder    UT ARTHROPLASTY GLENOHUMERAL JOINT TOTAL SHOULDER Right 01/10/2023    Procedure: ARTHROPLASTY SHOULDER REVERSE WITH BICEPS TENODESIS;  Surgeon: Lele Patrick MD;  Location:  MAIN OR;  Service: Orthopedics    UT BLEPHAROPLASTY LOWER EYELID Bilateral 12/04/2023    Procedure: BLEPHAROPLASTY LOWER;  Surgeon: Rachna Aguilar DO;  Location:  MAIN OR;  Service: Plastics    UT BLEPHAROPLASTY UPPER EYELID W/EXCESSIVE SKIN Bilateral 12/04/2023    Procedure: BLEPHAROPLASTY UPPER;  Surgeon: Rachna Aguilar DO;  Location:  MAIN OR;  Service: Plastics    UMBILICAL HERNIA REPAIR       Allergies   Allergen Reactions    Crestor [Rosuvastatin] Other (See Comments)     Lost ability to walk and balance    Lipitor [Atorvastatin] Other (See Comments) and Dizziness     And very unsteady on feet    Spiriva Respimat [Tiotropium Bromide Monohydrate] Rash       Current Outpatient Medications:    Current Outpatient Medications   Medication Sig Dispense Refill    lamoTRIgine (LaMICtal) 200 MG tablet Take 1 tablet (200 mg total) by mouth daily 90 tablet 1    pramipexole (MIRAPEX) 1 mg tablet Take 2 tablets (2 mg total) by mouth daily at bedtime 180 tablet 1    traZODone (DESYREL) 50 mg tablet Take 1 tablet (50 mg total) by mouth daily at bedtime 30 tablet 1    albuterol (PROVENTIL HFA,VENTOLIN HFA) 90 mcg/act inhaler Inhale  "1 puff every 6 (six) hours as needed for wheezing or shortness of breath 18 g 6    cetirizine (ZyrTEC) 5 MG tablet Take 5 mg by mouth daily      fluticasone-umeclidinium-vilanterol (Trelegy Ellipta) 200-62.5-25 mcg/actuation AEPB inhaler Inhale 1 puff daily 60 blister 5    levothyroxine 112 mcg tablet TAKE 1 TABLET(112 MCG) BY MOUTH DAILY 100 tablet 1    montelukast (SINGULAIR) 10 mg tablet Take 1 tablet (10 mg total) by mouth daily at bedtime 90 tablet 1    multivitamin-iron-minerals-folic acid (CENTRUM) chewable tablet Chew 1 tablet daily      omega-3-acid ethyl esters (LOVAZA) 1 g capsule Take 2 capsules (2 g total) by mouth 2 (two) times a day \"Fish oil\" 180 capsule 4    semaglutide, 2 mg/dose, (Ozempic, 2 MG/DOSE,) 8 mg/ mL injection pen Inject 0.75 mL (2 mg total) under the skin every 7 days 3 mL 2    tamsulosin (FLOMAX) 0.4 mg TAKE 2 CAPSULES(0.8 MG) BY MOUTH IN THE MORNING 180 capsule 1    torsemide (DEMADEX) 5 MG tablet Take 1 tablet (5 mg total) by mouth daily 90 tablet 1     No current facility-administered medications for this visit.       History Review:    The following portions of the patient's history were reviewed and updated as appropriate: allergies, current medications, past family history, past medical history, past social history, past surgical history, and problem list.    OBJECTIVE:    Vital signs in last 24 hours:    There were no vitals filed for this visit.     Mental Status Evaluation:    Appearance age appropriate, casually dressed, dressed appropriately   Behavior pleasant, cooperative, calm   Speech normal rate, normal volume, normal pitch   Mood euthymic   Affect normal range and intensity, appropriate   Thought Processes organized, goal directed   Associations intact associations   Thought Content no overt delusions   Perceptual Disturbances: no auditory hallucinations, no visual hallucinations   Abnormal Thoughts  Risk Potential Suicidal ideation - None  Homicidal ideation - " None  Potential for aggression - No   Orientation oriented to person, place, time/date, and situation   Memory recent and remote memory grossly intact   Consciousness alert and awake   Attention Span Concentration Span attention span and concentration are age appropriate   Intellect appears to be of average intelligence   Insight intact   Judgement intact   Muscle Strength and  Gait normal gait and normal balance   Motor Activity no abnormal movements   Language no difficulty naming common objects, no difficulty repeating a phrase, no difficulty writing a sentence   Fund of Knowledge adequate knowledge of current events  adequate fund of knowledge regarding past history  adequate fund of knowledge regarding vocabulary    Pain none   Pain Scale 0       Laboratory Results: I have personally reviewed all pertinent laboratory/tests results    Recent Labs (last 6 months):   Appointment on 09/30/2024   Component Date Value    Sodium 09/30/2024 137     Potassium 09/30/2024 4.3     Chloride 09/30/2024 96     CO2 09/30/2024 35 (H)     ANION GAP 09/30/2024 6     BUN 09/30/2024 16     Creatinine 09/30/2024 1.14     Glucose, Fasting 09/30/2024 93     Calcium 09/30/2024 9.4     AST 09/30/2024 52 (H)     ALT 09/30/2024 63 (H)     Alkaline Phosphatase 09/30/2024 93     Total Protein 09/30/2024 7.3     Albumin 09/30/2024 4.5     Total Bilirubin 09/30/2024 0.41     eGFR 09/30/2024 63        Suicide/Homicide Risk Assessment:    Risk of Harm to Self:  The following ratings are based on assessment at the time of the interview and review of records  Demographic risk factors include: , male, age: over 50 or older  Historical Risk Factors include: history of mood disorder, history of suicide attempt  Recent Specific Risk Factors include: diagnosis of mood disorder  Protective Factors: no current suicidal ideation, ability to adapt to change, able to manage anger well, access to mental health treatment, being a parent, being  , compliant with medications, compliant with mental health treatment, effective coping skills, effective decision-making skills, having a desire to be alive, resiliency, stable living environment, sense of personal control, supportive family, supportive friends  Weapons: none. The following steps have been taken to ensure weapons are properly secured: not applicable  Based on today's assessment, Roverto presents the following risk of harm to self: low    Risk of Harm to Others:  The following ratings are based on assessment at the time of the interview and review of records  Demographic Risk Factors include: male.  Historical Risk Factors include: none.  Recent Specific Risk Factors include: none.  Protective Factors: no current homicidal ideation, ability to adapt to change, able to manage anger well, access to mental health treatment, being a parent, being , compliant with medications, compliant with mental health treatment, effective coping skills, effective decision-making skills, resilience, safe and stable living environment, sense of personal control, supportive family, supportive friends  Weapons: none. The following steps have been taken to ensure weapons are properly secured: not applicable  Based on today's assessment, Roverto presents the following risk of harm to others: low    The following interventions are recommended: continue medication management    Treatment Plan:    Completed and signed during the session: Yes - Treatment Plan done but not signed at time of office visit due to:  Plan reviewed by video and verbal consent given due to virtual visit. Treatment Plan sent to patient via [employer] for signature.    This note was not shared with the patient due to reasonable likelihood of causing patient harm    Visit Time    Visit Start Time: 10:00 AM  Visit Stop Time: 10:55 AM  Total Visit Duration:  55 minutes    Abhijeet Guillen MD 02/25/25

## 2025-02-25 NOTE — BH TREATMENT PLAN
TREATMENT PLAN (Medication Management Only)        [facility]    Name and Date of Birth:  Roverto Milton 74 y.o. 1950  Date of Treatment Plan: February 25, 2025  Diagnosis/Diagnoses:    1. Bipolar 1 disorder (HCC)      Strengths/Personal Resources for Self-Care: supportive family, supportive friends, taking medications as prescribed, ability to adapt to life changes, ability to communicate well, ability to listen, ability to reason, ability to understand psychiatric illness, general fund of knowledge, good physical health, independence, motivation for treatment, being resoureceful, well educated, willingness to work on problems.  Area/Areas of need (in own words): sleep problems  1. Long Term Goal: maintain mood stability.  Target Date:6 months - 8/25/2025  Person/Persons responsible for completion of goal: Roverto  2.  Short Term Objective (s) - How will we reach this goal?:   A. Provider new recommended medication/dosage changes and/or continue medication(s):  Will continue Lamictal and Mirapex and start trial of Trazodone for sleep .  B.  Will utilize ECT treatments if depressive symptoms re-emerge as this has been very beneficial in the past .  C.  Will work on lifestyle modifications as needed to address stressors .  Target Date:6 months - 8/25/2025  Person/Persons Responsible for Completion of Goal: Roverto  Progress Towards Goals: continuing treatment  Treatment Modality: medication management every 4 weeks  Review due 180 days from date of this plan: 6 months - 8/25/2025  Expected length of service: ongoing treatment  My Physician/PA/NP and I have developed this plan together and I agree to work on the goals and objectives. I understand the treatment goals that were developed for my treatment.

## 2025-02-25 NOTE — BH CRISIS PLAN
Client Name: Maurice Milton       Client YOB: 1950    \[facility]\""Loc Safety Plan      Creation Date: 2/25/25 Update Date: 2/25/25   Created By: Abhijeet Guillen MD Last Updated By: Abhijeet Guillen MD      Step 1: Warning Signs:   Warning Signs   Severe depression   Excessive sleeping   Loss of motivation            Step 2: Internal Coping Strategies:   Internal Coping Strategies   Listening to music   Taking walks            Step 3: People and social settings that provide distraction:   Name Contact Information   Cameesha (friend) Phone   Armond (friend) Phone            Step 4: People whom I can ask for help during a crisis:      Name Contact Information    Viry (wife) In home, phone      Step 5: Professionals or agencies I can contact during a crisis:      Clinican/Agency Name Phone Emergency Contact    [facility] [phone]       [facility] Emergency Department Emergency Department Phone Emergency Department Address    [facility] [phone]         Crisis Phone Numbers:   Suicide Prevention Lifeline: Call or Text  419 Crisis Text Line: Text HOME to 786-435   Please note: Some [facility] do not have a separate number for Child/Adolescent specific crisis. If your county is not listed under Child/Adolescent, please call the adult number for your county      Adult Crisis Numbers: Child/Adolescent Crisis Numbers   [facility]: [phone] [facility]: [phone]   [facility]: [phone] [facility]: [phone]   [facility]: [phone] [place]: [phone]   [facility]: [phone] [place]: [phone]   [place] [facility]: [phone]   [facility]: [phone]   [facility]: [phone]   [facility]: [phone] (daytime) [phone] (after hours, weekends, holidays)      Step 6: Making the environment safer (plan for lethal means safety):   Patient did not identify any lethal  methods: Yes     Optional: What is most important to me and worth living for?   Grandchildren and great grandchildren     Job Safety Plan. Shea Soler and Lele Monterroso. Used with permission of the authors.

## 2025-02-25 NOTE — ASSESSMENT & PLAN NOTE
This patient presents to establish care for bipolar disorder.  He has a history of bipolar disorder, with history of prolonged manic and depressed episodes in the past.  He has been in treatment for many years and has been on his current regimen of Lamictal and Mirapex for the past 5 years.  The patient was also had a history of undergoing cycles of ECT treatments every 6 to 9 years when depressive symptoms began to recur.  He currently endorses good mood and feels that he is doing well on his current medication regimen.  He denies any SI, HI, or AVH and is able to appropriately plan for safety during session today.  He does not demonstrate an imminent danger to his safety or to the safety of others.  He has had issues with sleep after his recent move, though does not demonstrate any signs or symptoms of mike during session.  We will initiate trazodone to assist with sleep.    Continue Lamictal 200 mg daily.  Continue Mirapex 2 mg nightly.  Start trazodone 50 mg nightly.    We will continue to monitor for recurrence of depressive symptoms or changes in mood stability.  We will monitor for need for initiation of ECT.  Most recent ECT series was in 2018.

## 2025-03-11 ENCOUNTER — HOSPITAL ENCOUNTER (EMERGENCY)
Facility: HOSPITAL | Age: 75
Discharge: HOME | End: 2025-03-11
Attending: EMERGENCY MEDICINE
Payer: MEDICARE

## 2025-03-11 ENCOUNTER — APPOINTMENT (OUTPATIENT)
Dept: RADIOLOGY | Facility: HOSPITAL | Age: 75
End: 2025-03-11
Payer: MEDICARE

## 2025-03-11 VITALS
TEMPERATURE: 97.8 F | OXYGEN SATURATION: 93 % | RESPIRATION RATE: 20 BRPM | HEART RATE: 81 BPM | DIASTOLIC BLOOD PRESSURE: 63 MMHG | SYSTOLIC BLOOD PRESSURE: 113 MMHG

## 2025-03-11 DIAGNOSIS — M54.9 BACK PAIN: ICD-10-CM

## 2025-03-11 DIAGNOSIS — R07.9 CHEST PAIN, UNSPECIFIED TYPE: Primary | ICD-10-CM

## 2025-03-11 LAB
2HR DELTA HS TROPONIN: -1 NG/L
ALBUMIN SERPL BCG-MCNC: 4.2 G/DL (ref 3.5–5)
ALP SERPL-CCNC: 87 U/L (ref 34–104)
ALT SERPL W P-5'-P-CCNC: 27 U/L (ref 7–52)
ANION GAP SERPL CALCULATED.3IONS-SCNC: 6 MMOL/L (ref 4–13)
AST SERPL W P-5'-P-CCNC: 25 U/L (ref 13–39)
BASOPHILS # BLD AUTO: 0.03 THOUSANDS/ÂΜL (ref 0–0.1)
BASOPHILS NFR BLD AUTO: 1 % (ref 0–1)
BILIRUB SERPL-MCNC: 0.51 MG/DL (ref 0.2–1)
BUN SERPL-MCNC: 17 MG/DL (ref 5–25)
CALCIUM SERPL-MCNC: 9.4 MG/DL (ref 8.4–10.2)
CARDIAC TROPONIN I PNL SERPL HS: 5 NG/L (ref ?–50)
CARDIAC TROPONIN I PNL SERPL HS: 6 NG/L (ref ?–50)
CHLORIDE SERPL-SCNC: 98 MMOL/L (ref 96–108)
CO2 SERPL-SCNC: 31 MMOL/L (ref 21–32)
CREAT SERPL-MCNC: 1.2 MG/DL (ref 0.6–1.3)
EOSINOPHIL # BLD AUTO: 0.24 THOUSAND/ÂΜL (ref 0–0.61)
EOSINOPHIL NFR BLD AUTO: 4 % (ref 0–6)
ERYTHROCYTE [DISTWIDTH] IN BLOOD BY AUTOMATED COUNT: 13.4 % (ref 11.6–15.1)
GFR SERPL CREATININE-BSD FRML MDRD: 60 ML/MIN/1.73SQ M
GLUCOSE SERPL-MCNC: 114 MG/DL (ref 65–140)
HCT VFR BLD AUTO: 42.1 % (ref 36.5–49.3)
HGB BLD-MCNC: 13.8 G/DL (ref 12–17)
IMM GRANULOCYTES # BLD AUTO: 0.02 THOUSAND/UL (ref 0–0.2)
IMM GRANULOCYTES NFR BLD AUTO: 0 % (ref 0–2)
LYMPHOCYTES # BLD AUTO: 1.68 THOUSANDS/ÂΜL (ref 0.6–4.47)
LYMPHOCYTES NFR BLD AUTO: 26 % (ref 14–44)
MCH RBC QN AUTO: 29.6 PG (ref 26.8–34.3)
MCHC RBC AUTO-ENTMCNC: 32.8 G/DL (ref 31.4–37.4)
MCV RBC AUTO: 90 FL (ref 82–98)
MONOCYTES # BLD AUTO: 0.54 THOUSAND/ÂΜL (ref 0.17–1.22)
MONOCYTES NFR BLD AUTO: 8 % (ref 4–12)
NEUTROPHILS # BLD AUTO: 3.95 THOUSANDS/ÂΜL (ref 1.85–7.62)
NEUTS SEG NFR BLD AUTO: 61 % (ref 43–75)
NRBC BLD AUTO-RTO: 0 /100 WBCS
PLATELET # BLD AUTO: 205 THOUSANDS/UL (ref 149–390)
PMV BLD AUTO: 9.3 FL (ref 8.9–12.7)
POTASSIUM SERPL-SCNC: 3.6 MMOL/L (ref 3.5–5.3)
PROT SERPL-MCNC: 6.9 G/DL (ref 6.4–8.4)
RBC # BLD AUTO: 4.66 MILLION/UL (ref 3.88–5.62)
SODIUM SERPL-SCNC: 135 MMOL/L (ref 135–147)
WBC # BLD AUTO: 6.46 THOUSAND/UL (ref 4.31–10.16)

## 2025-03-11 PROCEDURE — 99285 EMERGENCY DEPT VISIT HI MDM: CPT

## 2025-03-11 PROCEDURE — 85025 COMPLETE CBC W/AUTO DIFF WBC: CPT

## 2025-03-11 PROCEDURE — 36415 COLL VENOUS BLD VENIPUNCTURE: CPT

## 2025-03-11 PROCEDURE — 84484 ASSAY OF TROPONIN QUANT: CPT

## 2025-03-11 PROCEDURE — 93005 ELECTROCARDIOGRAM TRACING: CPT

## 2025-03-11 PROCEDURE — 99285 EMERGENCY DEPT VISIT HI MDM: CPT | Performed by: EMERGENCY MEDICINE

## 2025-03-11 PROCEDURE — 80053 COMPREHEN METABOLIC PANEL: CPT

## 2025-03-11 PROCEDURE — 71046 X-RAY EXAM CHEST 2 VIEWS: CPT

## 2025-03-11 RX ORDER — ACETAMINOPHEN 325 MG/1
975 TABLET ORAL ONCE
Status: COMPLETED | OUTPATIENT
Start: 2025-03-11 | End: 2025-03-11

## 2025-03-11 RX ORDER — LIDOCAINE 50 MG/G
1 PATCH TOPICAL ONCE
Status: DISCONTINUED | OUTPATIENT
Start: 2025-03-11 | End: 2025-03-11 | Stop reason: HOSPADM

## 2025-03-11 RX ADMIN — LIDOCAINE 1 PATCH: 700 PATCH TOPICAL at 19:20

## 2025-03-11 RX ADMIN — ACETAMINOPHEN 975 MG: 325 TABLET, FILM COATED ORAL at 19:21

## 2025-03-11 NOTE — ED ATTENDING ATTESTATION
3/11/2025  I, Richard Singh MD, saw and evaluated the patient. I have discussed the patient with the resident/non-physician practitioner and agree with the resident's/non-physician practitioner's findings, Plan of Care, and MDM as documented in the resident's/non-physician practitioner's note, except where noted. All available labs and Radiology studies were reviewed.  I was present for key portions of any procedure(s) performed by the resident/non-physician practitioner and I was immediately available to provide assistance.       At this point I agree with the current assessment done in the Emergency Department.  I have conducted an independent evaluation of this patient a history and physical is as follows:  74-year-old male with history of chronic back pain presenting with itchy eyes, chest tightness, low back pain.  Patient states that [place], states since then he has had itchy eyes as well as a feeling of chest tightness.  He also complains of worsening pain in the low back that is dull, mild to moderate, worse with movement and better at rest, radiating throughout the lower back.  He denies numbness, weakness, trauma, fevers, nausea, vomiting, other symptoms.  On examination, high slightly watery and irritated appearing, heart sounds normal, lungs clear to auscultation, abdomen nontender, overall benign.  Chest tightness and mild pain is reproduced exactly with pressure to the lateral rib cage on both sides.  EKG not acutely ischemic, chest x-ray reassuring, labs reassuring with negative troponin and delta troponin.  Do not suspect ACS, aortic dissection, PE, bacterial pneumonia, pneumothorax, other acute life threat.  Suspect  allergies as contributor, with chest and low back pain felt to be more musculoskeletal.  Discharged with strict return precautions, follow-up primary care doctor as well as cardiology.  ED Course         Critical Care  Time  Procedures

## 2025-03-12 LAB
ATRIAL RATE: 81 BPM
P AXIS: 70 DEGREES
PR INTERVAL: 162 MS
QRS AXIS: 38 DEGREES
QRSD INTERVAL: 90 MS
QT INTERVAL: 376 MS
QTC INTERVAL: 436 MS
T WAVE AXIS: 70 DEGREES
VENTRICULAR RATE: 81 BPM

## 2025-03-12 PROCEDURE — 93010 ELECTROCARDIOGRAM REPORT: CPT | Performed by: INTERNAL MEDICINE

## 2025-03-12 NOTE — ED PROVIDER NOTES
Time reflects when diagnosis was documented in both MDM as applicable and the Disposition within this note       Time User Action Codes Description Comment    3/11/2025  8:02 PM Mikey Corona [R07.9] Chest pain, unspecified type     3/11/2025  8:03 PM Mikey Corona [M54.9] Back pain           ED Disposition       ED Disposition   Discharge    Condition   Stable    Date/Time   Tue Mar 11, 2025  8:03 PM    Comment   Roverto Yoan discharge to home/self care.                   Assessment & Plan       Medical Decision Making  74-year-old male presented to ED for evaluation of back pain, shortness of breath    Differential diagnoses include but not limited to: Lumbar muscle strain/sprain, compression fracture, disc herniation, lumbar radiculopathy, ACS, stable angina, pneumonia, allergies    Initial workup to include: CBC, CMP, troponin, EKG, chest x-ray    See ED Course for data/imaging interpretation and further MDM.    Disposition: Patient reports improvement to back pain following Tylenol and lidocaine patch.  Suspect musculoskeletal etiology for symptoms.  Lab work reassuring for no acute cardiac pathology at this time.  No evidence of pneumonia on chest x-ray.  Suspect shortness of breath and eye itching due to allergies with significant dust exposure at new home.  Patient notified of reassuring lab findings and safe for discharge home.  Return precautions discussed.      Amount and/or Complexity of Data Reviewed  Labs:  Decision-making details documented in ED Course.  Radiology: independent interpretation performed. Decision-making details documented in ED Course.    Risk  OTC drugs.        ED Course as of 03/11/25 2339   Tue Mar 11, 2025   1834 XR chest 2 views  Similar to previous, no areas of consolidation concerning for pneumonia per my interpretation   1834 CBC and differential  Within normal limits   1834 Comprehensive metabolic panel  Within normal limits   1834 HS Troponin 0hr (reflex protocol)  Mild  elevation, plan to trend at 2 hours   2000 HS Troponin I 2hr  Negative delta troponin       Medications   acetaminophen (TYLENOL) tablet 975 mg (975 mg Oral Given 3/11/25 1921)       ED Risk Strat Scores   HEART Risk Score      Flowsheet Row Most Recent Value   Heart Score Risk Calculator    History 1 Filed at: 03/11/2025 2001   ECG 1 Filed at: 03/11/2025 2001   Age 2 Filed at: 03/11/2025 2001   Risk Factors 2 Filed at: 03/11/2025 2001   Troponin 0 Filed at: 03/11/2025 2001   HEART Score 6 Filed at: 03/11/2025 2001          HEART Risk Score      Flowsheet Row Most Recent Value   Heart Score Risk Calculator    History 1 Filed at: 03/11/2025 2001   ECG 1 Filed at: 03/11/2025 2001   Age 2 Filed at: 03/11/2025 2001   Risk Factors 2 Filed at: 03/11/2025 2001   Troponin 0 Filed at: 03/11/2025 2001   HEART Score 6 Filed at: 03/11/2025 2001                                                  History of Present Illness       Chief Complaint   Patient presents with    Shortness of Breath     Pt started with back pain yesterday. Itchy eyes for 3 days. SOB since this am        Past Medical History:   Diagnosis Date    ARIANE (acute kidney injury) (HCC) 01/13/2023    pt not aware of this    Arthritis     Asthma     Breathing difficulty 01/2023    shldr sx    Colon polyp     Dental crowns present     all teeth Veneers    Depression     Disease of thyroid gland     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HCC)     Essential hypertension 9/14/2022    History of COVID-19 09/09/2023    original surgery date 9/12/23 and rescheduled to 10/23/23    Hyperlipidemia     Hypertension 2012    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HCC)     per pt does not have this now    Manic depression (HCC)     Right lower lobe pneumonia 01/12/2023    Shortness of breath     [place]    Sleep apnea       Past Surgical History:   Procedure Laterality Date    COLONOSCOPY  12/13/2022    COLONOSCOPY W/ BIOPSIES AND POLYPECTOMY  06/14/2023    JOINT REPLACEMENT  1/10 /2023    Right Shoulder    AR  "ARTHROPLASTY GLENOHUMERAL JOINT TOTAL SHOULDER Right 01/10/2023    Procedure: ARTHROPLASTY SHOULDER REVERSE WITH BICEPS TENODESIS;  Surgeon: Lele Patrick MD;  Location:  MAIN OR;  Service: Orthopedics    VA BLEPHAROPLASTY LOWER EYELID Bilateral 12/04/2023    Procedure: BLEPHAROPLASTY LOWER;  Surgeon: Rachna Aguilar DO;  Location:  MAIN OR;  Service: Plastics    VA BLEPHAROPLASTY UPPER EYELID W/EXCESSIVE SKIN Bilateral 12/04/2023    Procedure: BLEPHAROPLASTY UPPER;  Surgeon: Rachna Aguilar DO;  Location:  MAIN OR;  Service: Plastics    UMBILICAL HERNIA REPAIR        Family History   Problem Relation Age of Onset    Hypertension Mother     Depression Mother     Depression Father     Arthritis Father     Heart attack Paternal Uncle 31    Colon cancer Neg Hx     Prostate cancer Neg Hx       Social History     Tobacco Use    Smoking status: Never    Smokeless tobacco: Never   Vaping Use    Vaping status: Never Used   Substance Use Topics    Alcohol use: Not Currently     Comment: \"very very occas\"    Drug use: Never      E-Cigarette/Vaping    E-Cigarette Use Never User       E-Cigarette/Vaping Substances    Nicotine No     THC No     CBD No     Flavoring No     Other No     Unknown No       I have reviewed and agree with the history as documented.     74-year-old male presented to ED for evaluation of back pain, shortness of breath.  Patient states that back pain began yesterday with gradual onset worsening lumbar back pain.  Does not report any traumatic injuries.  No prior history of IV drug use, urinary or bowel incontinence, unexpected weight loss or fevers.  No red flag symptoms.  Pain is over her lumbar musculature and worse with positional change.  Patient also complaining of mild shortness of breath and tightness in chest for 1 day.  Denies any prior history of ACS, cardiac arrhythmia, heart failure.  He reports moving to new house and renovating with significant dust exposure.  " Patient also complaining of itchy eyes.  Denies any associated productive cough, sore throat, abdominal pain, nausea, vomiting, diarrhea.      Shortness of Breath      Review of Systems   HENT:          Eye itchiness   Respiratory:  Positive for shortness of breath.    Musculoskeletal:  Positive for back pain.           Objective       ED Triage Vitals   Temperature Pulse Blood Pressure Respirations SpO2 Patient Position - Orthostatic VS   03/11/25 1711 03/11/25 1711 03/11/25 1711 03/11/25 1711 03/11/25 1711 03/11/25 1711   97.8 °F (36.6 °C) 80 125/66 20 96 % Sitting      Temp Source Heart Rate Source BP Location FiO2 (%) Pain Score    03/11/25 1711 03/11/25 1711 03/11/25 1711 -- 03/11/25 1921    Oral Monitor Right arm  2      Vitals      Date and Time Temp Pulse SpO2 Resp BP Pain Score FACES Pain Rating User   03/11/25 2012 -- 81 93 % 20 113/63 -- -- MLR   03/11/25 1921 -- -- -- -- -- 2 -- MLR   03/11/25 1711 97.8 °F (36.6 °C) 80 96 % 20 125/66 -- -- AG            Physical Exam  Constitutional:       Appearance: Normal appearance. He is not ill-appearing.   Eyes:      Extraocular Movements: Extraocular movements intact.      Pupils: Pupils are equal, round, and reactive to light.   Cardiovascular:      Rate and Rhythm: Normal rate and regular rhythm.      Pulses: Normal pulses.      Heart sounds: Normal heart sounds. No murmur heard.     No friction rub. No gallop.   Pulmonary:      Effort: Pulmonary effort is normal. No respiratory distress.      Breath sounds: Normal breath sounds. No stridor. No wheezing, rhonchi or rales.   Abdominal:      General: Abdomen is flat. Bowel sounds are normal.      Palpations: Abdomen is soft.      Tenderness: There is no abdominal tenderness. There is no right CVA tenderness, left CVA tenderness, guarding or rebound.   Musculoskeletal:      Comments: Tender in lumbar musculature bilaterally.  Pain reproducible to palpation   Skin:     General: Skin is warm and dry.      Capillary  Refill: Capillary refill takes less than 2 seconds.      Findings: No rash.   Neurological:      General: No focal deficit present.      Mental Status: He is alert and oriented to person, place, and time. Mental status is at baseline.      Cranial Nerves: No cranial nerve deficit.      Sensory: No sensory deficit.      Motor: No weakness.         Results Reviewed       Procedure Component Value Units Date/Time    HS Troponin I 2hr [[id number]]  (Normal) Collected: 03/11/25 1923    Lab Status: Final result Specimen: Blood from Arm, Left Updated: 03/11/25 1953     hs TnI 2hr 5 ng/L      Delta 2hr hsTnI -1 ng/L     Trauma tubes on hold [[id number]] Collected: 03/11/25 1717    Lab Status: Final result Specimen: Blood from Arm, Left Updated: 03/11/25 1901    Narrative:      The following orders were created for panel order Trauma tubes on hold.  Procedure                               Abnormality         Status                     ---------                               -----------         ------                     Light Blue Top on hold[[id number]]                           Final result                 Please view results for these tests on the individual orders.    HS Troponin 0hr (reflex protocol) [[id number]]  (Normal) Collected: 03/11/25 1717    Lab Status: Final result Specimen: Blood from Arm, Left Updated: 03/11/25 1753     hs TnI 0hr 6 ng/L     Comprehensive metabolic panel [[id number]] Collected: 03/11/25 1717    Lab Status: Final result Specimen: Blood from Arm, Left Updated: 03/11/25 1750     Sodium 135 mmol/L      Potassium 3.6 mmol/L      Chloride 98 mmol/L      CO2 31 mmol/L      ANION GAP 6 mmol/L      BUN 17 mg/dL      Creatinine 1.20 mg/dL      Glucose 114 mg/dL      Calcium 9.4 mg/dL      AST 25 U/L      ALT 27 U/L      Alkaline Phosphatase 87 U/L      Total Protein 6.9 g/dL      Albumin 4.2 g/dL      Total Bilirubin 0.51 mg/dL      eGFR 60 ml/min/1.73sq m     Narrative:      National Kidney Disease  Foundation guidelines for Chronic Kidney Disease (CKD):     Stage 1 with normal or high GFR (GFR > 90 mL/min/1.73 square meters)    Stage 2 Mild CKD (GFR = 60-89 mL/min/1.73 square meters)    Stage 3A Moderate CKD (GFR = 45-59 mL/min/1.73 square meters)    Stage 3B Moderate CKD (GFR = 30-44 mL/min/1.73 square meters)    Stage 4 Severe CKD (GFR = 15-29 mL/min/1.73 square meters)    Stage 5 End Stage CKD (GFR <15 mL/min/1.73 square meters)  Note: GFR calculation is accurate only with a steady state creatinine    CBC and differential [[id number]] Collected: 03/11/25 1717    Lab Status: Final result Specimen: Blood from Arm, Left Updated: 03/11/25 1734     WBC 6.46 Thousand/uL      RBC 4.66 Million/uL      Hemoglobin 13.8 g/dL      Hematocrit 42.1 %      MCV 90 fL      MCH 29.6 pg      MCHC 32.8 g/dL      RDW 13.4 %      MPV 9.3 fL      Platelets 205 Thousands/uL      nRBC 0 /100 WBCs      Segmented % 61 %      Immature Grans % 0 %      Lymphocytes % 26 %      Monocytes % 8 %      Eosinophils Relative 4 %      Basophils Relative 1 %      Absolute Neutrophils 3.95 Thousands/µL      Absolute Immature Grans 0.02 Thousand/uL      Absolute Lymphocytes 1.68 Thousands/µL      Absolute Monocytes 0.54 Thousand/µL      Eosinophils Absolute 0.24 Thousand/µL      Basophils Absolute 0.03 Thousands/µL             XR chest 2 views   ED Interpretation by Mikey Corona DO (03/11 5474)   Similar to previous, no areas of consolidation concerning for pneumonia per my interpretation          ECG 12 Lead Documentation Only    Date/Time: 3/11/2025 11:38 PM    Performed by: Mikey Corona DO  Authorized by: Mikey Corona DO    Patient location:  ED  Previous ECG:     Previous ECG:  Compared to current    Similarity:  No change  Interpretation:     Interpretation: normal    Rate:     ECG rate assessment: normal    Rhythm:     Rhythm: sinus rhythm    Ectopy:     Ectopy: PVCs    QRS:     QRS axis:  Normal    QRS intervals:  Normal  Conduction:      "Conduction: normal    ST segments:     ST segments:  Normal  T waves:     T waves: normal        ED Medication and Procedure Management   Prior to Admission Medications   Prescriptions Last Dose Informant Patient Reported? Taking?   albuterol (PROVENTIL HFA,VENTOLIN HFA) 90 mcg/act inhaler  Self No No   Sig: Inhale 1 puff every 6 (six) hours as needed for wheezing or shortness of breath   cetirizine (ZyrTEC) 5 MG tablet  Self Yes No   Sig: Take 5 mg by mouth daily   fluticasone-umeclidinium-vilanterol (Trelegy Ellipta) 200-62.5-25 mcg/actuation AEPB inhaler   No No   Sig: Inhale 1 puff daily   lamoTRIgine (LaMICtal) 200 MG tablet   No No   Sig: Take 1 tablet (200 mg total) by mouth daily   levothyroxine 112 mcg tablet   No No   Sig: TAKE 1 TABLET(112 MCG) BY MOUTH DAILY   montelukast (SINGULAIR) 10 mg tablet   No No   Sig: Take 1 tablet (10 mg total) by mouth daily at bedtime   multivitamin-iron-minerals-folic acid (CENTRUM) chewable tablet  Self Yes No   Sig: Chew 1 tablet daily   omega-3-acid ethyl esters (LOVAZA) 1 g capsule  Self No No   Sig: Take 2 capsules (2 g total) by mouth 2 (two) times a day \"Fish oil\"   pramipexole (MIRAPEX) 1 mg tablet   No No   Sig: Take 2 tablets (2 mg total) by mouth daily at bedtime   semaglutide, 2 mg/dose, (Ozempic, 2 MG/DOSE,) 8 mg/ mL injection pen   No No   Sig: Inject 0.75 mL (2 mg total) under the skin every 7 days   tamsulosin (FLOMAX) 0.4 mg   No No   Sig: TAKE 2 CAPSULES(0.8 MG) BY MOUTH IN THE MORNING   torsemide (DEMADEX) 5 MG tablet   No No   Sig: Take 1 tablet (5 mg total) by mouth daily   traZODone (DESYREL) 50 mg tablet   No No   Sig: Take 1 tablet (50 mg total) by mouth daily at bedtime      Facility-Administered Medications: None     Discharge Medication List as of 3/11/2025  8:08 PM        CONTINUE these medications which have NOT CHANGED    Details   albuterol (PROVENTIL HFA,VENTOLIN HFA) 90 mcg/act inhaler Inhale 1 puff every 6 (six) hours as needed for wheezing " "or shortness of breath, Starting Fri 1/26/2024, Normal      cetirizine (ZyrTEC) 5 MG tablet Take 5 mg by mouth daily, Historical Med      fluticasone-umeclidinium-vilanterol (Trelegy Ellipta) 200-62.5-25 mcg/actuation AEPB inhaler Inhale 1 puff daily, Starting Wed 1/15/2025, Normal      lamoTRIgine (LaMICtal) 200 MG tablet Take 1 tablet (200 mg total) by mouth daily, Starting Tue 2/25/2025, Until Sun 8/24/2025, Normal      levothyroxine 112 mcg tablet TAKE 1 TABLET(112 MCG) BY MOUTH DAILY, Normal      montelukast (SINGULAIR) 10 mg tablet Take 1 tablet (10 mg total) by mouth daily at bedtime, Starting Thu 3/28/2024, Normal      multivitamin-iron-minerals-folic acid (CENTRUM) chewable tablet Chew 1 tablet daily, Historical Med      omega-3-acid ethyl esters (LOVAZA) 1 g capsule Take 2 capsules (2 g total) by mouth 2 (two) times a day \"Fish oil\", Starting Fri 2/16/2024, Normal      pramipexole (MIRAPEX) 1 mg tablet Take 2 tablets (2 mg total) by mouth daily at bedtime, Starting Tue 2/25/2025, Until Sun 8/24/2025, Normal      semaglutide, 2 mg/dose, (Ozempic, 2 MG/DOSE,) 8 mg/ mL injection pen Inject 0.75 mL (2 mg total) under the skin every 7 days, Starting Wed 2/19/2025, Normal      tamsulosin (FLOMAX) 0.4 mg TAKE 2 CAPSULES(0.8 MG) BY MOUTH IN THE MORNING, Normal      torsemide (DEMADEX) 5 MG tablet Take 1 tablet (5 mg total) by mouth daily, Starting Mon 1/13/2025, Normal      traZODone (DESYREL) 50 mg tablet Take 1 tablet (50 mg total) by mouth daily at bedtime, Starting Tue 2/25/2025, Normal             ED SEPSIS DOCUMENTATION   Time reflects when diagnosis was documented in both MDM as applicable and the Disposition within this note       Time User Action Codes Description Comment    3/11/2025  8:02 PM Mikey Corona [R07.9] Chest pain, unspecified type     3/11/2025  8:03 PM Mikey Corona [M54.9] Back pain                  Mikey Corona DO  03/11/25 9609    "

## 2025-03-12 NOTE — DISCHARGE INSTRUCTIONS
Patient Education     Chest Pain, Adult ED   General Information   You came to the Emergency Department (ED) for chest pain. The doctor feels that the risk of a serious cause for your chest pain is low. Many things can cause chest pain. Some are serious things like heart disease or lung disease. Less serious things like stomach problems can also cause chest pain.  The doctors may not be able to find all serious causes of chest pain the first time they see you. It is important that you follow up with your doctor. You may be waiting on some test results. The staff will notify you if there are concerning results.  What care is needed at home?   Call your regular doctor to let them know you were in the ED. Make a follow-up appointment if you were told to.  Follow your regular doctor’s orders to keep your blood pressure, cholesterol, and high blood sugar (diabetes) under control.  If you smoke, try to quit. Your doctor or nurse can help.  Keep a healthy weight. If you are too heavy, lose weight.  Eat a healthy diet, as discussed with your doctor or nurse.  When do I need to get emergency help?   Call for an ambulance if:   You have signs of a heart attack, which may include:  Severe chest pain, pressure, or discomfort with:  Breathing trouble; sweating; upset stomach; or cold, clammy skin.  Pain in your arms, back, or jaw.  Worse pain with activity like walking up stairs.  Fast or irregular heartbeat.  Feeling dizzy, faint, or weak.  When do I need to call the doctor?   You have a fever of 100.4°F (38°C) or higher or chills.  You cough up yellow or green mucus.  You are more tired than normal or more trouble breathing with activity.  You have new or worsening symptoms.  Last Reviewed Date   2020-06-28  Consumer Information Use and Disclaimer   This generalized information is a limited summary of diagnosis, treatment, and/or medication information. It is not meant to be comprehensive and should be used as a tool to help  the user understand and/or assess potential diagnostic and treatment options. It does NOT include all information about conditions, treatments, medications, side effects, or risks that may apply to a specific patient. It is not intended to be medical advice or a substitute for the medical advice, diagnosis, or treatment of a health care provider based on the health care provider's examination and assessment of a patient’s specific and unique circumstances. Patients must speak with a health care provider for complete information about their health, medical questions, and treatment options, including any risks or benefits regarding use of medications. This information does not endorse any treatments or medications as safe, effective, or approved for treating a specific patient. [employer] and its affiliates disclaim any warranty or liability relating to this information or the use thereof. The use of this information is governed by the Terms of Use, available at https://www.FIGS.com/en/know/clinical-effectiveness-terms   Copyright   Copyright © 2024 [employer], [employer] and its affiliates and/or licensors. All rights reserved.

## 2025-03-25 ENCOUNTER — TELEPHONE (OUTPATIENT)
Age: 75
End: 2025-03-25

## 2025-03-25 NOTE — TELEPHONE ENCOUNTER
Patient called in for assistance with consent form signing.    Virtual consent form/[facility] Form Signed.    Writer additionally updated the emergency contact on file, for the patient.

## 2025-03-26 ENCOUNTER — TELEMEDICINE (OUTPATIENT)
Dept: PSYCHIATRY | Facility: CLINIC | Age: 75
End: 2025-03-26
Payer: MEDICARE

## 2025-03-26 DIAGNOSIS — F31.9 BIPOLAR 1 DISORDER (HCC): Primary | ICD-10-CM

## 2025-03-26 PROCEDURE — 99213 OFFICE O/P EST LOW 20 MIN: CPT | Performed by: STUDENT IN AN ORGANIZED HEALTH CARE EDUCATION/TRAINING PROGRAM

## 2025-03-26 RX ORDER — MIRTAZAPINE 7.5 MG/1
7.5 TABLET, FILM COATED ORAL
Qty: 30 TABLET | Refills: 1 | Status: SHIPPED | OUTPATIENT
Start: 2025-03-26

## 2025-03-26 NOTE — ASSESSMENT & PLAN NOTE
He has a history of bipolar disorder, with history of prolonged manic and depressed episodes in the past.  He has been in treatment for many years and has been on his current regimen of Lamictal and Mirapex for the past 5 years.  The patient was also had a history of undergoing cycles of ECT treatments every 6 to 9 years when depressive symptoms began to recur.  He currently endorses good mood and feels that he is doing well on his current medication regimen.  He denies any SI, HI, or AVH and is able to appropriately plan for safety during session today.  He does not demonstrate an imminent danger to his safety or to the safety of others.  He has had issues with sleep after his recent move, though does not demonstrate any signs or symptoms of mike during session.  He has had problems tolerating Trazodone and has had no benefit on this. Will switch to Remeron.     Continue Lamictal 200 mg daily.  Continue Mirapex 2 mg nightly.  Discontinue Trazodone.  Start Remeron 7.5 mg QHS.     We will continue to monitor for recurrence of depressive symptoms or changes in mood stability.  We will monitor for need for initiation of ECT.  Most recent ECT series was in 2018.

## 2025-03-26 NOTE — PSYCH
MEDICATION MANAGEMENT NOTE    Name: Roverto Milton      : 1950      MRN: [id number]  Encounter Provider: Abhijeet Guillen MD  Encounter Date: 3/26/2025   Encounter department: [facility]    Insurance: Payor: MEDICARE / Plan: MEDICARE A AND B / Product Type: Medicare A & B Fee for Service /      Reason for Visit:   Chief Complaint   Patient presents with    Follow-up    Medication Management   :  Assessment & Plan  Bipolar 1 disorder (HCC)   He has a history of bipolar disorder, with history of prolonged manic and depressed episodes in the past.  He has been in treatment for many years and has been on his current regimen of Lamictal and Mirapex for the past 5 years.  The patient was also had a history of undergoing cycles of ECT treatments every 6 to 9 years when depressive symptoms began to recur.  He currently endorses good mood and feels that he is doing well on his current medication regimen.  He denies any SI, HI, or AVH and is able to appropriately plan for safety during session today.  He does not demonstrate an imminent danger to his safety or to the safety of others.  He has had issues with sleep after his recent move, though does not demonstrate any signs or symptoms of mike during session.  He has had problems tolerating Trazodone and has had no benefit on this. Will switch to Remeron.     Continue Lamictal 200 mg daily.  Continue Mirapex 2 mg nightly.  Discontinue Trazodone.  Start Remeron 7.5 mg QHS.     We will continue to monitor for recurrence of depressive symptoms or changes in mood stability.  We will monitor for need for initiation of ECT.  Most recent ECT series was in 2018.    Orders:    mirtazapine (REMERON) 7.5 MG tablet; Take 1 tablet (7.5 mg total) by mouth daily at bedtime        Treatment Recommendations:    Educated about diagnosis and treatment modalities. Verbalizes understanding and agreement with the treatment plan.  Discussed self  monitoring of symptoms, and symptom monitoring tools.  Discussed medications and if treatment adjustment was needed or desired.  Medication management every 4 weeks  Aware of 24 hour and weekend coverage for urgent situations accessed by calling [facility] main practice number  I am scheduling this patient out for greater than 3 months: No    Medications Risks/Benefits:      Risks, Benefits And Possible Side Effects Of Medications:    Risks, benefits, and possible side effects of medications explained to Maurice and he (or legal representative) verbalizes understanding and agreement for treatment.    Controlled Medication Discussion:     Not applicable      History of Present Illness     Maurice is seen today for a follow up for Bipolar Disorder. He continues to do fairly well since the last visit.  Reports that he has been feeling stable with regard to mood.  He denies any feelings of depression and has not noticed any episodes of mike recently.  He was started on trazodone at last visit to address his sleep, which remains poor.  The patient reports that trazodone was not helpful with his sleep and that he was experiencing significant side effects including lightheadedness, constipation, unsteadiness while walking.  The patient reports that this improved after he discontinued this.  He continues to struggle with sleep and is amenable to alternate medication regimen for sleep.  Discussed Remeron, with plan to maintain low dose to help with sleep.  Advised patient of risk of weight gain.  Patient reports that he has lost weight while on Ozempic, and is very mindful of his eating habits.  The patient is in agreement with plan to trial Remeron.    He denies suicidal ideation, intent or plan at present; denies homicidal ideation, intent or plan at present.    He denies auditory hallucinations, denies visual hallucinations, has no delusional thoughts.    HPI ROS Appetite Changes and Sleep:     He  reports difficulty sleeping, adequate appetite, normal energy level    Review Of Systems: A review of systems is obtained and is negative except for the pertinent positives listed in HPI/Subjective above.      Current Rating Scores:     None completed today.    Areas of Improvement: reviewed in HPI/Subjective Section and reviewed in Assessment and Plan Section    Past Medical History:   Diagnosis Date    ARIANE (acute kidney injury) (HCC) 01/13/2023    pt not aware of this    Arthritis     Asthma     Breathing difficulty 01/2023    shldr sx    Colon polyp     Dental crowns present     all teeth Veneers    Depression     Disease of thyroid gland     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HCC)     Essential hypertension 9/14/2022    History of COVID-19 09/09/2023    original surgery date 9/12/23 and rescheduled to 10/23/23    Hyperlipidemia     Hypertension 2012    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HCC)     per pt does not have this now    Manic depression (HCC)     Right lower lobe pneumonia 01/12/2023    Shortness of breath     [place]O    Sleep apnea         Past Surgical History:   Procedure Laterality Date    COLONOSCOPY  12/13/2022    COLONOSCOPY W/ BIOPSIES AND POLYPECTOMY  06/14/2023    JOINT REPLACEMENT  1/10 /2023    Right Shoulder    MI ARTHROPLASTY GLENOHUMERAL JOINT TOTAL SHOULDER Right 01/10/2023    Procedure: ARTHROPLASTY SHOULDER REVERSE WITH BICEPS TENODESIS;  Surgeon: Lele Patrick MD;  Location:  MAIN OR;  Service: Orthopedics    MI BLEPHAROPLASTY LOWER EYELID Bilateral 12/04/2023    Procedure: BLEPHAROPLASTY LOWER;  Surgeon: Rachna Aguilar DO;  Location:  MAIN OR;  Service: Plastics    MI BLEPHAROPLASTY UPPER EYELID W/EXCESSIVE SKIN Bilateral 12/04/2023    Procedure: BLEPHAROPLASTY UPPER;  Surgeon: Rachna Aguilar DO;  Location:  MAIN OR;  Service: Plastics    UMBILICAL HERNIA REPAIR       Allergies:   Allergies   Allergen Reactions    Atorvastatin Dizziness and Other (See Comments)     And very  "unsteady on feet    Rosuvastatin Other (See Comments)     Lost ability to walk and balance    Spiriva Respimat [Tiotropium Bromide Monohydrate] Rash       Current Outpatient Medications   Medication Sig Dispense Refill    mirtazapine (REMERON) 7.5 MG tablet Take 1 tablet (7.5 mg total) by mouth daily at bedtime 30 tablet 1    albuterol (PROVENTIL HFA,VENTOLIN HFA) 90 mcg/act inhaler Inhale 1 puff every 6 (six) hours as needed for wheezing or shortness of breath 18 g 6    cetirizine (ZyrTEC) 5 MG tablet Take 5 mg by mouth daily      fluticasone-umeclidinium-vilanterol (Trelegy Ellipta) 200-62.5-25 mcg/actuation AEPB inhaler Inhale 1 puff daily 60 blister 5    lamoTRIgine (LaMICtal) 200 MG tablet Take 1 tablet (200 mg total) by mouth daily 90 tablet 1    levothyroxine 112 mcg tablet TAKE 1 TABLET(112 MCG) BY MOUTH DAILY 100 tablet 1    montelukast (SINGULAIR) 10 mg tablet Take 1 tablet (10 mg total) by mouth daily at bedtime 90 tablet 1    multivitamin-iron-minerals-folic acid (CENTRUM) chewable tablet Chew 1 tablet daily      omega-3-acid ethyl esters (LOVAZA) 1 g capsule Take 2 capsules (2 g total) by mouth 2 (two) times a day \"Fish oil\" 180 capsule 4    pramipexole (MIRAPEX) 1 mg tablet Take 2 tablets (2 mg total) by mouth daily at bedtime 180 tablet 1    semaglutide, 2 mg/dose, (Ozempic, 2 MG/DOSE,) 8 mg/ mL injection pen Inject 0.75 mL (2 mg total) under the skin every 7 days 3 mL 2    tamsulosin (FLOMAX) 0.4 mg TAKE 2 CAPSULES(0.8 MG) BY MOUTH IN THE MORNING 180 capsule 1    torsemide (DEMADEX) 5 MG tablet Take 1 tablet (5 mg total) by mouth daily 90 tablet 1     No current facility-administered medications for this visit.       Substance Abuse History:    Social History     Substance and Sexual Activity   Alcohol Use Not Currently    Comment: \"very very occas\"     Social History     Substance and Sexual Activity   Drug Use Never       Social History:    Social History     Socioeconomic History    Marital " "status: /Civil Union     Spouse name: Not on file    Number of children: Not on file    Years of education: Not on file    Highest education level: Not on file   Occupational History    Not on file   Tobacco Use    Smoking status: Never    Smokeless tobacco: Never   Vaping Use    Vaping status: Never Used   Substance and Sexual Activity    Alcohol use: Not Currently     Comment: \"very very occas\"    Drug use: Never    Sexual activity: Not Currently     Partners: Female     Birth control/protection: None     Comment: defer   Other Topics Concern    Not on file   Social History Narrative    Not on file     Social Drivers of Health     Financial Resource Strain: Low Risk  (2/22/2023)    Overall Financial Resource Strain (CARDIA)     Difficulty of Paying Living Expenses: Not hard at all   Food Insecurity: No Food Insecurity (3/19/2024)    Nursing - Inadequate Food Risk Classification     Worried About Running Out of Food in the Last Year: Never true     Ran Out of Food in the Last Year: Never true     Ran Out of Food in the Last Year: Not on file   Transportation Needs: No Transportation Needs (3/19/2024)    PRAPARE - Transportation     Lack of Transportation (Medical): No     Lack of Transportation (Non-Medical): No   Physical Activity: Not on file   Stress: Not on file   Social Connections: Not on file   Intimate Partner Violence: Not on file   Housing Stability: Low Risk  (3/19/2024)    Housing Stability Vital Sign     Unable to Pay for Housing in the Last Year: No     Number of Times Moved in the Last Year: 1     Homeless in the Last Year: No       Family Psychiatric History:     Family History   Problem Relation Age of Onset    Hypertension Mother     Depression Mother     Depression Father     Arthritis Father     Heart attack Paternal Uncle 31    Colon cancer Neg Hx     Prostate cancer Neg Hx        Medical History Reviewed by provider this encounter:          Objective   There were no vitals taken for " this visit.     Mental Status Evaluation:    Appearance age appropriate, casually dressed   Behavior pleasant, cooperative, calm   Speech normal rate, normal volume, normal pitch, spontaneous   Mood euthymic   Affect normal range and intensity, appropriate   Thought Processes organized, goal directed   Thought Content no overt delusions   Perceptual Disturbances: no auditory hallucinations, no visual hallucinations   Abnormal Thoughts  Risk Potential Suicidal ideation - None  Homicidal ideation - None  Potential for aggression - No   Orientation oriented to person, place, time/date, and situation   Memory recent and remote memory grossly intact   Consciousness alert and awake   Attention Span Concentration Span attention span and concentration are age appropriate   Intellect appears to be of average intelligence   Insight intact   Judgement intact   Muscle Strength and  Gait unable to assess today due to virtual visit   Motor activity no abnormal movements   Language no difficulty naming common objects, no difficulty repeating a phrase, no difficulty writing a sentence   Fund of Knowledge adequate knowledge of current events  adequate fund of knowledge regarding past history  adequate fund of knowledge regarding vocabulary    Pain none   Pain Scale 0       Laboratory Results: I have personally reviewed all pertinent laboratory/tests results    Recent Labs (last 6 months):   Admission on 03/11/2025, Discharged on 03/11/2025   Component Date Value    WBC 03/11/2025 6.46     RBC 03/11/2025 4.66     Hemoglobin 03/11/2025 13.8     Hematocrit 03/11/2025 42.1     MCV 03/11/2025 90     MCH 03/11/2025 29.6     MCHC 03/11/2025 32.8     RDW 03/11/2025 13.4     MPV 03/11/2025 9.3     Platelets 03/11/2025 205     nRBC 03/11/2025 0     Segmented % 03/11/2025 61     Immature Grans % 03/11/2025 0     Lymphocytes % 03/11/2025 26     Monocytes % 03/11/2025 8     Eosinophils Relative 03/11/2025 4     Basophils Relative 03/11/2025 1      Absolute Neutrophils 03/11/2025 3.95     Absolute Immature Grans 03/11/2025 0.02     Absolute Lymphocytes 03/11/2025 1.68     Absolute Monocytes 03/11/2025 0.54     Eosinophils Absolute 03/11/2025 0.24     Basophils Absolute 03/11/2025 0.03     Sodium 03/11/2025 135     Potassium 03/11/2025 3.6     Chloride 03/11/2025 98     CO2 03/11/2025 31     ANION GAP 03/11/2025 6     BUN 03/11/2025 17     Creatinine 03/11/2025 1.20     Glucose 03/11/2025 114     Calcium 03/11/2025 9.4     AST 03/11/2025 25     ALT 03/11/2025 27     Alkaline Phosphatase 03/11/2025 87     Total Protein 03/11/2025 6.9     Albumin 03/11/2025 4.2     Total Bilirubin 03/11/2025 0.51     eGFR 03/11/2025 60     hs TnI 0hr 03/11/2025 6     Ventricular Rate 03/11/2025 81     Atrial Rate 03/11/2025 81     [place] Interval 03/11/2025 162     QRSD Interval 03/11/2025 90     QT Interval 03/11/2025 376     QTC Interval 03/11/2025 436     P Axis 03/11/2025 70     QRS [place] 03/11/2025 38     T Wave [place] 03/11/2025 70     hs TnI 2hr 03/11/2025 5     Delta 2hr hsTnI 03/11/2025 -1    Appointment on 09/30/2024   Component Date Value    Sodium 09/30/2024 137     Potassium 09/30/2024 4.3     Chloride 09/30/2024 96     CO2 09/30/2024 35 (H)     ANION GAP 09/30/2024 6     BUN 09/30/2024 16     Creatinine 09/30/2024 1.14     Glucose, Fasting 09/30/2024 93     Calcium 09/30/2024 9.4     AST 09/30/2024 52 (H)     ALT 09/30/2024 63 (H)     Alkaline Phosphatase 09/30/2024 93     Total Protein 09/30/2024 7.3     Albumin 09/30/2024 4.5     Total Bilirubin 09/30/2024 0.41     eGFR 09/30/2024 63        Suicide/Homicide Risk Assessment:    Risk of Harm to Self:  Based on today's assessment, Bill presents the following risk of harm to self: low    Risk of Harm to Others:  Based on today's assessment, Bill presents the following risk of harm to others: low    The following interventions are recommended: Continue medication management. No other intervention changes indicated at this  time.    Psychotherapy Provided:     Individual psychotherapy provided: No    Treatment Plan:    Completed and signed during the session: Not applicable - Treatment Plan not due at this session    Goals: Progress towards Treatment Plan goals - Yes, progressing, as evidenced by subjective findings in HPI/Subjective Section and in Assessment and Plan Section    Depression Follow-up Plan Completed: No    Note Share:    This note was not shared with the patient due to reasonable likelihood of causing patient harm    Administrative Statements   Administrative Statements   Encounter provider Abhijeet Guillen MD    The Patient is located at Home and in the following state in which I hold an active license [place].    The patient was identified by name and date of birth. Roverto Milton was informed that this is a telemedicine visit and that the visit is being conducted through the Epic Embedded platform. He agrees to proceed..  My office door was closed. No one else was in the room.  He acknowledged consent and understanding of privacy and security of the video platform. The patient has agreed to participate and understands they can discontinue the visit at any time.    I have spent a total time of 15 minutes in caring for this patient on the day of the visit/encounter including Risks and benefits of tx options, Instructions for management, Patient and family education, Importance of tx compliance, Risk factor reductions, Impressions, Documenting in the medical record, and Reviewing/placing orders in the medical record (including tests, medications, and/or procedures), not including the time spent for establishing the audio/video connection.    Visit Time  Visit Start Time: 9:10 AM  Visit Stop Time: 9:25 AM  Total Visit Duration:  15 minutes    Abhijeet Guillen MD 03/26/25

## 2025-03-31 ENCOUNTER — TELEPHONE (OUTPATIENT)
Dept: PSYCHIATRY | Facility: CLINIC | Age: 75
End: 2025-03-31

## 2025-03-31 NOTE — TELEPHONE ENCOUNTER
Called patient but had to leave voicemail message requesting call back to schedule 4-6  week follow up appointment with Abhijeet Guillen MD. Left call back # [phone].

## 2025-05-01 ENCOUNTER — APPOINTMENT (OUTPATIENT)
Dept: LAB | Facility: CLINIC | Age: 75
End: 2025-05-01
Payer: MEDICARE

## 2025-05-01 DIAGNOSIS — E03.9 HYPOTHYROIDISM, UNSPECIFIED TYPE: ICD-10-CM

## 2025-05-01 DIAGNOSIS — N40.0 BENIGN PROSTATIC HYPERPLASIA, UNSPECIFIED WHETHER LOWER URINARY TRACT SYMPTOMS PRESENT: ICD-10-CM

## 2025-05-01 DIAGNOSIS — E11.9 NEW ONSET TYPE 2 DIABETES MELLITUS (HCC): ICD-10-CM

## 2025-05-01 LAB
ALBUMIN SERPL BCG-MCNC: 4.5 G/DL (ref 3.5–5)
ALP SERPL-CCNC: 97 U/L (ref 34–104)
ALT SERPL W P-5'-P-CCNC: 27 U/L (ref 7–52)
ANION GAP SERPL CALCULATED.3IONS-SCNC: 11 MMOL/L (ref 4–13)
AST SERPL W P-5'-P-CCNC: 31 U/L (ref 13–39)
BILIRUB SERPL-MCNC: 0.48 MG/DL (ref 0.2–1)
BUN SERPL-MCNC: 11 MG/DL (ref 5–25)
CALCIUM SERPL-MCNC: 9.5 MG/DL (ref 8.4–10.2)
CHLORIDE SERPL-SCNC: 97 MMOL/L (ref 96–108)
CO2 SERPL-SCNC: 30 MMOL/L (ref 21–32)
CREAT SERPL-MCNC: 1.05 MG/DL (ref 0.6–1.3)
CREAT UR-MCNC: 28 MG/DL
EST. AVERAGE GLUCOSE BLD GHB EST-MCNC: 114 MG/DL
GFR SERPL CREATININE-BSD FRML MDRD: 70 ML/MIN/1.73SQ M
GLUCOSE P FAST SERPL-MCNC: 81 MG/DL (ref 65–99)
HBA1C MFR BLD: 5.6 %
MICROALBUMIN UR-MCNC: <7 MG/L
POTASSIUM SERPL-SCNC: 3.8 MMOL/L (ref 3.5–5.3)
PROT SERPL-MCNC: 7 G/DL (ref 6.4–8.4)
PSA SERPL-MCNC: 1.24 NG/ML (ref 0–4)
SODIUM SERPL-SCNC: 138 MMOL/L (ref 135–147)
TSH SERPL DL<=0.05 MIU/L-ACNC: 1.34 UIU/ML (ref 0.45–4.5)

## 2025-05-01 PROCEDURE — 84443 ASSAY THYROID STIM HORMONE: CPT

## 2025-05-01 PROCEDURE — 80053 COMPREHEN METABOLIC PANEL: CPT

## 2025-05-01 PROCEDURE — 36415 COLL VENOUS BLD VENIPUNCTURE: CPT

## 2025-05-01 PROCEDURE — G0103 PSA SCREENING: HCPCS

## 2025-05-01 PROCEDURE — 82570 ASSAY OF URINE CREATININE: CPT

## 2025-05-01 PROCEDURE — 83036 HEMOGLOBIN GLYCOSYLATED A1C: CPT

## 2025-05-01 PROCEDURE — 82043 UR ALBUMIN QUANTITATIVE: CPT

## 2025-05-02 ENCOUNTER — RESULTS FOLLOW-UP (OUTPATIENT)
Dept: FAMILY MEDICINE CLINIC | Facility: CLINIC | Age: 75
End: 2025-05-02

## 2025-05-02 DIAGNOSIS — M47.816 LUMBAR SPONDYLOSIS: Primary | ICD-10-CM

## 2025-05-02 RX ORDER — METHYLPREDNISOLONE 4 MG/1
TABLET ORAL
Qty: 1 EACH | Refills: 0 | Status: SHIPPED | OUTPATIENT
Start: 2025-05-02

## 2025-05-02 NOTE — PROGRESS NOTES
Patient is s/p Two MBB, with 80% relief after two nerve blocks for 6 hours, during which the patients reduced to a more functional level without typical pain symptoms, During the second block the patient's pain prior to the block was 10/10, after the nerve block the pain reduced 2/10 for 6 hours, As the patient has gone to two nerve blocks at minimum 2 weeks apart , will proceed with nerve ablation.    The risks of the procedure were discussed in detail.  These risks include infection, increased pain, paralysis, bleeding.  Patient understands the risks and is willing to pursue with the procedure

## 2025-05-05 ENCOUNTER — OFFICE VISIT (OUTPATIENT)
Dept: FAMILY MEDICINE CLINIC | Facility: CLINIC | Age: 75
End: 2025-05-05
Payer: MEDICARE

## 2025-05-05 ENCOUNTER — RESULTS FOLLOW-UP (OUTPATIENT)
Dept: FAMILY MEDICINE CLINIC | Facility: CLINIC | Age: 75
End: 2025-05-05

## 2025-05-05 VITALS
WEIGHT: 184 LBS | SYSTOLIC BLOOD PRESSURE: 130 MMHG | BODY MASS INDEX: 31.41 KG/M2 | DIASTOLIC BLOOD PRESSURE: 78 MMHG | HEIGHT: 64 IN | OXYGEN SATURATION: 99 % | HEART RATE: 83 BPM

## 2025-05-05 DIAGNOSIS — N40.0 BENIGN PROSTATIC HYPERPLASIA, UNSPECIFIED WHETHER LOWER URINARY TRACT SYMPTOMS PRESENT: ICD-10-CM

## 2025-05-05 DIAGNOSIS — E11.9 NEW ONSET TYPE 2 DIABETES MELLITUS (HCC): ICD-10-CM

## 2025-05-05 DIAGNOSIS — Z13.6 ENCOUNTER FOR ABDOMINAL AORTIC ANEURYSM (AAA) SCREENING: ICD-10-CM

## 2025-05-05 DIAGNOSIS — E66.01 OBESITY, MORBID (HCC): ICD-10-CM

## 2025-05-05 DIAGNOSIS — E03.9 HYPOTHYROIDISM, UNSPECIFIED TYPE: ICD-10-CM

## 2025-05-05 DIAGNOSIS — Z78.9 STATIN INTOLERANCE: ICD-10-CM

## 2025-05-05 DIAGNOSIS — F31.0 BIPOLAR AFFECTIVE DISORDER, CURRENT EPISODE HYPOMANIC (HCC): ICD-10-CM

## 2025-05-05 DIAGNOSIS — Z00.00 MEDICARE ANNUAL WELLNESS VISIT, INITIAL: Primary | ICD-10-CM

## 2025-05-05 DIAGNOSIS — K63.5 POLYP OF COLON, UNSPECIFIED PART OF COLON, UNSPECIFIED TYPE: ICD-10-CM

## 2025-05-05 DIAGNOSIS — E78.00 ELEVATED LDL CHOLESTEROL LEVEL: ICD-10-CM

## 2025-05-05 PROBLEM — J96.01 ACUTE RESPIRATORY FAILURE WITH HYPOXIA (HCC): Status: RESOLVED | Noted: 2024-02-22 | Resolved: 2025-05-05

## 2025-05-05 PROCEDURE — G2211 COMPLEX E/M VISIT ADD ON: HCPCS | Performed by: FAMILY MEDICINE

## 2025-05-05 PROCEDURE — G0402 INITIAL PREVENTIVE EXAM: HCPCS | Performed by: FAMILY MEDICINE

## 2025-05-05 PROCEDURE — 99214 OFFICE O/P EST MOD 30 MIN: CPT | Performed by: FAMILY MEDICINE

## 2025-05-05 RX ORDER — LEVOTHYROXINE SODIUM 112 UG/1
112 TABLET ORAL DAILY
Qty: 100 TABLET | Refills: 1 | Status: SHIPPED | OUTPATIENT
Start: 2025-05-05

## 2025-05-05 RX ORDER — TAMSULOSIN HYDROCHLORIDE 0.4 MG/1
0.8 CAPSULE ORAL
Qty: 200 CAPSULE | Refills: 1 | Status: SHIPPED | OUTPATIENT
Start: 2025-05-05

## 2025-05-05 NOTE — ASSESSMENT & PLAN NOTE
- last Cscope 6/14/2023 - polyps - 2yr recall (2025) - referral given for GI   Orders:    Ambulatory Referral to Gastroenterology; Future

## 2025-05-05 NOTE — PROGRESS NOTES
Name: Roverto Milton      : 1950      MRN: [id number]  Encounter Provider: Latisha Vernon DO  Encounter Date: 2025   Encounter department: [facility]  :  Assessment & Plan  Medicare annual wellness visit, initial  - reviewed PMHx, PSHx, meds, allergies, FHx, Soc Hx  - reviewed labs done 2025  - has script for Lipid Panel as per Cardio in [facility]   - UTD with COVID IMMs and Boosters   - UTD with Tdap (within the past 10yrs) - no records   - UTD with PCV20   - UTD with Shingles Vaccine   - UTD with annual Flu vaccine   - UTD with RSV   - last Cscope 2023 - polyps - 2yr recall () - referral given for GI   - UTD with Prostate Ca screening   - UTD with Ophtho and Dental   - RTO in 1yr for AWV - pt aware and agreeable        New onset type 2 diabetes mellitus (HCC)  - A1c = 5.6   - stable renal function and urine microalbumin   - IRIS eye exam done in office today   - will defer DM foot exam today   - tolerating Ozempic - Rx sent   - UTD with PVC20  - UTD with RSV   - UTD with COVID Booster   - UTD with annual Flu vaccine   - caution with carb intake  - RTO in 6month, with repeat labs, for f/u - pt aware and agreeable   Lab Results   Component Value Date    HGBA1C 5.6 2025       Orders:    Ambulatory Referral to Ophthalmology; Future    semaglutide, 2 mg/dose, (Ozempic, 2 MG/DOSE,) 8 mg/ mL injection pen; Inject 0.75 mL (2 mg total) under the skin every 7 days    IRIS Diabetic eye exam    Hemoglobin A1C; Future    Basic metabolic panel; Future    Elevated LDL cholesterol level  - following with Cardio - on Leqvio and Fish Oil   - has Lipid Panel ordered by Cardio   - (+) statin intolerance        Statin intolerance         Polyp of colon, unspecified part of colon, unspecified type  - last Cscope 2023 - polyps - 2yr recall () - referral given for GI   Orders:    Ambulatory Referral to Gastroenterology; Future    Hypothyroidism, unspecified type  - stable TSH   -  cont Levothyroxine 112mcg QAM   - repeat TSH in 6months   Orders:    levothyroxine 112 mcg tablet; Take 1 tablet (112 mcg total) by mouth daily    TSH, 3rd generation with Free T4 reflex; Future    Benign prostatic hyperplasia, unspecified whether lower urinary tract symptoms present  - stable PSA   - cont Flomax 0.8mg QHS   Orders:    tamsulosin (FLOMAX) 0.4 mg; Take 2 capsules (0.8 mg total) by mouth daily with dinner    Encounter for abdominal aortic aneurysm (AAA) screening    Orders:     Abdominal Aorta Medicare Screening AAA; Future    Bipolar affective disorder, current episode hypomanic (HCC)  - follows with Psych        Obesity, morbid (HCC)  - BMI 31.6   - diet/exercise             Preventive health issues were discussed with patient, and age appropriate screening tests were ordered as noted in patient's After Visit Summary. Personalized health advice and appropriate referrals for health education or preventive services given if needed, as noted in patient's After Visit Summary.    History of Present Illness     HPI as below     Patient Care Team:  Latisha Vernon DO as PCP - General (Family Medicine)  Beverley Gibbs RD as Diabetes Educator (Diabetes Services)    Review of Systems as per HPI     Medical History Reviewed by provider this encounter:  Tobacco  Allergies  Meds  Problems  Med Hx  Surg Hx  Fam Hx       Annual Wellness Visit Questionnaire   Roverto is here for his Initial Wellness visit.     Health Risk Assessment:   Patient rates overall health as good. Patient feels that their physical health rating is same. Patient is satisfied with their life. Eyesight was rated as same. Hearing was rated as same. Patient feels that their emotional and mental health rating is same. Patients states they are never, rarely angry. Patient states they are sometimes unusually tired/fatigued. Pain experienced in the last 7 days has been a lot. Patient's pain rating has been 4/10. Patient states that he  has experienced weight loss or gain in last 6 months. Regarding weight loss, Ozempic is the best! From 242 to 188 since starting the medication.    Depression Screening:   PHQ-2 Score: 0      Fall Risk Screening:   In the past year, patient has experienced: no history of falling in past year      Home Safety:  Patient does not have trouble with stairs inside or outside of their home. Patient has working smoke alarms and has no working carbon monoxide detector. Home safety hazards include: none.     Nutrition:   Current diet is Regular and Limited junk food.     Medications:   Patient is currently taking over-the-counter supplements. OTC medications include: see medication list. Patient is able to manage medications.     Activities of Daily Living (ADLs)/Instrumental Activities of Daily Living (IADLs):   Walk and transfer into and out of bed and chair?: Yes  Dress and groom yourself?: Yes    Bathe or shower yourself?: Yes    Feed yourself? Yes  Do your laundry/housekeeping?: Yes  Manage your money, pay your bills and track your expenses?: Yes  Make your own meals?: Yes    Do your own shopping?: Yes    Previous Hospitalizations:   Any hospitalizations or ED visits within the last 12 months?: Yes    How many hospitalizations have you had in the last year?: 1-2    Advance Care Planning:   Living will: Yes    Durable POA for healthcare: Yes    Advanced directive: Yes      Preventive Screenings      Cardiovascular Screening:    General: History Lipid Disorder and Risks and Benefits Discussed    Due for: Lipid Panel      Diabetes Screening:     General: History Diabetes and Risks and Benefits Discussed      Colorectal Cancer Screening:     General: Screening Current      Prostate Cancer Screening:    General: Screening Current      Osteoporosis Screening:    General: Screening Not Indicated      Abdominal Aortic Aneurysm (AAA) Screening:    Risk factors include: age between 65-74 yo        General: Risks and Benefits  Discussed    Due for: Screening AAA Ultrasound      Lung Cancer Screening:     General: Screening Not Indicated      Hepatitis C Screening:    General: Screening Current      Preventive Screening Comments: Roverto Milton is a 74 y.o. male who presents to the office for his Initial Medicare Wellness   - Specialists: Cardio, Bariatrics, Ortho, Pulm, Neuro, Sleep Med, Psych   - PMHx: Depression, BPH, HL, HTN, asthma, DONALD (on CPAP but issues with mask fitting and not using), seasonal allergies, hypothyroidism, BMI 31.6 <-- 42, colon polyps   - allergies: Spiriva - rash, statins - SOB  - Meds: see med rec   - PSHx: umbilical hernia repair, b/l cataract surgery, R-shoulder surgery, blepharoplasty  - FHx: M (HTN, Depression), F (Arthritis, Depression), PU (MI at 32yo), S (CVA - at 78yo), denies FHx of colon/prostate ca   - Immunizations: UTD with COVID IMMs and Booster x2, Tdap within the past 10yrs (no records), UTD with PCV20, UTD with Shingles vaccines, UTD with annual Flu vaccine   - Hm: last Cscope 2023 - polyps - 2yr recall - due in 2025, UTD with Prostate Ca screening   - diet/exercise: not exercising, diet: follows with Bariatrics  - social: denies tob/illicits/EtOH  - last vision: follows with Ophtho annually   - last dental: goes Q6months   - reviewed labs done 2025   - denies F/C/N/V/CP/palpitations/SOB/wheezing/abd pain/LE edema  - (+) back pain - following with PM - currently on Medrol dospak         Screening, Brief Intervention, and Referral to Treatment (SBIRT)     Screening  Typical number of drinks in a day: 0  Typical number of drinks in a week: 0  Interpretation: Low risk drinking behavior.    AUDIT-C Screenin) How often did you have a drink containing alcohol in the past year? monthly or less  2) How many drinks did you have on a typical day when you were drinking in the past year? 1 to 2  3) How often did you have 6 or more drinks on one occasion in the past year? never    AUDIT-C  "Score: 1  Interpretation: Score 0-3 (male): Negative screen for alcohol misuse    Single Item Drug Screening:  How often have you used an illegal drug (including marijuana) or a prescription medication for non-medical reasons in the past year? never    Single Item Drug Screen Score: 0  Interpretation: Negative screen for possible drug use disorder    Social Drivers of Health     Financial Resource Strain: Low Risk  (2/22/2023)    Overall Financial Resource Strain (CARDIA)     Difficulty of Paying Living Expenses: Not hard at all   Food Insecurity: No Food Insecurity (5/5/2025)    Hunger Vital Sign     Worried About Running Out of Food in the Last Year: Never true     Ran Out of Food in the Last Year: Never true   Transportation Needs: No Transportation Needs (5/5/2025)    PRAPARE - Transportation     Lack of Transportation (Medical): No     Lack of Transportation (Non-Medical): No   Housing Stability: Low Risk  (5/5/2025)    Housing Stability Vital Sign     Unable to Pay for Housing in the Last Year: No     Number of Times Moved in the Last Year: 0     Homeless in the Last Year: No   Utilities: Not At Risk (5/5/2025)    [facility] Utilities     Threatened with loss of utilities: No     No results found.    Objective   /78   Pulse 83   Ht 5' 4\" (1.626 m)   Wt 83.5 kg (184 lb)   SpO2 99%   BMI 31.58 kg/m²     Physical Exam  Vitals reviewed.   Constitutional:       General: He is not in acute distress.     Appearance: Normal appearance. He is not ill-appearing, toxic-appearing or diaphoretic.   HENT:      Head: Normocephalic and atraumatic.      Right Ear: External ear normal.      Left Ear: External ear normal.      Nose: Nose normal.   Eyes:      General: No scleral icterus.        Right eye: No discharge.         Left eye: No discharge.      Extraocular Movements: Extraocular movements intact.      Conjunctiva/sclera: Conjunctivae normal.   Cardiovascular:      Rate and Rhythm: Normal rate and regular rhythm.    "   Heart sounds: Normal heart sounds.   Pulmonary:      Effort: Pulmonary effort is normal. No respiratory distress.      Breath sounds: Normal breath sounds. No stridor. No wheezing, rhonchi or rales.   Abdominal:      Palpations: Abdomen is soft.   Musculoskeletal:         General: Normal range of motion.      Cervical back: Normal range of motion.      Right lower leg: No edema.      Left lower leg: No edema.   Skin:     General: Skin is warm.   Neurological:      General: No focal deficit present.      Mental Status: He is alert and oriented to person, place, and time.   Psychiatric:         Mood and Affect: Mood normal.         Behavior: Behavior normal.

## 2025-05-05 NOTE — ASSESSMENT & PLAN NOTE
- A1c = 5.6   - stable renal function and urine microalbumin   - IRIS eye exam done in office today   - will defer DM foot exam today   - tolerating Ozempic - Rx sent   - UTD with PVC20  - UTD with RSV   - UTD with COVID Booster   - UTD with annual Flu vaccine   - caution with carb intake  - RTO in 6month, with repeat labs, for f/u - pt aware and agreeable   Lab Results   Component Value Date    HGBA1C 5.6 05/01/2025       Orders:    Ambulatory Referral to Ophthalmology; Future    semaglutide, 2 mg/dose, (Ozempic, 2 MG/DOSE,) 8 mg/ mL injection pen; Inject 0.75 mL (2 mg total) under the skin every 7 days    IRIS Diabetic eye exam    Hemoglobin A1C; Future    Basic metabolic panel; Future

## 2025-05-05 NOTE — ASSESSMENT & PLAN NOTE
- following with Cardio - on Leqvio and Fish Oil   - has Lipid Panel ordered by Cardio   - (+) statin intolerance

## 2025-05-05 NOTE — PATIENT INSTRUCTIONS
Medicare Preventive Visit Patient Instructions  Thank you for completing your Welcome to Medicare Visit or Medicare Annual Wellness Visit today. Your next wellness visit will be due in one year (5/6/2026).  The screening/preventive services that you may require over the next 5-10 years are detailed below. Some tests may not apply to you based off risk factors and/or age. Screening tests ordered at today's visit but not completed yet may show as past due. Also, please note that scanned in results may not display below.  Preventive Screenings:  Service Recommendations Previous Testing/Comments   Colorectal Cancer Screening  Colonoscopy    Fecal Occult Blood Test (FOBT)/Fecal Immunochemical Test (FIT)  Fecal DNA/Cologuard Test  Flexible Sigmoidoscopy Age: 45-75 years old   Colonoscopy: every 10 years (May be performed more frequently if at higher risk)  OR  FOBT/FIT: every 1 year  OR  Cologuard: every 3 years  OR  Sigmoidoscopy: every 5 years  Screening may be recommended earlier than age 45 if at higher risk for colorectal cancer. Also, an individualized decision between you and your healthcare provider will decide whether screening between the ages of 76-85 would be appropriate. Colonoscopy: 06/14/2023  FOBT/FIT: Not on file  Cologuard: Not on file  Sigmoidoscopy: Not on file    Screening Current     Prostate Cancer Screening Individualized decision between patient and health care provider in men between ages of 55-69   Medicare will cover every 12 months beginning on the day after your 50th birthday PSA: 1.241 ng/mL     Screening Current     Hepatitis C Screening Once for adults born between 1945 and 1965  More frequently in patients at high risk for Hepatitis C Hep C Antibody: 09/14/2022    Screening Current   Diabetes Screening 1-2 times per year if you're at risk for diabetes or have pre-diabetes Fasting glucose: 81 mg/dL (5/1/2025)  A1C: 5.6 % (5/1/2025)  Screening Not Indicated  History Diabetes   Cholesterol  Screening Once every 5 years if you don't have a lipid disorder. May order more often based on risk factors. Lipid panel: 07/01/2024  Screening Not Indicated  History Lipid Disorder      Other Preventive Screenings Covered by Medicare:  Abdominal Aortic Aneurysm (AAA) Screening: covered once if your at risk. You're considered to be at risk if you have a family history of AAA or a male between the age of 65-75 who smoking at least 100 cigarettes in your lifetime.  Lung Cancer Screening: covers low dose CT scan once per year if you meet all of the following conditions: (1) Age 55-77; (2) No signs or symptoms of lung cancer; (3) Current smoker or have quit smoking within the last 15 years; (4) You have a tobacco smoking history of at least 20 pack years (packs per day x number of years you smoked); (5) You get a written order from a healthcare provider.  Glaucoma Screening: covered annually if you're considered high risk: (1) You have diabetes OR (2) Family history of glaucoma OR (3)  aged 50 and older OR (4)  American aged 65 and older  Osteoporosis Screening: covered every 2 years if you meet one of the following conditions: (1) Have a vertebral abnormality; (2) On glucocorticoid therapy for more than 3 months; (3) Have primary hyperparathyroidism; (4) On osteoporosis medications and need to assess response to drug therapy.  HIV Screening: covered annually if you're between the age of 15-65. Also covered annually if you are younger than 15 and older than 65 with risk factors for HIV infection. For pregnant patients, it is covered up to 3 times per pregnancy.    Immunizations:  Immunization Recommendations   Influenza Vaccine Annual influenza vaccination during flu season is recommended for all persons aged >= 6 months who do not have contraindications   Pneumococcal Vaccine   * Pneumococcal conjugate vaccine = PCV13 (Prevnar 13), PCV15 (Vaxneuvance), PCV20 (Prevnar 20)  * Pneumococcal  polysaccharide vaccine = PPSV23 (Pneumovax) Adults 19-63 yo with certain risk factors or if 65+ yo  If never received any pneumonia vaccine: recommend Prevnar 20 (PCV20)  Give PCV20 if previously received 1 dose of PCV13 or PPSV23   Hepatitis B Vaccine 3 dose series if at intermediate or high risk (ex: diabetes, end stage renal disease, liver disease)   Respiratory syncytial virus (RSV) Vaccine - COVERED BY MEDICARE PART D  * RSVPreF3 (Arexvy) CDC recommends that adults 60 years of age and older may receive a single dose of RSV vaccine using shared clinical decision-making (SCDM)   Tetanus (Td) Vaccine - COST NOT COVERED BY MEDICARE PART B Following completion of primary series, a booster dose should be given every 10 years to maintain immunity against tetanus. Td may also be given as tetanus wound prophylaxis.   Tdap Vaccine - COST NOT COVERED BY MEDICARE PART B Recommended at least once for all adults. For pregnant patients, recommended with each pregnancy.   Shingles Vaccine (Shingrix) - COST NOT COVERED BY MEDICARE PART B  2 shot series recommended in those 19 years and older who have or will have weakened immune systems or those 50 years and older     Health Maintenance Due:      Topic Date Due   • Colorectal Cancer Screening  06/13/2025   • Hepatitis C Screening  Completed     Immunizations Due:      Topic Date Due   • COVID-19 Vaccine (8 - 2024-25 season) 09/01/2024     Advance Directives   What are advance directives?  Advance directives are legal documents that state your wishes and plans for medical care. These plans are made ahead of time in case you lose your ability to make decisions for yourself. Advance directives can apply to any medical decision, such as the treatments you want, and if you want to donate organs.   What are the types of advance directives?  There are many types of advance directives, and each state has rules about how to use them. You may choose a combination of any of the  following:  Living will:  This is a written record of the treatment you want. You can also choose which treatments you do not want, which to limit, and which to stop at a certain time. This includes surgery, medicine, IV fluid, and tube feedings.   Durable power of  for healthcare (DPAHC):  This is a written record that states who you want to make healthcare choices for you when you are unable to make them for yourself. This person, called a proxy, is usually a family member or a friend. You may choose more than 1 proxy.  Do not resuscitate (DNR) order:  A DNR order is used in case your heart stops beating or you stop breathing. It is a request not to have certain forms of treatment, such as CPR. A DNR order may be included in other types of advance directives.  Medical directive:  This covers the care that you want if you are in a coma, near death, or unable to make decisions for yourself. You can list the treatments you want for each condition. Treatment may include pain medicine, surgery, blood transfusions, dialysis, IV or tube feedings, and a ventilator (breathing machine).  Values history:  This document has questions about your views, beliefs, and how you feel and think about life. This information can help others choose the care that you would choose.  Why are advance directives important?  An advance directive helps you control your care. Although spoken wishes may be used, it is better to have your wishes written down. Spoken wishes can be misunderstood, or not followed. Treatments may be given even if you do not want them. An advance directive may make it easier for your family to make difficult choices about your care.   Weight Management   Why it is important to manage your weight:  Being overweight increases your risk of health conditions such as heart disease, high blood pressure, type 2 diabetes, and certain types of cancer. It can also increase your risk for osteoarthritis, sleep apnea, and  other respiratory problems. Aim for a slow, steady weight loss. Even a small amount of weight loss can lower your risk of health problems.  How to lose weight safely:  A safe and healthy way to lose weight is to eat fewer calories and get regular exercise. You can lose up about 1 pound a week by decreasing the number of calories you eat by 500 calories each day.   Healthy meal plan for weight management:  A healthy meal plan includes a variety of foods, contains fewer calories, and helps you stay healthy. A healthy meal plan includes the following:  Eat whole-grain foods more often.  A healthy meal plan should contain fiber. Fiber is the part of grains, fruits, and vegetables that is not broken down by your body. Whole-grain foods are healthy and provide extra fiber in your diet. Some examples of whole-grain foods are whole-wheat breads and pastas, oatmeal, brown rice, and bulgur.  Eat a variety of vegetables every day.  Include dark, leafy greens such as spinach, kale, eloy greens, and mustard greens. Eat yellow and orange vegetables such as carrots, sweet potatoes, and winter squash.   Eat a variety of fruits every day.  Choose fresh or canned fruit (canned in its own juice or light syrup) instead of juice. Fruit juice has very little or no fiber.  Eat low-fat dairy foods.  Drink fat-free (skim) milk or 1% milk. Eat fat-free yogurt and low-fat cottage cheese. Try low-fat cheeses such as mozzarella and other reduced-fat cheeses.  Choose meat and other protein foods that are low in fat.  Choose beans or other legumes such as split peas or lentils. Choose fish, skinless poultry (chicken or turkey), or lean cuts of red meat (beef or pork). Before you cook meat or poultry, cut off any visible fat.   Use less fat and oil.  Try baking foods instead of frying them. Add less fat, such as margarine, sour cream, regular salad dressing and mayonnaise to foods. Eat fewer high-fat foods. Some examples of high-fat foods  include french fries, doughnuts, ice cream, and cakes.  Eat fewer sweets.  Limit foods and drinks that are high in sugar. This includes candy, cookies, regular soda, and sweetened drinks.  Exercise:  Exercise at least 30 minutes per day on most days of the week. Some examples of exercise include walking, biking, dancing, and swimming. You can also fit in more physical activity by taking the stairs instead of the elevator or parking farther away from stores. Ask your healthcare provider about the best exercise plan for you.      © Copyright [employer] 2018 Information is for End User's use only and may not be sold, redistributed or otherwise used for commercial purposes. All illustrations and images included in CareNotes® are the copyrighted property of [employer]., Inc[employer] or [employer]

## 2025-05-05 NOTE — ASSESSMENT & PLAN NOTE
- stable TSH   - cont Levothyroxine 112mcg QAM   - repeat TSH in 6months   Orders:    levothyroxine 112 mcg tablet; Take 1 tablet (112 mcg total) by mouth daily    TSH, 3rd generation with Free T4 reflex; Future

## 2025-05-05 NOTE — ASSESSMENT & PLAN NOTE
- stable PSA   - cont Flomax 0.8mg QHS   Orders:    tamsulosin (FLOMAX) 0.4 mg; Take 2 capsules (0.8 mg total) by mouth daily with dinner

## 2025-05-14 ENCOUNTER — HOSPITAL ENCOUNTER (OUTPATIENT)
Dept: ULTRASOUND IMAGING | Facility: HOSPITAL | Age: 75
Discharge: HOME | End: 2025-05-14
Attending: FAMILY MEDICINE
Payer: MEDICARE

## 2025-05-14 DIAGNOSIS — Z13.6 ENCOUNTER FOR ABDOMINAL AORTIC ANEURYSM (AAA) SCREENING: ICD-10-CM

## 2025-05-14 PROCEDURE — 76706 US ABDL AORTA SCREEN AAA: CPT

## 2025-05-16 ENCOUNTER — TELEPHONE (OUTPATIENT)
Age: 75
End: 2025-05-16

## 2025-05-16 NOTE — TELEPHONE ENCOUNTER
Caller: grace Richards     Doctor: Dr. SAAVEDRA    Reason for call: pt is scheduled for procedure on 5/23/25. Pt would like to r/s this date because pt will be moving on this day.   Pt would like to know if it can be r/s  possibly  a week from his procedure that is scheduled on 5/30/25. Please Advise     Call back#: [phone]

## 2025-05-18 NOTE — TELEPHONE ENCOUNTER
Patient calling to confirm 5/23/25 appointment, would like the office to disregard previous message.

## 2025-05-23 ENCOUNTER — HOSPITAL ENCOUNTER (OUTPATIENT)
Dept: RADIOLOGY | Facility: HOSPITAL | Age: 75
Discharge: HOME | End: 2025-05-23
Attending: PAIN MEDICINE
Payer: MEDICARE

## 2025-05-23 ENCOUNTER — TELEPHONE (OUTPATIENT)
Dept: PAIN MEDICINE | Facility: CLINIC | Age: 75
End: 2025-05-23

## 2025-05-23 VITALS
SYSTOLIC BLOOD PRESSURE: 118 MMHG | DIASTOLIC BLOOD PRESSURE: 60 MMHG | HEART RATE: 67 BPM | RESPIRATION RATE: 17 BRPM | OXYGEN SATURATION: 96 % | TEMPERATURE: 97.5 F

## 2025-05-23 DIAGNOSIS — M47.816 LUMBAR SPONDYLOSIS: ICD-10-CM

## 2025-05-23 RX ORDER — METHYLPREDNISOLONE ACETATE 40 MG/ML
40 INJECTION, SUSPENSION INTRA-ARTICULAR; INTRALESIONAL; INTRAMUSCULAR; PARENTERAL; SOFT TISSUE ONCE
Status: COMPLETED | OUTPATIENT
Start: 2025-05-23 | End: 2025-05-23

## 2025-05-23 RX ORDER — BUPIVACAINE HCL/PF 2.5 MG/ML
3 VIAL (ML) INJECTION ONCE
Status: COMPLETED | OUTPATIENT
Start: 2025-05-23 | End: 2025-05-23

## 2025-05-23 RX ORDER — LIDOCAINE HYDROCHLORIDE 10 MG/ML
5 INJECTION, SOLUTION EPIDURAL; INFILTRATION; INTRACAUDAL; PERINEURAL ONCE
Status: COMPLETED | OUTPATIENT
Start: 2025-05-23 | End: 2025-05-23

## 2025-05-23 RX ADMIN — BUPIVACAINE HYDROCHLORIDE 3 ML: 2.5 INJECTION, SOLUTION EPIDURAL; INFILTRATION; INTRACAUDAL at 10:02

## 2025-05-23 RX ADMIN — METHYLPREDNISOLONE ACETATE 40 MG: 40 INJECTION, SUSPENSION INTRA-ARTICULAR; INTRALESIONAL; INTRAMUSCULAR; SOFT TISSUE at 10:02

## 2025-05-23 RX ADMIN — LIDOCAINE HYDROCHLORIDE 5 ML: 10 INJECTION, SOLUTION EPIDURAL; INFILTRATION; INTRACAUDAL; PERINEURAL at 09:51

## 2025-05-23 RX ADMIN — LIDOCAINE HYDROCHLORIDE 3 ML: 20 INJECTION, SOLUTION EPIDURAL; INFILTRATION; INTRACAUDAL; PERINEURAL at 09:58

## 2025-05-23 NOTE — TELEPHONE ENCOUNTER
Patient is S/P a Right L3-L5 RFA c/ AR on 5/23/25  Next procedure scheduled for 5/30/25.  Please call on 5/27/25 post RFA. Thanks

## 2025-05-23 NOTE — H&P
History of Present Illness: The patient is a 74 y.o. male who presents with complaints of low back  right side pain    Past Medical History[1]    Past Surgical History[2]    Current Medications[3]    Allergies[4]    Physical Exam:   Vitals:    05/23/25 0929   BP: 123/62   Pulse: 70   Resp: 16   Temp: 97.5 °F (36.4 °C)   SpO2: 97%     General: Awake, Alert, Oriented x 3, Mood and affect appropriate  Respiratory: Respirations even and unlabored  Cardiovascular: Peripheral pulses intact; no edema  Musculoskeletal Exam: Ttp low back    ASA Score: 2    Patient/Chart Verification  Patient ID Verified: Verbal  ID Band Applied: No  H&P( within 30 days) Verified: To be obtained in the Procedural area  Allergies Reviewed: Yes  Anticoag/NSAID held?: NA  Currently on antibiotics?: No    Assessment:   1. Lumbar spondylosis        Plan: RT L3-4,L4-5 RFA      The risks of the procedure were discussed in detail.  These risks include infection, increased pain, paralysis, bleeding.  Patient understands the risks and is willing to pursue with the procedure         [1]   Past Medical History:  Diagnosis Date    ARIANE (acute kidney injury) (HCC) 01/13/2023    pt not aware of this    Arthritis     Asthma     Breathing difficulty 01/2023    shldr sx    Colon polyp     Dental crowns present     all teeth Veneers    Depression     Disease of thyroid gland     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HCC)     Essential hypertension 9/14/2022    History of COVID-19 09/09/2023    original surgery date 9/12/23 and rescheduled to 10/23/23    Hyperlipidemia     Hypertension 2012    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HCC)     per pt does not have this now    Manic depression (HCC)     Right lower lobe pneumonia 01/12/2023    Shortness of breath     MONTAÑO    Sleep apnea    [2]   Past Surgical History:  Procedure Laterality Date    COLONOSCOPY  12/13/2022    COLONOSCOPY W/ BIOPSIES AND POLYPECTOMY  06/14/2023    JOINT REPLACEMENT  1/10 /2023    Right Shoulder    [place] ARTHROPLASTY  "GLENOHUMERAL JOINT TOTAL SHOULDER Right 01/10/2023    Procedure: ARTHROPLASTY SHOULDER REVERSE WITH BICEPS TENODESIS;  Surgeon: Lele Patrick MD;  Location:  MAIN OR;  Service: Orthopedics    [place] BLEPHAROPLASTY LOWER EYELID Bilateral 12/04/2023    Procedure: BLEPHAROPLASTY LOWER;  Surgeon: Rachna Aguilar DO;  Location:  MAIN OR;  Service: Plastics    MO BLEPHAROPLASTY UPPER EYELID W/EXCESSIVE SKIN Bilateral 12/04/2023    Procedure: BLEPHAROPLASTY UPPER;  Surgeon: Rachna Aguilar DO;  Location:  MAIN OR;  Service: Plastics    UMBILICAL HERNIA REPAIR     [3]   Current Outpatient Medications:     albuterol (PROVENTIL HFA,VENTOLIN HFA) 90 mcg/act inhaler, Inhale 1 puff every 6 (six) hours as needed for wheezing or shortness of breath, Disp: 18 g, Rfl: 6    cetirizine (ZyrTEC) 5 MG tablet, Take 5 mg by mouth daily, Disp: , Rfl:     fluticasone-umeclidinium-vilanterol (Trelegy Ellipta) 200-62.5-25 mcg/actuation AEPB inhaler, Inhale 1 puff daily, Disp: 60 blister, Rfl: 5    lamoTRIgine (LaMICtal) 200 MG tablet, Take 1 tablet (200 mg total) by mouth daily, Disp: 90 tablet, Rfl: 1    levothyroxine 112 mcg tablet, Take 1 tablet (112 mcg total) by mouth daily, Disp: 100 tablet, Rfl: 1    methylPREDNISolone 4 MG tablet therapy pack, Use as directed on package, Disp: 1 each, Rfl: 0    montelukast (SINGULAIR) 10 mg tablet, Take 1 tablet (10 mg total) by mouth daily at bedtime, Disp: 90 tablet, Rfl: 1    multivitamin-iron-minerals-folic acid (CENTRUM) chewable tablet, Chew 1 tablet daily, Disp: , Rfl:     omega-3-acid ethyl esters (LOVAZA) 1 g capsule, Take 2 capsules (2 g total) by mouth 2 (two) times a day \"Fish oil\", Disp: 180 capsule, Rfl: 4    pramipexole (MIRAPEX) 1 mg tablet, Take 2 tablets (2 mg total) by mouth daily at bedtime, Disp: 180 tablet, Rfl: 1    semaglutide, 2 mg/dose, (Ozempic, 2 MG/DOSE,) 8 mg/ mL injection pen, Inject 0.75 mL (2 mg total) under the skin every 7 days, Disp: 3 mL, " Rfl: 2    tamsulosin (FLOMAX) 0.4 mg, Take 2 capsules (0.8 mg total) by mouth daily with dinner, Disp: 200 capsule, Rfl: 1    torsemide (DEMADEX) 5 MG tablet, Take 1 tablet (5 mg total) by mouth daily, Disp: 90 tablet, Rfl: 1  [4]   Allergies  Allergen Reactions    Atorvastatin Dizziness and Other (See Comments)     And very unsteady on feet    Rosuvastatin Other (See Comments)     Lost ability to walk and balance    Spiriva Respimat [Tiotropium Bromide Monohydrate] Rash    Pollen Extract Allergic Rhinitis

## 2025-05-23 NOTE — DISCHARGE INSTR - LAB

## 2025-05-27 ENCOUNTER — TELEMEDICINE (OUTPATIENT)
Dept: PSYCHIATRY | Facility: CLINIC | Age: 75
End: 2025-05-27
Payer: MEDICARE

## 2025-05-27 DIAGNOSIS — F31.9 BIPOLAR 1 DISORDER (HCC): Primary | ICD-10-CM

## 2025-05-27 PROCEDURE — 99213 OFFICE O/P EST LOW 20 MIN: CPT | Performed by: STUDENT IN AN ORGANIZED HEALTH CARE EDUCATION/TRAINING PROGRAM

## 2025-05-27 RX ORDER — MIRTAZAPINE 15 MG/1
15 TABLET, FILM COATED ORAL
Qty: 30 TABLET | Refills: 1 | Status: SHIPPED | OUTPATIENT
Start: 2025-05-27

## 2025-05-27 NOTE — ASSESSMENT & PLAN NOTE
He has a history of bipolar disorder, with history of prolonged manic and depressed episodes in the past.  He has been in treatment for many years and has been on his current regimen of Lamictal and Mirapex for the past 5 years.  The patient was also had a history of undergoing cycles of ECT treatments every 6 to 9 years when depressive symptoms began to recur.  He currently endorses good mood and feels that he is doing well on his current medication regimen.  He denies any SI, HI, or AVH and is able to appropriately plan for safety during session today.  He does not demonstrate an imminent danger to his safety or to the safety of others.  His primary concern remains chronic sleep problems. He tolerated Remeron but had no improvement at 7.5 mg QHS. He is agreeable to new trial at 15 mg.     Continue Lamictal 200 mg daily.  Continue Mirapex 2 mg nightly.  Restart Remeron at 15 mg QHS     We will continue to monitor for recurrence of depressive symptoms or changes in mood stability.  We will monitor for need for initiation of ECT.  Most recent ECT series was in 2018.

## 2025-05-27 NOTE — PSYCH
MEDICATION MANAGEMENT NOTE    Name: Roverto Milton      : 1950      MRN: [id number]  Encounter Provider: Abhijeet Guillen MD  Encounter Date: 2025   Encounter department: [facility]    Insurance: Payor: MEDICARE / Plan: MEDICARE A AND B / Product Type: Medicare A & B Fee for Service /      Reason for Visit:   Chief Complaint   Patient presents with    Follow-up    Medication Management   :  Assessment & Plan  Bipolar 1 disorder (HCC)   He has a history of bipolar disorder, with history of prolonged manic and depressed episodes in the past.  He has been in treatment for many years and has been on his current regimen of Lamictal and Mirapex for the past 5 years.  The patient was also had a history of undergoing cycles of ECT treatments every 6 to 9 years when depressive symptoms began to recur.  He currently endorses good mood and feels that he is doing well on his current medication regimen.  He denies any SI, HI, or AVH and is able to appropriately plan for safety during session today.  He does not demonstrate an imminent danger to his safety or to the safety of others.  His primary concern remains chronic sleep problems. He tolerated Remeron but had no improvement at 7.5 mg QHS. He is agreeable to new trial at 15 mg.     Continue Lamictal 200 mg daily.  Continue Mirapex 2 mg nightly.  Restart Remeron at 15 mg QHS     We will continue to monitor for recurrence of depressive symptoms or changes in mood stability.  We will monitor for need for initiation of ECT.  Most recent ECT series was in 2018.    Orders:    mirtazapine (REMERON) 15 mg tablet; Take 1 tablet (15 mg total) by mouth daily at bedtime        Treatment Recommendations:    Educated about diagnosis and treatment modalities. Verbalizes understanding and agreement with the treatment plan.  Discussed self monitoring of symptoms, and symptom monitoring tools.  Discussed medications and if treatment  adjustment was needed or desired.  Medication management every 4 weeks  Aware of 24 hour and weekend coverage for urgent situations accessed by calling [facility] main practice number  I am scheduling this patient out for greater than 3 months: No    Medications Risks/Benefits:      Risks, Benefits And Possible Side Effects Of Medications:    Risks, benefits, and possible side effects of medications explained to Maurice and he (or legal representative) verbalizes understanding and agreement for treatment.    Controlled Medication Discussion:     Not applicable      History of Present Illness     Maurice is seen today for a follow up for Bipolar Disorder. He continues to do fairly well since the last visit.  He reports that he is doing overall well. He notes his mood has remained good and has been stable. He was started on Remeron at last visit but notes that he did not notice any improvement in sleep. However, he also feels that he tolerated this well. He is not taking this currently. Sleep has remained unchanged and he struggles with this at baseline. He denies any presence of manic or hypomanic symptoms. He states life is currently stressful due to a move happening today, but is hopeful that once he and his wife move, he will be able to settle in to their new home fairly well.    He denies suicidal ideation, intent or plan at present; denies homicidal ideation, intent or plan at present.    He denies auditory hallucinations, denies visual hallucinations, has no delusional thoughts.    Patient agreeable to new trial of Remeron at 15 mg QHS with possibility of switching to Doxepin if ineffective.    HPI ROS Appetite Changes and Sleep:     He reports difficulty sleeping, adequate appetite, normal energy level    Review Of Systems: A review of systems is obtained and is negative except for the pertinent positives listed in HPI/Subjective above.      Current Rating Scores:     None completed  today.    Areas of Improvement: reviewed in HPI/Subjective Section and reviewed in Assessment and Plan Section    Past Medical History:   Diagnosis Date    ARIANE (acute kidney injury) (HCC) 01/13/2023    pt not aware of this    Arthritis     Asthma     Breathing difficulty 01/2023    shldr sx    Colon polyp     Dental crowns present     all teeth Veneers    Depression     Disease of thyroid gland     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HCC)     Essential hypertension 9/14/2022    History of COVID-19 09/09/2023    original surgery date 9/12/23 and rescheduled to 10/23/23    Hyperlipidemia     Hypertension 2012    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HCC)     per pt does not have this now    Manic depression (HCC)     Right lower lobe pneumonia 01/12/2023    Shortness of breath     MONTAÑO    Sleep apnea         Past Surgical History:   Procedure Laterality Date    COLONOSCOPY  12/13/2022    COLONOSCOPY W/ BIOPSIES AND POLYPECTOMY  06/14/2023    JOINT REPLACEMENT  1/10 /2023    Right Shoulder    [place] ARTHROPLASTY GLENOHUMERAL JOINT TOTAL SHOULDER Right 01/10/2023    Procedure: ARTHROPLASTY SHOULDER REVERSE WITH BICEPS TENODESIS;  Surgeon: Lele Patrick MD;  Location:  MAIN OR;  Service: Orthopedics    [place] BLEPHAROPLASTY LOWER EYELID Bilateral 12/04/2023    Procedure: BLEPHAROPLASTY LOWER;  Surgeon: Rachna Aguilar DO;  Location:  MAIN OR;  Service: Plastics    [place] BLEPHAROPLASTY UPPER EYELID W/EXCESSIVE SKIN Bilateral 12/04/2023    Procedure: BLEPHAROPLASTY UPPER;  Surgeon: Rachna Aguilar DO;  Location:  MAIN OR;  Service: Plastics    UMBILICAL HERNIA REPAIR       Allergies:   Allergies   Allergen Reactions    Atorvastatin Dizziness and Other (See Comments)     And very unsteady on feet    Rosuvastatin Other (See Comments)     Lost ability to walk and balance    Spiriva Respimat [Tiotropium Bromide Monohydrate] Rash    Pollen Extract Allergic Rhinitis       Current Outpatient Medications   Medication Sig Dispense Refill     "mirtazapine (REMERON) 15 mg tablet Take 1 tablet (15 mg total) by mouth daily at bedtime 30 tablet 1    albuterol (PROVENTIL HFA,VENTOLIN HFA) 90 mcg/act inhaler Inhale 1 puff every 6 (six) hours as needed for wheezing or shortness of breath 18 g 6    cetirizine (ZyrTEC) 5 MG tablet Take 5 mg by mouth daily      fluticasone-umeclidinium-vilanterol (Trelegy Ellipta) 200-62.5-25 mcg/actuation AEPB inhaler Inhale 1 puff daily 60 blister 5    lamoTRIgine (LaMICtal) 200 MG tablet Take 1 tablet (200 mg total) by mouth daily 90 tablet 1    levothyroxine 112 mcg tablet Take 1 tablet (112 mcg total) by mouth daily 100 tablet 1    methylPREDNISolone 4 MG tablet therapy pack Use as directed on package 1 each 0    montelukast (SINGULAIR) 10 mg tablet Take 1 tablet (10 mg total) by mouth daily at bedtime 90 tablet 1    multivitamin-iron-minerals-folic acid (CENTRUM) chewable tablet Chew 1 tablet daily      omega-3-acid ethyl esters (LOVAZA) 1 g capsule Take 2 capsules (2 g total) by mouth 2 (two) times a day \"Fish oil\" 180 capsule 4    pramipexole (MIRAPEX) 1 mg tablet Take 2 tablets (2 mg total) by mouth daily at bedtime 180 tablet 1    semaglutide, 2 mg/dose, (Ozempic, 2 MG/DOSE,) 8 mg/ mL injection pen Inject 0.75 mL (2 mg total) under the skin every 7 days 3 mL 2    tamsulosin (FLOMAX) 0.4 mg Take 2 capsules (0.8 mg total) by mouth daily with dinner 200 capsule 1    torsemide (DEMADEX) 5 MG tablet Take 1 tablet (5 mg total) by mouth daily 90 tablet 1     No current facility-administered medications for this visit.       Substance Abuse History:    Social History     Substance and Sexual Activity   Alcohol Use Not Currently    Comment: \"very very occas\"     Social History     Substance and Sexual Activity   Drug Use Never       Social History:    Social History     Socioeconomic History    Marital status: /Civil Union     Spouse name: Not on file    Number of children: Not on file    Years of education: Not on file    " "Highest education level: Not on file   Occupational History    Not on file   Tobacco Use    Smoking status: Never    Smokeless tobacco: Never   Vaping Use    Vaping status: Never Used   Substance and Sexual Activity    Alcohol use: Not Currently     Comment: \"very very occas\"    Drug use: Never    Sexual activity: Not Currently     Partners: Female     Birth control/protection: None     Comment: defer   Other Topics Concern    Not on file   Social History Narrative    Not on file     Social Drivers of Health     Financial Resource Strain: Low Risk  (2/22/2023)    Overall Financial Resource Strain (CARDIA)     Difficulty of Paying Living Expenses: Not hard at all   Food Insecurity: No Food Insecurity (5/5/2025)    Nursing - Inadequate Food Risk Classification     Worried About Running Out of Food in the Last Year: Never true     Ran Out of Food in the Last Year: Never true     Ran Out of Food in the Last Year: Not on file   Transportation Needs: No Transportation Needs (5/5/2025)    PRAPARE - Transportation     Lack of Transportation (Medical): No     Lack of Transportation (Non-Medical): No   Physical Activity: Not on file   Stress: Not on file   Social Connections: Not on file   Intimate Partner Violence: Not on file   Housing Stability: Low Risk  (5/5/2025)    Housing Stability Vital Sign     Unable to Pay for Housing in the Last Year: No     Number of Times Moved in the Last Year: 0     Homeless in the Last Year: No       Family Psychiatric History:     Family History   Problem Relation Name Age of Onset    Hypertension Mother Flora Sebastiánzak     Depression Mother Flora Buczak     Depression Father      Arthritis Father      Heart attack Paternal Uncle  31    Colon cancer Neg Hx      Prostate cancer Neg Hx         Medical History Reviewed by provider this encounter:          Objective   There were no vitals taken for this visit.     Mental Status Evaluation:    Appearance age appropriate, casually dressed " "  Behavior pleasant, cooperative, calm   Speech normal rate, normal volume, normal pitch, spontaneous   Mood \"Good\"   Affect normal range and intensity, appropriate   Thought Processes organized, goal directed   Thought Content no overt delusions   Perceptual Disturbances: no auditory hallucinations, no visual hallucinations   Abnormal Thoughts  Risk Potential Suicidal ideation - None  Homicidal ideation - None  Potential for aggression - No   Orientation oriented to person, place, time/date, and situation   Memory recent and remote memory grossly intact   Consciousness alert and awake   Attention Span Concentration Span attention span and concentration are age appropriate   Intellect appears to be of average intelligence   Insight intact   Judgement intact   Muscle Strength and  Gait unable to assess today due to virtual visit   Motor activity no abnormal movements   Language no difficulty naming common objects, no difficulty repeating a phrase, no difficulty writing a sentence   Fund of Knowledge adequate knowledge of current events  adequate fund of knowledge regarding past history  adequate fund of knowledge regarding vocabulary    Pain none   Pain Scale 0       Laboratory Results: I have personally reviewed all pertinent laboratory/tests results    Recent Labs (last 6 months):   Office Visit on 2025   Component Date Value    Severity 2025 ALERT (A)     Right Eye Diabetic Retin* 2025 INDET     Right Eye Macular Edema 2025 INDET     Right Eye Other Retinopa* 2025 INDET     Right Eye Image Quality 2025 Not Gradable Image     Left Eye Diabetic Retino* 2025 INDET     Left Eye Macular Edema 2025 INDET     Left Eye Other Retinopat* 2025 INDET     Left Eye Image Quality 2025 Not Gradable Image     Result 2025 Retinal Study Result for TEO PIMENTEL     Result 2025 stephen JAMIL 75 y/o, M (: 1950, MRN: [id number])     Result 2025 " presented to [facility] on 05- for a retinal imaging study of the left and right eyes.     Result 05/05/2025 Based on the findings of the study, the following is recommended for TEO PIMENTEL     Result 05/05/2025 Not Gradable Eye(s) Found: Schedule an appointment with a retina specialist for further evaluation within 3 months.     Result 05/05/2025 Interpreting Provider's Comments:  No comments provided     Result 05/05/2025 Diagnoses Present: E119 - Type 2 diabetes mellitus without complications     Result 05/05/2025 Right eye findings: Eye not gradable for reasons listed: Key anatomy missing or not clearly visible     Result 05/05/2025 Left eye findings: Eye not gradable for reasons listed: Key anatomy missing or not clearly visible     Result 05/05/2025 This result was electronically signed by Caleb Beverly MD, NPI: [id number], Taxonomy: [id number] on 05- 14:27 UTC.     Result 05/05/2025 NOTE:  Any pathology noted on this diabetic retinal evaluation should be confirmed by an appropriate ophthalmic examination.    Appointment on 05/01/2025   Component Date Value    Hemoglobin A1C 05/01/2025 5.6     EAG 05/01/2025 114     Sodium 05/01/2025 138     Potassium 05/01/2025 3.8     Chloride 05/01/2025 97     CO2 05/01/2025 30     ANION GAP 05/01/2025 11     BUN 05/01/2025 11     Creatinine 05/01/2025 1.05     Glucose, Fasting 05/01/2025 81     Calcium 05/01/2025 9.5     AST 05/01/2025 31     ALT 05/01/2025 27     Alkaline Phosphatase 05/01/2025 97     Total Protein 05/01/2025 7.0     Albumin 05/01/2025 4.5     Total Bilirubin 05/01/2025 0.48     eGFR 05/01/2025 70     Creatinine, Ur 05/01/2025 28.0     Albumin,U,Random 05/01/2025 <7.0     Albumin Creat Ratio 05/01/2025      PSA 05/01/2025 1.241     TSH 3RD GENERATON 05/01/2025 1.339    Admission on 03/11/2025, Discharged on 03/11/2025   Component Date Value    WBC 03/11/2025 6.46     RBC 03/11/2025 4.66     Hemoglobin 03/11/2025 13.8      Hematocrit 03/11/2025 42.1     MCV 03/11/2025 90     MCH 03/11/2025 29.6     MCHC 03/11/2025 32.8     RDW 03/11/2025 13.4     MPV 03/11/2025 9.3     Platelets 03/11/2025 205     nRBC 03/11/2025 0     Segmented % 03/11/2025 61     Immature Grans % 03/11/2025 0     Lymphocytes % 03/11/2025 26     Monocytes % 03/11/2025 8     Eosinophils Relative 03/11/2025 4     Basophils Relative 03/11/2025 1     Absolute Neutrophils 03/11/2025 3.95     Absolute Immature Grans 03/11/2025 0.02     Absolute Lymphocytes 03/11/2025 1.68     Absolute Monocytes 03/11/2025 0.54     Eosinophils Absolute 03/11/2025 0.24     Basophils Absolute 03/11/2025 0.03     Sodium 03/11/2025 135     Potassium 03/11/2025 3.6     Chloride 03/11/2025 98     CO2 03/11/2025 31     ANION GAP 03/11/2025 6     BUN 03/11/2025 17     Creatinine 03/11/2025 1.20     Glucose 03/11/2025 114     Calcium 03/11/2025 9.4     AST 03/11/2025 25     ALT 03/11/2025 27     Alkaline Phosphatase 03/11/2025 87     Total Protein 03/11/2025 6.9     Albumin 03/11/2025 4.2     Total Bilirubin 03/11/2025 0.51     eGFR 03/11/2025 60     hs TnI 0hr 03/11/2025 6     Ventricular Rate 03/11/2025 81     Atrial Rate 03/11/2025 81     IL Interval 03/11/2025 162     QRSD Interval 03/11/2025 90     QT Interval 03/11/2025 376     QTC Interval 03/11/2025 436     P Axis 03/11/2025 70     QRS [place] 03/11/2025 38     T Wave [place] 03/11/2025 70     hs TnI 2hr 03/11/2025 5     Delta 2hr hsTnI 03/11/2025 -1        Suicide/Homicide Risk Assessment:    Risk of Harm to Self:  Based on today's assessment, Maurice presents the following risk of harm to self: low    Risk of Harm to Others:  Based on today's assessment, Bill presents the following risk of harm to others: low    The following interventions are recommended: Continue medication management. No other intervention changes indicated at this time.    Psychotherapy Provided:     Individual psychotherapy provided: No    Treatment Plan:    Completed and  signed during the session: Not applicable - Treatment Plan not due at this session    Goals: Progress towards Treatment Plan goals - Yes, progressing, as evidenced by subjective findings in HPI/Subjective Section and in Assessment and Plan Section    Depression Follow-up Plan Completed: No    Note Share:    This note was not shared with the patient due to reasonable likelihood of causing patient harm    Administrative Statements   Administrative Statements   Encounter provider Abhijeet Guillen MD    The Patient is located at Home and in the following state in which I hold an active license [place].    The patient was identified by name and date of birth. Roverto Milton was informed that this is a telemedicine visit and that the visit is being conducted through the Epic Embedded platform. He agrees to proceed..  My office door was closed. No one else was in the room.  He acknowledged consent and understanding of privacy and security of the video platform. The patient has agreed to participate and understands they can discontinue the visit at any time.    I have spent a total time of 15 minutes in caring for this patient on the day of the visit/encounter including Risks and benefits of tx options, Instructions for management, Patient and family education, Importance of tx compliance, Risk factor reductions, Impressions, Documenting in the medical record, and Reviewing/placing orders in the medical record (including tests, medications, and/or procedures), not including the time spent for establishing the audio/video connection.    Visit Time  Visit Start Time: 8:05AM  Visit Stop Time: 8:20 AM  Total Visit Duration: 15 minutes    Abhijeet Guillen MD 05/27/25

## 2025-05-30 ENCOUNTER — HOSPITAL ENCOUNTER (OUTPATIENT)
Dept: RADIOLOGY | Facility: HOSPITAL | Age: 75
Discharge: HOME | End: 2025-05-30
Attending: PAIN MEDICINE
Payer: MEDICARE

## 2025-05-30 ENCOUNTER — TELEPHONE (OUTPATIENT)
Dept: RADIOLOGY | Facility: HOSPITAL | Age: 75
End: 2025-05-30

## 2025-05-30 VITALS
OXYGEN SATURATION: 98 % | SYSTOLIC BLOOD PRESSURE: 115 MMHG | DIASTOLIC BLOOD PRESSURE: 56 MMHG | TEMPERATURE: 97.6 F | RESPIRATION RATE: 18 BRPM | HEART RATE: 66 BPM

## 2025-05-30 DIAGNOSIS — M47.816 LUMBAR SPONDYLOSIS: ICD-10-CM

## 2025-05-30 PROCEDURE — 64635 DESTROY LUMB/SAC FACET JNT: CPT | Performed by: PAIN MEDICINE

## 2025-05-30 PROCEDURE — 64636 DESTROY L/S FACET JNT ADDL: CPT | Performed by: PAIN MEDICINE

## 2025-05-30 RX ORDER — LIDOCAINE HYDROCHLORIDE 10 MG/ML
5 INJECTION, SOLUTION EPIDURAL; INFILTRATION; INTRACAUDAL; PERINEURAL ONCE
Status: COMPLETED | OUTPATIENT
Start: 2025-05-30 | End: 2025-05-30

## 2025-05-30 RX ORDER — METHYLPREDNISOLONE ACETATE 40 MG/ML
40 INJECTION, SUSPENSION INTRA-ARTICULAR; INTRALESIONAL; INTRAMUSCULAR; PARENTERAL; SOFT TISSUE ONCE
Status: COMPLETED | OUTPATIENT
Start: 2025-05-30 | End: 2025-05-30

## 2025-05-30 RX ORDER — BUPIVACAINE HCL/PF 2.5 MG/ML
5 VIAL (ML) INJECTION ONCE
Status: COMPLETED | OUTPATIENT
Start: 2025-05-30 | End: 2025-05-30

## 2025-05-30 RX ORDER — 0.9 % SODIUM CHLORIDE 0.9 %
10 VIAL (ML) INJECTION ONCE
Status: DISCONTINUED | OUTPATIENT
Start: 2025-05-30 | End: 2025-05-31 | Stop reason: HOSPADM

## 2025-05-30 RX ADMIN — LIDOCAINE HYDROCHLORIDE 5 ML: 10 INJECTION, SOLUTION EPIDURAL; INFILTRATION; INTRACAUDAL; PERINEURAL at 10:17

## 2025-05-30 RX ADMIN — METHYLPREDNISOLONE ACETATE 40 MG: 40 INJECTION, SUSPENSION INTRA-ARTICULAR; INTRALESIONAL; INTRAMUSCULAR; SOFT TISSUE at 10:35

## 2025-05-30 RX ADMIN — Medication 10 ML: at 10:28

## 2025-05-30 RX ADMIN — BUPIVACAINE HYDROCHLORIDE 5 ML: 2.5 INJECTION, SOLUTION EPIDURAL; INFILTRATION; INTRACAUDAL at 10:35

## 2025-05-30 NOTE — DISCHARGE INSTR - LAB

## 2025-05-30 NOTE — H&P
History of Present Illness: The patient is a 74 y.o. male who presents with complaints of left low back pain    Past Medical History[1]    Past Surgical History[2]    Current Medications[3]    Allergies[4]    Physical Exam: There were no vitals filed for this visit.  General: Awake, Alert, Oriented x 3, Mood and affect appropriate  Respiratory: Respirations even and unlabored  Cardiovascular: Peripheral pulses intact; no edema  Musculoskeletal Exam: TTP left lowback    ASA Score: 2         Assessment:   1. Lumbar spondylosis        Plan: LT L3-4,L4-5 RFA      The risks of the procedure were discussed in detail.  These risks include infection, increased pain, paralysis, bleeding.  Patient understands the risks and is willing to pursue with the procedure         [1]   Past Medical History:  Diagnosis Date    ARIANE (acute kidney injury) (HCC) 01/13/2023    pt not aware of this    Arthritis     Asthma     Breathing difficulty 01/2023    shldr sx    Colon polyp     Dental crowns present     all teeth Veneers    Depression     Disease of thyroid gland     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HCC)     Essential hypertension 9/14/2022    History of COVID-19 09/09/2023    original surgery date 9/12/23 and rescheduled to 10/23/23    Hyperlipidemia     Hypertension 2012    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HCC)     per pt does not have this now    Manic depression (HCC)     Right lower lobe pneumonia 01/12/2023    Shortness of breath     MON[place]O    Sleep apnea    [2]   Past Surgical History:  Procedure Laterality Date    COLONOSCOPY  12/13/2022    COLONOSCOPY W/ BIOPSIES AND POLYPECTOMY  06/14/2023    JOINT REPLACEMENT  1/10 /2023    Right Shoulder    NY ARTHROPLASTY GLENOHUMERAL JOINT TOTAL SHOULDER Right 01/10/2023    Procedure: ARTHROPLASTY SHOULDER REVERSE WITH BICEPS TENODESIS;  Surgeon: Lele Patrick MD;  Location: [facility];  Service: Orthopedics    NY BLEPHAROPLASTY LOWER EYELID Bilateral 12/04/2023    Procedure: BLEPHAROPLASTY LOWER;   "Surgeon: Rachna Aguilar DO;  Location:  MAIN OR;  Service: Plastics    MD BLEPHAROPLASTY UPPER EYELID W/EXCESSIVE SKIN Bilateral 12/04/2023    Procedure: BLEPHAROPLASTY UPPER;  Surgeon: Rachna Aguilar DO;  Location:  MAIN OR;  Service: Plastics    UMBILICAL HERNIA REPAIR     [3]   Current Outpatient Medications:     albuterol (PROVENTIL HFA,VENTOLIN HFA) 90 mcg/act inhaler, Inhale 1 puff every 6 (six) hours as needed for wheezing or shortness of breath, Disp: 18 g, Rfl: 6    cetirizine (ZyrTEC) 5 MG tablet, Take 5 mg by mouth daily, Disp: , Rfl:     fluticasone-umeclidinium-vilanterol (Trelegy Ellipta) 200-62.5-25 mcg/actuation AEPB inhaler, Inhale 1 puff daily, Disp: 60 blister, Rfl: 5    lamoTRIgine (LaMICtal) 200 MG tablet, Take 1 tablet (200 mg total) by mouth daily, Disp: 90 tablet, Rfl: 1    levothyroxine 112 mcg tablet, Take 1 tablet (112 mcg total) by mouth daily, Disp: 100 tablet, Rfl: 1    methylPREDNISolone 4 MG tablet therapy pack, Use as directed on package, Disp: 1 each, Rfl: 0    mirtazapine (REMERON) 15 mg tablet, Take 1 tablet (15 mg total) by mouth daily at bedtime, Disp: 30 tablet, Rfl: 1    montelukast (SINGULAIR) 10 mg tablet, Take 1 tablet (10 mg total) by mouth daily at bedtime, Disp: 90 tablet, Rfl: 1    multivitamin-iron-minerals-folic acid (CENTRUM) chewable tablet, Chew 1 tablet daily, Disp: , Rfl:     omega-3-acid ethyl esters (LOVAZA) 1 g capsule, Take 2 capsules (2 g total) by mouth 2 (two) times a day \"Fish oil\", Disp: 180 capsule, Rfl: 4    pramipexole (MIRAPEX) 1 mg tablet, Take 2 tablets (2 mg total) by mouth daily at bedtime, Disp: 180 tablet, Rfl: 1    semaglutide, 2 mg/dose, (Ozempic, 2 MG/DOSE,) 8 mg/ mL injection pen, Inject 0.75 mL (2 mg total) under the skin every 7 days, Disp: 3 mL, Rfl: 2    tamsulosin (FLOMAX) 0.4 mg, Take 2 capsules (0.8 mg total) by mouth daily with dinner, Disp: 200 capsule, Rfl: 1    torsemide (DEMADEX) 5 MG tablet, Take 1 tablet (5 " mg total) by mouth daily, Disp: 90 tablet, Rfl: 1  [4]   Allergies  Allergen Reactions    Atorvastatin Dizziness and Other (See Comments)     And very unsteady on feet    Rosuvastatin Other (See Comments)     Lost ability to walk and balance    Spiriva Respimat [Tiotropium Bromide Monohydrate] Rash    Pollen Extract Allergic Rhinitis

## 2025-06-12 ENCOUNTER — OFFICE VISIT (OUTPATIENT)
Age: 75
End: 2025-06-12
Attending: FAMILY MEDICINE
Payer: MEDICARE

## 2025-06-12 DIAGNOSIS — E11.9 TYPE 2 DIABETES MELLITUS WITHOUT RETINOPATHY (HCC): ICD-10-CM

## 2025-06-12 DIAGNOSIS — H43.813 POSTERIOR VITREOUS DETACHMENT OF BOTH EYES: Primary | ICD-10-CM

## 2025-06-12 PROCEDURE — 92134 CPTRZ OPH DX IMG PST SGM RTA: CPT | Performed by: OPHTHALMOLOGY

## 2025-06-12 PROCEDURE — 92004 COMPRE OPH EXAM NEW PT 1/>: CPT | Performed by: OPHTHALMOLOGY

## 2025-06-12 NOTE — PROGRESS NOTES
Name: Roverto Milton      : 1950      MRN: [id number]  Encounter Provider: Alicja Carpenter MD  Encounter Date: 2025   Encounter department: [facility] OPHTHALMOLOGY  :  Assessment & Plan  Posterior vitreous detachment of both eyes  Posterior vitreous detachment with melchor ring both eyes: asymptomatic. Negative rashad's sign. No retinal tears, breaks, or detachments on dilated examination with scleral depression 360 degrees.  Retinal detachment precautions were discussed with the patient. The patient was instructed to call or return immediately for an increase in flashes of light, floaters (dark spots in vision), or curtaining of the visual field.     Orders:    OCT, Retina - OU - Both Eyes    Type 2 diabetes mellitus without retinopathy (HCC)  No diabetic retinopathy on exam, no macular edema, no NV. The importance of proper blood sugar, blood lipids, and blood pressure control for minimizing the risk of vision loss due to retinopathy associated with diabetes mellitus and hypertension was discussed with the patient. Stressed the importance of following up for monitoring and management by a primary care physician.     Lab Results   Component Value Date    HGBA1C 5.6 2025              Roverto Milton is a 74 y.o. male who presents for a diabetic eye exam.    Reports vision overall doing well.  Was previously a diabetic but lost weight and is now 'borderline'.  A1c: 5.6  Last eye exam was a few years ago.  Denies retinal symptoms.    History obtained from: patient    Review of Systems  Medical History Reviewed by provider this encounter:  Tobacco  Allergies  Meds  Problems  Med Hx  Surg Hx  Fam Hx     .     Past Ocular History: s/p LASIK, PCIOL OU    Ocular Meds/Drops:   Artificial tears PRN    Base Eye Exam       Visual Acuity (Snellen - Linear)         Right Left    Dist sc 20/25- 20/20              Tonometry (Tonopen, 9:19 AM)         Right Left    Pressure 15 15              Pupils          Pupils    Right PERRL    Left PERRL              Visual Fields         Left Right     Full Full              Extraocular Movement         Right Left     Full Full              Neuro/Psych       Oriented x3: Yes    Mood/Affect: Normal              Dilation       Both eyes: 2.5% Phenylephrine, 1% Tropicamide @ 9:19 AM                  Slit Lamp and Fundus Exam       External Exam         Right Left    External Normal Normal              Slit Lamp Exam         Right Left    Lids/Lashes Normal Normal    Conjunctiva/Sclera White and quiet White and quiet    Cornea s/p LASIK s/p LASIK    Anterior Chamber Deep and quiet Deep and quiet    Iris Round and reactive Round and reactive    Lens PCIOL PCIOL    Anterior Vitreous PVD, clear, no cell PVD, clear, no cell              Fundus Exam         Right Left    Disc sharp, no pallor sharp, no pallor    C/D Ratio 0.25 0.25    Macula RPE changes RPE changes    Vessels normal in caliber normal in caliber    Periphery flat, no retinal tear or detachment flat, no retinal tear or detachment                      IMAGING:  OCT, Retina - OU - Both Eyes          Right Eye  Quality was good. Findings include PVD, RPE changes.     Left Eye  Quality was good. Findings include PVD, RPE changes.

## 2025-06-13 ENCOUNTER — OFFICE VISIT (OUTPATIENT)
Dept: PAIN MEDICINE | Facility: CLINIC | Age: 75
End: 2025-06-13
Payer: MEDICARE

## 2025-06-13 ENCOUNTER — TELEPHONE (OUTPATIENT)
Age: 75
End: 2025-06-13

## 2025-06-13 ENCOUNTER — HOSPITAL ENCOUNTER (OUTPATIENT)
Dept: RADIOLOGY | Facility: HOSPITAL | Age: 75
End: 2025-06-13
Payer: MEDICARE

## 2025-06-13 VITALS — WEIGHT: 182 LBS | BODY MASS INDEX: 31.07 KG/M2 | HEIGHT: 64 IN

## 2025-06-13 DIAGNOSIS — M54.16 LUMBAR RADICULOPATHY: ICD-10-CM

## 2025-06-13 DIAGNOSIS — M25.551 BILATERAL HIP PAIN: ICD-10-CM

## 2025-06-13 DIAGNOSIS — M25.552 BILATERAL HIP PAIN: ICD-10-CM

## 2025-06-13 DIAGNOSIS — M48.061 FORAMINAL STENOSIS OF LUMBAR REGION: Primary | ICD-10-CM

## 2025-06-13 PROCEDURE — 99214 OFFICE O/P EST MOD 30 MIN: CPT | Performed by: NURSE PRACTITIONER

## 2025-06-13 PROCEDURE — 73521 X-RAY EXAM HIPS BI 2 VIEWS: CPT

## 2025-06-13 PROCEDURE — G2211 COMPLEX E/M VISIT ADD ON: HCPCS | Performed by: NURSE PRACTITIONER

## 2025-06-13 NOTE — ASSESSMENT & PLAN NOTE
Patient reports longstanding history of pain in his hips.  Will get an x-ray of his hips to evaluate any other pathology may be causing his pain.    Orders:    FL spine and pain procedure; Future    XR hips bilateral 2 vw w pelvis if performed; Future

## 2025-06-13 NOTE — PROGRESS NOTES
Name: Roverto Milton      : 1950      MRN: [id number]  Encounter Provider: LOLLY Rosas  Encounter Date: 2025   Encounter department: [facility] SPINE AND PAIN [facility]  :  Assessment & Plan  Foraminal stenosis of lumbar region  Lumbar radiculopathy  Patient reports minimal to no relief from his RFA.  Patient is reporting he is having bilateral low back pain that is radiating down his left leg.  He also reports some right buttock pain.  He is having pain in his SI joints but has negative provocative maneuvers.    Orders:    FL spine and pain procedure; Future    Bilateral hip pain  Patient reports longstanding history of pain in his hips.  Will get an x-ray of his hips to evaluate any other pathology may be causing his pain.    Orders:    FL spine and pain procedure; Future    XR hips bilateral 2 vw w pelvis if performed; Future        Complete risks and benefits including bleeding, infection, tissue reaction, nerve injury and allergic reaction were discussed. The approach was demonstrated using models and literature was provided. Verbal and written consent was obtained.    My impressions and treatment recommendations were discussed in detail with the patient who verbalized understanding and had no further questions.  Discharge instructions were provided. I personally saw and examined the patient and I agree with the above discussed plan of care.    History of Present Illness     Roverto Milton is a 74 y.o. male who presents to [facility] for interval re-evaluation in regards to pain in the Low back and bilateral Hip. The patients last office visit was 2024. Patient presents today with pain in B/L hips and low back pain that radiates to posterior left thigh and right buttock. Pain is described as Constant, Burning, Dull-aching, Shooting, and Pins & Needles. Symptoms are worse walking, standing, lifting, bending. Alleviating factors identifiable by the  "patient are rest, sitting. On the numeric pain scale of 1-10, the pain typically increases to max of 6 out of 10, which is currently impacting their quality of life.    Current pain meds include: None    Conservative therapy: None  Previous treatments: B/L RFA  Date: May 2025;  0% relief for 2 weeks     Review of Systems   Constitutional: Negative.    Respiratory: Negative.     Cardiovascular: Negative.    Gastrointestinal: Negative.    Musculoskeletal:  Positive for arthralgias (B/L hips) and back pain.   Neurological: Negative.    All other systems reviewed and are negative.      Medical History Reviewed by provider this encounter:  Tobacco  Allergies  Meds  Problems  Med Hx  Surg Hx  Fam Hx     .       Objective   Ht 5' 4\" (1.626 m)   Wt 82.6 kg (182 lb)   BMI 31.24 kg/m²         Physical Exam  Constitutional:normal, well developed, well nourished, alert, in no distress and non-toxic and no overt pain behavior. and overweight  Eyes:anicteric  HEENT:grossly intact  Neck:supple, symmetric, trachea midline and no masses   Pulmonary:even and unlabored  Cardiovascular:No edema or pitting edema present  Skin:Normal without rashes or lesions and well hydrated  Psychiatric:Mood and affect appropriate  Neurologic:Cranial Nerves II-XII grossly intact  Musculoskeletal: normal gait    Lumbar Spine Exam    Appearance:  Normal lordosis  Palpation/Tenderness:  left lumbar paraspinal tenderness  right lumbar paraspinal tenderness  left sacroiliac joint tenderness  right sacroiliac joint tenderness  Sensory:  no sensory deficits noted  Range of Motion:  Flexion:  Minimally limited  with pain  Extension:  Minimally limited  with pain  Motor Strength:  Left hip flexion:  5/5  Right hip flexion:  5/5  Left knee flexion:  5/5  Left knee extension:  5/5  Right knee flexion:  5/5  Right knee extension:  5/5  Special Tests:  Left Thigh Thrust Test:  negative  Right Thigh Thrust Test:  negative  Left LEXX's Maneuver:  " negative  Right LEXX's Maneuver:  negative       Radiology Results Review: I have reviewed radiology reports from 9/9/2024 including: MRI spine.

## 2025-06-18 ENCOUNTER — RESULTS FOLLOW-UP (OUTPATIENT)
Dept: PAIN MEDICINE | Facility: CLINIC | Age: 75
End: 2025-06-18

## 2025-06-18 ENCOUNTER — CLINICAL SUPPORT (OUTPATIENT)
Dept: CARDIOLOGY CLINIC | Facility: CLINIC | Age: 75
End: 2025-06-18
Payer: MEDICARE

## 2025-06-18 VITALS — BODY MASS INDEX: 31.07 KG/M2 | HEIGHT: 64 IN | WEIGHT: 182 LBS

## 2025-06-18 DIAGNOSIS — I25.10 CAD IN NATIVE ARTERY: ICD-10-CM

## 2025-06-18 DIAGNOSIS — Z78.9 STATIN INTOLERANCE: ICD-10-CM

## 2025-06-18 DIAGNOSIS — E78.2 MIXED HYPERLIPIDEMIA: Primary | ICD-10-CM

## 2025-06-18 PROCEDURE — 99211 OFF/OP EST MAY X REQ PHY/QHP: CPT

## 2025-06-19 ENCOUNTER — OFFICE VISIT (OUTPATIENT)
Dept: GASTROENTEROLOGY | Facility: CLINIC | Age: 75
End: 2025-06-19
Payer: MEDICARE

## 2025-06-19 ENCOUNTER — TELEPHONE (OUTPATIENT)
Dept: GASTROENTEROLOGY | Facility: CLINIC | Age: 75
End: 2025-06-19

## 2025-06-19 VITALS
BODY MASS INDEX: 31.07 KG/M2 | HEART RATE: 63 BPM | HEIGHT: 64 IN | SYSTOLIC BLOOD PRESSURE: 120 MMHG | DIASTOLIC BLOOD PRESSURE: 78 MMHG | WEIGHT: 182 LBS

## 2025-06-19 DIAGNOSIS — K63.5 POLYP OF COLON, UNSPECIFIED PART OF COLON, UNSPECIFIED TYPE: ICD-10-CM

## 2025-06-19 PROCEDURE — 99213 OFFICE O/P EST LOW 20 MIN: CPT | Performed by: PHYSICIAN ASSISTANT

## 2025-06-19 RX ORDER — POLYETHYLENE GLYCOL 3350, SODIUM CHLORIDE, SODIUM BICARBONATE, POTASSIUM CHLORIDE 420; 11.2; 5.72; 1.48 G/4L; G/4L; G/4L; G/4L
4000 POWDER, FOR SOLUTION ORAL ONCE
Qty: 4000 ML | Refills: 0 | Status: SHIPPED | OUTPATIENT
Start: 2025-06-19 | End: 2025-06-19

## 2025-06-19 RX ORDER — SODIUM CHLORIDE, SODIUM LACTATE, POTASSIUM CHLORIDE, CALCIUM CHLORIDE 600; 310; 30; 20 MG/100ML; MG/100ML; MG/100ML; MG/100ML
125 INJECTION, SOLUTION INTRAVENOUS CONTINUOUS
OUTPATIENT
Start: 2025-06-19

## 2025-06-19 NOTE — PROGRESS NOTES
"Name: Roverto Milton      : 1950      MRN: [id number]  Encounter Provider: Caitlin Ba PA-C  Encounter Date: 2025   Encounter department: [facility] GASTROENTEROLOGY [place] [id number]  :  Assessment & Plan  Polyp of colon, unspecified part of colon, unspecified type  Patient with history of tubular adenomatous colon polyps as well as tubulovillous adenoma with recommended colonoscopy in 2 years and we will schedule at this time, did have poor prep on prior colonoscopy, will plan for colonoscopy with GoLytely prep  Orders:    Ambulatory Referral to Gastroenterology    Colonoscopy; Future        History of Present Illness   Roverto Milton is a 74 y.o. male who presents for consultation to colonoscopy.  Patient does have history of large colon polyps and was noted to have 1 polyp was a tubulovillous adenoma with recommended colonoscopy in 2 years.  Patient otherwise has no GI complaints.  Denies any upper GI symptoms.  Did have difficulty swallowing in the past and had an EGD back in  which did not show any evidence of esophageal stricture but Candida esophagitis was suspected, biopsy did confirm Candida esophagitis and he was prescribed nystatin at that time.  Did have a colonoscopy at that time which did reveal a poor prep.  HPI    Review of Systems A complete review of systems is negative other than that noted above in the HPI.      Current Medications[1]  Objective   /78 (BP Location: Left arm, Patient Position: Sitting, Cuff Size: Standard)   Pulse 63   Ht 5' 4\" (1.626 m)   Wt 82.6 kg (182 lb)   BMI 31.24 kg/m²     Physical Exam   Gen- alert, nad  Heart-s1/s2  Lungs-CTA b/l  Abd-soft positive bowel sounds nontender nondistended      Lab Results: I personally reviewed relevant lab results.       Results for orders placed during the hospital encounter of 23    Colonoscopy    Impression  Scattered diverticulosis of moderate severity in the mid descending colon, " H&P UPDATE NOTE    Aramis Pepe returns to clinic today for scheduled Procedure. Patient was evaluated in the exam room prior to being brought back to the procedure room. There were no changes in symptoms or clinical condition since the prior visit. All appropriate documentation and studies were reviewed. Physical exam unchanged from the prior visit. After extensive discussion concerning benefits and risks of the procedure--not limited to bleeding, infection, nerve injury, headache, worsening of pain, medications adverse effects and allergic reaction--the patient approved and consented for the procedure. The patient denied history of allergies to relevant medications and denied current infections, anticoagulation therapy, possible pregnancy, abnormal control of blood pressure and blood sugar. The patient was able to repeat the possible side effects and risks with complete understanding. The aforementioned treatment plan was also discussed with the patient and any available family members, who indicated it's understanding, agreed with it and there were no barriers to communication.     Will proceed with TOM     distal descending colon, proximal sigmoid colon, mid sigmoid colon and distal sigmoid colon  Two 15 mm adenomatous-appearing polyps in the proximal transverse colon and distal transverse colon; removed by hot snare  Subcentimeter sessile polyp in the proximal descending colon was removed with cold forceps biopsy  One 20 mm sessile, benign-appearing polyp in the distal descending colon; removed by hot snare        RECOMMENDATION:  Await pathology results  Repeat colonoscopy in 2 years  Personal history of colon polyps    Multiple colon polyps have been removed.  Patient is high risk for adenoma and carcinoma.              Christa Overton MD    Path-   A. Transverse colon, proximal, polyp, polypectomy:  -Favor tubular adenoma, see comment.     Comment: Extensive cautery artifact precludes definitive evaluation.       B. Transverse colon, distal, polyp, polypectomy:  -Tubular adenoma, negative for high grade dysplasia.      C. Descending colon, polyp, polypectomy:  -Tubular adenoma, negative for high grade dysplasia.      D. Descending colon, distal, polyp, polypectomy:  -Tubulovillous adenoma, negative for high grade dysplasia.               [1]   Current Outpatient Medications   Medication Sig Dispense Refill    albuterol (PROVENTIL HFA,VENTOLIN HFA) 90 mcg/act inhaler Inhale 1 puff every 6 (six) hours as needed for wheezing or shortness of breath 18 g 6    cetirizine (ZyrTEC) 5 MG tablet Take 5 mg by mouth in the morning.      fluticasone-umeclidinium-vilanterol (Trelegy Ellipta) 200-62.5-25 mcg/actuation AEPB inhaler Inhale 1 puff daily 60 blister 5    lamoTRIgine (LaMICtal) 200 MG tablet Take 1 tablet (200 mg total) by mouth daily 90 tablet 1    levothyroxine 112 mcg tablet Take 1 tablet (112 mcg total) by mouth daily 100 tablet 1    mirtazapine (REMERON) 15 mg tablet Take 1 tablet (15 mg total) by mouth daily at bedtime 30 tablet 1    montelukast (SINGULAIR) 10 mg tablet Take 1 tablet (10 mg total) by mouth  "daily at bedtime 90 tablet 1    multivitamin-iron-minerals-folic acid (CENTRUM) chewable tablet Chew 1 tablet in the morning.      omega-3-acid ethyl esters (LOVAZA) 1 g capsule Take 2 capsules (2 g total) by mouth 2 (two) times a day \"Fish oil\" 180 capsule 4    pramipexole (MIRAPEX) 1 mg tablet Take 2 tablets (2 mg total) by mouth daily at bedtime 180 tablet 1    semaglutide, 2 mg/dose, (Ozempic, 2 MG/DOSE,) 8 mg/ mL injection pen Inject 0.75 mL (2 mg total) under the skin every 7 days 3 mL 2    tamsulosin (FLOMAX) 0.4 mg Take 2 capsules (0.8 mg total) by mouth daily with dinner 200 capsule 1    torsemide (DEMADEX) 5 MG tablet Take 1 tablet (5 mg total) by mouth daily 90 tablet 1    methylPREDNISolone 4 MG tablet therapy pack Use as directed on package 1 each 0     No current facility-administered medications for this visit.     "

## 2025-06-19 NOTE — TELEPHONE ENCOUNTER
Scheduled date of colonoscopy (as of today): 8/27/25  Physician performing colonoscopy: Dr Shabazz  Location of colonoscopy:   Bowel prep reviewed with patient: Golytely given at appt   Instructions reviewed with patient by: ls  Clearances: n/a  7 day Ozempic hold - pt aware

## 2025-06-19 NOTE — ASSESSMENT & PLAN NOTE
Patient with history of tubular adenomatous colon polyps as well as tubulovillous adenoma with recommended colonoscopy in 2 years and we will schedule at this time, did have poor prep on prior colonoscopy, will plan for colonoscopy with GoLytely prep  Orders:    Ambulatory Referral to Gastroenterology    Colonoscopy; Future

## 2025-06-23 ENCOUNTER — TELEPHONE (OUTPATIENT)
Dept: PAIN MEDICINE | Facility: CLINIC | Age: 75
End: 2025-06-23

## 2025-06-23 DIAGNOSIS — M47.816 LUMBAR SPONDYLOSIS: Primary | ICD-10-CM

## 2025-06-23 DIAGNOSIS — M79.18 MYOFASCIAL PAIN SYNDROME: ICD-10-CM

## 2025-06-23 RX ORDER — TRAMADOL HYDROCHLORIDE 50 MG/1
50 TABLET ORAL EVERY 6 HOURS PRN
Qty: 28 TABLET | Refills: 1 | Status: SHIPPED | OUTPATIENT
Start: 2025-06-23

## 2025-06-23 NOTE — TELEPHONE ENCOUNTER
----- Message from Lex Dyer MD sent at 6/23/2025 10:24 AM EDT -----  Lets cancel KELY for now  Please have patient follow up with me in 2 weeks thank you , ok ruby double book

## 2025-06-23 NOTE — TELEPHONE ENCOUNTER
Advised pt as per dr bhatia will cancel procedure and fup and schedule him w/ dr bhatia in 2 weeks in [place]. Appt made and confirmed

## 2025-06-23 NOTE — PROGRESS NOTES
Called to discuss pain symptoms  Will hold off on KELY for now   Will provide course of tramadol 50 mg qid prn with refill  Will follow up with me in the office

## 2025-07-09 ENCOUNTER — OFFICE VISIT (OUTPATIENT)
Dept: PAIN MEDICINE | Facility: CLINIC | Age: 75
End: 2025-07-09
Payer: MEDICARE

## 2025-07-09 VITALS — HEIGHT: 64 IN | WEIGHT: 181 LBS | BODY MASS INDEX: 30.9 KG/M2

## 2025-07-09 DIAGNOSIS — M53.3 PAIN OF BOTH SACROILIAC JOINTS: Primary | ICD-10-CM

## 2025-07-09 PROCEDURE — 99214 OFFICE O/P EST MOD 30 MIN: CPT | Performed by: PAIN MEDICINE

## 2025-07-09 NOTE — PROGRESS NOTES
"Name: Roverto Milton      : 1950      MRN: [id number]  Encounter Provider: Lex Dyer MD  Encounter Date: 2025   Encounter department: [facility] SPINE AND PAIN [facility]  :  Assessment & Plan  Pain of both sacroiliac joints    Orders:  •  FL spine and pain procedure; Future  3 provocative tests on exam consistent with SI dysfunction bilaterally, will recommend bilateral SI joint injection            My impressions and treatment recommendations were discussed in detail with the patient who verbalized understanding and had no further questions.  Discharge instructions were provided. I personally saw and examined the patient and I agree with the above discussed plan of care.    History of Present Illness     Roverto Milton is a 75 y.o. male who presents for a follow up office visit in regards to low back pain, VAS 7/10 in the lower back and buttocks, doing well following ablation, reports pain with weight transfer, getting in and out of chair, and walking. Reports OTC medications can be helpful        Opioid contract date    Last UDS Date: No results in last 5 years                    last taken on    Review of Systems    Medical History Reviewed by provider this encounter:     .       Objective   Ht 5' 4\" (1.626 m)   Wt 82.1 kg (181 lb)   BMI 31.07 kg/m²         Physical Exam  Lumbar Spine:  No masses or atrophy,    Range of motion - Decreased extension/flexion  Palpation - minimal Tenderness to palpation in the lumbar parapsinals   PSIS tenderness + bilaterally  Rigoberto's/LEXX + bilaterally  Gaenslen's + bilaterally  Yeoman + bilaterally  SLR neg       Strength Right Left   Hip flexion L1,2 5 5   Knee extension L3 5 5   Ankle dorsiflexion L4 5 5   Great toe extension L5 5 5   Ankle Plantarflexion S1 5 5       Sensory Exam:  intact to light touch bilateral LE       Reflexes:     Right Left   Biceps 2+ 2+   Triceps 2+ 2+   Brachioradialis 2+ 2+   Patellar 2+ 2+   Achilles 2+ 2+ "   Babinski neg neg        Gait normal

## 2025-07-10 ENCOUNTER — TELEPHONE (OUTPATIENT)
Age: 75
End: 2025-07-10

## 2025-07-10 NOTE — TELEPHONE ENCOUNTER
Caller: Roverto     Doctor: Dr. SAAVEDRA    Reason for call: Patient calling to schedule procedure please advise     Call back#: [phone]

## 2025-07-24 ENCOUNTER — HOSPITAL ENCOUNTER (OUTPATIENT)
Dept: RADIOLOGY | Facility: HOSPITAL | Age: 75
Discharge: HOME | End: 2025-07-24
Attending: PAIN MEDICINE
Payer: MEDICARE

## 2025-07-24 VITALS
SYSTOLIC BLOOD PRESSURE: 108 MMHG | HEART RATE: 75 BPM | TEMPERATURE: 97.5 F | OXYGEN SATURATION: 96 % | DIASTOLIC BLOOD PRESSURE: 61 MMHG | RESPIRATION RATE: 18 BRPM

## 2025-07-24 DIAGNOSIS — M53.3 PAIN OF BOTH SACROILIAC JOINTS: ICD-10-CM

## 2025-07-24 PROCEDURE — 27096 INJECT SACROILIAC JOINT: CPT | Performed by: PAIN MEDICINE

## 2025-07-24 RX ORDER — METHYLPREDNISOLONE ACETATE 80 MG/ML
80 INJECTION, SUSPENSION INTRA-ARTICULAR; INTRALESIONAL; INTRAMUSCULAR; PARENTERAL; SOFT TISSUE ONCE
Status: COMPLETED | OUTPATIENT
Start: 2025-07-24 | End: 2025-07-24

## 2025-07-24 RX ORDER — BUPIVACAINE HCL/PF 2.5 MG/ML
5 VIAL (ML) INJECTION ONCE
Status: COMPLETED | OUTPATIENT
Start: 2025-07-24 | End: 2025-07-24

## 2025-07-24 RX ADMIN — IOHEXOL 2 ML: 300 INJECTION, SOLUTION INTRAVENOUS at 08:58

## 2025-07-24 RX ADMIN — METHYLPREDNISOLONE ACETATE 80 MG: 80 INJECTION, SUSPENSION INTRA-ARTICULAR; INTRALESIONAL; INTRAMUSCULAR; SOFT TISSUE at 08:58

## 2025-07-24 RX ADMIN — BUPIVACAINE HYDROCHLORIDE 5 ML: 2.5 INJECTION, SOLUTION EPIDURAL; INFILTRATION; INTRACAUDAL at 08:58

## 2025-07-24 NOTE — DISCHARGE INSTR - LAB

## 2025-07-24 NOTE — H&P
History of Present Illness: The patient is a 75 y.o. male who presents with complaints of bilateral buttocks pain    Past Medical History[1]    Past Surgical History[2]    Current Medications[3]    Allergies[4]    Physical Exam: There were no vitals filed for this visit.  General: Awake, Alert, Oriented x 3, Mood and affect appropriate  Respiratory: Respirations even and unlabored  Cardiovascular: Peripheral pulses intact; no edema  Musculoskeletal Exam: TTP si joint    ASA Score: 2         Assessment:   1. Pain of both sacroiliac joints        Plan: bilateral si joint injection         [1]   Past Medical History:  Diagnosis Date    ARIANE (acute kidney injury) (HCC) 01/13/2023    pt not aware of this    Arthritis     Asthma     Breathing difficulty 01/2023    shldr sx    Cataract 2000    Colon polyp     Dental crowns present     all teeth Veneers    Depression     Disease of thyroid gland     Ear problems 3/30/2024    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HCC)     Essential hypertension 09/14/2022    Herpes 1975    Genital herpes    History of COVID-19 09/09/2023    original surgery date 9/12/23 and rescheduled to 10/23/23    History of electroconvulsive therapy 1987    First Treatment    Hyperlipidemia     Hypertension 2012    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HCC)     per pt does not have this now    Manic depression (HCC)     Nasal congestion 3/30/2024    Obesity 1999    Osteoarthritis     Right lower lobe pneumonia 01/12/2023    Shortness of breath     [place]    Sleep apnea     STD (sexually transmitted disease) 1980    Herpes   [2]   Past Surgical History:  Procedure Laterality Date    CATARACT EXTRACTION  2000    COLONOSCOPY  12/13/2022    COLONOSCOPY W/ BIOPSIES AND POLYPECTOMY  06/14/2023    COSMETIC SURGERY  January 2024    Eyelid Surgery    EYE SURGERY  Lasik 1995    JOINT REPLACEMENT  1/10 /2023    Right Shoulder    LASIK  2005    [facility] ARTHROPLASTY GLENOHUMERAL JOINT TOTAL SHOULDER Right 01/10/2023    Procedure: ARTHROPLASTY SHOULDER  "REVERSE WITH BICEPS TENODESIS;  Surgeon: Lele Patrick MD;  Location:  MAIN OR;  Service: Orthopedics    [place] BLEPHAROPLASTY LOWER EYELID Bilateral 12/04/2023    Procedure: BLEPHAROPLASTY LOWER;  Surgeon: Rachna Aguilar DO;  Location:  MAIN OR;  Service: Plastics    [place] BLEPHAROPLASTY UPPER EYELID W/EXCESSIVE SKIN Bilateral 12/04/2023    Procedure: BLEPHAROPLASTY UPPER;  Surgeon: Rachna Aguilar DO;  Location:  MAIN OR;  Service: Plastics    UMBILICAL HERNIA REPAIR     [3]   Current Outpatient Medications:     albuterol (PROVENTIL HFA,VENTOLIN HFA) 90 mcg/act inhaler, Inhale 1 puff every 6 (six) hours as needed for wheezing or shortness of breath, Disp: 18 g, Rfl: 6    cetirizine (ZyrTEC) 5 MG tablet, Take 5 mg by mouth in the morning., Disp: , Rfl:     fluticasone-umeclidinium-vilanterol (Trelegy Ellipta) 200-62.5-25 mcg/actuation AEPB inhaler, Inhale 1 puff daily, Disp: 60 blister, Rfl: 5    lamoTRIgine (LaMICtal) 200 MG tablet, Take 1 tablet (200 mg total) by mouth daily, Disp: 90 tablet, Rfl: 1    levothyroxine 112 mcg tablet, Take 1 tablet (112 mcg total) by mouth daily, Disp: 100 tablet, Rfl: 1    methylPREDNISolone 4 MG tablet therapy pack, Use as directed on package, Disp: 1 each, Rfl: 0    mirtazapine (REMERON) 15 mg tablet, Take 1 tablet (15 mg total) by mouth daily at bedtime, Disp: 30 tablet, Rfl: 1    montelukast (SINGULAIR) 10 mg tablet, Take 1 tablet (10 mg total) by mouth daily at bedtime, Disp: 90 tablet, Rfl: 1    multivitamin-iron-minerals-folic acid (CENTRUM) chewable tablet, Chew 1 tablet in the morning., Disp: , Rfl:     omega-3-acid ethyl esters (LOVAZA) 1 g capsule, Take 2 capsules (2 g total) by mouth 2 (two) times a day \"Fish oil\", Disp: 180 capsule, Rfl: 4    polyethylene glycol-electrolytes (NULYTELY) 4000 mL solution, Take 4,000 mL by mouth once for 1 dose, Disp: 4000 mL, Rfl: 0    pramipexole (MIRAPEX) 1 mg tablet, Take 2 tablets (2 mg total) by mouth daily " at bedtime, Disp: 180 tablet, Rfl: 1    semaglutide, 2 mg/dose, (Ozempic, 2 MG/DOSE,) 8 mg/ mL injection pen, Inject 0.75 mL (2 mg total) under the skin every 7 days, Disp: 3 mL, Rfl: 2    tamsulosin (FLOMAX) 0.4 mg, Take 2 capsules (0.8 mg total) by mouth daily with dinner, Disp: 200 capsule, Rfl: 1    torsemide (DEMADEX) 5 MG tablet, Take 1 tablet (5 mg total) by mouth daily, Disp: 90 tablet, Rfl: 1    traMADol (Ultram) 50 mg tablet, Take 1 tablet (50 mg total) by mouth every 6 (six) hours as needed for severe pain, Disp: 28 tablet, Rfl: 1  [4]   Allergies  Allergen Reactions    Atorvastatin Dizziness and Other (See Comments)     And very unsteady on feet    Rosuvastatin Other (See Comments)     Lost ability to walk and balance    Spiriva Respimat [Tiotropium Bromide Monohydrate] Rash    Pollen Extract Allergic Rhinitis

## 2025-08-04 ENCOUNTER — TELEPHONE (OUTPATIENT)
Age: 75
End: 2025-08-04

## 2025-08-07 ENCOUNTER — TELEPHONE (OUTPATIENT)
Dept: PAIN MEDICINE | Facility: MEDICAL CENTER | Age: 75
End: 2025-08-07

## 2025-08-08 ENCOUNTER — TELEPHONE (OUTPATIENT)
Age: 75
End: 2025-08-08

## 2025-08-15 ENCOUNTER — TELEPHONE (OUTPATIENT)
Dept: OBGYN CLINIC | Facility: CLINIC | Age: 75
End: 2025-08-15

## (undated) DEVICE — DRESSING MEPILEX AG BORDER 4 X 8 IN

## (undated) DEVICE — 3M™ IOBAN™ 2 ANTIMICROBIAL INCISE DRAPE 6650EZ: Brand: IOBAN™ 2

## (undated) DEVICE — CAPIT ASCEND FLEX REVERSE W PERFORM

## (undated) DEVICE — SPONGE 4 X 4 XRAY 16 PLY STRL LF RFD

## (undated) DEVICE — GUIDE PIN 2.5 X 220MM AEQUALIS PERFORM PLUS

## (undated) DEVICE — BASIC PACK: Brand: CONVERTORS

## (undated) DEVICE — CORNEAL PROTECTOR ADULT

## (undated) DEVICE — SCD SEQUENTIAL COMPRESSION COMFORT SLEEVE MEDIUM KNEE LENGTH: Brand: KENDALL SCD

## (undated) DEVICE — TELFA NON-ADHERENT ABSORBENT DRESSING: Brand: TELFA

## (undated) DEVICE — INTENDED FOR TISSUE SEPARATION, AND OTHER PROCEDURES THAT REQUIRE A SHARP SURGICAL BLADE TO PUNCTURE OR CUT.: Brand: BARD-PARKER SAFETY BLADES SIZE 10, STERILE

## (undated) DEVICE — INTENDED FOR TISSUE SEPARATION, AND OTHER PROCEDURES THAT REQUIRE A SHARP SURGICAL BLADE TO PUNCTURE OR CUT.: Brand: BARD-PARKER ® SAFETYLOCK CARBON RIB-BACK BLADES

## (undated) DEVICE — SUT VICRYL PLUS 2-0 CTB-1 27 IN VCPB259H

## (undated) DEVICE — DRAPE EQUIPMENT RF WAND

## (undated) DEVICE — THE SIMPULSE SOLO SYSTEM WITH ULTREX RETRACTABLE SPLASH SHIELD TIP: Brand: SIMPULSE SOLO

## (undated) DEVICE — DRILL BIT 3.2MM

## (undated) DEVICE — PENCIL ELECTROSURG E-Z CLEAN -0035H

## (undated) DEVICE — GLOVE SRG BIOGEL 7.5

## (undated) DEVICE — 1820 FOAM BLOCK NEEDLE COUNTER: Brand: DEVON

## (undated) DEVICE — PROXIMATE PLUS MD MULTI-DIRECTIONAL RELEASE SKIN STAPLERS CONTAINS 35 STAINLESS STEEL STAPLES APPROXIMATE CLOSED DIMENSIONS: 6.9MM X 3.9MM WIDE: Brand: PROXIMATE

## (undated) DEVICE — HOOD: Brand: T7PLUS

## (undated) DEVICE — CHLORAPREP HI-LITE 26ML ORANGE

## (undated) DEVICE — COTTON TIP APPLICTOR 2 PK

## (undated) DEVICE — NEEDLE BLUNT 18 G X 1 1/2IN

## (undated) DEVICE — KIT STABILIZATION SHOULDER MARCO

## (undated) DEVICE — POV-IOD SOLUTION 4OZ BT

## (undated) DEVICE — MASTISOL LIQ ADHESIVE 2/3ML

## (undated) DEVICE — SYRINGE 30ML LL

## (undated) DEVICE — STANDARD SURGICAL GOWN, L: Brand: CONVERTORS

## (undated) DEVICE — GLOVE INDICATOR PI UNDERGLOVE SZ 7.5 BLUE

## (undated) DEVICE — DRESSING MEPILEX AG BORDER POST-OP 4 X 8 IN

## (undated) DEVICE — ICE PACK EYE

## (undated) DEVICE — PACK MAJOR ORTHO W/SPLITS PBDS

## (undated) DEVICE — 3M™ STERI-STRIP™ REINFORCED ADHESIVE SKIN CLOSURES, R1546, 1/4 IN X 4 IN (6 MM X 100 MM), 10 STRIPS/ENVELOPE: Brand: 3M™ STERI-STRIP™

## (undated) DEVICE — SPONGE PVP SCRUB WING STERILE

## (undated) DEVICE — SYRINGE 10ML LL

## (undated) DEVICE — SUT 2 ORTHOCORD MO7

## (undated) DEVICE — IMPERVIOUS STOCKINETTE: Brand: DEROYAL

## (undated) DEVICE — BASIC SINGLE BASIN 2-LF: Brand: MEDLINE INDUSTRIES, INC.

## (undated) DEVICE — 3M™ STERI-STRIP™ REINFORCED ADHESIVE SKIN CLOSURES, R1542, 1/4 IN X 1-1/2 IN (6 MM X 38 MM), 6 STRIPS/ENVELOPE: Brand: 3M™ STERI-STRIP™

## (undated) DEVICE — PREMIUM DRY TRAY LF: Brand: MEDLINE INDUSTRIES, INC.

## (undated) DEVICE — DECANTER: Brand: UNBRANDED

## (undated) DEVICE — PAD GROUNDING ADULT

## (undated) DEVICE — ELECTRODE NEEDLE MOD E-Z CLEAN 2.75IN 7CM -0013M

## (undated) DEVICE — NEEDLE 30 G X 1/2

## (undated) DEVICE — STERILE POLYISOPRENE POWDER-FREE SURGICAL GLOVES WITH EMOLLIENT COATING: Brand: PROTEXIS

## (undated) DEVICE — SKIN MARKER DUAL TIP WITH RULER CAP, FLEXIBLE RULER AND LABELS: Brand: DEVON

## (undated) DEVICE — UNDYED MONOFILAMENT (POLYDIOXANONE), ABSORBABLE SYNTHETIC SURGICAL SUTURE: Brand: PDS

## (undated) DEVICE — SUT MONOCRYL PLUS 5-0 PC12 18 IN MCP834G

## (undated) DEVICE — INTENT TO BE USED WITH SUTURE MATERIAL FOR TISSUE CLOSURE: Brand: RICHARD-ALLAN®  NEEDLE 1/2 CIRCLE REVERSE CUTTING

## (undated) DEVICE — ARTHROSCOPY FLOOR MAT

## (undated) DEVICE — SUT CHROMIC 6-0 790G

## (undated) DEVICE — ASTOUND STANDARD SURGICAL GOWN, XXL: Brand: CONVERTORS

## (undated) DEVICE — LIGHT GLOVE GREEN

## (undated) DEVICE — SUT PROLENE 6-0 P-3 18 IN 8695G

## (undated) DEVICE — TIBURON SPLIT SHEET: Brand: CONVERTORS

## (undated) DEVICE — DISPOSABLE OR TOWEL: Brand: CARDINAL HEALTH

## (undated) DEVICE — SUT VICRYL PLUS 0 CTB-1 27 IN VCPB260H

## (undated) DEVICE — STERILE POLYISOPRENE POWDER-FREE SURGICAL GLOVES: Brand: PROTEXIS

## (undated) DEVICE — HEAVY DUTY TABLE COVER: Brand: CONVERTORS

## (undated) DEVICE — SUT 2 ORTHOCORD 36 IN W/O NEEDLES

## (undated) DEVICE — DUAL CUT SAGITTAL BLADE